# Patient Record
Sex: FEMALE | Race: WHITE | NOT HISPANIC OR LATINO | Employment: OTHER | URBAN - NONMETROPOLITAN AREA
[De-identification: names, ages, dates, MRNs, and addresses within clinical notes are randomized per-mention and may not be internally consistent; named-entity substitution may affect disease eponyms.]

---

## 2017-06-27 ENCOUNTER — OFFICE VISIT (OUTPATIENT)
Dept: ORTHOPEDIC SURGERY | Facility: CLINIC | Age: 67
End: 2017-06-27

## 2017-06-27 DIAGNOSIS — M25.562 LEFT KNEE PAIN, UNSPECIFIED CHRONICITY: ICD-10-CM

## 2017-06-27 DIAGNOSIS — M17.12 PRIMARY OSTEOARTHRITIS OF LEFT KNEE: ICD-10-CM

## 2017-06-27 DIAGNOSIS — Z96.641 STATUS POST TOTAL HIP REPLACEMENT, RIGHT: ICD-10-CM

## 2017-06-27 DIAGNOSIS — M25.551 RIGHT HIP PAIN: Primary | ICD-10-CM

## 2017-06-27 PROCEDURE — 99213 OFFICE O/P EST LOW 20 MIN: CPT | Performed by: ORTHOPAEDIC SURGERY

## 2017-06-27 PROCEDURE — 73560 X-RAY EXAM OF KNEE 1 OR 2: CPT | Performed by: ORTHOPAEDIC SURGERY

## 2017-06-27 PROCEDURE — 73502 X-RAY EXAM HIP UNI 2-3 VIEWS: CPT | Performed by: ORTHOPAEDIC SURGERY

## 2017-11-01 ENCOUNTER — CONVERSION ENCOUNTER (OUTPATIENT)
Dept: OTHER | Facility: HOSPITAL | Age: 67
End: 2017-11-01

## 2017-11-09 ENCOUNTER — CONVERSION ENCOUNTER (OUTPATIENT)
Dept: MAMMOGRAPHY | Facility: HOSPITAL | Age: 67
End: 2017-11-09

## 2018-01-23 ENCOUNTER — OFFICE VISIT CONVERTED (OUTPATIENT)
Dept: FAMILY MEDICINE CLINIC | Age: 68
End: 2018-01-23
Attending: FAMILY MEDICINE

## 2018-03-05 ENCOUNTER — OFFICE VISIT CONVERTED (OUTPATIENT)
Dept: UROLOGY | Facility: CLINIC | Age: 68
End: 2018-03-05
Attending: UROLOGY

## 2018-03-05 ENCOUNTER — CONVERSION ENCOUNTER (OUTPATIENT)
Dept: SURGERY | Facility: CLINIC | Age: 68
End: 2018-03-05

## 2018-06-11 ENCOUNTER — OFFICE VISIT CONVERTED (OUTPATIENT)
Dept: FAMILY MEDICINE CLINIC | Age: 68
End: 2018-06-11
Attending: NURSE PRACTITIONER

## 2018-08-14 ENCOUNTER — OFFICE VISIT CONVERTED (OUTPATIENT)
Dept: FAMILY MEDICINE CLINIC | Age: 68
End: 2018-08-14
Attending: NURSE PRACTITIONER

## 2018-08-21 ENCOUNTER — OFFICE VISIT (OUTPATIENT)
Dept: ORTHOPEDIC SURGERY | Facility: CLINIC | Age: 68
End: 2018-08-21

## 2018-08-21 DIAGNOSIS — M17.12 PRIMARY OSTEOARTHRITIS OF LEFT KNEE: ICD-10-CM

## 2018-08-21 DIAGNOSIS — Z96.641 HISTORY OF TOTAL HIP REPLACEMENT, RIGHT: ICD-10-CM

## 2018-08-21 DIAGNOSIS — G89.29 CHRONIC PAIN OF LEFT KNEE: Primary | ICD-10-CM

## 2018-08-21 DIAGNOSIS — Z96.641 STATUS POST TOTAL HIP REPLACEMENT, RIGHT: ICD-10-CM

## 2018-08-21 DIAGNOSIS — M25.562 CHRONIC PAIN OF LEFT KNEE: Primary | ICD-10-CM

## 2018-08-21 PROCEDURE — 99213 OFFICE O/P EST LOW 20 MIN: CPT | Performed by: ORTHOPAEDIC SURGERY

## 2018-08-21 PROCEDURE — 73560 X-RAY EXAM OF KNEE 1 OR 2: CPT | Performed by: ORTHOPAEDIC SURGERY

## 2018-08-21 PROCEDURE — 73502 X-RAY EXAM HIP UNI 2-3 VIEWS: CPT | Performed by: ORTHOPAEDIC SURGERY

## 2018-08-21 PROCEDURE — 20610 DRAIN/INJ JOINT/BURSA W/O US: CPT | Performed by: ORTHOPAEDIC SURGERY

## 2018-08-21 RX ADMIN — METHYLPREDNISOLONE ACETATE 160 MG: 80 INJECTION, SUSPENSION INTRA-ARTICULAR; INTRALESIONAL; INTRAMUSCULAR; SOFT TISSUE at 13:36

## 2018-08-21 RX ADMIN — LIDOCAINE HYDROCHLORIDE 2 ML: 10 INJECTION, SOLUTION EPIDURAL; INFILTRATION; INTRACAUDAL; PERINEURAL at 13:36

## 2018-08-21 NOTE — PROGRESS NOTES
Chief Complaint   Patient presents with   • Right Hip - Follow-up   • Left Knee - Follow-up   Patient is here today for both a right hip and left knee follow-up. She had a right hip replacement done by Dr. Bose and is having some pain. She is also still having left knee pain.        HPI the patient states that she is doing quite well with her right hip replacement.  The hip arthroplasty itself does not bother her at all.  She has some pain and discomfort laterally over the greater trochanter.  I do think this is because of increased offset from the hip arthroplasty with pain and tenderness over the greater trochanter.  Overall the hip replacement helped her symptoms tremendously.  She is able to walk a whole lot better and can walk for exercise at this point.  She states that she's been having some left knee pain and discomfort.  This pain is a dull grinding sensation which is in the retropatellar location.  Her symptoms are associated with some swelling and bruising.  She has difficulty going up and down the steps.  She has a sense of tightness in the knee when she goes into deep flexion.  There is no history of trauma to the knee.  She describes her pain is moderately severe.  The hip pain is worse at night and makes it difficult for her to turn over in bed at night.  There do not appear to be any symptoms related to the hip implant pain and discomfort.        There were no vitals filed for this visit.        Review of Systems   Constitutional: Negative.    HENT: Negative.    Eyes: Negative.    Respiratory: Negative.    Cardiovascular: Negative.    Gastrointestinal: Negative.    Endocrine: Negative.    Genitourinary: Negative.    Musculoskeletal: Positive for gait problem and joint swelling.   Skin: Negative.    Allergic/Immunologic: Negative.    Hematological: Negative.    Psychiatric/Behavioral: Negative.            Physical Exam   Constitutional: She is oriented to person, place, and time. She appears  well-nourished.   HENT:   Head: Atraumatic.   Eyes: EOM are normal.   Neck: Neck supple.   Cardiovascular: Normal rate, regular rhythm, normal heart sounds and intact distal pulses.    Pulmonary/Chest: Effort normal and breath sounds normal.   Abdominal: Bowel sounds are normal.   Musculoskeletal: She exhibits edema and tenderness.   Neurological: She is oriented to person, place, and time.   Skin: Skin is dry. Capillary refill takes 2 to 3 seconds.   Psychiatric: She has a normal mood and affect. Her behavior is normal. Thought content normal.   Nursing note and vitals reviewed.          Joint/Body Part Specific Exam:  right hip. Patient is post op 5 year(s) from total hip arthoplasty, direct posterior. Incision is clean and healing well. Calf is soft and nontender. Homans sign is negative. Skin and soft tissues are appropriate for postoperative status of the patient. Hip flexion is 0-90 degrees, hip abduction is 0-40 degrees. No limb lenth discrepancy. Neurovascular status is intact. Patients gait is significantly improved. The pain relief is extremely dramatic for the patient. Dorsalis pedis and posterior tibial artery pulses palpable. Common peroneal function is well preserved. There is no evidence of cellulitis or erythema or deep seated joint infection.    left knee. Patient has crepitus throughout range of motion. Positive patellar grind test. Mild effusion. Lachman is negative. Pivot shift is negative. Anterior and posterior drawer signs are negative. Significant joint line tenderness is noted on the medial aspect of the knee. Patient has a varus orientation of the knee. There is fullness and tenderness in the Popliteal fossa. Mild distention of a Popliteal cyst is noted in this location. Range of motion in flexion is from 0- 110° degrees. Neurovascular status is intact.  Dorsalis pedis and posterior tibial artery pulses are palpable. Common peroneal nerve function is well preserved. Patient's gait is  cautious and antalgic. Skin and soft tissues are mildly swollen, consistent with synovitis and effusion. The patient has a significant limp with the first few steps after starting the gait cycle. Getting out of a chair takes a lot of effort due to pain on knee flexion.  X-RAY Report:  right Hip X-Ray  Indication: Evaluation of implant position after total hip arthroplasty  AP and lateral  Findings: Well placed implants with a good press-fit profile without any subsidence of the implants  no bony lesion  Soft tissues within normal limits  within normal limits joint spaces  Hardware appropriately positioned yes      yes prior studies available for comparison.    X-RAY was ordered and reviewed by Alberto Bose MD    left Knee X-Ray  Indication: Evaluation of pain on the medial aspect of the knee  AP, Lateral views  Findings: Slight narrowing of the medial joint space with osteophyte formation  no bony lesion  Soft tissues within normal limits  within normal limits joint spaces  Hardware appropriately positioned not applicable      no prior studies available for comparison.    X-RAY was ordered and reviewed by Alberto Bose MD      Diagnostics:        Criss was seen today for follow-up and follow-up.    Diagnoses and all orders for this visit:    Chronic pain of left knee  -     XR Knee 1 or 2 View Left    History of total hip replacement, right  -     XR Hip With or Without Pelvis 2 - 3 View Right            Large Joint Arthrocentesis  Date/Time: 8/21/2018 1:36 PM  Consent given by: patient  Site marked: site marked  Timeout: Immediately prior to procedure a time out was called to verify the correct patient, procedure, equipment, support staff and site/side marked as required   Supporting Documentation  Indications: pain   Procedure Details  Location: hip - R greater trochanteric bursa  Preparation: Patient was prepped and draped in the usual sterile fashion  Needle size: 25 G  Approach: anteromedial  Medications  administered: 2 mL lidocaine PF 1% 1 %; 160 mg methylPREDNISolone acetate 80 MG/ML  Patient tolerance: patient tolerated the procedure well with no immediate complications              Plan:  Injected patients right greater trochanteric bursa on the base of the hip from an anteromedial approach.    Stretching and strengthening exercises of the quads and the hamstrings.    Dislocation precautions.    Tablet ibuprofen 600 mg orally twice a day for pain swelling and discomfort.    Intra-articular steroid injection to the left knee and Synvisc Visco supplementation discussed with the patient at length.  At this point she states that her knee symptoms are tolerable and she would like to proceed with nonoperative care.    Follow-up in my office in 1 year.    , GI and dental procedure prophylaxis with antibiotics to prevent anesthetic infection to the hip arthroplasty implants.

## 2018-08-29 RX ORDER — METHYLPREDNISOLONE ACETATE 80 MG/ML
160 INJECTION, SUSPENSION INTRA-ARTICULAR; INTRALESIONAL; INTRAMUSCULAR; SOFT TISSUE
Status: COMPLETED | OUTPATIENT
Start: 2018-08-21 | End: 2018-08-21

## 2018-08-29 RX ORDER — LIDOCAINE HYDROCHLORIDE 10 MG/ML
2 INJECTION, SOLUTION EPIDURAL; INFILTRATION; INTRACAUDAL; PERINEURAL
Status: COMPLETED | OUTPATIENT
Start: 2018-08-21 | End: 2018-08-21

## 2018-12-03 ENCOUNTER — OFFICE VISIT CONVERTED (OUTPATIENT)
Dept: FAMILY MEDICINE CLINIC | Age: 68
End: 2018-12-03
Attending: NURSE PRACTITIONER

## 2018-12-26 ENCOUNTER — OFFICE VISIT CONVERTED (OUTPATIENT)
Dept: FAMILY MEDICINE CLINIC | Age: 68
End: 2018-12-26
Attending: NURSE PRACTITIONER

## 2019-01-29 ENCOUNTER — OFFICE VISIT CONVERTED (OUTPATIENT)
Dept: FAMILY MEDICINE CLINIC | Age: 69
End: 2019-01-29
Attending: FAMILY MEDICINE

## 2019-03-06 ENCOUNTER — OFFICE VISIT CONVERTED (OUTPATIENT)
Dept: SURGERY | Facility: CLINIC | Age: 69
End: 2019-03-06
Attending: PHYSICIAN ASSISTANT

## 2019-04-16 ENCOUNTER — HOSPITAL ENCOUNTER (OUTPATIENT)
Dept: OTHER | Facility: HOSPITAL | Age: 69
Discharge: HOME | End: 2019-04-16
Attending: FAMILY MEDICINE

## 2019-04-17 ENCOUNTER — CONVERSION ENCOUNTER (OUTPATIENT)
Dept: OTOLARYNGOLOGY | Facility: CLINIC | Age: 69
End: 2019-04-17

## 2019-04-17 ENCOUNTER — CONVERSION ENCOUNTER (OUTPATIENT)
Dept: OTOLARYNGOLOGY | Facility: CLINIC | Age: 69
End: 2019-04-17
Attending: OTOLARYNGOLOGY

## 2019-05-09 ENCOUNTER — HOSPITAL ENCOUNTER (OUTPATIENT)
Dept: OTHER | Facility: HOSPITAL | Age: 69
Discharge: HOME | End: 2019-05-09
Attending: FAMILY MEDICINE

## 2019-05-09 ENCOUNTER — OFFICE VISIT CONVERTED (OUTPATIENT)
Dept: FAMILY MEDICINE CLINIC | Age: 69
End: 2019-05-09
Attending: FAMILY MEDICINE

## 2019-05-09 LAB
ALBUMIN SERPL-MCNC: 4.2 G/DL (ref 3.5–5)
ALBUMIN/GLOB SERPL: 1.3 {RATIO} (ref 1.4–2.6)
ALP SERPL-CCNC: 101 U/L (ref 43–160)
ALT SERPL-CCNC: 28 U/L (ref 10–40)
ANION GAP SERPL CALC-SCNC: 17 MMOL/L (ref 8–19)
AST SERPL-CCNC: 35 U/L (ref 15–50)
BILIRUB SERPL-MCNC: 0.45 MG/DL (ref 0.2–1.3)
BUN SERPL-MCNC: 13 MG/DL (ref 5–25)
BUN/CREAT SERPL: 16 {RATIO} (ref 6–20)
CALCIUM SERPL-MCNC: 9.7 MG/DL (ref 8.7–10.4)
CHLORIDE SERPL-SCNC: 104 MMOL/L (ref 99–111)
CHOLEST SERPL-MCNC: 171 MG/DL (ref 107–200)
CHOLEST/HDLC SERPL: 4.2 {RATIO} (ref 3–6)
CONV CO2: 24 MMOL/L (ref 22–32)
CONV TOTAL PROTEIN: 7.5 G/DL (ref 6.3–8.2)
CREAT UR-MCNC: 0.8 MG/DL (ref 0.5–0.9)
GFR SERPLBLD BASED ON 1.73 SQ M-ARVRAT: >60 ML/MIN/{1.73_M2}
GLOBULIN UR ELPH-MCNC: 3.3 G/DL (ref 2–3.5)
GLUCOSE SERPL-MCNC: 116 MG/DL (ref 65–99)
HDLC SERPL-MCNC: 41 MG/DL (ref 40–60)
LDLC SERPL CALC-MCNC: 106 MG/DL (ref 70–100)
OSMOLALITY SERPL CALC.SUM OF ELEC: 293 MOSM/KG (ref 273–304)
POTASSIUM SERPL-SCNC: 4.4 MMOL/L (ref 3.5–5.3)
SODIUM SERPL-SCNC: 141 MMOL/L (ref 135–147)
TRIGL SERPL-MCNC: 121 MG/DL (ref 40–150)
VLDLC SERPL-MCNC: 24 MG/DL (ref 5–37)

## 2019-05-29 ENCOUNTER — OFFICE VISIT CONVERTED (OUTPATIENT)
Dept: OTOLARYNGOLOGY | Facility: CLINIC | Age: 69
End: 2019-05-29
Attending: OTOLARYNGOLOGY

## 2019-07-31 ENCOUNTER — OFFICE VISIT CONVERTED (OUTPATIENT)
Dept: FAMILY MEDICINE CLINIC | Age: 69
End: 2019-07-31
Attending: NURSE PRACTITIONER

## 2019-07-31 ENCOUNTER — HOSPITAL ENCOUNTER (OUTPATIENT)
Dept: OTHER | Facility: HOSPITAL | Age: 69
Discharge: HOME | End: 2019-07-31
Attending: NURSE PRACTITIONER

## 2019-08-02 LAB — BACTERIA SPEC AEROBE CULT: NORMAL

## 2019-08-20 ENCOUNTER — OFFICE VISIT CONVERTED (OUTPATIENT)
Dept: FAMILY MEDICINE CLINIC | Age: 69
End: 2019-08-20
Attending: FAMILY MEDICINE

## 2019-12-19 ENCOUNTER — OFFICE VISIT CONVERTED (OUTPATIENT)
Dept: FAMILY MEDICINE CLINIC | Age: 69
End: 2019-12-19
Attending: FAMILY MEDICINE

## 2019-12-19 ENCOUNTER — HOSPITAL ENCOUNTER (OUTPATIENT)
Dept: OTHER | Facility: HOSPITAL | Age: 69
Discharge: HOME | End: 2019-12-19
Attending: FAMILY MEDICINE

## 2019-12-19 LAB
ALBUMIN SERPL-MCNC: 4.5 G/DL (ref 3.5–5)
ALBUMIN/GLOB SERPL: 1.4 {RATIO} (ref 1.4–2.6)
ALP SERPL-CCNC: 121 U/L (ref 43–160)
ALT SERPL-CCNC: 26 U/L (ref 10–40)
ANION GAP SERPL CALC-SCNC: 23 MMOL/L (ref 8–19)
AST SERPL-CCNC: 35 U/L (ref 15–50)
BILIRUB SERPL-MCNC: 0.34 MG/DL (ref 0.2–1.3)
BUN SERPL-MCNC: 12 MG/DL (ref 5–25)
BUN/CREAT SERPL: 14 {RATIO} (ref 6–20)
CALCIUM SERPL-MCNC: 10.4 MG/DL (ref 8.7–10.4)
CHLORIDE SERPL-SCNC: 103 MMOL/L (ref 99–111)
CHOLEST SERPL-MCNC: 178 MG/DL (ref 107–200)
CHOLEST/HDLC SERPL: 4 {RATIO} (ref 3–6)
CONV CO2: 24 MMOL/L (ref 22–32)
CONV TOTAL PROTEIN: 7.8 G/DL (ref 6.3–8.2)
CREAT UR-MCNC: 0.85 MG/DL (ref 0.5–0.9)
GFR SERPLBLD BASED ON 1.73 SQ M-ARVRAT: >60 ML/MIN/{1.73_M2}
GLOBULIN UR ELPH-MCNC: 3.3 G/DL (ref 2–3.5)
GLUCOSE SERPL-MCNC: 96 MG/DL (ref 65–99)
HDLC SERPL-MCNC: 44 MG/DL (ref 40–60)
LDLC SERPL CALC-MCNC: 108 MG/DL (ref 70–100)
OSMOLALITY SERPL CALC.SUM OF ELEC: 300 MOSM/KG (ref 273–304)
POTASSIUM SERPL-SCNC: 4.7 MMOL/L (ref 3.5–5.3)
SODIUM SERPL-SCNC: 145 MMOL/L (ref 135–147)
TRIGL SERPL-MCNC: 131 MG/DL (ref 40–150)
VLDLC SERPL-MCNC: 26 MG/DL (ref 5–37)

## 2020-01-28 ENCOUNTER — HOSPITAL ENCOUNTER (OUTPATIENT)
Dept: OTHER | Facility: HOSPITAL | Age: 70
Discharge: HOME | End: 2020-01-28
Attending: FAMILY MEDICINE

## 2020-02-07 ENCOUNTER — OFFICE VISIT (OUTPATIENT)
Dept: ORTHOPEDIC SURGERY | Facility: CLINIC | Age: 70
End: 2020-02-07

## 2020-02-07 VITALS — BODY MASS INDEX: 31.82 KG/M2 | HEIGHT: 65 IN | TEMPERATURE: 98.3 F | WEIGHT: 191 LBS

## 2020-02-07 DIAGNOSIS — M25.551 RIGHT HIP PAIN: Primary | ICD-10-CM

## 2020-02-07 DIAGNOSIS — Z96.641 STATUS POST TOTAL HIP REPLACEMENT, RIGHT: ICD-10-CM

## 2020-02-07 PROCEDURE — 73502 X-RAY EXAM HIP UNI 2-3 VIEWS: CPT | Performed by: ORTHOPAEDIC SURGERY

## 2020-02-07 PROCEDURE — 99213 OFFICE O/P EST LOW 20 MIN: CPT | Performed by: ORTHOPAEDIC SURGERY

## 2020-02-07 RX ORDER — ONDANSETRON 4 MG/1
4 TABLET, FILM COATED ORAL AS NEEDED
COMMUNITY
Start: 2020-01-03 | End: 2022-09-14

## 2020-02-07 RX ORDER — TAMSULOSIN HYDROCHLORIDE 0.4 MG/1
CAPSULE ORAL
COMMUNITY
Start: 2020-01-02 | End: 2021-09-09

## 2020-02-07 RX ORDER — PREDNISOLONE ACETATE 10 MG/ML
SUSPENSION/ DROPS OPHTHALMIC
COMMUNITY
Start: 2019-12-20 | End: 2021-09-09

## 2020-02-07 RX ORDER — OMEPRAZOLE 40 MG/1
CAPSULE, DELAYED RELEASE ORAL
COMMUNITY
Start: 2020-02-02 | End: 2021-09-09

## 2020-02-07 RX ORDER — TRAMADOL HYDROCHLORIDE 50 MG/1
TABLET ORAL
Qty: 30 TABLET | Refills: 0 | Status: SHIPPED | OUTPATIENT
Start: 2020-02-07 | End: 2021-09-09

## 2020-02-07 RX ORDER — TRAMADOL HYDROCHLORIDE 50 MG/1
TABLET ORAL
Qty: 30 TABLET | Refills: 0 | Status: CANCELLED | OUTPATIENT
Start: 2020-02-07

## 2020-02-07 NOTE — PROGRESS NOTES
FOLLOW UP VISIT    Patient: Criss Edwards  ?  YOB: 1950    MRN: [id number]  ?  Chief Complaint   Patient presents with   • Right Hip - Pain, Follow-up      ?  HPI: The patient is following up on a total hip arthroplasty on the right side.  She had advanced hip osteoarthritis at the time.  She has undergone a hip replacement performed by me in September 2013.  She did have a prosthesis with a modular femoral neck.  I have discussed with the patient about the options of metallosis and a localized adverse reaction with lymphocyte elevation in the vicinity of the pseudocapsule of the joint.  She states that she does not have any history of trauma.  She has difficulty with turning over in bed at night.  Most of the pain is in her buttock.  She states that the pain is intermittent but there are times when it is severe.  She has some swelling over the lateral aspect of the hip as well.  By the end of the day she has a slight limp.  Overall she states that her symptoms started around Thanksgiving and have slowly progressed and are stable at this point but she is just concerned about the fact that she has this pain.    Pain Location: right hip(s)  Radiation: none  Quality: dull, aching  Intensity/Severity: moderate  Duration: 4 months  Onset quality: gradual   Timing: intermittent  Aggravating Factors: going up and down stairs, kneeling, rising after sitting  Alleviating Factors: Tylenol, rest  Previous Episodes: none mentioned  Associated Symptoms: swelling, decreased strength  ADLs Affected: ambulating  Previous Treatment: She has had a total hip arthroplasty in September 2013.    This patient is an established patient.  This problem is not new to this examiner.      Allergies:   Allergies   Allergen Reactions   • Codeine Itching   • Iodine Hives     DENIES       Medications:   Home Medications:  Current Outpatient Medications on File Prior to Visit   Medication Sig   • atenolol (TENORMIN) 25 MG tablet    •  "escitalopram (LEXAPRO) 5 MG tablet    • fluticasone (FLONASE) 50 MCG/ACT nasal spray    • gabapentin (NEURONTIN) 300 MG capsule    • meloxicam (MOBIC) 15 MG tablet    • omeprazole (priLOSEC) 40 MG capsule    • ondansetron (ZOFRAN) 4 MG tablet    • prednisoLONE acetate (PRED FORTE) 1 % ophthalmic suspension INSTILL 1 DROP INTO BOTH EYES EVERY 12 HOURS AS DIRECTED   • tamsulosin (FLOMAX) 0.4 MG capsule 24 hr capsule    • valsartan-hydrochlorothiazide (DIOVAN-HCT) 160-12.5 MG per tablet      No current facility-administered medications on file prior to visit.      Current Medications:  Scheduled Meds:  PRN Meds:.    I have reviewed the patient's medical history in detail and updated the computerized patient record.  Review and summarization of old records include:    History reviewed. No pertinent past medical history.  Past Surgical History:   Procedure Laterality Date   • HAND SURGERY Left 05/07/2016   • HIP ARTHROPLASTY Right    • KIDNEY STONE SURGERY       Social History     Occupational History   • Not on file   Tobacco Use   • Smoking status: Never Smoker   Substance and Sexual Activity   • Alcohol use: No   • Drug use: No   • Sexual activity: Not on file      Social History     Social History Narrative   • Not on file     History reviewed. No pertinent family history.      Review of Systems   Constitutional: Negative.    HENT: Negative.    Eyes: Negative.    Respiratory: Negative.    Cardiovascular: Negative.    Gastrointestinal: Negative.    Endocrine: Negative.    Genitourinary: Negative.    Musculoskeletal: Positive for arthralgias and gait problem.   Skin: Negative.    Allergic/Immunologic: Negative.    Hematological: Negative.    Psychiatric/Behavioral: Negative.           Wt Readings from Last 3 Encounters:   02/07/20 86.6 kg (191 lb)     Ht Readings from Last 3 Encounters:   02/07/20 165.1 cm (65\")     Body mass index is 31.78 kg/m².  Facility age limit for growth percentiles is 20 years.  Vitals:    " 02/07/20 1025   Temp: 98.3 °F (36.8 °C)         Physical Exam  Constitutional: Patient is oriented to person, place, and time. Appears well-developed and well-nourished.   HENT:   Head: Normocephalic and atraumatic.   Eyes: Conjunctivae and EOM are normal. Pupils are equal, round, and reactive to light.   Cardiovascular: Normal rate, regular rhythm, normal heart sounds and intact distal pulses.   Pulmonary/Chest: Effort normal and breath sounds normal.   Musculoskeletal:   See detailed exam below   Neurological: Alert and oriented to person, place, and time. No sensory deficit. Coordination normal.   Skin: Skin is warm and dry. Capillary refill takes less than 2 seconds. No rash noted. No erythema.   Psychiatric: Patient has a normal mood and affect. Her behavior is normal. Judgment and thought content normal.   Nursing note and vitals reviewed.      Ortho Exam:     Right hip. Patient is post op 6.5 year(s) from total hip arthoplasty, direct posterior. Incision is clean and healing well. Calf is soft and nontender. Homans sign is negative. Skin and soft tissues are appropriate for postoperative status of the patient. Hip flexion is 0-80 degrees, hip abduction is 0-30 degrees. No limb lenth discrepancy. Neurovascular status is intact. Patients gait is significantly improved. The pain relief is extremely dramatic for the patient. Dorsalis pedis and posterior tibial artery pulses palpable. Common peroneal function is well preserved. There is no evidence of cellulitis or erythema or deep seated joint infection.      Diagnostics:  xrays obtained today  right Hip X-Ray  Indication: Evaluation of pain after total hip arthroplasty.  AP and lateral  Findings: Well-placed implants without any evidence of osteolysis or subsidence of the stem.  She does have a modular neck on the femoral component.  no bony lesion  Soft tissues within normal limits  within normal limits joint spaces  Hardware appropriately positioned  yes      yes prior studies available for comparison.    X-RAY was ordered and reviewed by Alberto Bose MD        Assessment:  Criss was seen today for pain and follow-up.    Diagnoses and all orders for this visit:    Right hip pain  -     XR Hip With or Without Pelvis 2 - 3 View Right    Status post total hip replacement, right          Procedures  ?    Plan    · Very extensive discussion with the patient about the modularity of the femoral neck and the possibility of metallosis with an adverse local reaction.  If that were to be the case we would go ahead and check some serum ionic levels as far as metal ions are concerned.  The patient understands that this situation could possibly lead to revision surgery.  She states that at this point she is really not interested in any form of revision surgery because she has multiple medical issues and does not wish to consider the potential complications of a complex surgical revision procedure.  · Calcium and vitamin D for bone health.  · If her symptoms continue to bother her we will go ahead and get some blood work to make sure that she does not have any adverse reaction such as an infective pathology.  We would recommend CBC with differential, sedimentation rate and C-reactive protein.  · Also discussed with the patient were getting a triple phase bone scan to make sure that there is no loosening of the components whether or not that is related to metallic small particle disease.  · Tablet Ultram 50 mg tab 1 p.o. nightly for pain swelling and discomfort.  Total 30 pills no refills.  · Controlled substance treatment options discussed in detail. Patient's signed consent to medical options on file. ASHLIE form in chart.  The patient is being provided this narcotic prescription to address the acute medical condition that they are undergoing/experiencing at this time.  It is my medical and surgical assessment that more than 3 days of narcotic medication is necessary to help  control the pain and discomfort that this patient is experiencing at this point.  Risks of narcotic medication usage outlined.  Possibility of physical and psychological dependence and abuse, especially long term, emphasized to the patient.  I have explained to the patient that we will try to wean them off the narcotic medication as soon as possible and introduce non-narcotic modalities of pain control.  At this point in the patient's clinical spectrum, however, alternative available treatments are inadequate to control their pain and symptoms.  I have also discussed the possibility of random drug testing as well as pill counts to prevent misuse and misappropriation of the narcotic medications prescribed to the patient.  · Rest, ice, compression, and elevation (RICE) therapy  · Stretching and strengthening exercises  · Tylenol 500-1000mg by mouth every 6 hours as needed for pain   · Follow up in 6 month(s) and the patient states that she understands to call us if there is any worsening of her symptoms that might require further investigation and possible surgical consideration.    Date of encounter: 02/07/2020   Alberto Bose MD

## 2020-02-10 PROBLEM — M25.551 RIGHT HIP PAIN: Status: ACTIVE | Noted: 2020-02-10

## 2020-03-11 ENCOUNTER — OFFICE VISIT CONVERTED (OUTPATIENT)
Dept: SURGERY | Facility: CLINIC | Age: 70
End: 2020-03-11
Attending: PHYSICIAN ASSISTANT

## 2020-03-11 ENCOUNTER — HOSPITAL ENCOUNTER (OUTPATIENT)
Dept: SURGERY | Facility: CLINIC | Age: 70
Discharge: HOME | End: 2020-03-11
Attending: PHYSICIAN ASSISTANT

## 2020-03-13 LAB — BACTERIA UR CULT: NORMAL

## 2020-04-06 ENCOUNTER — OFFICE VISIT CONVERTED (OUTPATIENT)
Dept: FAMILY MEDICINE CLINIC | Age: 70
End: 2020-04-06
Attending: FAMILY MEDICINE

## 2020-04-14 ENCOUNTER — TELEPHONE CONVERTED (OUTPATIENT)
Dept: UROLOGY | Facility: CLINIC | Age: 70
End: 2020-04-14
Attending: UROLOGY

## 2020-04-17 ENCOUNTER — OFFICE VISIT CONVERTED (OUTPATIENT)
Dept: FAMILY MEDICINE CLINIC | Age: 70
End: 2020-04-17
Attending: FAMILY MEDICINE

## 2020-04-24 ENCOUNTER — OFFICE VISIT CONVERTED (OUTPATIENT)
Dept: FAMILY MEDICINE CLINIC | Age: 70
End: 2020-04-24
Attending: FAMILY MEDICINE

## 2020-05-01 ENCOUNTER — HOSPITAL ENCOUNTER (OUTPATIENT)
Dept: OTHER | Facility: HOSPITAL | Age: 70
Discharge: HOME | End: 2020-05-01
Attending: FAMILY MEDICINE

## 2020-05-01 ENCOUNTER — OFFICE VISIT CONVERTED (OUTPATIENT)
Dept: FAMILY MEDICINE CLINIC | Age: 70
End: 2020-05-01
Attending: FAMILY MEDICINE

## 2020-05-01 LAB
25(OH)D3 SERPL-MCNC: 27 NG/ML (ref 30–100)
ALBUMIN SERPL-MCNC: 4.2 G/DL (ref 3.5–5)
ALBUMIN/GLOB SERPL: 1.4 {RATIO} (ref 1.4–2.6)
ALP SERPL-CCNC: 111 U/L (ref 43–160)
ALT SERPL-CCNC: 25 U/L (ref 10–40)
ANION GAP SERPL CALC-SCNC: 16 MMOL/L (ref 8–19)
AST SERPL-CCNC: 30 U/L (ref 15–50)
BASOPHILS # BLD MANUAL: 0.03 10*3/UL (ref 0–0.2)
BASOPHILS NFR BLD MANUAL: 0.5 % (ref 0–3)
BILIRUB SERPL-MCNC: 0.27 MG/DL (ref 0.2–1.3)
BUN SERPL-MCNC: 7 MG/DL (ref 5–25)
BUN/CREAT SERPL: 9 {RATIO} (ref 6–20)
CALCIUM SERPL-MCNC: 9.9 MG/DL (ref 8.7–10.4)
CHLORIDE SERPL-SCNC: 101 MMOL/L (ref 99–111)
CONV CO2: 27 MMOL/L (ref 22–32)
CONV TOTAL PROTEIN: 7.2 G/DL (ref 6.3–8.2)
CREAT UR-MCNC: 0.75 MG/DL (ref 0.5–0.9)
DEPRECATED RDW RBC AUTO: 44.9 FL
EOSINOPHIL # BLD MANUAL: 0.21 10*3/UL (ref 0–0.7)
EOSINOPHIL NFR BLD MANUAL: 3.6 % (ref 0–7)
ERYTHROCYTE [DISTWIDTH] IN BLOOD BY AUTOMATED COUNT: 13 % (ref 11.5–14.5)
GFR SERPLBLD BASED ON 1.73 SQ M-ARVRAT: >60 ML/MIN/{1.73_M2}
GLOBULIN UR ELPH-MCNC: 3 G/DL (ref 2–3.5)
GLUCOSE SERPL-MCNC: 134 MG/DL (ref 65–99)
GRANS (ABSOLUTE): 3.26 10*3/UL (ref 2–8)
GRANS: 55.6 % (ref 30–85)
HBA1C MFR BLD: 13.4 G/DL (ref 12–16)
HCT VFR BLD AUTO: 40.3 % (ref 37–47)
IMM GRANULOCYTES # BLD: 0.01 10*3/UL (ref 0–0.54)
IMM GRANULOCYTES NFR BLD: 0.2 % (ref 0–0.43)
LYMPHOCYTES # BLD MANUAL: 1.95 10*3/UL (ref 1–5)
LYMPHOCYTES NFR BLD MANUAL: 6.8 % (ref 3–10)
MCH RBC QN AUTO: 31 PG (ref 27–31)
MCHC RBC AUTO-ENTMCNC: 33.3 G/DL (ref 33–37)
MCV RBC AUTO: 93.3 FL (ref 81–99)
MONOCYTES # BLD AUTO: 0.4 10*3/UL (ref 0.2–1.2)
OSMOLALITY SERPL CALC.SUM OF ELEC: 290 MOSM/KG (ref 273–304)
PLATELET # BLD AUTO: 185 10*3/UL (ref 130–400)
PMV BLD AUTO: 9.3 FL (ref 7.4–10.4)
POTASSIUM SERPL-SCNC: 4.3 MMOL/L (ref 3.5–5.3)
RBC # BLD AUTO: 4.32 10*6/UL (ref 4.2–5.4)
SODIUM SERPL-SCNC: 140 MMOL/L (ref 135–147)
TSH SERPL-ACNC: 2.95 M[IU]/L (ref 0.27–4.2)
VARIANT LYMPHS NFR BLD MANUAL: 33.3 % (ref 20–45)
VIT B12 SERPL-MCNC: 414 PG/ML (ref 211–911)
WBC # BLD AUTO: 5.86 10*3/UL (ref 4.8–10.8)

## 2020-05-15 ENCOUNTER — OFFICE VISIT CONVERTED (OUTPATIENT)
Dept: FAMILY MEDICINE CLINIC | Age: 70
End: 2020-05-15
Attending: FAMILY MEDICINE

## 2020-06-26 ENCOUNTER — OFFICE VISIT CONVERTED (OUTPATIENT)
Dept: FAMILY MEDICINE CLINIC | Age: 70
End: 2020-06-26
Attending: FAMILY MEDICINE

## 2020-08-06 ENCOUNTER — HOSPITAL ENCOUNTER (OUTPATIENT)
Dept: OTHER | Facility: HOSPITAL | Age: 70
Discharge: HOME | End: 2020-08-06
Attending: ORTHOPAEDIC SURGERY

## 2020-08-06 ENCOUNTER — OFFICE VISIT (OUTPATIENT)
Dept: ORTHOPEDIC SURGERY | Facility: CLINIC | Age: 70
End: 2020-08-06

## 2020-08-06 VITALS — TEMPERATURE: 98.3 F | WEIGHT: 183 LBS | HEIGHT: 63 IN | BODY MASS INDEX: 32.43 KG/M2

## 2020-08-06 DIAGNOSIS — Z96.641 STATUS POST TOTAL HIP REPLACEMENT, RIGHT: Primary | ICD-10-CM

## 2020-08-06 PROCEDURE — 99213 OFFICE O/P EST LOW 20 MIN: CPT | Performed by: ORTHOPAEDIC SURGERY

## 2020-08-06 NOTE — PROGRESS NOTES
FOLLOW UP VISIT    Patient: Criss Edwards  ?  YOB: 1950    MRN: [id number]  ?  Chief Complaint   Patient presents with   • Right Hip - Follow-up      ?  HPI:   Criss South is following up on a right total hip arthroplasty.  The surgery was performed by me through a posterior approach in 2013.  About 6 months ago she had a lot of pain and discomfort which has since settled down.  She does not have any discomfort at this point.  Apparently her PCP diagnosed her with a vitamin D deficiency.  She was placed on large doses of vitamin D and calcium supplementation.  This has helped to improve her bone pain significantly for the better.  She states that she is ambulating well and is able to walk for exercise.  She does not have any limb length discrepancy.  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      Pain Location: RIGHT hip  Radiation: none  Quality: dull  Intensity/Severity: mild   Duration: 7 years  Onset quality: gradual   Timing: intermittent  Aggravating Factors: walking, squatting  Alleviating Factors: NSAIDs  Previous Episodes: yes  Associated Symptoms: swelling  ADLs Affected: recreational activities/sports  Previous Treatment: right total hip arthroplasty    This patient is an established patient.  This problem is not new to this examiner.      Allergies:   Allergies   Allergen Reactions   • Codeine Itching   • Iodine Hives     DENIES       Medications:   Home Medications:  Current Outpatient Medications on File Prior to Visit   Medication Sig   • atenolol (TENORMIN) 25 MG tablet    • escitalopram (LEXAPRO) 5 MG tablet    • fluticasone (FLONASE) 50 MCG/ACT nasal spray    • gabapentin (NEURONTIN) 300 MG capsule    • meloxicam (MOBIC) 15 MG tablet    • omeprazole (priLOSEC) 40 MG capsule    • ondansetron (ZOFRAN) 4 MG tablet    • prednisoLONE acetate (PRED FORTE) 1 % ophthalmic suspension INSTILL 1 DROP INTO BOTH EYES EVERY 12 HOURS  "AS DIRECTED   • tamsulosin (FLOMAX) 0.4 MG capsule 24 hr capsule    • traMADol (ULTRAM) 50 MG tablet 1 PO QHD   • valsartan-hydrochlorothiazide (DIOVAN-HCT) 160-12.5 MG per tablet      No current facility-administered medications on file prior to visit.      Current Medications:  Scheduled Meds:  PRN Meds:.    I have reviewed the patient's medical history in detail and updated the computerized patient record.  Review and summarization of old records include:    History reviewed. No pertinent past medical history.  Past Surgical History:   Procedure Laterality Date   • HAND SURGERY Left 05/07/2016   • HIP ARTHROPLASTY Right    • KIDNEY STONE SURGERY       Social History     Occupational History   • Not on file   Tobacco Use   • Smoking status: Never Smoker   Substance and Sexual Activity   • Alcohol use: No   • Drug use: No   • Sexual activity: Not on file      History reviewed. No pertinent family history.      Review of Systems   Constitutional: Negative.    Eyes: Negative.    Respiratory: Negative.    Cardiovascular: Negative.    Gastrointestinal: Negative.    Endocrine: Negative.    Genitourinary: Negative.    Musculoskeletal: Positive for arthralgias and gait problem.   Skin: Negative.    Allergic/Immunologic: Negative.    Hematological: Negative.    Psychiatric/Behavioral: Negative.           Wt Readings from Last 3 Encounters:   08/06/20 83 kg (183 lb)   02/07/20 86.6 kg (191 lb)     Ht Readings from Last 3 Encounters:   08/06/20 160 cm (63\")   02/07/20 165.1 cm (65\")     Body mass index is 32.42 kg/m².  Facility age limit for growth percentiles is 20 years.  Vitals:    08/06/20 0818   Temp: 98.3 °F (36.8 °C)         Physical Exam  Constitutional: Patient is oriented to person, place, and time. Appears well-developed and well-nourished.   HENT:   Head: Normocephalic and atraumatic.   Eyes: Conjunctivae and EOM are normal. Pupils are equal, round, and reactive to light.   Cardiovascular: Normal rate, regular " rhythm, normal heart sounds and intact distal pulses.   Pulmonary/Chest: Effort normal and breath sounds normal.   Musculoskeletal:   See detailed exam below   Neurological: Alert and oriented to person, place, and time. No sensory deficit. Coordination normal.   Skin: Skin is warm and dry. Capillary refill takes less than 2 seconds. No rash noted. No erythema.   Psychiatric: Patient has a normal mood and affect. Her behavior is normal. Judgment and thought content normal.   Nursing note and vitals reviewed.      Ortho Exam:       Right hip. Patient is post op 7 year(s) from total hip arthoplasty, direct posterior. Incision is clean and healing well. Calf is soft and nontender. Homans sign is negative. Skin and soft tissues are appropriate for postoperative status of the patient. Hip flexion is 0-90 degrees, hip abduction is 0-30 degrees. No limb lenth discrepancy. Neurovascular status is intact. Patients gait is significantly improved. The pain relief is extremely dramatic for the patient. Dorsalis pedis and posterior tibial artery pulses palpable. Common peroneal function is well preserved. There is no evidence of cellulitis or erythema or deep seated joint infection.          Diagnostics:  Right hip xrays ap/lateral views were ordered by Alberto Bose MD and performed at [facility]. These images were independently viewed and interpreted by myself, my impression as follows:    right Hip X-Ray  Indication: Evaluation of implant position after total hip arthroplasty.  AP and lateral  Findings: The patient has a modular femoral neck and head prosthesis in place.  I do not see any evidence of bone loss either over the greater trochanter or medially over the calcar of the femur.  no bony lesion  Soft tissues within normal limits  within normal limits joint spaces  Hardware appropriately positioned yes      yes prior studies available for comparison.    X-RAY was ordered and reviewed by Alberto  MD Juice        Assessment:  Criss was seen today for follow-up.    Diagnoses and all orders for this visit:    Status post total hip replacement, right  -     XR Hip With or Without Pelvis 2 - 3 View Right; Future          Procedures  ?    Plan    · Dislocation precautions and falls precaution.  · , GI and dental procedure prophylaxis with antibiotics to prevent metastatic infection of the hip arthroplasty implants.  · Calcium and vitamin D for bone health.  · Rest, ice, compression, and elevation (RICE) therapy  · Stretching and strengthening exercises of the hip flexors and abductors.  · Alternate Ibuprofen and Tylenol as needed  · Follow up in 1 year(s)    Date of encounter: 08/06/2020   Alberto Bose MD

## 2020-08-18 ENCOUNTER — HOSPITAL ENCOUNTER (OUTPATIENT)
Dept: OTHER | Facility: HOSPITAL | Age: 70
Discharge: HOME | End: 2020-08-18
Attending: FAMILY MEDICINE

## 2020-08-19 LAB — 25(OH)D3 SERPL-MCNC: 55.2 NG/ML (ref 30–100)

## 2020-08-25 ENCOUNTER — OFFICE VISIT CONVERTED (OUTPATIENT)
Dept: FAMILY MEDICINE CLINIC | Age: 70
End: 2020-08-25
Attending: FAMILY MEDICINE

## 2021-01-26 ENCOUNTER — HOSPITAL ENCOUNTER (OUTPATIENT)
Dept: OTHER | Facility: HOSPITAL | Age: 71
Discharge: HOME | End: 2021-01-26
Attending: FAMILY MEDICINE

## 2021-01-26 ENCOUNTER — OFFICE VISIT CONVERTED (OUTPATIENT)
Dept: FAMILY MEDICINE CLINIC | Age: 71
End: 2021-01-26
Attending: FAMILY MEDICINE

## 2021-01-26 LAB
ALBUMIN SERPL-MCNC: 4.4 G/DL (ref 3.5–5)
ALBUMIN/GLOB SERPL: 1.3 {RATIO} (ref 1.4–2.6)
ALP SERPL-CCNC: 126 U/L (ref 43–160)
ALT SERPL-CCNC: 30 U/L (ref 10–40)
ANION GAP SERPL CALC-SCNC: 16 MMOL/L (ref 8–19)
AST SERPL-CCNC: 37 U/L (ref 15–50)
BILIRUB SERPL-MCNC: 0.38 MG/DL (ref 0.2–1.3)
BUN SERPL-MCNC: 12 MG/DL (ref 5–25)
BUN/CREAT SERPL: 14 {RATIO} (ref 6–20)
CALCIUM SERPL-MCNC: 9.9 MG/DL (ref 8.7–10.4)
CHLORIDE SERPL-SCNC: 104 MMOL/L (ref 99–111)
CHOLEST SERPL-MCNC: 179 MG/DL (ref 107–200)
CHOLEST/HDLC SERPL: 4 {RATIO} (ref 3–6)
CONV CO2: 26 MMOL/L (ref 22–32)
CONV TOTAL PROTEIN: 7.7 G/DL (ref 6.3–8.2)
CREAT UR-MCNC: 0.88 MG/DL (ref 0.5–0.9)
GFR SERPLBLD BASED ON 1.73 SQ M-ARVRAT: >60 ML/MIN/{1.73_M2}
GLOBULIN UR ELPH-MCNC: 3.3 G/DL (ref 2–3.5)
GLUCOSE SERPL-MCNC: 113 MG/DL (ref 65–99)
HDLC SERPL-MCNC: 45 MG/DL (ref 40–60)
LDLC SERPL CALC-MCNC: 109 MG/DL (ref 70–100)
OSMOLALITY SERPL CALC.SUM OF ELEC: 295 MOSM/KG (ref 273–304)
POTASSIUM SERPL-SCNC: 4.4 MMOL/L (ref 3.5–5.3)
SODIUM SERPL-SCNC: 142 MMOL/L (ref 135–147)
TRIGL SERPL-MCNC: 125 MG/DL (ref 40–150)
VLDLC SERPL-MCNC: 25 MG/DL (ref 5–37)

## 2021-02-03 ENCOUNTER — OFFICE VISIT CONVERTED (OUTPATIENT)
Dept: OTOLARYNGOLOGY | Facility: CLINIC | Age: 71
End: 2021-02-03
Attending: OTOLARYNGOLOGY

## 2021-04-22 ENCOUNTER — HOSPITAL ENCOUNTER (OUTPATIENT)
Dept: OTHER | Facility: HOSPITAL | Age: 71
Discharge: HOME | End: 2021-04-22
Attending: FAMILY MEDICINE

## 2021-05-12 NOTE — PROGRESS NOTES
Quick Note      Patient Name: Criss Edwards   Patient ID: [id number]   Sex: Female   YOB: 1950    Primary Care Provider: Clarisse Flores MD   Referring Provider: Nereida Jeffers PA-C    Visit Date: April 14, 2020    Provider: Thong Tiwari MD   Location: [facility]   Location Address: [address] [place] [address]  [id number]   Location Phone: [phone]          History Of Present Illness  TELEHEALTH VISIT  Chief Complaint: Urinary Incontinence   Criss Edwards is a 69 year old /White female who is presenting for evaluation via telehealth visit. Verbal consent obtained before beginning visit.   Provider spent 12 minutes with the patient during telehealth visit.   The following staff were present during this visit: Donya June   Past Medical History/Overview of Patient Symptoms       HPI    69-year-old  female with a horseshoe kidney and chronic pelvic pain, incontinence and history of nephrolithiasis    Patient over the last years been having worse incontinence.  Patient has mixed incontinence    UUI -patient has been having worsening urgency over the last year.  She did try oxybutynin 5 mg XL over the last month with much improvement.  She got down to wearing 3 2-3 pads daily which is an improvement for her.      No history of glaucoma    MEL -patient does have leakage with coughing/sneezing and picking up things.  This does bother her at times she is never had any procedures for this.  She has done Kegels in the past.    Myrbetriqmade her nauseated           Assessment  · Mixed stress and urge urinary incontinence     788.33/N39.46      Plan  · Orders  o Physician Telephone Evaluation, 11-20 minutes ([id number]) - 788.33/N39.46 - 04/14/2020  · Medications  o Medications have been Reconciled  o Transition of Care or Provider Policy  · Instructions  o Plan Of Care:   o Electronically Identified Patient Education Materials Provided Electronically       Patient is liking  the way oxybutynin 5 mg XL works, but it is costly for patient.  At this point is not made her constipation any worse.    After discussion we will place her on oxybutynin 10 mg XL daily.  Risk/benefit and side effect discussed.  She will watch for constipation dry eyes and dry mouth.        I will talk to her with a telephone visit in 1 month see how things are going             Electronically Signed by: Thong Tiwari MD -Author on April 14, 2020 10:48:46 AM

## 2021-05-14 VITALS — TEMPERATURE: 97.1 F | BODY MASS INDEX: 33.4 KG/M2 | HEIGHT: 63 IN | WEIGHT: 188.5 LBS

## 2021-05-14 NOTE — PROGRESS NOTES
Progress Note      Patient Name: Criss Edwards   Patient ID: [id number]   Sex: Female   YOB: 1950    Primary Care Provider: Clarisse Flores MD   Referring Provider: Nereida Jeffers PA-C    Visit Date: February 3, 2021    Provider: Tyrese Terrazas MD   Location: [facility]   Location Address: [address] [place] [address]  [address] [place] [address]  [id number]   Location Phone: [phone]          Chief Complaint     1.  Cerumen impactions    2.  Hearing loss    3.  Intermittent dizzy spells    4.  TMJ       History Of Present Illness  Criss Edwards is a 70 year old /White female who presents to the office today for a follow-up visit.      She presents the clinic today for reevaluation of continued issues with hearing loss, cerumen impaction, ear pain, and intermittent vertigo.  She notes that her hearing has been stable, and she does have difficulty hearing in day-to-day situations.  I previously saw her in 2019 and did recommend hearing aids, but she has not had a chance to get these yet.  She does complain about ear drainage that she states is mostly yellow.  She has not had any significant issues with recurrent ear disease.  She does note bilateral ear pain that radiates down the left side of her neck.  She does have pain with chewing and tenderness around the temporomandibular joint areas bilaterally, today worse on the right.    She also has had issues with intermittent dizzy spells.  She states that the spells are short, lasting seconds.  She describes true vertigo.  She has had some issues with her balance and has sustained several falls.  She notes that she is not very physically active.  She has denied any headaches with this.  She has used meclizine but has not gotten any long-term relief.       Past Medical History  Dysuria; Gross hematuria; Hearing loss; Hematuria, gross; High blood pressure; Kidney stones; Meniere's Disease; Mitral valve prolapse; Mixed  "stress and urge urinary incontinence; Nephrolithiasis; Otalgia; Tinnitus         Past Surgical History  Back surgery; Gallbladder; Hip replacement, right; Hysterectomy; Kidney Stone Surgery, Unspecified; Oopherectomy         Medication List  buspirone 5 mg oral tablet; Cymbalta 60 mg oral capsule,delayed release(DR/EC); meclizine 12.5 mg oral tablet; meloxicam 15 mg oral tablet; omeprazole 40 mg oral capsule,delayed release(DR/EC); oxybutynin chloride 10 mg oral tablet extended release 24hr; valsartan 40 mg oral tablet         Allergy List  Allergic to IV contrast         Family Medical History  Family history of heart disease; Family history of diabetes mellitus         Social History  Tobacco (Never)         Review of Systems  · Constitutional  o Denies  o : fever, night sweats, weight loss  · Eyes  o Denies  o : discharge from eye, impaired vision  · HENT  o Admits  o : *See HPI  · Cardiovascular  o Denies  o : chest pain, irregular heart beats  · Respiratory  o Denies  o : shortness of breath, wheezing, coughing up blood  · Gastrointestinal  o Denies  o : heartburn, reflux, vomiting blood  · Genitourinary  o Denies  o : frequency  · Integument  o Denies  o : rash, skin dryness  · Neurologic  o Denies  o : seizures, loss of balance, loss of consciousness, dizziness  · Endocrine  o Denies  o : cold intolerance, heat intolerance  · Heme-Lymph  o Denies  o : easy bleeding, anemia      Vitals  Date Time BP Position Site L\R Cuff Size HR RR TEMP (F) WT  HT  BMI kg/m2 BSA m2 O2 Sat FR L/min FiO2        02/03/2021 12:57 PM        97.1 188lbs 8oz 5'  3\" 33.39 1.95             Physical Examination  · Constitutional  o Appearance  o : well developed, well-nourished, alert and in no acute distress, voice clear and strong  · Head and Face  o Head  o :   § Inspection  § : no deformities or lesions  o Face  o :   § Inspection  § : No facial lesions; House-Brackmann I/VI bilaterally  § Palpation  § : Bilateral TMJ crepitus " and discomfort  · Eyes  o Vision  o :   § Visual Fields  § : Extraocular movements are intact. No spontaneous or gaze-induced nystagmus.  o Conjunctivae  o : clear  o Sclerae  o : clear  o Pupils and Irises  o : pupils equal, round, and reactive to light.   · Ears, Nose, Mouth and Throat  o Ears  o :   § External Ears  § : appearance within normal limits, no lesions present  § Otoscopic Examination  § : Cerumen impactions bilaterally, cleared with curettes and microscope, tympanic membrane appearance within normal limits bilaterally without perforations, well-aerated middle ears  § Hearing  § : intact to conversational voice both ears  o Nose  o :   § External Nose  § : appearance normal  § Intranasal Exam  § : mucosa within normal limits, vestibules normal, no intranasal lesions present, septum midline, sinuses non tender to percussion  o Oral Cavity  o :   § Oral Mucosa  § : oral mucosa normal without pallor or cyanosis  § Lips  § : lip appearance normal  § Teeth  § : normal dentition for age  § Gums  § : gums pink, non-swollen, no bleeding present  § Tongue  § : tongue appearance normal; normal mobility  § Palate  § : hard palate normal, soft palate appearance normal with symmetric mobility  o Throat  o :   § Oropharynx  § : no inflammation or lesions present, tonsils within normal limits  § Hypopharynx  § : appearance within normal limits, superior epiglottis within normal limits  § Larynx  § : appearance within normal limits, vocal cords within normal limits, no lesions present  · Neck  o Inspection/Palpation  o : normal appearance, no masses or tenderness, trachea midline; thyroid size normal, nontender, no nodules or masses present on palpation  · Respiratory  o Respiratory Effort  o : breathing unlabored  o Inspection of Chest  o : normal appearance, no retractions  · Cardiovascular  o Heart  o : regular rate and rhythm  · Lymphatic  o Neck  o : no lymphadenopathy present  o Supraclavicular Nodes  o : no  lymphadenopathy present  o Preauricular Nodes  o : no lymphadenopathy present  · Skin and Subcutaneous Tissue  o General Inspection  o : Regarding face and neck - there are no rashes present, no lesions present, and no areas of discoloration  · Neurologic  o Cranial Nerves  o : cranial nerves II-XII are grossly intact bilaterally  o Gait and Station  o : normal gait, [place]-Hallpike maneuver negative bilaterally, able to stand without diffculty  · Psychiatric  o Judgement and Insight  o : judgment and insight intact  o Mood and Affect  o : mood normal, affect appropriate          Assessment  · Cerumen impaction     380.4/H61.20  · Hearing loss     389.9/H91.90  · TMJ syndrome     524.69/M26.629  · Vertigo     780.4/R42      Plan  · Medications  o Medications have been Reconciled  o Transition of Care or Provider Policy  · Instructions  o She presents the clinic today for reevaluation of continued issues with hearing loss, cerumen impaction, ear pain, and intermittent vertigo. She notes that her hearing has been stable, and she does have difficulty hearing in day-to-day situations. I previously saw her in 2019 and did recommend hearing aids, but she has not had a chance to get these yet. She does complain about ear drainage that she states is mostly yellow. She has not had any significant issues with recurrent ear disease. She does note bilateral ear pain that radiates down the left side of her neck. She does have pain with chewing and tenderness around the temporomandibular joint areas bilaterally, today worse on the right.She also has had issues with intermittent dizzy spells. She states that the spells are short, lasting seconds. She describes true vertigo. She has had some issues with her balance and has sustained several falls. She notes that she is not very physically active. She has denied any headaches with this. She has used meclizine but has not gotten any long-term relief. On exam, she did have cerumen  impactions which I was able to clear. Her ears look to be free of any fluid or infection. There was TMJ tenderness bilaterally, and I recommended massaging the temporalis and masseter muscles as well as jaw stretching exercises and maintaining a soft diet. Oral exam and neck exam were normal. She does have some musculoskeletal neck pain but no palpable abnormalities. I have asked her to do some vestibular exercises and to stop using meclizine. If this does not resolve her issues, I have asked her to contact me for formal vestibular testing.  o Electronically Identified Patient Education Materials Provided Electronically            Electronically Signed by: Tyrese Terrazas MD -Author on February 3, 2021 03:18:00 PM

## 2021-05-15 VITALS — BODY MASS INDEX: 33.55 KG/M2 | RESPIRATION RATE: 12 BRPM | HEIGHT: 63 IN | WEIGHT: 189.37 LBS

## 2021-05-15 VITALS
SYSTOLIC BLOOD PRESSURE: 127 MMHG | OXYGEN SATURATION: 95 % | HEART RATE: 80 BPM | TEMPERATURE: 97.7 F | RESPIRATION RATE: 18 BRPM | BODY MASS INDEX: 34.09 KG/M2 | DIASTOLIC BLOOD PRESSURE: 77 MMHG | HEIGHT: 63 IN | WEIGHT: 192.37 LBS

## 2021-05-15 VITALS
SYSTOLIC BLOOD PRESSURE: 123 MMHG | BODY MASS INDEX: 33.69 KG/M2 | DIASTOLIC BLOOD PRESSURE: 76 MMHG | OXYGEN SATURATION: 95 % | RESPIRATION RATE: 16 BRPM | HEART RATE: 72 BPM | HEIGHT: 63 IN | WEIGHT: 190.12 LBS

## 2021-05-15 VITALS — HEIGHT: 63 IN | WEIGHT: 190.25 LBS | BODY MASS INDEX: 33.71 KG/M2 | RESPIRATION RATE: 12 BRPM

## 2021-05-16 VITALS — HEIGHT: 63 IN | BODY MASS INDEX: 31.89 KG/M2 | WEIGHT: 180 LBS | RESPIRATION RATE: 15 BRPM

## 2021-05-18 NOTE — PROGRESS NOTES
Criss Edwards 1950     Office/Outpatient Visit    Visit Date: Tue, Jan 23, 2018 08:51 am    Provider: Clarisse Flores MD (Assistant: Rosaura Wagner MA)    Location: [facility]        Electronically signed by Clarisse Flores MD on  01/23/2018 10:53:06 AM                             SUBJECTIVE:        CC: Medicare AWV         HPI:     She is due for colonoscopy.  Pap smears are no longer indicated by age and history; s/p total hysterectomy.  She is UTD on mammogram, last done 11/2017.  She had diagnostic mammo with U/S that was benign and 1 year f/u was recommended.  She is due for DEXA, last done 10/2015 and this showed osteopenia.  She is UTD on Prevnar (11/2016), Zostavax (5/2010), Td (10/2012), and flu (9/2017).  She is due for routine labs including HTN panel and HCV ab screen.     Ms. Edwards is here for a Medicare wellness visit.  In regard to the Five Item Geriatric Depression Scale, she reports dissatisfaction with her life and feeling worthless the way she is now, but not getting bored often, feeling helpless or preferring to stay at home rather than going out and doing new things.  Total score is 2 Returning to health checkup, individual and family medical history was reviewed and updated A list of current providers and suppliers reviewed and updated A current height, weight, BMI, blood pressure, and pulse were recorded in the vitals section of the note and have been reviewed A review of cognitive impairment was performed and was negative.  A review of functional ability and level of safety was performed and was negative In regard to hearing, she reports having trouble hearing the TV/radio when others do not and having to strain to hear or understand conversations, but not wearing hearing aid(s).  Concerning home safety, she reports that at home she DOES have poor lighting, handrails on stairs and functioning smoke alarms, but not throw rugs, a slippery bath or shower or grab bars in  the bath.      Immunization Status: ** Has not received pneumococcal vaccination;     Physical Activity: Exercises at least 3 times per week; Has not had any falls or only one fall without injury in the past year.  Advance Care Directive updated today in PMH Tobacco: She has never smoked.    Preventative Health updated today     Score of 2.  She is currently being treated for depression.     ROS:     CONSTITUTIONAL:  Negative for fatigue and fever.      EYES:  Negative for blurred vision.      E/N/T:  Positive for diminished hearing ( bilaterally ).   Negative for nasal congestion.      CARDIOVASCULAR:  Negative for chest pain and palpitations.      RESPIRATORY:  Negative for recent cough and dyspnea.      GASTROINTESTINAL:  Negative for abdominal pain, constipation, diarrhea, nausea and vomiting.      GENITOURINARY:  Negative for dysuria and urinary incontinence.      MUSCULOSKELETAL:  Positive for arthralgias.   Negative for myalgias.      NEUROLOGICAL:  Negative for paresthesias and weakness.      PSYCHIATRIC:  Positive for anxiety and feelings of stress.   Negative for crying spells, depression, anhedonia, difficulty concentrating or sleep disturbance.          PM/[facility]/SH:     Last Reviewed on 1/23/2018 09:12 AM by Clarisse Flores    Past Medical History:                 PAST MEDICAL HISTORY         Hypertension     Renal Stones         CURRENT MEDICAL PROVIDERS:    Nephrologist: Te    Orthopedist: Juice             ADVANCED DIRECTIVES: None         PREVENTIVE HEALTH MAINTENANCE             BONE DENSITY: was last done 10/28/15 with the following abnormality noted-- osteopenia     EYE EXAM: was last done 2016     MAMMOGRAM: Done within last 2 years and results in are chart was last done 11/2017 with normal results         Surgical History:         Cholecystectomy    Hysterectomy: total;     Joint Replacement: R KENZIE;      back surgery;    kidney stone removal;         Family History:     Father: Congestive  Heart Failure     Mother: Myocardial Infarction;  Cerebrovascular Accident     Brother(s): Coronary Artery Disease ( at 46 yo )     Sister(s): Coronary Artery Disease; Endocrine Type 2 Diabetes         Social History:     Occupation: Retired (Prior occupation: [employer])     Marital Status: Single     Children: 2 children         Tobacco/Alcohol/Supplements:     Last Reviewed on 1/23/2018 09:12 AM by Clarisse Flores    Tobacco: She has never smoked.          Substance Abuse History:     Last Reviewed on 1/23/2018 09:12 AM by Clarisse Flores        Mental Health History:     Last Reviewed on 1/23/2018 09:12 AM by Clarisse Flores        Communicable Diseases (eg STDs):     Last Reviewed on 1/23/2018 09:12 AM by Clarisse Flores            Current Problems:     Last Reviewed on 10/19/2017 01:37 PM by Clarisse Flores    Screening for cancer of colon     Recurrent nephrolithiasis     GERD     Moderate depression     Generalized osteoarthritis     Hypertension     Kidney stone     Allergies         Immunizations:     Td adult 10/1/2012     Prevnar 13 (Pneumococcal PCV 13) 11/1/2016     Fluzone High-Dose pf (>=65 yr) 11/1/2016     Fluzone High-Dose pf (>=65 yr) 9/20/2017     Zostavax (Zoster) 5/1/2010         Allergies:     Last Reviewed on 10/19/2017 01:37 PM by Clarisse Flores    Darvon Compound 32:    Lyrica:    Hydrocodone/Acetaminophen:    Percocet:        Current Medications:     Last Reviewed on 10/19/2017 01:37 PM by Clarisse Flores    Atenolol 25mg Tablet 1 tab daily     Meloxicam 15mg Tablet 1 tab HS with food     Flomax 0.4mg Capsules 1 tab daily     Zinc Gluconate 50mg 1 po qd     Omeprazole 40mg Capsules, Extended Release 1 capsule daily     Gabapentin 300mg Capsules 1 capsule daily     Cetirizine HCl 10mg Tablet 1 tab daily         OBJECTIVE:        Vitals:         Current: 1/23/2018 8:54:16 AM    Ht:  5 ft, 2.1 in;  Wt: 194.3 lbs;  BMI: 35.4    T: 98.1 F (oral);  BP: 132/81 mm Hg (left arm, sitting);   P: 65 bpm (left arm (BP Cuff), sitting);  sCr: 0.77 mg/dL;  GFR: 75.10    VA: 20/70 OD, 20/70 OS (near, without correction)        Exams:     PHYSICAL EXAM:     GENERAL: vital signs recorded - well developed, well nourished;  well groomed;  no apparent distress;     EYES: extraocular movements intact; conjunctiva and cornea are normal; PERRLA;     E/N/T: OROPHARYNX:  normal mucosa, dentition, gingiva, and posterior pharynx;     RESPIRATORY: normal respiratory rate and pattern with no distress; normal breath sounds with no rales, rhonchi, wheezes or rubs;     CARDIOVASCULAR: normal rate; rhythm is regular;  no systolic murmur; no edema;     GASTROINTESTINAL: nontender; normal bowel sounds;     LYMPHATIC: no enlargement of cervical or facial nodes;     MUSCULOSKELETAL: normal gait; normal overall tone     NEUROLOGIC: mental status: alert and oriented x 3; Reflexes: brachioradialis: 2+; knee jerks: 2+;     PSYCHIATRIC: appropriate affect and demeanor; normal psychomotor function; normal speech pattern; normal thought and perception;         Procedures:     Vaccination against pneumococcal pneumonia     1. Pneumovax (pneumococcal PPSV23): 0.5 ml unit dose given IM in the left upper arm; administered by [facility];  lot number [id number]; expires APRIL 20,2019             ASSESSMENT           V70.0   Z00.00  Health checkup              DDx:     V79.0   Z13.89  Screening for depression              DDx:     401.1   I10  Hypertension              DDx:     733.90   M85.89  Osteopenia              DDx:     V03.82   Z23  Vaccination against pneumococcal pneumonia              DDx:         ORDERS:         Meds Prescribed:       Wellbutrin XL (Bupropion HCl) 150mg Tablets, Extended Release 1 tab daily  #30 (Thirty) tablet(s) Refills: 5         Radiology/Test Orders:       [id number]  DXA, bone density study, 1 or more sites; axial skeleton (eg hips, pelvis, spine)  (Send-Out)         3014F  Screening mammography results documented and  reviewed (PV)1  (In-House)           Lab Orders:       [id number]  HTN - [facility] CMP AND LIPID: [id number], [id number]  (Send-Out)           [facility] Hepatitis C AB (Medicare Screen)  (Send-Out)           Procedures Ordered:       [id number]  Pneumococcal polysaccharide vaccine, 23-valent, adult or immunosuppressed patient dosage, for use in  (In-House)         REFER  Referral to Specialist or Other Facility  (Send-Out)           Annual wellness visit, includes a PPPS, subsequent visit  (In-House)           Other Orders:       [id number]  Behav assmt w/score & docd/stand instrument  (In-House)           Depression screen negative  (In-House)         1101F  Pt screen for fall risk; document no falls in past year or only 1 fall w/o injury in past year (YENIFER)  (In-House)           Depression Screen Annual Medicare  (In-House)           Administration of pneumococcal vaccine (x1)                 PLAN:          Health checkup She is due for colonoscopy; ordered.  Pap smears are no longer indicated by age and history; s/p total hysterectomy.  She is UTD on mammogram, last done 11/2017.  She had diagnostic mammo with U/S that was benign and 1 year f/u was recommended.  She is due for DEXA, last done 10/2015 and this showed osteopenia; ordered.  She is UTD on Prevnar (11/2016), Zostavax (5/2010), Td (10/2012), and flu (9/2017).  She is due for Pneumovax; given today in clinic.  She is due for routine labs including HTN panel and HCV ab screen; ordered.  No fall risk, no memory issues, no signs/symptoms of depression.  She lives with alone.  She is able to drive and perform ADLs/manage finances independently.  Hearing is adequate.  She does not have a living will; information given on how to obtain one.  Preventive services handout and safety handout were given to her.  Current doctor list updated.  RTC 5 months.     LABORATORY:  Labs ordered to be performed today include Hepatitis C Ab (Medicare Screen).      REFERRALS:   Referral initiated to a general surgeon ( Dr. Rick Sosa; a colonoscopy ).  MIPS Negative Depression Screen and Current diagnosis of depression and/or bipolar disorder     MAMMOGRAM: Done within last 2 years and results in are chart     FOLLOW-UP: Schedule a follow-up appointment in 6 weeks..            Orders:         [facility] Hepatitis C AB (Medicare Screen)  (Send-Out)         REFER  Referral to Specialist or Other Facility  (Send-Out)         08962  Behav assmt w/score & docd/stand instrument  (In-House)           Depression screen negative  (In-House)         1101F  Pt screen for fall risk; document no falls in past year or only 1 fall w/o injury in past year (YENIFER)  (In-House)         3014F  Screening mammography results documented and reviewed (PV)1  (In-House)           Annual wellness visit, includes a PPPS, subsequent visit  (In-House)           Depression Screen Annual Medicare  (In-House)             Patient Education Handouts:       Physical Exam 60+ year, Female           Screening for depression Score of 2.  She is currently being treated for depression.  Will taper her down off the escitalopram (1/2 tab x 1 week, then 1/2 tab QOD x 1 week, then stop) and start Wellbutrin as below.  RTC 6 weeks.           Prescriptions:       Wellbutrin XL (Bupropion HCl) 150mg Tablets, Extended Release 1 tab daily  #30 (Thirty) tablet(s) Refills: 5          Hypertension     LABORATORY:  Labs ordered to be performed today include HTN/Lipid Panel: CMP, Lipid.            Orders:       [id number]  [facility] CMP AND LIPID: [id number], [id number]  (Send-Out)            Osteopenia         RADIOLOGY:  I have ordered Dexa Scan to be done today.            Orders:       [id number]  DXA, bone density study, 1 or more sites; axial skeleton (eg hips, pelvis, spine)  (Send-Out)            Vaccination against pneumococcal pneumonia         IMMUNIZATIONS given today: Pneumovax.            Orders:       [id number]  Pneumococcal  polysaccharide vaccine, 23-valent, adult or immunosuppressed patient dosage, for use in  (In-House)                     Administration of pneumococcal vaccine (x1)             Patient Recommendations:        For  Health checkup:     Schedule a follow-up visit in 6 weeks.                APPOINTMENT INFORMATION:        Monday Tuesday Wednesday Thursday Friday Saturday Sunday            Time:___________________AM  PM   Date:_____________________             CHARGE CAPTURE           **Please note: ICD descriptions below are intended for billing purposes only and may not represent clinical diagnoses**        Primary Diagnosis:         V70.0 Health checkup            Z00.00    Encntr for general adult medical exam w/o abnormal findings              Orders:          [id number]   Behav assmt w/score & docd/stand instrument  (In-House)                Depression screen negative  (In-House)             1101F   Pt screen for fall risk; document no falls in past year or only 1 fall w/o injury in past year (YENIFER)  (In-House)             3014F   Screening mammography results documented and reviewed (PV)1  (In-House)                Annual wellness visit, includes a PPPS, subsequent visit  (In-House)                Depression Screen Annual Medicare  (In-House)           V79.0 Screening for depression            Z13.89    Encounter for screening for other disorder    401.1 Hypertension            I10    Essential (primary) hypertension    733.90 Osteopenia            M85.89    Other specified disorders of bone density and structure, multiple sites    V03.82 Vaccination against pneumococcal pneumonia            Z23    Encounter for immunization              Orders:          74889   Pneumococcal polysaccharide vaccine, 23-valent, adult or immunosuppressed patient dosage, for use in  (In-House)                                           Administration of pneumococcal vaccine (x1)             ADDENDUMS:       ____________________________________    Date: 01/23/2018 04:21 PM    Author: Yana Chou         Visit Note Faxed to:        Rick Sosa  (General Surgery); Number [phone]

## 2021-05-18 NOTE — PROGRESS NOTES
Criss EdwardsSteph 1950     Office/Outpatient Visit    Visit Date: Mon, Dec 3, 2018 02:47 pm    Provider: Elaine Richardson N.P. (Assistant: Russ Armenta)    Location: [facility]        Electronically signed by Elaine Richardson N.P. on  12/03/2018 05:36:08 PM                             SUBJECTIVE:        CC:     Ms. Edwards is a 68 year old White female.  presents today due to sore throaat, ear pain, burning when she urinates, lost her voice last week (atenolol is twice a day)         HPI:         Ms. Edwards presents with upper respiratory illness.  These have been present for the past 10 days.  The symptoms include chest congestion, cough, ear complaints, headache, nasal congestion, nasal discharge and sinus pain/pressure.  She denies fever.  She denies exposure to ill contacts.  She has already tried to relieve the symptoms with allegy medication.  Medical history is significant for allergies.          In regard to the dysuria, this began within the last 2 days.  Associated symptoms include right flank pain, urgency and urinary frequency.  She denies associated fever, hematuria or hesitancy.  Treatments tried thus far include taking alkaseltzer for her cold and increased water.      ROS:     CONSTITUTIONAL:  Positive for fatigue.   Negative for fever.      EYES:  Negative for blurred vision.      E/N/T:  Positive for nasal congestion, frequent rhinorrhea and sinus pressure.   Negative for ear pain.      CARDIOVASCULAR:  Negative for chest pain, orthopnea, paroxysmal nocturnal dyspnea and pedal edema.      RESPIRATORY:  Positive for recent cough and pleuritic chest pain.   Negative for dyspnea.      GASTROINTESTINAL:  Negative for abdominal pain.      GENITOURINARY:  Positive for dysuria.      NEUROLOGICAL:  Positive for headaches.          PMH/FMH/SH:     Last Reviewed on 6/11/2018 05:14 PM by Reid Bourgeois    Past Medical History:                 PAST MEDICAL HISTORY         Hypertension      Renal Stones         CURRENT MEDICAL PROVIDERS:    Nephrologist: Te    Orthopedist: Juice             ADVANCED DIRECTIVES: None         PREVENTIVE HEALTH MAINTENANCE             BONE DENSITY: was last done 10/28/15 with the following abnormality noted-- osteopenia     COLORECTAL CANCER SCREENING: Up to date (colonoscopy q10y; sigmoidoscopy q5y; Cologuard q3y) was last done 4/11/2018; colonoscopy with normal results; The next colonoscopy is due  10 years     EYE EXAM: was last done 2016     Hepatitis C Medicare Screening: was last done 1/2018     MAMMOGRAM: Done within last 2 years and results in are chart was last done 11/2017 with normal results     PAP SMEAR: No longer indicated due to age and history s/p hysterectomy         Surgical History:         Cholecystectomy    Hysterectomy: total;     Joint Replacement: R KENZIE;      back surgery;    kidney stone removal;         Family History:     Father: Congestive Heart Failure     Mother: Myocardial Infarction;  Cerebrovascular Accident     Brother(s): Coronary Artery Disease ( at 46 yo )     Sister(s): Coronary Artery Disease; Endocrine Type 2 Diabetes         Social History:     Occupation: Retired (Prior occupation: [employer])     Marital Status: Single     Children: 2 children         Tobacco/Alcohol/Supplements:     Last Reviewed on 8/14/2018 04:30 PM by Spurling, Sarah C    Tobacco: She has never smoked.          Substance Abuse History:     Last Reviewed on 6/11/2018 05:14 PM by Reid Bourgeois        Mental Health History:     Last Reviewed on 6/11/2018 05:14 PM by Reid Bourgeois        Communicable Diseases (eg STDs):     Last Reviewed on 6/11/2018 05:14 PM by Reid Bourgeois            Current Problems:     Last Reviewed on 8/14/2018 04:58 PM by Elaine Richardson    Osteopenia     Screening for cancer of colon     Recurrent nephrolithiasis     GERD     Moderate depression     Generalized osteoarthritis     Hypertension     Kidney  stone     Allergies         Immunizations:     Td adult 10/1/2012     Prevnar 13 (Pneumococcal PCV 13) 11/1/2016     Fluzone High-Dose pf (>=65 yr) 11/1/2016     Fluzone High-Dose pf (>=65 yr) 9/20/2017     Fluzone High-Dose pf (>=65 yr) 10/15/2018     PNEUMOVAX 23 (Pneumococcal PPV23) 1/23/2018     Zostavax (Zoster live) 5/1/2010         Allergies:     Last Reviewed on 8/14/2018 04:29 PM by Spurling, Sarah C Darvon Compound 32:    Lyrica:    Hydrocodone/Acetaminophen:    Percocet:    band aides:        Current Medications:     Last Reviewed on 8/14/2018 04:30 PM by Spurling, Sarah C    Atenolol 25mg Tablet 1 tab daily     Meloxicam 15mg Tablet 1 tab HS with food     Lexapro 20mg Tablet 1 tab daily     Zinc Gluconate 50mg 1 po qd     Cetirizine HCl 10mg Tablet 1 tab daily         OBJECTIVE:        Vitals:         Current: 12/3/2018 2:56:55 PM    Ht:  5 ft, 2.1 in;  Wt: 189.4 lbs;  BMI: 34.5    T: 98.6 F (oral);  BP: 131/79 mm Hg (right arm, sitting);  P: 70 bpm (right arm (BP Cuff), sitting);  sCr: 0.79 mg/dL;  GFR: 71.42        Exams:     PHYSICAL EXAM:     GENERAL: vital signs recorded - well developed, well nourished;  no apparent distress, appears moderately ill;     E/N/T: EARS: external auditory canal edematous and erythematous;  bilateral TMs are normal;     NECK: range of motion is normal; thyroid is non-palpable;     RESPIRATORY: normal respiratory rate and pattern with no distress; coarse breath sounds throughout;     CARDIOVASCULAR: normal rate; rhythm is regular;  no systolic murmur; no edema;     GASTROINTESTINAL: nontender; normal bowel sounds; no organomegaly;     LYMPHATIC: bilateral submandibular nodes ( fluctuant, tender );     MUSCULOSKELETAL: normal gait; normal range of motion of all major muscle groups; no limb or joint pain with range of motion;     NEUROLOGICAL:  cranial nerves, motor and sensory function, reflexes, gait and coordination are all intact;     PSYCHIATRIC:  appropriate affect  and demeanor; normal speech pattern; grossly normal memory;         Lab/Test Results:             Glucose, Urine:  Neg (12/03/2018),     Bilirubin, urine:  Negative (12/03/2018),     Ketones, Urine Strip:  Negative (12/03/2018),     Specific Gravity, urine:  1.020 (12/03/2018),     Blood in Urine:  negative (12/03/2018),     pH, urine:  6.5 (12/03/2018),     Protein Urine QL:  negative (12/03/2018),     Urobilinogen, urine:  0.2 E.U./dL (12/03/2018),     Nitrite, Urine:  Negative (12/03/2018),     Leukoctyes, urine:  Small (12/03/2018),     Appearance:  Clear (12/03/2018),     collection source:  Clean-catch (12/03/2018),     Color:  Yellow (12/03/2018),     Performed by::  FRANSISCA (12/03/2018),             ASSESSMENT:           465.8   J01.00  Upper respiratory illness              DDx:     788.1   R30.0  Dysuria              DDx:         ORDERS:         Meds Prescribed:       Amoxicillin 875mg Tablet One PO BID X 10 days.  #20 (Twenty) tablet(s) Refills: 0         Lab Orders:       [id number]  Urinalysis, automated, without microscopy  (In-House)         [id number]  UR - [facility] Urine Culture  (Send-Out)                   PLAN:          Upper respiratory illness           Prescriptions:       Amoxicillin 875mg Tablet One PO BID X 10 days.  #20 (Twenty) tablet(s) Refills: 0          Dysuria     LABORATORY:  Labs ordered to be performed today include Urine culture and UA dip in office no micro.            Orders:       [id number]  Urinalysis, automated, without microscopy  (In-House)         59323  URCU - [facility] Urine Culture  (Send-Out)               CHARGE CAPTURE:           Primary Diagnosis:     465.8 Upper respiratory illness            J01.00    Acute maxillary sinusitis, unspecified              Orders:          [id number]   Office/outpatient visit; established patient, level 3  (In-House)           788.1 Dysuria            R30.0    Dysuria              Orders:          [id number]   Urinalysis, automated, without microscopy  (In-House)

## 2021-05-18 NOTE — PROGRESS NOTES
"Criss Edwards  1950     Office/Outpatient Visit    Visit Date: Fri, Jun 26, 2020 10:09 am    Provider: Clarisse Flores MD (Assistant: Ilda Benson MA)    Location: [facility]        Electronically signed by Clarisse Flores MD on  06/26/2020 11:04:18 AM                             Subjective:        CC: Ms. Edwards is a 69 year old White female.  Patient presents today with complaints of hernia;         HPI: Criss is here today with complaint of umbilical hernia.  She has periumbilical pain around the site of her umbilical hernia for the past few months.  Dr. Tiwari diagnosed her \"while he was doing a kidney procedure.\"  She has been struggling with constipation for the past few months, currently on daily Miralax.        She had a CT A/P in Jan 2020 that showed horseshoe kidney but no hernia specifically.        She needs her atenolol refilled.  She has had several episodes of chest pain this spring.  Follows with Dr. Nelson but has not seen him in the past year.    ROS:     CONSTITUTIONAL:  Positive for fatigue.   Negative for fever.      EYES:  Negative for blurred vision.      CARDIOVASCULAR:  Positive for chest pain.   Negative for palpitations.      RESPIRATORY:  Negative for recent cough and dyspnea.      GASTROINTESTINAL:  Positive for abdominal pain and constipation.   Negative for diarrhea, nausea or vomiting.      MUSCULOSKELETAL:  Positive for arthralgias.   Negative for myalgias.      PSYCHIATRIC:  Positive for anxiety and feelings of stress.   Negative for depression or sleep disturbance.          Past Medical History / Family History / Social History:         Last Reviewed on 6/26/2020 10:42 AM by Clarisse Flores    Past Medical History:                 PAST MEDICAL HISTORY         Hypertension     Renal Stones         CURRENT MEDICAL PROVIDERS:    Nephrologist: Te    Orthopedist: Juice             ADVANCED DIRECTIVES: None         PREVENTIVE HEALTH MAINTENANCE         "     BONE DENSITY: was last done 1/28/2020 with the following abnormality noted-- osteopenia (improved)     COLORECTAL CANCER SCREENING: Up to date (colonoscopy q10y; sigmoidoscopy q5y; Cologuard q3y) was last done 4/11/2018; colonoscopy with normal results; The next colonoscopy is due  10 years     EYE EXAM: was last done 2016     Hepatitis C Medicare Screening: was last done 1/2018     MAMMOGRAM: Done within last 2 years and results in are chart was last done 4/16/2019 with normal results     PAP SMEAR: No longer indicated due to age and history s/p hysterectomy         Surgical History:         Cholecystectomy    Hysterectomy: total;     Joint Replacement: R KENZIE;     back surgery;    kidney stone removal;         Family History:     Father: Congestive Heart Failure     Mother: Myocardial Infarction;  Cerebrovascular Accident     Brother(s): Coronary Artery Disease ( at 48 yo )     Sister(s): Coronary Artery Disease; Endocrine Type 2 Diabetes         Social History:     Occupation: Retired (Prior occupation: [employer])     Marital Status: Single     Children: 2 children         Tobacco/Alcohol/Supplements:     Last Reviewed on 6/26/2020 10:42 AM by Clarisse Flores    Tobacco: She has never smoked.          Substance Abuse History:     Last Reviewed on 6/26/2020 10:42 AM by Clarisse Flores        Mental Health History:     Last Reviewed on 6/26/2020 10:42 AM by Clarisse Flores        Communicable Diseases (eg STDs):     Last Reviewed on 6/26/2020 10:42 AM by Clarisse Flores        Current Problems:     Last Reviewed on 5/15/2020 11:46 AM by Clarisse Flores    Depression - Major depressive disorder, single episode, moderate    HTN - Essential (primary) hypertension    Primary generalized (osteo)arthritis    GERD - Gastro-esophageal reflux disease without esophagitis    Calculus of ureter    Osteopenia - Other specified disorders of bone density and structure, multiple sites    BPPV - Benign paroxysmal  vertigo, bilateral    Allergic rhinitis, unspecified    Lobulated, fused and horseshoe kidney    Other chest pain    Generalized abdominal tenderness    Flushing    Urge incontinence    Constipation, unspecified    Vitamin D deficiency, unspecified    Anxiety disorder, unspecified    Other fatigue        Immunizations:     Td adult 10/1/2012    Prevnar 13 (Pneumococcal PCV 13) 11/1/2016    Fluzone High-Dose pf (>=65 yr) 11/1/2016    Fluzone High-Dose pf (>=65 yr) 9/20/2017    Fluzone High-Dose pf (>=65 yr) 10/15/2018    Fluzone High-Dose pf (>=65 yr) 10/24/2019    PNEUMOVAX 23 (Pneumococcal PPV23) 1/23/2018    Zostavax (Zoster live) 5/1/2010        Allergies:     Last Reviewed on 6/26/2020 10:12 AM by Ilda Benson aides:      Darvon Compound 32:      Lyrica:      Hydrocodone/Acetaminophen:      Percocet:          Current Medications:     Last Reviewed on 6/26/2020 10:12 AM by Ilda Benson    Atenolol 25mg Tablet [bid]    omeprazole 40 mg oral capsule,delayed release (enteric coated) [TAKE ONE CAPSULE BY MOUTH DAILY]    Cymbalta 60 mg oral capsule,delayed release (enteric coated) [Take 1 capsule(s) by mouth daily]    Zofran 4 mg oral tablet [take 1 tab by mouth TID PRN nausea ]    Miralax 17 gram/dose oral Powder [take 17 gram PO BID prn constipation]    busPIRone 5 mg oral tablet [TAKE ONE TABLET BY MOUTH TWICE A DAY AS NEEDED]    cholecalciferol (vitamin D3) 1,250 mcg (50,000 unit) oral capsule [take 1 capsule p.o. WEEKLY for 12 weeks]        Objective:        Vitals:         Current: 6/26/2020 10:13:36 AM    Ht:  5 ft, 2.1 in;  Wt: 184.2 lbs;  BMI: 33.6T: 97.9 F (oral);  BP: 131/72 mm Hg (left arm, sitting);  P: 80 bpm (left arm (BP Cuff), sitting);  sCr: 0.75 mg/dL;  GFR: 73.32        Exams:     PHYSICAL EXAM:     GENERAL: vital signs recorded - well developed, well nourished;  well groomed;  no apparent distress;     EYES: extraocular movements intact; conjunctiva and cornea are normal; pupils and  irises are normal;     E/N/T: EARS:  normal external auditory canals and tympanic membranes;  grossly normal hearing; OROPHARYNX:  normal mucosa, dentition, gingiva, and posterior pharynx; +[place] bilateral ears;     RESPIRATORY: normal respiratory rate and pattern with no distress; normal breath sounds with no rales, rhonchi, wheezes or rubs;     CARDIOVASCULAR: normal rate; rhythm is regular;  no systolic murmur; no edema;     GASTROINTESTINAL: nontender; normal bowel sounds; HERNIA IS REDUCED AT THIS TIME, NON-PALPABLE    MUSCULOSKELETAL: normal gait; normal overall tone         Assessment:         K42.9   Umbilical hernia without obstruction or gangrene       R07.89   Other chest pain       F41.9   Anxiety disorder, unspecified       Z13.31   Encounter for screening for depression           ORDERS:         Radiology/Test Orders:       3017F  Colorectal CA screen results documented and reviewed (PV)  (In-House)              Procedures Ordered:       REFER  Referral to Specialist or Other Facility  (Send-Out)              Other Orders:         Screening mammogram results documented  (Send-Out)              Depression screen positive and follow up plan documented  (In-House)                      Plan:         Umbilical hernia without obstruction or gangreneHer umbilical hernia is not large (not actually even palpable today, although I have seen it before).  Discussed that rupture of the hernia is not really a concern.  Reassurance provided.  She has a lot of anxiety.  Given this fact, she is not interested in surgical repair.  Continue to monitor.        30 min was spent in this face to face encounter.    MIPS PHQ-9 Depression Screening: Completed form scanned and in chart; Total Score 5 Positive Depression Screen: Stable on medications. No suicidal ideation.      FOLLOW-UP: Keep currently scheduled appointment.:.          Other chest painHas now had two spells of chest pain that sound primarily  like they are related to her anxiety.  However, she follows with Dr. Nelson and had a 1 year f/u scheduled that has fallen through the cracks with COVID.  Will get her rescheduled for this just so she can touch base with Dr. Nelson.        REFERRALS:  Referral initiated to a cardiologist ( Gera Nelson, Shahana, and Bran ([facility]); f/u 1 year (was last seen in Jun 2019); to evaluate chest pain ).            Orders:       REFER  Referral to Specialist or Other Facility  (Send-Out)              Anxiety disorder, unspecifiedShlatonya continues to have a lot of anxiety.  She is on Cymbalta and buspirone.  Continue these meds.        Encounter for screening for depression    MIPS PHQ-9 Depression Screening: Completed form scanned and in chart; Total Score 5 Positive Depression Screen: Stable on medications. No suicidal ideation.            Orders:         Screening mammogram results documented  (Send-Out)            3017F  Colorectal CA screen results documented and reviewed (PV)  (In-House)              Depression screen positive and follow up plan documented  (In-House)                  Patient Recommendations:        For  Umbilical hernia without obstruction or gangrene:                    APPOINTMENT INFORMATION:        Monday Tuesday Wednesday Thursday Friday Saturday Sunday            Time:___________________AM  PM   Date:_____________________         For  Other chest pain:    I also recommend f/u 1 year (was last seen in Jun 2019).              Charge Capture:         Primary Diagnosis:     K42.9  Umbilical hernia without obstruction or gangrene           Orders:      94651  Office/outpatient visit; established patient, level 4  (In-House)              R07.89  Other chest pain     F41.9  Anxiety disorder, unspecified     Z13.31  Encounter for screening for depression           Orders:      3017F  Colorectal CA screen results documented and reviewed (PV)  (In-House)              Depression screen  positive and follow up plan documented  (In-House)

## 2021-05-18 NOTE — PROGRESS NOTES
Criss EdwardsSteph 1950     Office/Outpatient Visit    Visit Date: Tue, Aug 14, 2018 04:26 pm    Provider: Elaine Richardson N.P. (Assistant: Sarah Spurling, MA)    Location: [facility]        Electronically signed by Elaine Richardson N.P. on  08/16/2018 10:34:03 PM                             SUBJECTIVE:        CC:     Ms. Edwards is a 67 year old White female.  This is a follow-up visit.  Tick bite on her back, and she said she is ate up with bugs.;         HPI:         Ms. Edwards presents with insect bites.  History of insect sting/bite appears to be consistent with fleas, chigger.  possible mosquito.  She does have a single tick bite to her right lateral mid back.  She pulled it off today and wants to have it looked at.  She is getting bit when she is out to the barn in the tall grass feeding horses and working outdoors.  - she has tried benadryl, hydrocortisone, chigger x cream and a hot bath - nothing seems to help     ROS:     CONSTITUTIONAL:  Negative for chills, fatigue and fever.      CARDIOVASCULAR:  Negative for chest pain and pedal edema.      RESPIRATORY:  Negative for recent cough and dyspnea.      INTEGUMENTARY/BREAST:  Positive for rash all over, mostly her feet and legs - bite on back.   Negative for pruritis.      ENDOCRINE:  Negative for hair loss, polydipsia and polyphagia.      ALLERGIC/IMMUNOLOGIC:  Positive for seasonal allergies.          PMH/FMH/SH:     Last Reviewed on 6/11/2018 05:14 PM by Reid Bourgeois    Past Medical History:                 PAST MEDICAL HISTORY         Hypertension     Renal Stones         CURRENT MEDICAL PROVIDERS:    Nephrologist: Te    Orthopedist: Juice             ADVANCED DIRECTIVES: None         PREVENTIVE HEALTH MAINTENANCE             BONE DENSITY: was last done 10/28/15 with the following abnormality noted-- osteopenia     COLORECTAL CANCER SCREENING: Up to date (colonoscopy q10y; sigmoidoscopy q5y; Cologuard q3y) was last done  4/11/2018; colonoscopy with normal results; The next colonoscopy is due  10 years     EYE EXAM: was last done 2016     Hepatitis C Medicare Screening: was last done 1/2018     MAMMOGRAM: Done within last 2 years and results in are chart was last done 11/2017 with normal results     PAP SMEAR: No longer indicated due to age and history s/p hysterectomy         Surgical History:         Cholecystectomy    Hysterectomy: total;     Joint Replacement: R KENZIE;      back surgery;    kidney stone removal;         Family History:     Father: Congestive Heart Failure     Mother: Myocardial Infarction;  Cerebrovascular Accident     Brother(s): Coronary Artery Disease ( at 48 yo )     Sister(s): Coronary Artery Disease; Endocrine Type 2 Diabetes         Social History:     Occupation: Retired (Prior occupation: [employer])     Marital Status: Single     Children: 2 children         Tobacco/Alcohol/Supplements:     Last Reviewed on 8/14/2018 04:30 PM by Spurling, Sarah C    Tobacco: She has never smoked.          Substance Abuse History:     Last Reviewed on 6/11/2018 05:14 PM by Reid Bourgeois        Mental Health History:     Last Reviewed on 6/11/2018 05:14 PM by Reid Bourgeois        Communicable Diseases (eg STDs):     Last Reviewed on 6/11/2018 05:14 PM by Reid Bourgeois            Current Problems:     Last Reviewed on 8/14/2018 04:58 PM by Elaine Richardson    Osteopenia     Screening for cancer of colon     Recurrent nephrolithiasis     GERD     Moderate depression     Generalized osteoarthritis     Hypertension     Kidney stone     Insect bites     Allergies         Immunizations:     Td adult 10/1/2012     Prevnar 13 (Pneumococcal PCV 13) 11/1/2016     Fluzone High-Dose pf (>=65 yr) 11/1/2016     Fluzone High-Dose pf (>=65 yr) 9/20/2017     PNEUMOVAX 23 (Pneumococcal PPV23) 1/23/2018     Zostavax (Zoster) 5/1/2010         Allergies:     Last Reviewed on 8/14/2018 04:29 PM by Spurling, Sarah C     Darvon Compound 32:    Lyrica:    Hydrocodone/Acetaminophen:    Percocet:    band aides:        Current Medications:     Last Reviewed on 8/14/2018 04:30 PM by Spurling, Sarah C    Atenolol 25mg Tablet 1 tab daily     Meloxicam 15mg Tablet 1 tab HS with food     Lexapro 20mg Tablet 1 tab daily     Zinc Gluconate 50mg 1 po qd     Cetirizine HCl 10mg Tablet 1 tab daily         OBJECTIVE:        Vitals:         Current: 8/14/2018 4:33:22 PM    Ht:  5 ft, 2.1 in;  Wt: 188.4 lbs;  BMI: 34.3    T: 98 F (oral);  BP: 124/54 mm Hg (right arm, sitting);  P: 78 bpm (right arm (BP Cuff), sitting);  sCr: 0.79 mg/dL;  GFR: 72.24        Exams:     PHYSICAL EXAM:     GENERAL: vital signs recorded - well developed, well nourished;  no apparent distress;     NECK: range of motion is normal;     RESPIRATORY: normal appearance and symmetric expansion of chest wall; normal respiratory rate and pattern with no distress; normal breath sounds with no rales, rhonchi, wheezes or rubs;     CARDIOVASCULAR: normal rate; rhythm is regular;  no edema;     BREAST/INTEGUMENT: insect bite(s) located on back, lower back,; bilateral lower extremities up to knees including feet     MUSCULOSKELETAL: normal gait; normal range of motion of all major muscle groups; no limb or joint pain with range of motion;     NEUROLOGIC: mental status: alert and oriented x 3;     PSYCHIATRIC: appropriate affect and demeanor; normal speech pattern; normal thought and perception;         ASSESSMENT           919.5   W57.XXXA  Insect bites              DDx:         ORDERS:         Meds Prescribed:       Triamcinolone Acetonide 0.1% Cream Apply thin film to affected area bid , prn  #60 (Sixty) gm Refills: 1       Doxycycline Monohydrate 100mg Tablet Take 2 tablets for single dose of 200 mg  #2 (Two) tablet(s) Refills: 0                 PLAN:          Insect bites insect bit on back from tick - will treat for that;  other recommend OTC chigger x continues- use OFF spray when  outdoors and long sleeve coverings with openings closed tight to avoid insects entering into clothing           Prescriptions:       Triamcinolone Acetonide 0.1% Cream Apply thin film to affected area bid , prn  #60 (Sixty) gm Refills: 1       Doxycycline Monohydrate 100mg Tablet Take 2 tablets for single dose of 200 mg  #2 (Two) tablet(s) Refills: 0             CHARGE CAPTURE           **Please note: ICD descriptions below are intended for billing purposes only and may not represent clinical diagnoses**        Primary Diagnosis:         919.5 Insect bites            W57.XXXA    Bitten or stung by nonvenomous insect and other nonvenomous arthropods, initial encounter              Orders:          59850   Office/outpatient visit; established patient, level 3  (In-House)               ADDENDUMS:      ____________________________________    Addendum: 08/21/2018 08:54 PHILIP - Elaine Richardson        Add:        S80.861A Insect bite (nonvenomous) right lower leg, initial encounter    S80.862A Insect bite (nonvenomous)  left lower leg, initial encounter    S30.860A Insect bite (nonvenomous) of lower back, initial encounter

## 2021-05-18 NOTE — PROGRESS NOTES
Criss Edwards  1950     Office/Outpatient Visit    Visit Date: Fri, Apr 24, 2020 08:51 am    Provider: Clarisse Flores MD (Assistant: Margarita Morris MA)    Location: [facility]        Electronically signed by Clarisse Flores MD on  04/24/2020 09:18:06 AM                             Subjective:        CC: Ms. Edwards is a 69 year old White female.  This is a follow-up visit.  pt states she is only taking atenolol, cymbalta and oxybutynin PHONE CALL;         HPI: Criss's telehealth visit today is to follow up on constipation.  She is following up after telehealth evaluation last week for a bad episode of abdominal pain felt by me to be related to severe constipation (which has worsened ever since starting oxybutynin for urge incontinence).  She was started on Miralax at that time.  She finally had a bowel movement on Tuesday and again last night.  She still gets some occasional chills and mild tremor.  +Dry mouth and itchy eyes.  She is taking her Miralax twice daily.    ROS:     CONSTITUTIONAL:  Negative for fatigue and fever.      EYES:  Negative for blurred vision.      CARDIOVASCULAR:  Negative for chest pain and palpitations.      RESPIRATORY:  Negative for recent cough and dyspnea.      GASTROINTESTINAL:  Positive for abdominal pain, constipation and nausea ( now resolved ).   Negative for diarrhea or vomiting.      MUSCULOSKELETAL:  Positive for arthralgias.   Negative for myalgias.      PSYCHIATRIC:  Negative for anxiety, depression and sleep disturbance.          Past Medical History / Family History / Social History:         Last Reviewed on 4/24/2020 09:00 AM by Clarisse Flores    Past Medical History:                 PAST MEDICAL HISTORY         Hypertension     Renal Stones         CURRENT MEDICAL PROVIDERS:    Nephrologist: Te    Orthopedist: Juice             ADVANCED DIRECTIVES: None         PREVENTIVE HEALTH MAINTENANCE             BONE DENSITY: was last done 1/28/2020  with the following abnormality noted-- osteopenia (improved)     COLORECTAL CANCER SCREENING: Up to date (colonoscopy q10y; sigmoidoscopy q5y; Cologuard q3y) was last done 4/11/2018; colonoscopy with normal results; The next colonoscopy is due  10 years     EYE EXAM: was last done 2016     Hepatitis C Medicare Screening: was last done 1/2018     MAMMOGRAM: Done within last 2 years and results in are chart was last done 4/16/2019 with normal results     PAP SMEAR: No longer indicated due to age and history s/p hysterectomy         Surgical History:         Cholecystectomy    Hysterectomy: total;     Joint Replacement: R KENZIE;     back surgery;    kidney stone removal;         Family History:     Father: Congestive Heart Failure     Mother: Myocardial Infarction;  Cerebrovascular Accident     Brother(s): Coronary Artery Disease ( at 46 yo )     Sister(s): Coronary Artery Disease; Endocrine Type 2 Diabetes         Social History:     Occupation: Retired (Prior occupation: [employer])     Marital Status: Single     Children: 2 children         Tobacco/Alcohol/Supplements:     Last Reviewed on 4/24/2020 09:00 AM by Clarisse Flores    Tobacco: She has never smoked.          Substance Abuse History:     Last Reviewed on 4/24/2020 09:00 AM by Clarisse Flores        Mental Health History:     Last Reviewed on 4/24/2020 09:00 AM by Clarisse Flores        Communicable Diseases (eg STDs):     Last Reviewed on 4/24/2020 09:00 AM by Clarisse Flores        Current Problems:     Last Reviewed on 4/17/2020 09:41 AM by Clarisse Flores    Depression - Major depressive disorder, single episode, moderate    HTN - Essential (primary) hypertension    Primary generalized (osteo)arthritis    GERD - Gastro-esophageal reflux disease without esophagitis    Calculus of ureter    Osteopenia - Other specified disorders of bone density and structure, multiple sites    BPPV - Benign paroxysmal vertigo, bilateral    Allergic rhinitis,  unspecified    Lobulated, fused and horseshoe kidney    Other chest pain    Generalized abdominal tenderness    Flushing    Urge incontinence    Constipation, unspecified        Immunizations:     Td adult 10/1/2012    Prevnar 13 (Pneumococcal PCV 13) 11/1/2016    Fluzone High-Dose pf (>=65 yr) 11/1/2016    Fluzone High-Dose pf (>=65 yr) 9/20/2017    Fluzone High-Dose pf (>=65 yr) 10/15/2018    Fluzone High-Dose pf (>=65 yr) 10/24/2019    PNEUMOVAX 23 (Pneumococcal PPV23) 1/23/2018    Zostavax (Zoster live) 5/1/2010        Allergies:     Last Reviewed on 4/17/2020 09:41 AM by Clarisse Flores aides:      Darvon Compound 32:      Lyrica:      Hydrocodone/Acetaminophen:      Percocet:          Current Medications:     Last Reviewed on 4/17/2020 09:41 AM by Clarisse Flores    Atenolol 25mg Tablet [bid]    meloxicam 15 mg oral tablet [TAKE ONE TABLET BY MOUTH AT BEDTIME WITH FOOD]    BACLOFEN     TAB 10MG     Cymbalta 60 mg oral capsule,delayed release (enteric coated) [Take 1 capsule(s) by mouth daily]    OMEPRAZOLE   CAP 40MG     fluticasone propionate 50 mcg/actuation Intranasal Spray, Suspension [inhale 1 spray (50 mcg) in each nostril by intranasal route once daily]    Zofran 4 mg oral tablet [take 1 tab by mouth TID PRN nausea ]    OXYBUTYNIN   TAB 5MG ER     Miralax 17 gram/dose oral Powder [take 17 gram PO BID prn constipation]        Assessment:         K59.00   Constipation, unspecified           ORDERS:         Lab Orders:       APPTO  Appointment need  (In-House)                      Plan:         Constipation, unspecifiedImproving but still having some constipation despite BID Miralax.  Also noting dry mouth and eyes and tremors.  I am going to have her stop the oxybutynin at this point.  Continue Miralax BID for now and can cut back to once daily once she achieves one soft BM daily.  I will do another telehealth visit with her next week.    Telehealth: Verbal consent obtained for visit to occur via  phone call; Staff, other than provider, present during telephone visit include Margarita Morris MA; Total time spent was 13 minutes; [id number]--[id number] E/M 11-20 minutes     FOLLOW-UP: Schedule a follow-up appointment in 1 week.:.  telehealth f/u constipation with [place]          Orders:       APPTO  Appointment need  (In-House)                  Patient Recommendations:        For  Constipation, unspecified:    Schedule a follow-up visit in 1 week.                APPOINTMENT INFORMATION:        Monday Tuesday Wednesday Thursday Friday Saturday Sunday            Time:___________________AM  PM   Date:_____________________             Charge Capture:         Primary Diagnosis:     K59.00  Constipation, unspecified           Orders:      APPTO  Appointment need  (In-House)            [id number]  Phys/QHP telephone evaluation 11-20 minutes  (In-House)

## 2021-05-18 NOTE — PROGRESS NOTES
"Criss Edwards  1950     Office/Outpatient Visit    Visit Date: Tue, Jan 26, 2021 09:52 am    Provider: Clarisse Flores MD (Assistant: Ilda Benson MA)    Location: [facility]        Electronically signed by Clarisse Flores MD on  01/26/2021 10:38:50 AM                             Subjective:        CC: Ms. Edwards is a 70 year old White female.  Patient presents today for five month follow up;         HPI: Criss is here today for routine f/u of chronic issues.        She is still having problems with the ears, R>L.  At night, the ears pop and she hears tinnitus \"like sirens.\"  She has felt some dizziness causing balance issues with 3 falls in the past few months.  She saw ENT Dr. Terrazas who reportedly told her she needed hearing aids.  She does not think that this is her main issue.          She is on metoprolol succinate for HTN.  BP has been well controlled.  No CP, palpitations, SOB.        She is on Cymbalta and buspirone for anxiety.          She is on omeprazole for GERD.  No heartburn or indigestion.        She is on meloxicam for diffuse aches and pains for OA.    ROS:     CONSTITUTIONAL:  Positive for fatigue.   Negative for fever.      EYES:  Negative for blurred vision.      E/N/T:  Positive for ear pain ( bilateral; R>L ) and tinnitus.   Negative for diminished hearing or nasal congestion.      CARDIOVASCULAR:  Negative for chest pain and palpitations.      RESPIRATORY:  Negative for recent cough and dyspnea.      GASTROINTESTINAL:  Positive for acid reflux symptoms and nausea.   Negative for abdominal pain, constipation, diarrhea or vomiting.      MUSCULOSKELETAL:  Positive for arthralgias.   Negative for myalgias.      NEUROLOGICAL:  Positive for dizziness.      PSYCHIATRIC:  Positive for anxiety and feelings of stress.   Negative for depression or sleep disturbance.          Past Medical History / Family History / Social History:         Last Reviewed on 1/26/2021 10:19 " AM by Clarisse Flores    Past Medical History:                 PAST MEDICAL HISTORY         Hypertension     Renal Stones         CURRENT MEDICAL PROVIDERS:    Nephrologist: Te    Orthopedist: Juice             ADVANCED DIRECTIVES: None         PREVENTIVE HEALTH MAINTENANCE             BONE DENSITY: was last done 01/26/2018 with the following abnormality noted-- osteopenia (improved)     COLORECTAL CANCER SCREENING: Up to date (colonoscopy q10y; sigmoidoscopy q5y; Cologuard q3y) was last done 4/11/2018; colonoscopy with normal results; The next colonoscopy is due  10 years     EYE EXAM: was last done 2016     Hepatitis C Medicare Screening: was last done 1/2018     MAMMOGRAM: Done within last 2 years and results in are chart was last done 4/16/2019 with normal results     PAP SMEAR: No longer indicated due to age and history s/p hysterectomy         Surgical History:         Cholecystectomy    Hysterectomy: total;     Joint Replacement: R KENZIE;     back surgery;    kidney stone removal;         Family History:     Father: Congestive Heart Failure     Mother: Myocardial Infarction;  Cerebrovascular Accident     Brother(s): Coronary Artery Disease ( at 48 yo )     Sister(s): Coronary Artery Disease; Endocrine Type 2 Diabetes         Social History:     Occupation: Retired (Prior occupation: [employer])     Marital Status: Single     Children: 2 children         Tobacco/Alcohol/Supplements:     Last Reviewed on 1/26/2021 10:19 AM by Clarisse Flores    Tobacco: She has never smoked.          Substance Abuse History:     Last Reviewed on 1/26/2021 10:19 AM by Clarisse Flores        Mental Health History:     Last Reviewed on 1/26/2021 10:19 AM by Clarisse Flores        Communicable Diseases (eg STDs):     Last Reviewed on 1/26/2021 10:19 AM by Clarisse Flores        Current Problems:     Last Reviewed on 8/25/2020 11:28 AM by Clarisse Flores    Depression - Major depressive disorder, single episode,  moderate    HTN - Essential (primary) hypertension    Primary generalized (osteo)arthritis    GERD - Gastro-esophageal reflux disease without esophagitis    Calculus of ureter    Osteopenia - Other specified disorders of bone density and structure, multiple sites    BPPV - Benign paroxysmal vertigo, bilateral    Allergic rhinitis, unspecified    Lobulated, fused and horseshoe kidney    Other chest pain    Generalized abdominal tenderness    Flushing    Urge incontinence    Constipation, unspecified    Other fatigue    Vitamin D deficiency, unspecified    Anxiety disorder, unspecified    Umbilical hernia without obstruction or gangrene    Encounter for screening for depression    Encounter for general adult medical examination without abnormal findings    Encounter for screening mammogram for malignant neoplasm of breast        Immunizations:     influenza, high-dose, quadrivalent (FLUZONE HIGH-DOSE QUAD 2020-21) 10/26/2020    Td adult 10/1/2012    Prevnar 13 (Pneumococcal PCV 13) 11/1/2016    Fluzone High-Dose pf (>=65 yr) 11/1/2016    Fluzone High-Dose pf (>=65 yr) 9/20/2017    Fluzone High-Dose pf (>=65 yr) 10/15/2018    Fluzone High-Dose pf (>=65 yr) 10/24/2019    PNEUMOVAX 23 (Pneumococcal PPV23) 1/23/2018    Zostavax (Zoster live) 5/1/2010        Allergies:     Last Reviewed on 1/26/2021 09:54 AM by Ilda Benson    band aides:      Darvon Compound 32:      Lyrica:      Hydrocodone/Acetaminophen:      Percocet:          Current Medications:     Last Reviewed on 1/26/2021 09:54 AM by Ilda Benson    omeprazole 40 mg oral capsule,delayed release (enteric coated) [TAKE ONE CAPSULE BY MOUTH DAILY]    Cymbalta 60 mg oral capsule,delayed release (enteric coated) [Take 1 capsule(s) by mouth daily]    Zofran 4 mg oral tablet [take 1 tab by mouth TID PRN nausea ]    MELOXICAM    TAB 15MG     Miralax 17 gram/dose oral Powder [take 17 gram PO BID prn constipation]    busPIRone 5 mg oral tablet [TAKE ONE TABLET BY  MOUTH TWICE A DAY AS NEEDED]    cholecalciferol (vitamin D3) 1,250 mcg (50,000 unit) oral capsule [take 1 capsule p.o. WEEKLY for 12 weeks]    METOPROL SUC TAB 25MG ER         Objective:        Vitals:         Current: 1/26/2021 9:57:28 AM    Ht:  5 ft, 2.1 in;  Wt: 187.2 lbs;  BMI: 34.1T: 97.6 F (temporal);  BP: 140/74 mm Hg (left arm, sitting);  P: 87 bpm (left arm (BP Cuff), sitting);  sCr: 0.75 mg/dL;  GFR: 72.79        Exams:     PHYSICAL EXAM:     GENERAL: vital signs recorded - well developed, well nourished;  well groomed;  no apparent distress;     EYES: extraocular movements intact; conjunctiva and cornea are normal; pupils and irises are normal;     RESPIRATORY: normal respiratory rate and pattern with no distress; normal breath sounds with no rales, rhonchi, wheezes or rubs;     CARDIOVASCULAR: normal rate; rhythm is regular;  no systolic murmur; no edema;     GASTROINTESTINAL: nontender; normal bowel sounds;     MUSCULOSKELETAL: normal gait; normal overall tone     PSYCHIATRIC: appropriate affect and demeanor; normal psychomotor function; normal speech pattern;         Assessment:         F41.9   Anxiety disorder, unspecified       F32.1   Depression - Major depressive disorder, single episode, moderate       I10   HTN - Essential (primary) hypertension       M15.0   Primary generalized (osteo)arthritis       K21.9   GERD - Gastro-esophageal reflux disease without esophagitis           ORDERS:         Meds Prescribed:       [Refilled] Cymbalta 60 mg oral capsule,delayed release (enteric coated) [Take 1 capsule(s) by mouth daily], #90 (ninety) capsules, Refills: 1 (one)       [Refilled] busPIRone 5 mg oral tablet [TAKE ONE TABLET BY MOUTH TWICE A DAY AS NEEDED], #180 (one hundred and eighty) tablets, Refills: 1 (one)         Radiology/Test Orders:       3017F  Colorectal CA screen results documented and reviewed (PV)  (In-House)              Lab Orders:       APPTO  Appointment need  (In-House)             [id number] [id number] [id number]  (Send-Out)              Other Orders:         Screening mammogram results documented  (Send-Out)            1124F  Advance Care Planning discussed and doc in MR; no surrogate named or advance care plan provided  (Send-Out)                      Plan:         Anxiety disorder, unspecifiedStable.  Refills needed.  RTC 4 months.    MIPS Vaccines Flu and Pneumonia updated in Shot record Screening mammomgram done within last 2 years and results in are chart Colorectal Cancer Screening is up to date and the results are in the chart Advance Directive/Surrogate Decision Maker discussed and pt declines to complete today     FOLLOW-UP: Schedule a follow-up visit in 4 months.:.  f/u anxiety with [place]          Prescriptions:       [Refilled] Cymbalta 60 mg oral capsule,delayed release (enteric coated) [Take 1 capsule(s) by mouth daily], #90 (ninety) capsules, Refills: 1 (one)       [Refilled] busPIRone 5 mg oral tablet [TAKE ONE TABLET BY MOUTH TWICE A DAY AS NEEDED], #180 (one hundred and eighty) tablets, Refills: 1 (one)           Orders:         Screening mammogram results documented  (Send-Out)            3017F  Colorectal CA screen results documented and reviewed (PV)  (In-House)            1124F  Advance Care Planning discussed and doc in MR; no surrogate named or advance care plan provided  (Send-Out)            APPTO  Appointment need  (In-House)              Depression - Major depressive disorder, single episode, moderateAs above.        HTN - Essential (primary) hypertensionBP at goal.  Checking labs.  No refills needed.    LABORATORY:  Labs ordered to be performed today include HTN/Lipid Panel: CMP, Lipid.            Orders:       [id number]  [facility] CMP AND LIPID: [id number], [id number]  (Send-Out)              Primary generalized (osteo)arthritisStable.  No refills needed.        GERD - Gastro-esophageal reflux disease without esophagitisStable.  No refills  needed.            Patient Recommendations:        For  Anxiety disorder, unspecified:    Schedule a follow-up visit in 4 months.                APPOINTMENT INFORMATION:        Monday Tuesday Wednesday Thursday Friday Saturday Sunday            Time:___________________AM  PM   Date:_____________________             Charge Capture:         Primary Diagnosis:     F41.9  Anxiety disorder, unspecified           Orders:      [id number]  Office/outpatient visit; established patient, level 4  (In-House)            3017F  Colorectal CA screen results documented and reviewed (PV)  (In-House)            APPTO  Appointment need  (In-House)              F32.1  Depression - Major depressive disorder, single episode, moderate     I10  HTN - Essential (primary) hypertension     M15.0  Primary generalized (osteo)arthritis     K21.9  GERD - Gastro-esophageal reflux disease without esophagitis

## 2021-05-18 NOTE — PROGRESS NOTES
Criss Edwards 1950     Office/Outpatient Visit    Visit Date: Tue, Jan 29, 2019 02:19 pm    Provider: Clarisse Flores MD (Assistant: Ilda Benson MA)    Location: [facility]        Electronically signed by Clarisse Flores MD on  01/29/2019 03:01:25 PM                             SUBJECTIVE:        CC:     Ms. Edwards is a 68 year old White female.  This is a follow-up visit.  Patient presents today for follow up, also has complaints of vertigo;         HPI: Criss is here today to follow up on chronic issues.        She is also having dizziness -- it hits her when she bends over or moves her head from side to side.  She notices it when she rolls over in bed as well.  Loud noises can trigger. +Room spinning vertigo.  +N/V associated with it.  Vertigo persists for 15 min or so.          She is on atenolol for HTN.  BP has been well controlled.  No CP, palpitations, SOB.        She is on meloxicam for generalized arthritis.        She is on Lexapro for depression.      ROS:     CONSTITUTIONAL:  Negative for fatigue and fever.      EYES:  Negative for blurred vision.      E/N/T:  Positive for diminished hearing ( bilaterally ).   Negative for nasal congestion.      CARDIOVASCULAR:  Positive for chest pain.   Negative for palpitations.      RESPIRATORY:  Negative for recent cough and dyspnea.      GASTROINTESTINAL:  Negative for abdominal pain, constipation, diarrhea, nausea and vomiting.      GENITOURINARY:  Negative for dysuria and urinary incontinence.      MUSCULOSKELETAL:  Positive for arthralgias.   Negative for myalgias.      NEUROLOGICAL:  Positive for vertigo.   Negative for paresthesias or weakness.          PMH/FMH/SH:     Last Reviewed on 1/29/2019 02:32 PM by Clarisse Flores    Past Medical History:                 PAST MEDICAL HISTORY         Hypertension     Renal Stones         CURRENT MEDICAL PROVIDERS:    Nephrologist: Te    Orthopedist: Juice CORNEJO  DIRECTIVES: None         PREVENTIVE HEALTH MAINTENANCE             BONE DENSITY: was last done 10/28/15 with the following abnormality noted-- osteopenia     COLORECTAL CANCER SCREENING: Up to date (colonoscopy q10y; sigmoidoscopy q5y; Cologuard q3y) was last done 4/11/2018; colonoscopy with normal results; The next colonoscopy is due  10 years     EYE EXAM: was last done 2016     Hepatitis C Medicare Screening: was last done 1/2018     MAMMOGRAM: Done within last 2 years and results in are chart was last done 11/2017 with normal results     PAP SMEAR: No longer indicated due to age and history s/p hysterectomy         Surgical History:         Cholecystectomy    Hysterectomy: total;     Joint Replacement: R KENZIE;      back surgery;    kidney stone removal;         Family History:     Father: Congestive Heart Failure     Mother: Myocardial Infarction;  Cerebrovascular Accident     Brother(s): Coronary Artery Disease ( at 46 yo )     Sister(s): Coronary Artery Disease; Endocrine Type 2 Diabetes         Social History:     Occupation: Retired (Prior occupation: [employer])     Marital Status: Single     Children: 2 children         Tobacco/Alcohol/Supplements:     Last Reviewed on 1/29/2019 02:32 PM by Clarisse Flores    Tobacco: She has never smoked.          Substance Abuse History:     Last Reviewed on 1/29/2019 02:32 PM by Clarisse Flores        Mental Health History:     Last Reviewed on 1/29/2019 02:32 PM by Clarisse Flores        Communicable Diseases (eg STDs):     Last Reviewed on 1/29/2019 02:32 PM by Clarisse Flores            Current Problems:     Last Reviewed on 8/14/2018 04:58 PM by Elaine Richardson    Vertigo     Osteopenia     Screening for cancer of colon     Recurrent nephrolithiasis     GERD     Moderate depression     Generalized osteoarthritis     Hypertension     Kidney stone     Cerumen impaction     Dysuria     Upper respiratory illness     Allergies         Immunizations:     Td adult  10/1/2012     Prevnar 13 (Pneumococcal PCV 13) 11/1/2016     Fluzone High-Dose pf (>=65 yr) 11/1/2016     Fluzone High-Dose pf (>=65 yr) 9/20/2017     Fluzone High-Dose pf (>=65 yr) 10/15/2018     PNEUMOVAX 23 (Pneumococcal PPV23) 1/23/2018     Zostavax (Zoster live) 5/1/2010         Allergies:     Last Reviewed on 12/26/2018 11:04 AM by Mariajose Manley Compound 32:    Lyrica:    Hydrocodone/Acetaminophen:    Percocet:        Current Medications:     Last Reviewed on 12/26/2018 11:05 AM by Mariajose Manley    Atenolol 25mg Tablet 1 tab daily     Meloxicam 15mg Tablet 1 tab HS with food     Lexapro 20mg Tablet 1 tab daily     Baclofen 10mg Tablet one po  tid prn     Cetirizine HCl 10mg Tablet 1 tab daily         OBJECTIVE:        Vitals:         Current: 1/29/2019 2:25:46 PM    Ht:  5 ft, 2.1 in;  Wt: 193.8 lbs;  BMI: 35.3    T: 97.7 F (oral);  BP: 135/65 mm Hg (left arm, sitting);  P: 72 bpm (left arm (BP Cuff), sitting);  sCr: 0.79 mg/dL;  GFR: 72.12        Exams:     PHYSICAL EXAM:     GENERAL: vital signs recorded - well developed, well nourished;  well groomed;  no apparent distress;     EYES: extraocular movements intact; conjunctiva and cornea are normal; pupils and irises are normal;     E/N/T: EARS:  normal external auditory canals and tympanic membranes;  grossly normal hearing; OROPHARYNX:  normal mucosa, dentition, gingiva, and posterior pharynx; +[place] Hallpike bilateral ears;     RESPIRATORY: normal respiratory rate and pattern with no distress; normal breath sounds with no rales, rhonchi, wheezes or rubs;     CARDIOVASCULAR: normal rate; rhythm is regular;  no systolic murmur; no edema;     GASTROINTESTINAL: nontender; normal bowel sounds;     MUSCULOSKELETAL: normal gait; normal overall tone         ASSESSMENT           780.4   H81.13  Vertigo              DDx:     401.1   I10  Hypertension              DDx:     296.22   F32.1  Moderate depression              DDx:     715.09   M15.0   Generalized osteoarthritis              DDx:     V76.10   Z12.39  Screening for breast cancer, unspecified              DDx:         ORDERS:         Meds Prescribed:       Refill of: Lexapro (Escitalopram Oxalate) 20mg Tablet 1 tab daily  #30 (Thirty) tablet(s) Refills: 5       Refill of: Meclizine HCl 25mg Tablet Take 1 to 2 tabs twice daily as needed for dizziness.  #30 (Thirty) tablet(s) Refills: 0         Radiology/Test Orders:       [id number]  Screening mammography bi 2-view inc CAD  (Send-Out)         3014F  Screening mammography results documented and reviewed (PV)1  (In-House)         3017F  Colorectal CA screen results documented and reviewed (PV)  (In-House)           Lab Orders:       APPTO  Appointment need  (In-House)           Other Orders:         Calculated BMI above the upper parameter and a follow-up plan was documented in the medical record  (In-House)                   PLAN:          Vertigo +BPPV.  Sending in more meclizine, but also gave handouts for Epley maneuvers and explained these to her.  If she does not get any benefit from completing these, will have her let me know and we will plan to get her in with ENT.  RTC 3 months.     MIPS Vaccines Flu and Pneumonia updated in Shot record     MAMMOGRAM: Done within last 2 years and results in are chart     COLORECTAL CANCER SCREENING: Results are in chart     BMI Elevated - Follow-Up Plan: She was provided education on weight loss strategies     FOLLOW-UP: Schedule a follow-up visit in 3 months..  Medicare wellness 30 min with Sandra           Prescriptions:       Refill of: Meclizine HCl 25mg Tablet Take 1 to 2 tabs twice daily as needed for dizziness.  #30 (Thirty) tablet(s) Refills: 0           Orders:       APPTO  Appointment need  (In-House)         3014F  Screening mammography results documented and reviewed (PV)1  (In-House)         3017F  Colorectal CA screen results documented and reviewed (PV)  (In-House)           Calculated BMI  above the upper parameter and a follow-up plan was documented in the medical record  (In-House)             Patient Education Handouts:       [facility] Medication Compliance           Hypertension BP at goal.  No refills needed.          Moderate depression Stable.  Will keep her on Lexapro for now, at least until we get the vertigo resolved.  She is interested in maybe going down or getting off.           Prescriptions:       Refill of: Lexapro (Escitalopram Oxalate) 20mg Tablet 1 tab daily  #30 (Thirty) tablet(s) Refills: 5          Generalized osteoarthritis Stable.  No refills needed.          Screening for breast cancer, unspecified         RADIOLOGY:  I have ordered screening mammogram to be done today.            Orders:       [id number]  Screening mammography bi 2-view inc CAD  (Send-Out)               Patient Recommendations:        For  Vertigo:     Schedule a follow-up visit in 3 months.                APPOINTMENT INFORMATION:        Monday Tuesday Wednesday Thursday Friday Saturday Sunday            Time:___________________AM  PM   Date:_____________________             CHARGE CAPTURE           **Please note: ICD descriptions below are intended for billing purposes only and may not represent clinical diagnoses**        Primary Diagnosis:         780.4 Vertigo            H81.13    Benign paroxysmal vertigo, bilateral              Orders:          [id number]   Office/outpatient visit; established patient, level 4  (In-House)             APPTO   Appointment need  (In-House)             3014F   Screening mammography results documented and reviewed (PV)1  (In-House)             3017F   Colorectal CA screen results documented and reviewed (PV)  (In-House)                Calculated BMI above the upper parameter and a follow-up plan was documented in the medical record  (In-House)           401.1 Hypertension            I10    Essential (primary) hypertension    296.22 Moderate depression            F32.1    Major  depressive disorder, single episode, moderate    715.09 Generalized osteoarthritis            M15.0    Primary generalized (osteo)arthritis    V76.10 Screening for breast cancer, unspecified            Z12.39    Encounter for other screening for malignant neoplasm of breast

## 2021-05-18 NOTE — PROGRESS NOTES
Criss Edwards  1950     Office/Outpatient Visit    Visit Date: Mon, Apr 6, 2020 02:56 pm    Provider: Rafael Kovacs MD (Assistant: Russ Armenta, )    Location: [facility]        Electronically signed by Rafael Kovacs MD on  04/06/2020 06:12:02 PM                             Subjective:        CC: Ms. Edwards is a 69 year old White female.  presents today due to stomach and chest pain, diarrhea,hot sweats and chills since sunday, pt had an umbilicial hernia and now it is gone (HAS NOT BEEN TAKING CETIRIZINE).    TELEMEDICINE VISIT:    - Patient consented to this telemedicine visit. Consent obtained by Russ Armenta and Rafael Kovacs MD.    - Persons present during the telemedicine consultation include:  Patient, Dr. Kovacs    - This visit is being conducted via telephone.            HPI: Patient is being evaluated today for complaints of an episode of diaphoresis, abdominal pain and chest pain that occurred 2 nights ago.  She said she was awakened at approximately 3 AM with the urge to have a bowel movement.  While sitting on the toilet, the patient reports that she became acutely sweaty and developed abdominal pain and chest pain.  Pain resolved within a few minutes but the patient became chilled shortly thereafter and this alternated with hot flashes for the next 24 hours.  Today, she reports that she is having no symptoms.  She denies fatigue. She denies chest pain or shortness of breath.  No dizziness or syncope.  No palpitations or edema.  No melena or hematochezia.  No fever or chills. No known coronary artery disease But she has had a cardiac work-up in the past with a normal stress test in 2015.    ROS:     CONSTITUTIONAL:  Positive for chills.   Negative for fatigue or fever.      EYES:  Negative for blurred vision.      CARDIOVASCULAR:  Positive for chest pain.   Negative for dizziness, palpitations or edema.      RESPIRATORY:  Negative for dyspnea and cough.      GASTROINTESTINAL:   Positive for abdominal pain and diarrhea.   Negative for constipation, hematochezia, melena, nausea or vomiting.      MUSCULOSKELETAL:  Negative for arthralgias and myalgias.      INTEGUMENTARY/BREAST:  Negative for rash, breast mass and skin changes of breast.      NEUROLOGICAL:  Negative for headaches, paresthesias and weakness.          Past Medical History / Family History / Social History:         Last Reviewed on 4/06/2020 06:11 PM by Rafael Kovacs    Past Medical History:                 PAST MEDICAL HISTORY         Hypertension     Renal Stones         CURRENT MEDICAL PROVIDERS:    Nephrologist: Te    Orthopedist: Juice             ADVANCED DIRECTIVES: None         PREVENTIVE HEALTH MAINTENANCE             BONE DENSITY: was last done 1/28/2020 with the following abnormality noted-- osteopenia (improved)     COLORECTAL CANCER SCREENING: Up to date (colonoscopy q10y; sigmoidoscopy q5y; Cologuard q3y) was last done 4/11/2018; colonoscopy with normal results; The next colonoscopy is due  10 years     EYE EXAM: was last done 2016     Hepatitis C Medicare Screening: was last done 1/2018     MAMMOGRAM: Done within last 2 years and results in are chart was last done 4/16/2019 with normal results     PAP SMEAR: No longer indicated due to age and history s/p hysterectomy         Surgical History:         Cholecystectomy    Hysterectomy: total;     Joint Replacement: R KENZIE;     back surgery;    kidney stone removal;         Family History:     Father: Congestive Heart Failure     Mother: Myocardial Infarction;  Cerebrovascular Accident     Brother(s): Coronary Artery Disease ( at 46 yo )     Sister(s): Coronary Artery Disease; Endocrine Type 2 Diabetes         Social History:     Occupation: Retired (Prior occupation: [employer])     Marital Status: Single     Children: 2 children         Tobacco/Alcohol/Supplements:     Last Reviewed on 4/06/2020 06:11 PM by Rafael Kovacs    Tobacco: She has never  smoked.          Substance Abuse History:     Last Reviewed on 4/06/2020 06:11 PM by Rafael Kovacs        Mental Health History:     Last Reviewed on 4/06/2020 06:11 PM by Rafael Kovacs        Communicable Diseases (eg STDs):     Last Reviewed on 4/06/2020 06:11 PM by Rafael Kovacs        Current Problems:     Last Reviewed on 4/06/2020 06:11 PM by Rafael Kovacs    Depression - Major depressive disorder, single episode, moderate    HTN - Essential (primary) hypertension    Primary generalized (osteo)arthritis    GERD - Gastro-esophageal reflux disease without esophagitis    Calculus of ureter    Osteopenia - Other specified disorders of bone density and structure, multiple sites    BPPV - Benign paroxysmal vertigo, bilateral    Allergic rhinitis, unspecified    Lobulated, fused and horseshoe kidney    Other chest pain    Generalized abdominal tenderness    Flushing        Immunizations:     Td adult 10/1/2012    Prevnar 13 (Pneumococcal PCV 13) 11/1/2016    Fluzone High-Dose pf (>=65 yr) 11/1/2016    Fluzone High-Dose pf (>=65 yr) 9/20/2017    Fluzone High-Dose pf (>=65 yr) 10/15/2018    Fluzone High-Dose pf (>=65 yr) 10/24/2019    PNEUMOVAX 23 (Pneumococcal PPV23) 1/23/2018    Zostavax (Zoster live) 5/1/2010        Allergies:     Last Reviewed on 4/06/2020 06:11 PM by Rafael Kovacs    band aides:      Darvon Compound 32:      Lyrica:      Hydrocodone/Acetaminophen:      Percocet:          Current Medications:     Last Reviewed on 4/06/2020 06:11 PM by Rafael Kovacs    Atenolol 25mg Tablet [bid]    meloxicam 15 mg oral tablet [TAKE ONE TABLET BY MOUTH AT BEDTIME WITH FOOD]    BACLOFEN     TAB 10MG     Cymbalta 60 mg oral capsule,delayed release (enteric coated) [Take 1 capsule(s) by mouth daily]    OMEPRAZOLE   CAP 40MG     cetirizine 10 mg oral tablet,chewable [chew 1 tablet (10 mg) by oral route once daily]    fluticasone propionate 50 mcg/actuation Intranasal Spray, Suspension [inhale 1 spray (50 mcg) in each  nostril by intranasal route once daily]    Zofran 4 mg oral tablet [take 1 tab by mouth TID PRN nausea ]    OXYBUTYNIN   TAB 5MG ER        Objective:        Exams:     PHYSICAL EXAM:     CARDIOVASCULAR: normal rate; rhythm is regular;  No murmurs. clicks, gallops or rubs appreciated; no edema;         Assessment:         R23.2   Flushing       R07.89   Other chest pain       R10.817   Generalized abdominal tenderness           Plan:         Flushing- Unclear etiology of patient's symptoms.  She has been very concerned that she may have a coronavirus.  Her symptom constellation makes this less of a concern in my opinion.  I be most concerned about a cardiac etiology and the patient has been advised that should she experience another similar episode to report to the emergency department immediately.  She is also been advised to alert her cardiologist about this most recent episode.  Otherwise, we will continue to monitor at this time with no specific intervention given resolution of symptoms.  In addition to possible cardiology intervention, we will consider further work-up if symptoms persist.    Telehealth: Verbal consent obtained for visit to occur via phone call; Total time spent was 7 minutes; 31280--Xenjvgiye E/M 5-10 minutes         Other chest pain- See above        Generalized abdominal tenderness- See above            Charge Capture:         Primary Diagnosis:     R23.2  Flushing           Orders:      [id number]  Phys/QHP telephone evaluation 5-10 min  (In-House)              R07.89  Other chest pain     R10.817  Generalized abdominal tenderness

## 2021-05-18 NOTE — PROGRESS NOTES
"Criss Edwards  1950     Office/Outpatient Visit    Visit Date: Fri, May 1, 2020 09:21 am    Provider: Clarisse Flores MD (Assistant: Donna Vences RN)    Location: [facility]        Electronically signed by Clarisse Flores MD on 05/01/2020 10:19:09 am  Subjective:        CC: Ms. Edwards is a 69 year old White female.  1 week follow up; PT IS NOT TAKING OXYBUTYNIN        HPI: Criss's telehealth visit today is to follow up on constipation.  Two weeks ago, she had a severe episode of abdominal pain thought to be related to severe constipation (which had worsened ever since starting oxybutynin for urge incontinence).  She was started on Miralax.  Sx improved, but she continued to have constipation as well as dry mouth and tremors, so last week we stopped her oxybutynin altogether.  Still having tremors.  Constipation is better.  She is still taking Miralax once daily and is having one soft BM daily.  The dizziness has improved.  \"I just believe I'm dying.\"  She has pressure behind her eyes and temples bilaterally.  She feels pain there as well.  +Fatigue.    ROS:     CONSTITUTIONAL:  Positive for fatigue ( moderate ).   Negative for fever.      EYES:  Negative for blurred vision.      E/N/T:  Positive for sinus pressure.      CARDIOVASCULAR:  Negative for chest pain and palpitations.      RESPIRATORY:  Negative for recent cough and dyspnea.      GASTROINTESTINAL:  Positive for abdominal pain and constipation.   Negative for diarrhea, nausea or vomiting.      PSYCHIATRIC:  Negative for anxiety, depression and sleep disturbance.          Past Medical History / Family History / Social History:         Last Reviewed on 5/01/2020 09:58 AM by Clarisse Flores    Past Medical History:                 PAST MEDICAL HISTORY         Hypertension     Renal Stones         CURRENT MEDICAL PROVIDERS:    Nephrologist: Te    Orthopedist: Juice             ADVANCED DIRECTIVES: None         PREVENTIVE HEALTH " MAINTENANCE             BONE DENSITY: was last done 1/28/2020 with the following abnormality noted-- osteopenia (improved)     COLORECTAL CANCER SCREENING: Up to date (colonoscopy q10y; sigmoidoscopy q5y; Cologuard q3y) was last done 4/11/2018; colonoscopy with normal results; The next colonoscopy is due  10 years     EYE EXAM: was last done 2016     Hepatitis C Medicare Screening: was last done 1/2018     MAMMOGRAM: Done within last 2 years and results in are chart was last done 4/16/2019 with normal results     PAP SMEAR: No longer indicated due to age and history s/p hysterectomy         Surgical History:         Cholecystectomy    Hysterectomy: total;     Joint Replacement: R KENZIE;     back surgery;    kidney stone removal;         Family History:     Father: Congestive Heart Failure     Mother: Myocardial Infarction;  Cerebrovascular Accident     Brother(s): Coronary Artery Disease ( at 46 yo )     Sister(s): Coronary Artery Disease; Endocrine Type 2 Diabetes         Social History:     Occupation: Retired (Prior occupation: [employer])     Marital Status: Single     Children: 2 children         Tobacco/Alcohol/Supplements:     Last Reviewed on 5/01/2020 09:58 AM by Clarisse Flores    Tobacco: She has never smoked.          Substance Abuse History:     Last Reviewed on 5/01/2020 09:58 AM by Clarisse Flores        Mental Health History:     Last Reviewed on 5/01/2020 09:58 AM by Clarisse Flores        Communicable Diseases (eg STDs):     Last Reviewed on 5/01/2020 09:58 AM by Clarisse Flores        Current Problems:     Last Reviewed on 4/24/2020 09:00 AM by Clarisse Flores    Depression - Major depressive disorder, single episode, moderate    HTN - Essential (primary) hypertension    Primary generalized (osteo)arthritis    GERD - Gastro-esophageal reflux disease without esophagitis    Calculus of ureter    Osteopenia - Other specified disorders of bone density and structure, multiple sites    BPPV -  Benign paroxysmal vertigo, bilateral    Allergic rhinitis, unspecified    Lobulated, fused and horseshoe kidney    Other chest pain    Generalized abdominal tenderness    Flushing    Urge incontinence    Constipation, unspecified        Immunizations:     Td adult 10/1/2012    Prevnar 13 (Pneumococcal PCV 13) 11/1/2016    Fluzone High-Dose pf (>=65 yr) 11/1/2016    Fluzone High-Dose pf (>=65 yr) 9/20/2017    Fluzone High-Dose pf (>=65 yr) 10/15/2018    Fluzone High-Dose pf (>=65 yr) 10/24/2019    PNEUMOVAX 23 (Pneumococcal PPV23) 1/23/2018    Zostavax (Zoster live) 5/1/2010        Allergies:     Last Reviewed on 5/01/2020 09:21 AM by Donna Vences aides:      Darvon Compound 32:      Lyrica:      Hydrocodone/Acetaminophen:      Percocet:          Current Medications:     Last Reviewed on 5/01/2020 09:22 AM by Donna Vences    Atenolol 25mg Tablet [bid]    Cymbalta 60 mg oral capsule,delayed release (enteric coated) [Take 1 capsule(s) by mouth daily]    Zofran 4 mg oral tablet [take 1 tab by mouth TID PRN nausea ]    OXYBUTYNIN   TAB 5MG ER     Miralax 17 gram/dose oral Powder [take 17 gram PO BID prn constipation]        Assessment:         R53.83   Other fatigue       E55.9   Vitamin D deficiency, unspecified       K59.00   Constipation, unspecified       F41.9   Anxiety disorder, unspecified           ORDERS:         Meds Prescribed:       [New Rx] busPIRone 5 mg oral tablet [take 1 tablet (5 mg) by oral route 2 times per day prn], #60 (sixty) tablets, Refills: 0 (zero)         Lab Orders:       [id number]  FFAT - H CBC, CMP, TSH, B12 levels FATIGUE PANEL  (Send-Out)            87082  VITD - [facility] Vitamin D, 25 Hydroxy  (Send-Out)            APPTO  Appointment need  (In-House)                      Plan:         Other fatigueFatigue.  She has a lot of anxiety about her current sx and in general.  Checking fatigue labs as below.  Will contact her with results when available.  RTC 2 weeks.    LABORATORY:  Labs  ordered to be performed today include Female Fatigue Panel (CBC, CMP, TSH, B12).  Telehealth: Verbal consent obtained for visit to occur via phone call; Staff, other than provider, present during telephone visit include Donna Vences RN; Total time spent was 15 minutes; 38710--Ehxrxfulw E/M 11-20 minutes     FOLLOW-UP: Schedule a follow-up appointment in 2 weeks.:.  f/u fatigue with [facility]          Orders:       [id number]  FFAT - [facility] CBC, CMP, TSH, B12 levels FATIGUE PANEL  (Send-Out)            APPTO  Appointment need  (In-House)              Vitamin D deficiency, unspecifiedAs above.    LABORATORY:  Labs ordered to be performed today include Vitamin D.            Orders:       [id number]  VITD - [facility] Vitamin D, 25 Hydroxy  (Send-Out)              Constipation, unspecifiedImproved since going off oxybutynin.  Continue Miralax once daily.        Anxiety disorder, unspecifiedShe has a lot of anxiety and I wonder if some of these complaints are not somatization of this stress.  Will continue her Cymbalta and add in some prn buspirone for her to use BID.            Prescriptions:       [New Rx] busPIRone 5 mg oral tablet [take 1 tablet (5 mg) by oral route 2 times per day prn], #60 (sixty) tablets, Refills: 0 (zero)             Patient Recommendations:        For  Other fatigue:    Schedule a follow-up visit in 2 weeks.                APPOINTMENT INFORMATION:        Monday Tuesday Wednesday Thursday Friday Saturday Sunday            Time:___________________AM  PM   Date:_____________________             Charge Capture:         Primary Diagnosis:     R53.83  Other fatigue           Orders:      APPTO  Appointment need  (In-House)            [id number]  Phys/QHP telephone evaluation 11-20 minutes  (In-House)              E55.9  Vitamin D deficiency, unspecified     K59.00  Constipation, unspecified     F41.9  Anxiety disorder, unspecified

## 2021-05-18 NOTE — PROGRESS NOTES
"Criss Edwards  1950     Office/Outpatient Visit    Visit Date: Fri, Apr 17, 2020 09:17 am    Provider: Clarisse Flores MD (Assistant: Ilda Benson MA)    Location: [facility]        Electronically signed by Clarisse Flores MD on  04/17/2020 10:10:09 AM                             Subjective:        CC: Ms. Edwards is a 69 year old White female.  Four month follow up (not taking Cetirizine);         HPI: Criss's telehealth visit today is for f/u on chronic issues.  \"I'm not doing well.\"  She has been very stressed and scared lately.  On April 5th early in the morning, she had severe abdominal pain with cold swets and chills.  \"This was my bowels.\"  +Constipation -- \"Like rocks, and then it came out as just water.\"  +Nausea.  She had hot flashes alternating chills for the rest of the day.  +Tremors.  She was using an OTC stool softener but stopped taking that because \"I could taste the chemicals.\"  Then her daughter sent her some Miralax and she been using one capful daily.        She is on atenolol for HTN.  BP has been well controlled.  No CP, palpitations, SOB.        She is on meloxicam for generalized arthritis.  She has been using baclofen as well for some spasm-like pain.  She saw Dr. Bose who told her that he was going to be monitoring her hip.        She is on Cymbalta for depression.  No depressed mood or anhedonia.  No anxiety or panic attacks  Has been on Lexapro in the past but did not get as much effect.        She is on oxybutynin for urge incontinence.  Urology started this after she saw them in March.        She is on omeprazole for GERD.  No indigestion or reflux.    ROS:     CONSTITUTIONAL:  Negative for fatigue and fever.      EYES:  Negative for blurred vision.      E/N/T:  Positive for diminished hearing ( bilaterally ) and tinnitus.   Negative for nasal congestion.      CARDIOVASCULAR:  Negative for chest pain and palpitations.      RESPIRATORY:  Negative for recent " cough and dyspnea.      GASTROINTESTINAL:  Positive for abdominal pain, constipation and nausea ( now resolved ).   Negative for diarrhea or vomiting.      MUSCULOSKELETAL:  Positive for arthralgias.   Negative for myalgias.      PSYCHIATRIC:  Negative for anxiety, depression and sleep disturbance.          Past Medical History / Family History / Social History:         Last Reviewed on 4/17/2020 09:41 AM by Clarisse Flores    Past Medical History:                 PAST MEDICAL HISTORY         Hypertension     Renal Stones         CURRENT MEDICAL PROVIDERS:    Nephrologist: Te    Orthopedist: Juice             ADVANCED DIRECTIVES: None         PREVENTIVE HEALTH MAINTENANCE             BONE DENSITY: was last done 1/28/2020 with the following abnormality noted-- osteopenia (improved)     COLORECTAL CANCER SCREENING: Up to date (colonoscopy q10y; sigmoidoscopy q5y; Cologuard q3y) was last done 4/11/2018; colonoscopy with normal results; The next colonoscopy is due  10 years     EYE EXAM: was last done 2016     Hepatitis C Medicare Screening: was last done 1/2018     MAMMOGRAM: Done within last 2 years and results in are chart was last done 4/16/2019 with normal results     PAP SMEAR: No longer indicated due to age and history s/p hysterectomy         Surgical History:         Cholecystectomy    Hysterectomy: total;     Joint Replacement: R KENZIE;     back surgery;    kidney stone removal;         Family History:     Father: Congestive Heart Failure     Mother: Myocardial Infarction;  Cerebrovascular Accident     Brother(s): Coronary Artery Disease ( at 46 yo )     Sister(s): Coronary Artery Disease; Endocrine Type 2 Diabetes         Social History:     Occupation: Retired (Prior occupation: [employer])     Marital Status: Single     Children: 2 children         Tobacco/Alcohol/Supplements:     Last Reviewed on 4/17/2020 09:41 AM by Clarisse Flores    Tobacco: She has never smoked.          Substance Abuse  History:     Last Reviewed on 4/17/2020 09:41 AM by Clarisse Flores        Mental Health History:     Last Reviewed on 4/17/2020 09:41 AM by Clarisse Flores        Communicable Diseases (eg STDs):     Last Reviewed on 4/17/2020 09:41 AM by Clarisse Flores        Current Problems:     Last Reviewed on 4/06/2020 06:11 PM by Rafael Kovacs    Depression - Major depressive disorder, single episode, moderate    HTN - Essential (primary) hypertension    Primary generalized (osteo)arthritis    GERD - Gastro-esophageal reflux disease without esophagitis    Calculus of ureter    Osteopenia - Other specified disorders of bone density and structure, multiple sites    BPPV - Benign paroxysmal vertigo, bilateral    Allergic rhinitis, unspecified    Lobulated, fused and horseshoe kidney    Other chest pain    Generalized abdominal tenderness    Flushing        Immunizations:     Td adult 10/1/2012    Prevnar 13 (Pneumococcal PCV 13) 11/1/2016    Fluzone High-Dose pf (>=65 yr) 11/1/2016    Fluzone High-Dose pf (>=65 yr) 9/20/2017    Fluzone High-Dose pf (>=65 yr) 10/15/2018    Fluzone High-Dose pf (>=65 yr) 10/24/2019    PNEUMOVAX 23 (Pneumococcal PPV23) 1/23/2018    Zostavax (Zoster live) 5/1/2010        Allergies:     Last Reviewed on 4/17/2020 09:18 AM by Ilda Benson    band aides:      Darvon Compound 32:      Lyrica:      Hydrocodone/Acetaminophen:      Percocet:          Current Medications:     Last Reviewed on 4/17/2020 09:18 AM by Ilda Benson    Atenolol 25mg Tablet [bid]    meloxicam 15 mg oral tablet [TAKE ONE TABLET BY MOUTH AT BEDTIME WITH FOOD]    BACLOFEN     TAB 10MG     Cymbalta 60 mg oral capsule,delayed release (enteric coated) [Take 1 capsule(s) by mouth daily]    OMEPRAZOLE   CAP 40MG     cetirizine 10 mg oral tablet,chewable [chew 1 tablet (10 mg) by oral route once daily]    fluticasone propionate 50 mcg/actuation Intranasal Spray, Suspension [inhale 1 spray (50 mcg) in each nostril by intranasal  route once daily]    Zofran 4 mg oral tablet [take 1 tab by mouth TID PRN nausea ]    OXYBUTYNIN   TAB 5MG ER        Assessment:         F32.1   Depression - Major depressive disorder, single episode, moderate       K59.00   Constipation, unspecified       I10   HTN - Essential (primary) hypertension       M15.0   Primary generalized (osteo)arthritis       K21.9   GERD - Gastro-esophageal reflux disease without esophagitis       N39.41   Urge incontinence           ORDERS:         Meds Prescribed:       [Refilled] Zofran 4 mg oral tablet [take 1 tab by mouth TID PRN nausea ], #30 (thirty) tablets, Refills: 0 (zero)       [New Rx] Miralax 17 gram/dose oral Powder [take 17 gram PO BID prn constipation], #527 (five hundred and twenty seven) grams, Refills: 2 (two)         Radiology/Test Orders:       3017F  Colorectal CA screen results documented and reviewed (PV)  (In-House)              Lab Orders:       APPTO  Appointment need  (In-House)              Other Orders:       1100F  Pt screen for fall risk; document 2+ falls in the past yr or any fall w/injury in past year (YENIFER)  (In-House)              Screening mammogram results documented  (Send-Out)            1124F  Advance Care Planning discussed and doc in MR; no surrogate named or advance care plan provided  (Send-Out)                      Plan:         Depression - Major depressive disorder, single episode, moderateShe is under a lot of stress, living on her own, and worried about COVID-19.  Extensively discussed her worries today and support offered.  Emphasized that I do not hear any evidence that she has contracted coronavirus, which was a relief to her.  No refills needed.  RTC televisit 1 week for constipation and 4 months in person for AWV.    MIPS Has fallen 2+ times in the past year or had one fall with an injury in the past year Vaccines Flu and Pneumonia updated in Shot record Screening mammomgram done within last 2 years and results in are chart  Colorectal Cancer Screening is up to date and the results are in the chart Advance Directive/Surrogate Decision Maker discussed and pt declines to complete today Telehealth: Verbal consent obtained for visit to occur via phone call; Staff, other than provider, present during telephone visit include Ilda Benson MA; Total time spent was 23 minutes; [id number]--[id number] E/M 21-30 minutes     FOLLOW-UP: Schedule a follow-up visit in 4 months.:.  Medicare wellness 30 min with TamaraAND 1 WEEK FOR TELEHEALTH VISIT WITH TAMARA, F/U CONSTIPATION          Orders:       APPTO  Appointment need  (In-House)            1100F  Pt screen for fall risk; document 2+ falls in the past yr or any fall w/injury in past year (YENIFER)  (In-House)              Screening mammogram results documented  (Send-Out)            3017F  Colorectal CA screen results documented and reviewed (PV)  (In-House)            1124F  Advance Care Planning discussed and doc in MR; no surrogate named or advance care plan provided  (Send-Out)              Constipation, unspecifiedShe had a very scary episode last week that sounds like it was related to bad constipation.  Constipation seems to have worsened since starting oxybutynin, unfortunately.  Will try to get it under better control with good bowel regimen.  Taking Miralax 17 gm once daily for the past 2 days, increase to BID prn constipation and taper back down as indicated if diarrhea develops.  I will do a repeat televisit in 1 week.          Prescriptions:       [Refilled] Zofran 4 mg oral tablet [take 1 tab by mouth TID PRN nausea ], #30 (thirty) tablets, Refills: 0 (zero)       [New Rx] Miralax 17 gram/dose oral Powder [take 17 gram PO BID prn constipation], #527 (five hundred and twenty seven) grams, Refills: 2 (two)         HTN - Essential (primary) hypertensionStable.  No refills needed.        Primary generalized (osteo)arthritisStable.  No refills needed.        GERD - Gastro-esophageal reflux  disease without esophagitisStable.  No refills needed.        Urge incontinenceStable.  No refills needed.            Patient Recommendations:        For  Depression - Major depressive disorder, single episode, moderate:    Schedule a follow-up visit in 4 months.                APPOINTMENT INFORMATION:        Monday Tuesday Wednesday Thursday Friday Saturday Sunday            Time:___________________AM  PM   Date:_____________________             Charge Capture:         Primary Diagnosis:     F32.1  Depression - Major depressive disorder, single episode, moderate           Orders:      APPTO  Appointment need  (In-House)            1100F  Pt screen for fall risk; document 2+ falls in the past yr or any fall w/injury in past year (YENIFER)  (In-House)            3017F  Colorectal CA screen results documented and reviewed (PV)  (In-House)            15258  Phys/QHP telephone evaluation 21-30 minutes  (In-House)              K59.00  Constipation, unspecified     I10  HTN - Essential (primary) hypertension     M15.0  Primary generalized (osteo)arthritis     K21.9  GERD - Gastro-esophageal reflux disease without esophagitis     N39.41  Urge incontinence
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
## 2021-05-18 NOTE — PROGRESS NOTES
Criss Edwards 1950     Office/Outpatient Visit    Visit Date: Mon, Jun 11, 2018 04:50 pm    Provider: Reid Bourgeois N.P. (Assistant: Ayesha Maharaj MA)    Location: [facility]        Electronically signed by Reid Bourgeois N.P. on  06/11/2018 09:26:48 PM                             SUBJECTIVE:        CC:     Ms. Edwards is a 67 year old White female.  Neck is stiff little movement (Atenolol is 2 a day);         HPI:         Neck pain noted.  for neck pain.  The pain is located left.  The pain radiates to the down spine.  The pain is characterized as moderate in intensity and pain is worse at night, states she lays on back with ice.  Initial onset was 6 days ago.  The precipitating event seems to have been fell out of lawn chair on wed, landed on her left side..  Pain is improved by: NSAIDs.  Medical history is pertinent for osteoarthritis and kicked in the left side of head by a horse 10 years ago, did report a concusion.  Negative for dizziness, faint/lightheaded and decreaed LOC.  Other details: pain seems to be worse at night time. Pain has limited her to not being able to do house work.      ROS:     CONSTITUTIONAL:  Negative for chills, fatigue, fever, and weight change.      EYES:  Negative for blurred vision.      CARDIOVASCULAR:  Negative for chest pain, orthopnea, paroxysmal nocturnal dyspnea and pedal edema.      RESPIRATORY:  Negative for dyspnea.      MUSCULOSKELETAL:  Positive for joint stiffness and limb pain ( left shoulder down spine ).          PMH/FMH/SH:     Last Reviewed on 6/11/2018 05:14 PM by Reid Bourgeois    Past Medical History:                 PAST MEDICAL HISTORY         Hypertension     Renal Stones         CURRENT MEDICAL PROVIDERS:    Nephrologist: Te    Orthopedist: Juice             ADVANCED DIRECTIVES: None         PREVENTIVE HEALTH MAINTENANCE             BONE DENSITY: was last done 10/28/15 with the following abnormality noted-- osteopenia      COLORECTAL CANCER SCREENING: Up to date (colonoscopy q10y; sigmoidoscopy q5y; Cologuard q3y) was last done 4/11/2018; colonoscopy with normal results; The next colonoscopy is due  10 years     EYE EXAM: was last done 2016     Hepatitis C Medicare Screening: was last done 1/2018     MAMMOGRAM: Done within last 2 years and results in are chart was last done 11/2017 with normal results     PAP SMEAR: No longer indicated due to age and history s/p hysterectomy         Surgical History:         Cholecystectomy    Hysterectomy: total;     Joint Replacement: R KENZIE;      back surgery;    kidney stone removal;         Family History:     Father: Congestive Heart Failure     Mother: Myocardial Infarction;  Cerebrovascular Accident     Brother(s): Coronary Artery Disease ( at 46 yo )     Sister(s): Coronary Artery Disease; Endocrine Type 2 Diabetes         Social History:     Occupation: Retired (Prior occupation: [employer])     Marital Status: Single     Children: 2 children         Tobacco/Alcohol/Supplements:     Last Reviewed on 6/11/2018 05:14 PM by Reid Bourgeois    Tobacco: She has never smoked.          Substance Abuse History:     Last Reviewed on 6/11/2018 05:14 PM by Reid Bourgeois        Mental Health History:     Last Reviewed on 6/11/2018 05:14 PM by Reid Bourgeois        Communicable Diseases (eg STDs):     Last Reviewed on 6/11/2018 05:14 PM by Reid Bourgeois            Current Problems:     Last Reviewed on 6/11/2018 05:14 PM by Reid Bourgeois    Osteopenia     Screening for cancer of colon     Recurrent nephrolithiasis     GERD     Moderate depression     Generalized osteoarthritis     Hypertension     Kidney stone     Neck pain     Allergies         Immunizations:     Td adult 10/1/2012     Prevnar 13 (Pneumococcal PCV 13) 11/1/2016     Fluzone High-Dose pf (>=65 yr) 11/1/2016     Fluzone High-Dose pf (>=65 yr) 9/20/2017     PNEUMOVAX 23 (Pneumococcal PPV23) 1/23/2018      Zostavax (Zoster) 5/1/2010         Allergies:     Last Reviewed on 6/11/2018 05:14 PM by Reid Bourgeois Compound 32:    Lyrica:    Hydrocodone/Acetaminophen:    Percocet:    band aides:        Current Medications:     Last Reviewed on 6/11/2018 05:14 PM by Reid Bourgeois    Atenolol 25mg Tablet 1 tab daily     Meloxicam 15mg Tablet 1 tab HS with food     Omeprazole 40mg Capsules, Extended Release 1 capsule daily     Lexapro 20mg Tablet 1 tab daily     Zinc Gluconate 50mg 1 po qd     Cetirizine HCl 10mg Tablet 1 tab daily         OBJECTIVE:        Vitals:         Current: 6/11/2018 4:53:14 PM    Ht:  5 ft, 2.1 in;  Wt: 190.8 lbs;  BMI: 34.8    T: 99.2 F (oral);  BP: 117/73 mm Hg (left arm, sitting);  P: 68 bpm (left arm (BP Cuff), sitting);  sCr: 0.79 mg/dL;  GFR: 72.63        Exams:     PHYSICAL EXAM:     GENERAL: vital signs recorded - well developed, well nourished;  no apparent distress;     NECK: range of motion is decreased with rotation toward the right;  thyroid is non-palpable;     RESPIRATORY: normal respiratory rate and pattern with no distress; normal breath sounds with no rales, rhonchi, wheezes or rubs;     CARDIOVASCULAR: normal rate; rhythm is regular;  no systolic murmur; no edema;     MUSCULOSKELETAL: decreased range of motion noted in: neck rightward rotation;  pain with range of motion in: neck rightward rotation;  Left lateral shoulder tenderness with FROM         ASSESSMENT:           847.0   S16.1XXA  Neck strain              DDx:     840.9   S46.919A  Sprains and strains of shoulder and upper arm, unspecified              DDx:         ORDERS:         Meds Prescribed:       Ibuprofen 800mg Tablet Take 1 tablet(s) by mouth q8h prn  #30 (Thirty) tablet(s) Refills: 0       Baclofen 10mg Tablet 1 tab po TID prn for pain/muscle pain  #60 (Sixty) tablet(s) Refills: 1         Radiology/Test Orders:       47360  Radiologic examination, spine, cervical; minimum of four views   (Send-Out)           Procedures Ordered:       REFER  Referral to Specialist or Other Facility  (Send-Out)                   PLAN:          Neck strain         REFERRALS:  Referral initiated to a chiropractor ( Dr. Dr. Donato Joseph for neck pain ).      FOLLOW-UP: Advised to call if there is no improvement 2 weeks.   Schedule follow-up appointments on a p.r.n. basis..      FOLLOW-UP TESTING #1:    RADIOLOGY:  I have ordered a C-Spine x-ray series to be done today.            Prescriptions:       Ibuprofen 800mg Tablet Take 1 tablet(s) by mouth q8h prn  #30 (Thirty) tablet(s) Refills: 0           Orders:       [id number]  Radiologic examination, spine, cervical; minimum of four views  (Send-Out)         REFER  Referral to Specialist or Other Facility  (Send-Out)            Sprains and strains of shoulder and upper arm, unspecified           Prescriptions:       Baclofen 10mg Tablet 1 tab po TID prn for pain/muscle pain  #60 (Sixty) tablet(s) Refills: 1             Patient Recommendations:        For  Neck strain:     Follow-up by phone if no improvement in 2 weeks. Schedule follow-up appointments as needed.                APPOINTMENT INFORMATION:        Monday Tuesday Wednesday Thursday Friday Saturday Sunday            Time:___________________AM  PM   Date:_____________________             CHARGE CAPTURE:           Primary Diagnosis:     847.0 Neck strain            S16.1XXA    Strain of muscle, fascia and tendon at neck level, initial encounter              Orders:          [id number]   Office/outpatient visit; established patient, level 4  (In-House)           840.9 Sprains and strains of shoulder and upper arm, unspecified            S46.919A    Strain of unspecified muscle, fascia and tendon at shoulder and upper arm level, unspecified arm, initial encounter

## 2021-05-18 NOTE — PROGRESS NOTES
"Criss Edwards  1950     Office/Outpatient Visit    Visit Date: Thu, Dec 19, 2019 10:24 am    Provider: Clarisse Flores MD (Assistant: Margarita Morris MA)    Location: [facility]        Electronically signed by Clarisse Flores MD on  12/19/2019 12:37:51 PM                             Subjective:        CC: Ms. Edwards is a 69 year old White female.  This is a follow-up visit.  check up; PT STATES SHE ISNT TAKING CETIRIZINE         HPI: Criss is here today to follow up on chronic issues.        She has had a bad headache for the past month.  +L ear pain.  +Dry, burning eyes.  +Sinus pressure.  +Congestion and rhinorrhea.  She went to the eye doctor this morning who diagnosed her with \"eye mites.\"          She is on atenolol for HTN.  BP has been well controlled.  No CP, palpitations, SOB.        She is on meloxicam for generalized arthritis.  She has been using baclofen.        She is on Cymbalta for depression.  Has been on Lexapro in the past but did not get as much effect.    ROS:     CONSTITUTIONAL:  Negative for fatigue and fever.      EYES:  Negative for blurred vision.      E/N/T:  Positive for diminished hearing ( bilaterally ) and tinnitus.   Negative for nasal congestion.      CARDIOVASCULAR:  Negative for chest pain and palpitations.      RESPIRATORY:  Negative for recent cough and dyspnea.      GASTROINTESTINAL:  Negative for abdominal pain, constipation, diarrhea, nausea and vomiting.      GENITOURINARY:  Negative for dysuria and urinary incontinence.      MUSCULOSKELETAL:  Positive for arthralgias.   Negative for myalgias.      NEUROLOGICAL:  Positive for vertigo (following with ENT for evaluation).   Negative for paresthesias or weakness.      PSYCHIATRIC:  Negative for anxiety, depression and sleep disturbance.          Past Medical History / Family History / Social History:         Last Reviewed on 12/19/2019 10:42 AM by Clarisse Flores    Past Medical History:                 " PAST MEDICAL HISTORY         Hypertension     Renal Stones         CURRENT MEDICAL PROVIDERS:    Nephrologist: Te    Orthopedist: Juice             ADVANCED DIRECTIVES: None         PREVENTIVE HEALTH MAINTENANCE             BONE DENSITY: was last done 01/26/2018 with the following abnormality noted-- osteopenia     COLORECTAL CANCER SCREENING: Up to date (colonoscopy q10y; sigmoidoscopy q5y; Cologuard q3y) was last done 4/11/2018; colonoscopy with normal results; The next colonoscopy is due  10 years     EYE EXAM: was last done 2016     Hepatitis C Medicare Screening: was last done 1/2018     MAMMOGRAM: Done within last 2 years and results in are chart was last done 4/16/2019 with normal results     PAP SMEAR: No longer indicated due to age and history s/p hysterectomy         Surgical History:         Cholecystectomy    Hysterectomy: total;     Joint Replacement: R KENZIE;     back surgery;    kidney stone removal;         Family History:     Father: Congestive Heart Failure     Mother: Myocardial Infarction;  Cerebrovascular Accident     Brother(s): Coronary Artery Disease ( at 48 yo )     Sister(s): Coronary Artery Disease; Endocrine Type 2 Diabetes         Social History:     Occupation: Retired (Prior occupation: [employer])     Marital Status: Single     Children: 2 children         Tobacco/Alcohol/Supplements:     Last Reviewed on 12/19/2019 10:42 AM by Clarisse Flores    Tobacco: She has never smoked.          Substance Abuse History:     Last Reviewed on 12/19/2019 10:42 AM by Clarisse Flores        Mental Health History:     Last Reviewed on 12/19/2019 10:42 AM by Clarisse Flores        Communicable Diseases (eg STDs):     Last Reviewed on 12/19/2019 10:42 AM by Clarisse Flores        Current Problems:     Last Reviewed on 8/20/2019 01:56 PM by Clarisse Flores    Type 2 diabetes mellitus with other circulatory complications    Major depressive disorder, single episode, moderate    Essential  (primary) hypertension    Primary generalized (osteo)arthritis    Allergies    Generalized osteoarthritis    Hypertension    Moderate depression    GERD    Gastro-esophageal reflux disease without esophagitis    Calculus of ureter    Recurrent nephrolithiasis    Encounter for screening for malignant neoplasm of colon    Screening for cancer of colon    Other specified disorders of bone density and structure, multiple sites    Osteopenia    Vertigo    Benign paroxysmal vertigo, bilateral    Screening for depression    Encounter for screening for other disorder        Immunizations:     Td adult 10/1/2012    Prevnar 13 (Pneumococcal PCV 13) 11/1/2016    Fluzone High-Dose pf (>=65 yr) 11/1/2016    Fluzone High-Dose pf (>=65 yr) 9/20/2017    Fluzone High-Dose pf (>=65 yr) 10/15/2018    Fluzone High-Dose pf (>=65 yr) 10/24/2019    PNEUMOVAX 23 (Pneumococcal PPV23) 1/23/2018    Zostavax (Zoster live) 5/1/2010        Allergies:     Last Reviewed on 8/20/2019 01:56 PM by Clarisse Flores    band aides:      Darvon Compound 32:      Lyrica:      Hydrocodone/Acetaminophen:      Percocet:          Current Medications:     Last Reviewed on 8/20/2019 01:56 PM by Clarisse Flores    Atenolol 25mg Tablet [bid]    Meloxicam 15mg Tablet [1 tab HS with food ]    Cetirizine HCl 10mg Tablet [1 tab daily]        Objective:        Vitals:         Current: 12/19/2019 10:30:20 AM    Ht:  5 ft, 2.1 in;  Wt: 191.8 lbs;  BMI: 35.0T: 97.8 F (oral);  BP: 144/71 mm Hg (left arm, sitting);  P: 66 bpm (left arm (BP Cuff), sitting);  sCr: 0.8 mg/dL;  GFR: 69.93        Repeat:     11:2:18 AM  BP:   133/73mm Hg (left arm, sitting, HR: 70)     Exams:     PHYSICAL EXAM:     GENERAL: vital signs recorded - well developed, well nourished;  well groomed;  no apparent distress;     EYES: extraocular movements intact; conjunctiva and cornea are normal; pupils and irises are normal;     E/N/T: EARS:  normal external auditory canals and tympanic membranes;   grossly normal hearing; OROPHARYNX:  normal mucosa, dentition, gingiva, and posterior pharynx; +Sedan Hallpike bilateral ears;     RESPIRATORY: normal respiratory rate and pattern with no distress; normal breath sounds with no rales, rhonchi, wheezes or rubs;     CARDIOVASCULAR: normal rate; rhythm is regular;  no systolic murmur; no edema;     MUSCULOSKELETAL: normal gait; normal overall tone     PSYCHIATRIC: appropriate affect and demeanor; normal psychomotor function; normal speech pattern;         Assessment:         I10   Essential (primary) hypertension       J30.9   Allergic rhinitis, unspecified       M15.0   Primary generalized (osteo)arthritis       K21.9   Gastro-esophageal reflux disease without esophagitis       H81.13   Benign paroxysmal vertigo, bilateral       F32.1   Major depressive disorder, single episode, moderate           ORDERS:         Meds Prescribed:       [New Rx] cetirizine 10 mg oral tablet,chewable [chew 1 tablet (10 mg) by oral route once daily], #90 (ninety) tablets, Refills: 3 (three)       [New Rx] fluticasone propionate 50 mcg/actuation Intranasal Spray, Suspension [inhale 1 spray (50 mcg) in each nostril by intranasal route once daily], #16 (sixteen) grams, Refills: 5 (five)         Radiology/Test Orders:       3014F  Screening mammography results documented and reviewed (PV)1  (In-House)            3017F  Colorectal CA screen results documented and reviewed (PV)  (In-House)              Lab Orders:       APPTO  Appointment need  (In-House)            99993  HTN - [facility] CMP AND LIPID: [id number], [id number]  (Send-Out)                      Plan:         Essential (primary) hypertension    LABORATORY:  Labs ordered to be performed today include HTN/Lipid Panel: CMP, Lipid.  MIPS Vaccines Flu and Pneumonia updated in Shot record Screening mammomgram done within last 2 years and results in are chart Colorectal Cancer Screening is up to date and the results are in the chart     FOLLOW-UP:  Schedule a follow-up visit in 4 months.:.  f/u HTN with [facility]          Orders:       3014F  Screening mammography results documented and reviewed (PV)1  (In-House)            3017F  Colorectal CA screen results documented and reviewed (PV)  (In-House)            APPTO  Appointment need  (In-House)            [id number]  hospitals - [facility] CMP AND LIPID: [id number], [id number]  (Send-Out)              Allergic rhinitis, unspecified          Prescriptions:       [New Rx] cetirizine 10 mg oral tablet,chewable [chew 1 tablet (10 mg) by oral route once daily], #90 (ninety) tablets, Refills: 3 (three)       [New Rx] fluticasone propionate 50 mcg/actuation Intranasal Spray, Suspension [inhale 1 spray (50 mcg) in each nostril by intranasal route once daily], #16 (sixteen) grams, Refills: 5 (five)             Patient Recommendations:        For  Essential (primary) hypertension:    Schedule a follow-up visit in 4 months.                APPOINTMENT INFORMATION:        Monday Tuesday Wednesday Thursday Friday Saturday Sunday            Time:___________________AM  PM   Date:_____________________             Charge Capture:         Primary Diagnosis:     I10  Essential (primary) hypertension           Orders:      [id number]  Office/outpatient visit; established patient, level 4  (In-House)            3014F  Screening mammography results documented and reviewed (PV)1  (In-House)            3017F  Colorectal CA screen results documented and reviewed (PV)  (In-House)            APPTO  Appointment need  (In-House)              J30.9  Allergic rhinitis, unspecified     M15.0  Primary generalized (osteo)arthritis     K21.9  Gastro-esophageal reflux disease without esophagitis     H81.13  Benign paroxysmal vertigo, bilateral     F32.1  Major depressive disorder, single episode, moderate

## 2021-05-18 NOTE — PROGRESS NOTES
"Criss Edwards  1950     Office/Outpatient Visit    Visit Date: Fri, May 15, 2020 11:14 am    Provider: Clarisse Flores MD (Assistant: Donna Vences RN)    Location: [facility]        Electronically signed by Clarisse Flores MD on  05/15/2020 12:01:59 PM                             Subjective:        CC: Ms. Edwards is a 69 year old White female.  2 Week follow up--Audio [id number];         HPI: Criss's telehealth visit today is to follow up on a myriad of issues, including anxiety.  At her last visit, we followed up on constipation which had improved with the discontinuation of oxybutynin and addition of Miralax.  However, she still had multiple complaints of fatigue, headaches, and \"I just believe I'm dying.\"  We did fatigue labs and did find a mildly low vitamin D level, so we have started repletion of that.  She has had increased energy and her hip pain also resolved.  She felt mentally better.  We also started buspirone BID prn in addition to her Cymbalta to see if this would help with her anxiety.  \"It helps, oh Lord.\"  She has been taking the buspirone regularly once daily and that has helped.  She has been watching her friend die, which has been traumatic.          She has been having some intermittent constipation, but going  back on the Miralax helps.    ROS:     CONSTITUTIONAL:  Positive for fatigue ( moderate ).   Negative for fever.      EYES:  Negative for blurred vision.      E/N/T:  Positive for sinus pressure.      CARDIOVASCULAR:  Negative for chest pain and palpitations.      RESPIRATORY:  Negative for recent cough and dyspnea.      GASTROINTESTINAL:  Positive for constipation ( improved ).   Negative for abdominal pain, diarrhea, nausea or vomiting.      GENITOURINARY:  Positive for urinary urgency.      PSYCHIATRIC:  Positive for anxiety and feelings of stress.   Negative for depression or sleep disturbance.          Past Medical History / Family History / Social " History:         Last Reviewed on 5/15/2020 11:46 AM by Clarisse Flores    Past Medical History:                 PAST MEDICAL HISTORY         Hypertension     Renal Stones         CURRENT MEDICAL PROVIDERS:    Nephrologist: Te    Orthopedist: Juice             ADVANCED DIRECTIVES: None         PREVENTIVE HEALTH MAINTENANCE             BONE DENSITY: was last done 1/28/2020 with the following abnormality noted-- osteopenia (improved)     COLORECTAL CANCER SCREENING: Up to date (colonoscopy q10y; sigmoidoscopy q5y; Cologuard q3y) was last done 4/11/2018; colonoscopy with normal results; The next colonoscopy is due  10 years     EYE EXAM: was last done 2016     Hepatitis C Medicare Screening: was last done 1/2018     MAMMOGRAM: Done within last 2 years and results in are chart was last done 4/16/2019 with normal results     PAP SMEAR: No longer indicated due to age and history s/p hysterectomy         Surgical History:         Cholecystectomy    Hysterectomy: total;     Joint Replacement: R KENZIE;     back surgery;    kidney stone removal;         Family History:     Father: Congestive Heart Failure     Mother: Myocardial Infarction;  Cerebrovascular Accident     Brother(s): Coronary Artery Disease ( at 48 yo )     Sister(s): Coronary Artery Disease; Endocrine Type 2 Diabetes         Social History:     Occupation: Retired (Prior occupation: [employer])     Marital Status: Single     Children: 2 children         Tobacco/Alcohol/Supplements:     Last Reviewed on 5/15/2020 11:46 AM by Clarisse Flores    Tobacco: She has never smoked.          Substance Abuse History:     Last Reviewed on 5/15/2020 11:46 AM by Clarisse Flores        Mental Health History:     Last Reviewed on 5/15/2020 11:46 AM by Clarisse Flores        Communicable Diseases (eg STDs):     Last Reviewed on 5/15/2020 11:46 AM by Clarisse Flores        Current Problems:     Last Reviewed on 5/01/2020 09:58 AM by Clarisse Flores    Depression -  Major depressive disorder, single episode, moderate    HTN - Essential (primary) hypertension    Primary generalized (osteo)arthritis    GERD - Gastro-esophageal reflux disease without esophagitis    Calculus of ureter    Osteopenia - Other specified disorders of bone density and structure, multiple sites    BPPV - Benign paroxysmal vertigo, bilateral    Allergic rhinitis, unspecified    Lobulated, fused and horseshoe kidney    Other chest pain    Generalized abdominal tenderness    Flushing    Urge incontinence    Constipation, unspecified    Vitamin D deficiency, unspecified    Anxiety disorder, unspecified    Other fatigue        Immunizations:     Td adult 10/1/2012    Prevnar 13 (Pneumococcal PCV 13) 11/1/2016    Fluzone High-Dose pf (>=65 yr) 11/1/2016    Fluzone High-Dose pf (>=65 yr) 9/20/2017    Fluzone High-Dose pf (>=65 yr) 10/15/2018    Fluzone High-Dose pf (>=65 yr) 10/24/2019    PNEUMOVAX 23 (Pneumococcal PPV23) 1/23/2018    Zostavax (Zoster live) 5/1/2010        Allergies:     Last Reviewed on 5/01/2020 09:58 AM by Clarisse Flores aides:      Darvon Compound 32:      Lyrica:      Hydrocodone/Acetaminophen:      Percocet:          Current Medications:     Last Reviewed on 5/15/2020 11:15 AM by Donna Vences    Atenolol 25mg Tablet [bid]    omeprazole 40 mg oral capsule,delayed release (enteric coated) [TAKE ONE CAPSULE BY MOUTH DAILY]    Cymbalta 60 mg oral capsule,delayed release (enteric coated) [Take 1 capsule(s) by mouth daily]    Zofran 4 mg oral tablet [take 1 tab by mouth TID PRN nausea ]    Miralax 17 gram/dose oral Powder [take 17 gram PO BID prn constipation]    busPIRone 5 mg oral tablet [take 1 tablet (5 mg) by oral route 2 times per day prn]    cholecalciferol (vitamin D3) 1,250 mcg (50,000 unit) oral capsule [take 1 capsule p.o. WEEKLY for 12 weeks]        Assessment:         F41.9   Anxiety disorder, unspecified       K59.00   Constipation, unspecified           ORDERS:          Lab Orders:       APPTO  Appointment need  (In-House)                      Plan:         Anxiety disorder, unspecifiedShe is doing better with being on the buspirone once daily.  She can take that up to twice daily if needed.  Continue Cymbalta.  She has a lot of stressors but feels that she is coping better now.  RTC in Jun/Jul for evaluation of umbilical hernia.    Telehealth: Verbal consent obtained for visit to occur via phone call; Staff, other than provider, present during telephone visit include Donna Vences RN; Total time spent was 13 minutes; [id number]--[id number] E/M 11-20 minutes     FOLLOW-UP: Schedule a follow-up visit in 2 months.:.  1-2 month f/u IN PERSON to evaluate umbilical hernia (she should also keep her [facility] visit scheduled for March)          Orders:       APPTO  Appointment need  (In-House)              Constipation, unspecifiedBetter, although somewhat persistent.  She has been trying to go off the Miralax, but the constipation tends to come back then.  Add in fiber and can continue Miralax.            Patient Recommendations:        For  Anxiety disorder, unspecified:    Schedule a follow-up visit in 2 months.                APPOINTMENT INFORMATION:        Monday Tuesday Wednesday Thursday Friday Saturday Sunday            Time:___________________AM  PM   Date:_____________________             Charge Capture:         Primary Diagnosis:     F41.9  Anxiety disorder, unspecified           Orders:      [id number]  Phys/QHP telephone evaluation 11-20 minutes  (In-House)            APPTO  Appointment need  (In-House)              K59.00  Constipation, unspecified

## 2021-05-18 NOTE — PROGRESS NOTES
Criss Edwards 1950     Office/Outpatient Visit    Visit Date: Wed, Dec 26, 2018 11:00 am    Provider: Elaine Richardson N.P. (Assistant: Mariajose Manley MA)    Location: [facility]        Electronically signed by Elaine Richardson N.P. on  12/26/2018 08:02:57 PM                             SUBJECTIVE:        CC: (PT TAKES ATENOLOL BID )     Ms. Edwards is a 68 year old White female.  She presents with Bilateral ear pain, dizziness, pt had a recent sinus infection.          HPI:         Patient to be evaluated for upper respiratory illness.  These have been present for the past 2 weeks.  The symptoms include dizziness, ear complaints and unsteady when bending over - lymphnodes tender.  She denies headache.  She denies exposure to ill contacts.  She has already tried to relieve the symptoms with antihistamines.  Medical history is significant for allergies.      ROS:     CONSTITUTIONAL:  Negative for chills, fatigue, fever, and weight change.      E/N/T:  Positive for ear pain ( bilateral ).   Negative for nasal congestion.      CARDIOVASCULAR:  Negative for chest pain, orthopnea, paroxysmal nocturnal dyspnea and pedal edema.      RESPIRATORY:  Negative for dyspnea.      NEUROLOGICAL:  Positive for dizziness.      ALLERGIC/IMMUNOLOGIC:  Positive for seasonal allergies.          PMH/FMH/SH:     Last Reviewed on 6/11/2018 05:14 PM by Reid Bourgeois    Past Medical History:                 PAST MEDICAL HISTORY         Hypertension     Renal Stones         CURRENT MEDICAL PROVIDERS:    Nephrologist: Te    Orthopedist: Juice             ADVANCED DIRECTIVES: None         PREVENTIVE HEALTH MAINTENANCE             BONE DENSITY: was last done 10/28/15 with the following abnormality noted-- osteopenia     COLORECTAL CANCER SCREENING: Up to date (colonoscopy q10y; sigmoidoscopy q5y; Cologuard q3y) was last done 4/11/2018; colonoscopy with normal results; The next colonoscopy is due  10 years      EYE EXAM: was last done 2016     Hepatitis C Medicare Screening: was last done 1/2018     MAMMOGRAM: Done within last 2 years and results in are chart was last done 11/2017 with normal results     PAP SMEAR: No longer indicated due to age and history s/p hysterectomy         Surgical History:         Cholecystectomy    Hysterectomy: total;     Joint Replacement: R KENZIE;      back surgery;    kidney stone removal;         Family History:     Father: Congestive Heart Failure     Mother: Myocardial Infarction;  Cerebrovascular Accident     Brother(s): Coronary Artery Disease ( at 46 yo )     Sister(s): Coronary Artery Disease; Endocrine Type 2 Diabetes         Social History:     Occupation: Retired (Prior occupation: [employer])     Marital Status: Single     Children: 2 children         Tobacco/Alcohol/Supplements:     Last Reviewed on 12/26/2018 11:04 AM by Mariajose Manley    Tobacco: She has never smoked.          Substance Abuse History:     Last Reviewed on 6/11/2018 05:14 PM by Reid Bourgeois        Mental Health History:     Last Reviewed on 6/11/2018 05:14 PM by Reid Bourgeois        Communicable Diseases (eg STDs):     Last Reviewed on 6/11/2018 05:14 PM by Reid Bourgeois            Current Problems:     Last Reviewed on 8/14/2018 04:58 PM by Elaine Richardson    Osteopenia     Screening for cancer of colon     Recurrent nephrolithiasis     GERD     Moderate depression     Generalized osteoarthritis     Hypertension     Kidney stone     Cerumen impaction     Upper respiratory illness     Dysuria     Allergies         Immunizations:     Td adult 10/1/2012     Prevnar 13 (Pneumococcal PCV 13) 11/1/2016     Fluzone High-Dose pf (>=65 yr) 11/1/2016     Fluzone High-Dose pf (>=65 yr) 9/20/2017     Fluzone High-Dose pf (>=65 yr) 10/15/2018     PNEUMOVAX 23 (Pneumococcal PPV23) 1/23/2018     Zostavax (Zoster live) 5/1/2010         Allergies:     Last Reviewed on 12/26/2018 11:04 AM by Sindhu  Mariajose Chungn Compound 32:    Lyrica:    Hydrocodone/Acetaminophen:    Percocet:    band aides:        Current Medications:     Last Reviewed on 12/26/2018 11:05 AM by Mariajose Manley    Atenolol 25mg Tablet 1 tab daily     Meloxicam 15mg Tablet 1 tab HS with food     Baclofen 10mg Tablet one po  tid prn     Cetirizine HCl 10mg Tablet 1 tab daily         OBJECTIVE:        Vitals:         Current: 12/26/2018 11:33:49 AM    Ht:  5 ft, 2.1 in;  Wt: 188 lbs;  BMI: 34.3    T: 98.1 F (oral);  BP: 110/69 mm Hg (left arm, sitting);  P: 59 bpm (left arm (BP Cuff), sitting);  sCr: 0.79 mg/dL;  GFR: 71.20        Exams:     PHYSICAL EXAM:     GENERAL: vital signs recorded - well developed, well nourished;  no apparent distress;     E/N/T: EARS: external auditory canal occluded by cerumen bilaterally;     RESPIRATORY: normal respiratory rate and pattern with no distress; normal breath sounds with no rales, rhonchi, wheezes or rubs;     CARDIOVASCULAR: normal rate; rhythm is regular;  no systolic murmur; no edema;     LYMPHATIC: bilateral preauricular nodes ( tender );     MUSCULOSKELETAL: gait: uses a cane;  normal range of motion of all major muscle groups; no limb or joint pain with range of motion;     NEUROLOGICAL:  cranial nerves, motor and sensory function, reflexes, gait and coordination are all intact;     PSYCHIATRIC:  appropriate affect and demeanor; normal speech pattern; grossly normal memory;         Procedures:     Cerumen impaction     Cerumen impaction is noted in both ears The degree of wax accumulation is moderate in the left ear and right ear.  With moderate dificulty, using a syringe irrigation, the wax is removed.  Removed from ear was stringy pieces of ear wax..  The patient tolerated the procedure well.      There were no complications.  Performed by: pr             ASSESSMENT:           780.4   R42  Vertigo              DDx:     380.4   H61.23  Cerumen impaction              DDx:         ORDERS:          Meds Prescribed:       Meclizine HCl 25mg Tablet Take 1 to 2 tabs twice daily as needed for dizziness.  #40 (Forty) tablet(s) Refills: 0         Procedures Ordered:       [id number]  Removal of impacted cerumen right ear (NURSE)  (In-House)           Other Orders:       [id number]  Removal of impacted cerumen left ear (NURSE)  (In-House)                   PLAN:          Vertigo feel this may have been from the pressure on TM - will have her do saline nasal spray over then next week and hopefully with the irrigation of the ears, this may help - If still no improvement in the pain, we will refer to ENT in 2 weeks          Cerumen impaction           Prescriptions:       Meclizine HCl 25mg Tablet Take 1 to 2 tabs twice daily as needed for dizziness.  #40 (Forty) tablet(s) Refills: 0           Orders:       [id number]  Removal of impacted cerumen left ear (NURSE)  (In-House)         [id number]  Removal of impacted cerumen right ear (NURSE)  (In-House)               CHARGE CAPTURE:           Primary Diagnosis:     780.4 Vertigo            R42    Dizziness and giddiness              Orders:          [id number]   Office/outpatient visit; established patient, level 3  (In-House)           380.4 Cerumen impaction            H61.23    Impacted cerumen, bilateral              Orders:          [id number]   Removal of impacted cerumen left ear (NURSE)  (In-House)             [id number]   Removal of impacted cerumen right ear (NURSE)  (In-House)

## 2021-05-18 NOTE — PROGRESS NOTES
Criss Edwards 1950     Office/Outpatient Visit    Visit Date: Wed, Jul 31, 2019 10:52 am    Provider: Elaine Richardson N.P. (Assistant: Mariajose Manley MA)    Location: [facility]        Electronically signed by Elaine Richardson N.P. on  07/31/2019 12:57:02 PM                             SUBJECTIVE:        CC:     Ms. Edwards is a 68 year old White female.  She presents with sore throat, headache.          HPI:         Patient complains of uRI.  These have been present for the past 3 days.  She reports recent exposure to illness from went on trip with a goup of people from [place].  She has not tried any medications for symptomatic relief.      ROS:     CONSTITUTIONAL:  Positive for chills and fatigue ( moderate; lasting about 2-3 weeks ).   Negative for fever.      CARDIOVASCULAR:  Negative for chest pain, orthopnea, paroxysmal nocturnal dyspnea and pedal edema.      RESPIRATORY:  Negative for dyspnea.      GASTROINTESTINAL:  Negative for abdominal pain, constipation, diarrhea, nausea and vomiting.      MUSCULOSKELETAL:  Positive for neck pain.      NEUROLOGICAL:  Positive for headaches.      PSYCHIATRIC:  Negative for anxiety, depression, and sleep disturbances.          PM/FM/SH:     Last Reviewed on 5/09/2019 09:26 AM by Clarisse Flores    Past Medical History:                 PAST MEDICAL HISTORY         Hypertension     Renal Stones         CURRENT MEDICAL PROVIDERS:    Nephrologist: Te    Orthopedist: Juice             ADVANCED DIRECTIVES: None         PREVENTIVE HEALTH MAINTENANCE             BONE DENSITY: was last done 01/26/2018 with the following abnormality noted-- osteopenia     COLORECTAL CANCER SCREENING: Up to date (colonoscopy q10y; sigmoidoscopy q5y; Cologuard q3y) was last done 4/11/2018; colonoscopy with normal results; The next colonoscopy is due  10 years     EYE EXAM: was last done 2016     Hepatitis C Medicare Screening: was last done 1/2018     MAMMOGRAM: Done  within last 2 years and results in are chart was last done 4/16/2019 with normal results     PAP SMEAR: No longer indicated due to age and history s/p hysterectomy         Surgical History:         Cholecystectomy    Hysterectomy: total;     Joint Replacement: R KENZIE;      back surgery;    kidney stone removal;         Family History:     Father: Congestive Heart Failure     Mother: Myocardial Infarction;  Cerebrovascular Accident     Brother(s): Coronary Artery Disease ( at 46 yo )     Sister(s): Coronary Artery Disease; Endocrine Type 2 Diabetes         Social History:     Occupation: Retired (Prior occupation: [employer])     Marital Status: Single     Children: 2 children         Tobacco/Alcohol/Supplements:     Last Reviewed on 7/31/2019 10:57 AM by Mariajsoe Manley    Tobacco: She has never smoked.          Substance Abuse History:     Last Reviewed on 5/09/2019 09:26 AM by Clarisse Flores        Mental Health History:     Last Reviewed on 5/09/2019 09:26 AM by Clarisse Flores        Communicable Diseases (eg STDs):     Last Reviewed on 5/09/2019 09:26 AM by Clarisse Flores            Current Problems:     Last Reviewed on 5/09/2019 09:26 AM by Clarisse Flores    Vertigo     Osteopenia     Screening for cancer of colon     Recurrent nephrolithiasis     GERD     Moderate depression     Generalized osteoarthritis     Hypertension     Acute viral syndrome     Headache     Screening for depression     Allergies         Immunizations:     Td adult 10/1/2012     Prevnar 13 (Pneumococcal PCV 13) 11/1/2016     Fluzone High-Dose pf (>=65 yr) 11/1/2016     Fluzone High-Dose pf (>=65 yr) 9/20/2017     Fluzone High-Dose pf (>=65 yr) 10/15/2018     PNEUMOVAX 23 (Pneumococcal PPV23) 1/23/2018     Zostavax (Zoster live) 5/1/2010         Allergies:     Last Reviewed on 7/31/2019 10:57 AM by Mariajose Manley Compound 32:    Lyrica:    Hydrocodone/Acetaminophen:    Percocet:    band aides:        Current  Medications:     Last Reviewed on 7/31/2019 10:58 AM by Mariajose Manley    Meloxicam 15mg Tablet 1 tab HS with food     Atenolol 25mg Tablet bid     Cymbalta 30mg Capsules, Delayed Release 1 capsule daily     Baclofen 10mg Tablet one po  tid prn     Cetirizine HCl 10mg Tablet 1 tab daily         OBJECTIVE:        Vitals:         Current: 7/31/2019 11:00:40 AM    Ht:  5 ft, 2.1 in;  Wt: 185.8 lbs;  BMI: 33.9    T: 98.1 F (oral);  BP: 105/52 mm Hg (left arm, sitting);  P: 81 bpm (left arm (BP Cuff), sitting);  sCr: 0.8 mg/dL;  GFR: 69.96        Exams:     PHYSICAL EXAM:     GENERAL: vital signs recorded - well developed, well nourished;  no apparent distress;     E/N/T: EARS:  normal external auditory canals and tympanic membranes;  grossly normal hearing; OROPHARYNX: posterior pharynx shows erythema;     NECK: range of motion is normal; thyroid is non-palpable;     RESPIRATORY: normal respiratory rate and pattern with no distress; normal breath sounds with no rales, rhonchi, wheezes or rubs;     CARDIOVASCULAR: normal rate; rhythm is regular;  no systolic murmur; no edema;     GASTROINTESTINAL: nontender; normal bowel sounds; no organomegaly;     LYMPHATIC: no enlargement of cervical or facial nodes;     NEUROLOGICAL:  cranial nerves, motor and sensory function, reflexes, gait and coordination are all intact;     PSYCHIATRIC:  appropriate affect and demeanor; normal speech pattern; grossly normal memory;         Lab/Test Results:             Rapid Strep Screen:  Negative (07/31/2019),     Performed by::  tls (07/31/2019),             Procedures:     Headache     1. Toradol 30 mg given IM in the left hip; administered by sk;  lot number [id number]; expires 11/19             ASSESSMENT:           465.9   B34.9  Acute viral syndrome              DDx:     784.0   R51  Headache              DDx:         ORDERS:         Lab Orders:       [id number]  Group A Streptococcus detection by immunoassay with direct optical observation   (In-House)         35022  [facility] Throat culture, strep  (Send-Out)           Other Orders:       [id number]  Therapeutic injection  (In-House)           Toradol, per 15 mg (x2)                 PLAN:          Acute viral syndrome appears to be a virus - I would recommend that she increase fluids, tylenol for HA/ alternate with Ibuprofen (if tolerated ) and follow up with PCP regarding long standing fatigue.  If symptoms persists , worsen or change, need to F/U in office           Orders:       [id number]  Group A Streptococcus detection by immunoassay with direct optical observation  (In-House)         60967  [facility] Throat culture, strep  (Send-Out)            Headache     Narcotic or pain medication Toradol 30 mg           Orders:       [id number]  Therapeutic injection  (In-House)                     Toradol, per 15 mg (x2)             CHARGE CAPTURE:           Primary Diagnosis:     465.9 Acute viral syndrome            B34.9    Viral infection, unspecified              Orders:          [id number]   Office/outpatient visit; established patient, level 3  (In-House)             66532   Group A Streptococcus detection by immunoassay with direct optical observation  (In-House)           784.0 Headache            R51    Headache              Orders:          [id number]   Therapeutic injection  (In-House)                                           Toradol, per 15 mg (x2)

## 2021-05-18 NOTE — PROGRESS NOTES
Criss Edwards  1950     Office/Outpatient Visit    Visit Date: Tue, Aug 25, 2020 10:54 am    Provider: Clarisse Flores MD (Assistant: Haven Clark LPN)    Location: [facility]        Electronically signed by Clarisse Flores MD on  08/25/2020 12:15:25 PM                             Subjective:        CC: Medicare wellness no longer taking atenolol. no longer on Vitamin d3    HPI:       She is UTD on colonoscopy, last done 4/2018 and this was normal.  Ten year repeat recommended.  Pap smears are no longer indicated by age and history; s/p hysterectomy.  She is due for mammogram, last done 4/2019 and this was normal.  She is UTD on DEXA, last done 1/2020 and this showed osteopenia.  She is UTD on Pneumovax (1/2018), Prevnar (11/2016), Zostavax (5/2010), Td (10/2012), and flu (10/2019).  She is due for Shingrix.  She is due for routine labs.    Ms. Edwards is here for a Medicare wellness visit.          Self-Assessment of Health: She rates her health as fair. She rates her confidence of being able to control/manage most of her health problems as somewhat confident. Her physical/emotional health has limited her social activites slightly.  A review of possible cognitive impairment was performed and the following was noted:;  not experiencing changes in memory; A review of functional ability and level of safety was performed and the following was noted: Bathing ( performs independently ); Dressing: ( performs independently ); Eating: ( performs independently ); Toilet use: ( performs independently ); Transferring: ( performs independently ); Urine and Bowel continence: ( Abnormal ) Falls Risk: Has fallen 2 or more times or had one fall with injury in the past year.  In regard to hearing, she reports having to strain to hear or understand conversations, but not having trouble hearing the TV/radio when others do not or wearing hearing aid(s).  Concerning home safety, she reports that at home she DOES  have adequate lighting, a skid resistant shower/tub, handrails on stairs, functioning smoke alarms and absence of throw rugs, but not grab bars in the bath.  Physical Activity: She exercises for at least 20 minutes 3 or more days/week.; Type of diet patient normally eats is described as well-balanced with fruits and vegetables Tobacco: She has never smoked.  Preventative Health updated today           PHQ-9 Depression Screening: Completed form scanned and in chart; Total Score 5     ROS:     CONSTITUTIONAL:  Positive for fatigue.   Negative for fever.      EYES:  Negative for blurred vision.      E/N/T:  Negative for diminished hearing and nasal congestion.      CARDIOVASCULAR:  Negative for chest pain and palpitations.      RESPIRATORY:  Negative for recent cough and dyspnea.      GASTROINTESTINAL:  Positive for acid reflux symptoms and nausea.   Negative for abdominal pain, constipation, diarrhea or vomiting.      GENITOURINARY:  Negative for dysuria and urinary incontinence.      MUSCULOSKELETAL:  Positive for arthralgias.   Negative for myalgias.      NEUROLOGICAL:  Negative for paresthesias and weakness.      PSYCHIATRIC:  Positive for anxiety and feelings of stress.   Negative for depression or sleep disturbance.          Past Medical History / Family History / Social History:         Last Reviewed on 8/25/2020 11:28 AM by Clarisse Flores    Past Medical History:                 PAST MEDICAL HISTORY         Hypertension     Renal Stones         CURRENT MEDICAL PROVIDERS:    Nephrologist: Te    Orthopedist: Juice             ADVANCED DIRECTIVES: None         PREVENTIVE HEALTH MAINTENANCE             BONE DENSITY: was last done 01/26/2018 with the following abnormality noted-- osteopenia (improved)     COLORECTAL CANCER SCREENING: Up to date (colonoscopy q10y; sigmoidoscopy q5y; Cologuard q3y) was last done 4/11/2018; colonoscopy with normal results; The next colonoscopy is due  10 years     EYE EXAM: was  last done 2016     Hepatitis C Medicare Screening: was last done 1/2018     MAMMOGRAM: Done within last 2 years and results in are chart was last done 4/16/2019 with normal results     PAP SMEAR: No longer indicated due to age and history s/p hysterectomy         Surgical History:         Cholecystectomy    Hysterectomy: total;     Joint Replacement: R KENZIE;     back surgery;    kidney stone removal;         Family History:     Father: Congestive Heart Failure     Mother: Myocardial Infarction;  Cerebrovascular Accident     Brother(s): Coronary Artery Disease ( at 48 yo )     Sister(s): Coronary Artery Disease; Endocrine Type 2 Diabetes         Social History:     Occupation: Retired (Prior occupation: [employer])     Marital Status: Single     Children: 2 children         Tobacco/Alcohol/Supplements:     Last Reviewed on 8/25/2020 11:28 AM by Clarisse Flores    Tobacco: She has never smoked.          Substance Abuse History:     Last Reviewed on 8/25/2020 11:28 AM by Clarisse Flores        Mental Health History:     Last Reviewed on 8/25/2020 11:28 AM by Clarisse Flores        Communicable Diseases (eg STDs):     Last Reviewed on 8/25/2020 11:28 AM by Clarisse Flores        Current Problems:     Last Reviewed on 6/26/2020 10:42 AM by Clarisse Flores    Depression - Major depressive disorder, single episode, moderate    HTN - Essential (primary) hypertension    Primary generalized (osteo)arthritis    GERD - Gastro-esophageal reflux disease without esophagitis    Calculus of ureter    Osteopenia - Other specified disorders of bone density and structure, multiple sites    BPPV - Benign paroxysmal vertigo, bilateral    Allergic rhinitis, unspecified    Lobulated, fused and horseshoe kidney    Other chest pain    Generalized abdominal tenderness    Flushing    Urge incontinence    Constipation, unspecified    Other fatigue    Vitamin D deficiency, unspecified    Anxiety disorder, unspecified    Umbilical hernia  without obstruction or gangrene    Encounter for screening for depression        Immunizations:     Td adult 10/1/2012    Prevnar 13 (Pneumococcal PCV 13) 11/1/2016    Fluzone High-Dose pf (>=65 yr) 11/1/2016    Fluzone High-Dose pf (>=65 yr) 9/20/2017    Fluzone High-Dose pf (>=65 yr) 10/15/2018    Fluzone High-Dose pf (>=65 yr) 10/24/2019    PNEUMOVAX 23 (Pneumococcal PPV23) 1/23/2018    Zostavax (Zoster live) 5/1/2010        Allergies:     Last Reviewed on 6/26/2020 10:42 AM by Clarisse Flores    band aides:      Darvon Compound 32:      Lyrica:      Hydrocodone/Acetaminophen:      Percocet:          Current Medications:     Last Reviewed on 6/26/2020 10:42 AM by Clarisse Flores    Atenolol 25mg Tablet [bid]    omeprazole 40 mg oral capsule,delayed release (enteric coated) [TAKE ONE CAPSULE BY MOUTH DAILY]    Cymbalta 60 mg oral capsule,delayed release (enteric coated) [Take 1 capsule(s) by mouth daily]    Zofran 4 mg oral tablet [take 1 tab by mouth TID PRN nausea ]    Miralax 17 gram/dose oral Powder [take 17 gram PO BID prn constipation]    busPIRone 5 mg oral tablet [TAKE ONE TABLET BY MOUTH TWICE A DAY AS NEEDED]    cholecalciferol (vitamin D3) 1,250 mcg (50,000 unit) oral capsule [take 1 capsule p.o. WEEKLY for 12 weeks]        Objective:        Vitals:         Current: 8/25/2020 10:59:18 AM    Ht:  5 ft, 2.1 in;  Wt: 185.4 lbs;  BMI: 33.8T: 97.2 F (temporal);  BP: 152/80 mm Hg (left arm, sitting);  P: 87 bpm (left arm (BP Cuff), sitting);  sCr: 0.75 mg/dL;  GFR: 72.49VA: 20/70 OD, 20/70 OS (near, without correction)        Repeat:     11:48:12 AM  BP:   138/80mm Hg (left arm, sitting) 11:48:28 AM  P:   83bpm (left arm (BP Cuff), sitting)     Exams:     PHYSICAL EXAM:     GENERAL: vital signs recorded - well developed, well nourished;  well groomed;  no apparent distress;     EYES: extraocular movements intact; conjunctiva and cornea are normal; pupils and irises are normal;     E/N/T: EARS:  normal  external auditory canals and tympanic membranes;  grossly normal hearing; OROPHARYNX:  normal mucosa, dentition, gingiva, and posterior pharynx; +Barnet Hallpike bilateral ears;     RESPIRATORY: normal respiratory rate and pattern with no distress; normal breath sounds with no rales, rhonchi, wheezes or rubs;     CARDIOVASCULAR: normal rate; rhythm is regular;  no systolic murmur; no edema;     GASTROINTESTINAL: nontender; normal bowel sounds;     MUSCULOSKELETAL: normal gait; normal overall tone     NEUROLOGIC: mental status: alert and oriented x 3; Reflexes: brachioradialis: 2+; knee jerks: 2+;     PSYCHIATRIC: appropriate affect and demeanor; normal psychomotor function; normal speech pattern;         Assessment:         Z00.00   Encounter for general adult medical examination without abnormal findings       Z13.31   Encounter for screening for depression       Z12.31   Encounter for screening mammogram for malignant neoplasm of breast           ORDERS:         Meds Prescribed:       [Refilled] Cymbalta 60 mg oral capsule,delayed release (enteric coated) [Take 1 capsule(s) by mouth daily], #90 (ninety) capsules, Refills: 1 (one)       [Refilled] Zofran 4 mg oral tablet [take 1 tab by mouth TID PRN nausea ], #30 (thirty) tablets, Refills: 0 (zero)         Radiology/Test Orders:       3017F  Colorectal CA screen results documented and reviewed (PV)  (In-House)            60354  Screening mammography bi 2-view inc CAD  (Send-Out)              Lab Orders:       APPTO  Appointment need  (In-House)              Procedures Ordered:         Annual wellness visit, includes a PPPS, subsequent visit  (In-House)              Other Orders:         Depression screen positive and follow up plan documented  (In-House)            1100F  Pt screen for fall risk; document 2+ falls in the past yr or any fall w/injury in past year (YENIFER)  (In-House)              Screening mammogram results documented  (Send-Out)             1124F  Advance Care Planning discussed and doc in MR; no surrogate named or advance care plan provided  (Send-Out)                      Plan:         Encounter for general adult medical examination without abnormal findingsShe is UTD on colonoscopy, last done 4/2018 and this was normal.  Ten year repeat recommended.  Pap smears are no longer indicated by age and history; s/p hysterectomy.  She is due for mammogram, last done 4/2019 and this was normal; ordered.  She is UTD on DEXA, last done 1/2020 and this showed osteopenia.  She is UTD on Pneumovax (1/2018), Prevnar (11/2016), Zostavax (5/2010), Td (10/2012), and flu (10/2019).  She is due for Shingrix; can be done at the pharmacy.  She is due for routine labs.  She has had a couple of falls earlier in the year.  No memory issues.  She is currently being treated for depression.  She lives alone.  She is able to drive and perform ADLs/manage finances independently.  Hearing is adequate.  She does not have a living will.  Preventive services handout and safety handout were given to her.  Current doctor list updated.  RTC 5 months.    MIPS PHQ-9 Depression Screening: Completed form scanned and in chart; Total Score 5 Positive Depression Screen: Stable on medications. No suicidal ideation.  ADVANCED DIRECTIVES: None         FOLLOW-UP: Schedule a follow-up visit in 5 months.:.  f/u anxiety with [place]          Orders:         Annual wellness visit, includes a PPPS, subsequent visit  (In-House)              Depression screen positive and follow up plan documented  (In-House)            1100F  Pt screen for fall risk; document 2+ falls in the past yr or any fall w/injury in past year (YENIFER)  (In-House)              Screening mammogram results documented  (Send-Out)            3017F  Colorectal CA screen results documented and reviewed (PV)  (In-House)            1124F  Advance Care Planning discussed and doc in MR; no surrogate named or advance care plan  provided  (Send-Out)            APPTO  Appointment need  (In-House)              Encounter for screening for depression          Prescriptions:       [Refilled] Cymbalta 60 mg oral capsule,delayed release (enteric coated) [Take 1 capsule(s) by mouth daily], #90 (ninety) capsules, Refills: 1 (one)         Encounter for screening mammogram for malignant neoplasm of breast        RADIOLOGY:  I have ordered Mammogram Screening to be done today.            Orders:       [id number]  Screening mammography bi 2-view inc CAD  (Send-Out)                  Other Prescriptions:       [Refilled] Zofran 4 mg oral tablet [take 1 tab by mouth TID PRN nausea ], #30 (thirty) tablets, Refills: 0 (zero)         Patient Recommendations:        For  Encounter for general adult medical examination without abnormal findings:    Schedule a follow-up visit in 5 months.                APPOINTMENT INFORMATION:        Monday Tuesday Wednesday Thursday Friday Saturday Sunday            Time:___________________AM  PM   Date:_____________________             Charge Capture:         Primary Diagnosis:     Z00.00  Encounter for general adult medical examination without abnormal findings           Orders:        Annual wellness visit, includes a PPPS, subsequent visit  (In-House)              Depression screen positive and follow up plan documented  (In-House)            1100F  Pt screen for fall risk; document 2+ falls in the past yr or any fall w/injury in past year (YENIFER)  (In-House)            3017F  Colorectal CA screen results documented and reviewed (PV)  (In-House)            APPTO  Appointment need  (In-House)              Z13.31  Encounter for screening for depression     Z12.31  Encounter for screening mammogram for malignant neoplasm of breast

## 2021-05-18 NOTE — PROGRESS NOTES
Criss EdwardsSteph 1950     Office/Outpatient Visit    Visit Date: Thu, May 9, 2019 09:12 am    Provider: Clarisse Flores MD (Assistant: Ilda Benson MA)    Location: [facility]        Electronically signed by Clarisse Flores MD on  05/09/2019 09:47:53 AM                             SUBJECTIVE:        CC:     Ms. Edwards is a 68 year old White female.  Patient presents today for [facility] visit;         HPI:     She is UTD on colonoscopy, last done 4/2018 and this was normal.  Pap smears are no longer indicated by age and history; s/p hysterectomy.  She is UTD on mammogram, last done 4/2019 and this was normal.  She is due for DEXA, last done 10/2015 and this showed osteopenia.  She is UTD on Pneumovax (1/2018), Prevnar (11/2016), Zostavax (5/2010), Td (10/2012), and flu (10/2018).  She is due for Shingrix and Havrix.  She is due for routine labs including HTN panel.     Ms. Edwards is here for a Medicare wellness visit.  ADVANCED DIRECTIVES: None     Returning to health checkup, the required HRA questions are integrated within this visit note. Family medical history and individual medical/surgical history were reviewed and updated.  A current height, weight, BMI, blood pressure, and pulse were recorded in the vitals section of the note and have been reviewed. Patient's medications, including supplements, were recorded in the chart and reviewed.  Current providers and suppliers were reviewed and updated.          Self-Assessment of Health: She rates her health as good. She rates her confidence of being able to control/manage most of her health problems as very confident. Her physical/emotional health has limited her social activites quite a bit.  A review of possible cognitive impairment was performed and the following was noted: she is not having trouble driving;  memory changes are noted;  she is having trouble with her finances A review of functional ability, including bathing, dressing, walking, and  urine/bowel continence as well as level of safety was performed and was found to be negative.  Falls Risk: Has fallen 2 or more times or had one fall with injury in the past year.  In regard to hearing, she reports having trouble hearing the TV/radio when others do not, but not having to strain to hear or understand conversations or wearing hearing aid(s).  Concerning home safety, she reports that at home she DOES have adequate lighting, handrails on stairs and functioning smoke alarms, but not a skid resistant shower/tub, grab bars in the bath or absence of throw rugs.          Immunization Status: Up to date; Physical Activity: She exercises for at least 20 minutes 3 or more days/week.; Type of diet patient normally eats is described as well-balanced with fruits and vegetables Tobacco: She has never smoked.  Preventative Health updated today         Screening for depression details;         PHQ-9 Depression Screening: Completed form scanned and in chart; Total Score 10 Alcohol Consumption Screening: Completed form scanned and in chart; Total Score 0     ROS:     CONSTITUTIONAL:  Negative for fatigue and fever.      EYES:  Negative for blurred vision.      E/N/T:  Positive for diminished hearing ( bilaterally ) and tinnitus.   Negative for nasal congestion.      CARDIOVASCULAR:  Negative for chest pain and palpitations.      RESPIRATORY:  Negative for recent cough and dyspnea.      GASTROINTESTINAL:  Negative for abdominal pain, constipation, diarrhea, nausea and vomiting.      GENITOURINARY:  Negative for dysuria and urinary incontinence.      MUSCULOSKELETAL:  Positive for arthralgias.   Negative for myalgias.      NEUROLOGICAL:  Positive for vertigo (following with ENT for evaluation).   Negative for paresthesias or weakness.      PSYCHIATRIC:  Negative for anxiety, depression and sleep disturbance.          PMH/FMH/SH:     Last Reviewed on 5/09/2019 09:26 AM by Clarisse Flores    Past Medical History:                  PAST MEDICAL HISTORY         Hypertension     Renal Stones         CURRENT MEDICAL PROVIDERS:    Nephrologist: Te    Orthopedist: Juice             ADVANCED DIRECTIVES: None         PREVENTIVE HEALTH MAINTENANCE             BONE DENSITY: was last done 01/26/2018 with the following abnormality noted-- osteopenia     COLORECTAL CANCER SCREENING: Up to date (colonoscopy q10y; sigmoidoscopy q5y; Cologuard q3y) was last done 4/11/2018; colonoscopy with normal results; The next colonoscopy is due  10 years     EYE EXAM: was last done 2016     Hepatitis C Medicare Screening: was last done 1/2018     MAMMOGRAM: Done within last 2 years and results in are chart was last done 4/16/2019 with normal results     PAP SMEAR: No longer indicated due to age and history s/p hysterectomy         Surgical History:         Cholecystectomy    Hysterectomy: total;     Joint Replacement: R KENZIE;      back surgery;    kidney stone removal;         Family History:     Father: Congestive Heart Failure     Mother: Myocardial Infarction;  Cerebrovascular Accident     Brother(s): Coronary Artery Disease ( at 46 yo )     Sister(s): Coronary Artery Disease; Endocrine Type 2 Diabetes         Social History:     Occupation: Retired (Prior occupation: [employer])     Marital Status: Single     Children: 2 children         Tobacco/Alcohol/Supplements:     Last Reviewed on 5/09/2019 09:26 AM by Clarisse Flores    Tobacco: She has never smoked.          Substance Abuse History:     Last Reviewed on 5/09/2019 09:26 AM by Clarisse Flores        Mental Health History:     Last Reviewed on 5/09/2019 09:26 AM by Clarisse Flores        Communicable Diseases (eg STDs):     Last Reviewed on 5/09/2019 09:26 AM by Clarisse Flores            Current Problems:     Last Reviewed on 1/29/2019 02:32 PM by Clarisse Flores    Vertigo     Osteopenia     Screening for cancer of colon     Recurrent nephrolithiasis     GERD     Moderate depression      Generalized osteoarthritis     Hypertension     Allergies         Immunizations:     Td adult 10/1/2012     Prevnar 13 (Pneumococcal PCV 13) 11/1/2016     Fluzone High-Dose pf (>=65 yr) 11/1/2016     Fluzone High-Dose pf (>=65 yr) 9/20/2017     Fluzone High-Dose pf (>=65 yr) 10/15/2018     PNEUMOVAX 23 (Pneumococcal PPV23) 1/23/2018     Zostavax (Zoster live) 5/1/2010         Allergies:     Last Reviewed on 1/29/2019 02:32 PM by Clarisse Flores Compound 32:    Lyrica:    Hydrocodone/Acetaminophen:    Percocet:        Current Medications:     Last Reviewed on 1/29/2019 02:32 PM by Clarisse Flores    Meloxicam 15mg Tablet 1 tab HS with food     Atenolol 25mg Tablet 1 tab daily     Lexapro 20mg Tablet 1 tab daily     Baclofen 10mg Tablet one po  tid prn     Cetirizine HCl 10mg Tablet 1 tab daily         OBJECTIVE:        Vitals:         Current: 5/9/2019 9:18:59 AM    Ht:  5 ft, 2.1 in;  Wt: 192 lbs;  BMI: 35.0    T: 97.9 F (oral);  BP: 125/71 mm Hg (left arm, sitting);  P: 71 bpm (left arm (BP Cuff), sitting);  sCr: 0.79 mg/dL;  GFR: 71.84    VA: 20/20 OD, 20/20 OS (near, with correction)        Exams:     PHYSICAL EXAM:     GENERAL: vital signs recorded - well developed, well nourished;  well groomed;  no apparent distress;     EYES: extraocular movements intact; conjunctiva and cornea are normal; pupils and irises are normal;     E/N/T: EARS:  normal external auditory canals and tympanic membranes;  grossly normal hearing; OROPHARYNX:  normal mucosa, dentition, gingiva, and posterior pharynx; +Quentin Hallpike bilateral ears;     RESPIRATORY: normal respiratory rate and pattern with no distress; normal breath sounds with no rales, rhonchi, wheezes or rubs;     CARDIOVASCULAR: normal rate; rhythm is regular;  no systolic murmur; no edema;     GASTROINTESTINAL: nontender; normal bowel sounds;     MUSCULOSKELETAL: normal gait; normal overall tone     NEUROLOGIC: mental status: alert and oriented x 3; Reflexes:  brachioradialis: 2+; knee jerks: 2+;     PSYCHIATRIC: appropriate affect and demeanor; normal psychomotor function; normal speech pattern;         ASSESSMENT           V70.0   Z00.00  Health checkup              DDx:     V79.0   Z13.89  Screening for depression              DDx:     401.1   I10  Hypertension              DDx:     V79.0   Z13.89  Screening for depression              DDx:     715.09   M15.0  Generalized osteoarthritis              DDx:         ORDERS:         Meds Prescribed:       Refill of: Lexapro (Escitalopram Oxalate) 5mg Tablet 2 tabs daily x 1 week, then 1 tab daily x 1 week  #21 (Twenty One) tablet(s) Refills: 1       Cymbalta (Duloxetine HCl) 30mg Capsules, Delayed Release 1 capsule daily  #30 (Thirty) capsule(s) Refills: 2       Refill of: Baclofen 10mg Tablet one po  tid prn  #60 (Sixty) tablet(s) Refills: 0         Radiology/Test Orders:       3014F  Screening mammography results documented and reviewed (PV)1  (In-House)         3017F  Colorectal CA screen results documented and reviewed (PV)  (In-House)           Lab Orders:       [id number]  HTN - [facility] CMP AND LIPID: [id number]  (Send-Out)         APPTO  Appointment need  (In-House)           Procedures Ordered:         Annual wellness visit, includes a PPPS, subsequent visit  (In-House)           Other Orders:         Depression screen positive and follow up plan documented  (In-House)         1100F  Pt screen for fall risk; document 2+ falls in the past yr or any fall w/injury in past year (YENIFER)  (In-House)           Negative EtOH screen  (In-House)                   PLAN:          Health checkup She is UTD on colonoscopy, last done 4/2018 and this was normal.  Pap smears are no longer indicated by age and history; s/p hysterectomy.  She is UTD on mammogram, last done 4/2019 and this was normal.  She is due for DEXA, last done 10/2015 and this showed osteopenia.  She is UTD on Pneumovax (1/2018), Prevnar (11/2016),  Zostavax (5/2010), Td (10/2012), and flu (10/2018).  She is due for Shingrix and Havrix; can be done at pharmacy.  She is due for routine labs including HTN panel; ordered.  No fall risk, no memory issues, no signs/symptoms of depression.  She lives alone.  She is able to drive and perform ADLs/manage finances independently.  Hearing is adequate.  She does not have a living will; information given today on how to obtain one.  Preventive services handout and safety handout were given to her.  Current doctor list updated.  RTC 3 months.     MIPS PHQ-9 Depression Screening Completed form scanned and in chart; Total Score 10 Positive Depression Screen: Pharmacologic intervention initiated/modified Negative alcohol screen     MAMMOGRAM: Done within last 2 years and results in are chart     COLORECTAL CANCER SCREENING: Results are in chart     FOLLOW-UP: Schedule a follow-up visit in 3 months..  f/u depression with [place]           Orders:         Annual wellness visit, includes a PPPS, subsequent visit  (In-House)         APPTO  Appointment need  (In-House)           Depression screen positive and follow up plan documented  (In-House)         1100F  Pt screen for fall risk; document 2+ falls in the past yr or any fall w/injury in past year (YENIFER)  (In-House)           Negative EtOH screen  (In-House)         3014F  Screening mammography results documented and reviewed (PV)1  (In-House)         3017F  Colorectal CA screen results documented and reviewed (PV)  (In-House)            Screening for depression           Prescriptions:       Refill of: Lexapro (Escitalopram Oxalate) 5mg Tablet 2 tabs daily x 1 week, then 1 tab daily x 1 week  #21 (Twenty One) tablet(s) Refills: 1       Cymbalta (Duloxetine HCl) 30mg Capsules, Delayed Release 1 capsule daily  #30 (Thirty) capsule(s) Refills: 2          Hypertension     LABORATORY:  Labs ordered to be performed today include HTN/Lipid Panel: CMP, Lipid.             Orders:       [id number]  HTN - [facility] CMP AND LIPID: [id number], [id number]  (Send-Out)            Generalized osteoarthritis           Prescriptions:       Refill of: Baclofen 10mg Tablet one po  tid prn  #60 (Sixty) tablet(s) Refills: 0             Patient Recommendations:        For  Health checkup:     Schedule a follow-up visit in 3 months.                APPOINTMENT INFORMATION:        Monday Tuesday Wednesday Thursday Friday Saturday Sunday            Time:___________________AM  PM   Date:_____________________             CHARGE CAPTURE           **Please note: ICD descriptions below are intended for billing purposes only and may not represent clinical diagnoses**        Primary Diagnosis:         V70.0 Health checkup            Z00.00    Encounter for general adult medical examination without abnormal findings              Orders:             Annual wellness visit, includes a PPPS, subsequent visit  (In-House)             APPTO   Appointment need  (In-House)                Depression screen positive and follow up plan documented  (In-House)             1100F   Pt screen for fall risk; document 2+ falls in the past yr or any fall w/injury in past year (YENIFER)  (In-House)                Negative EtOH screen  (In-House)             3014F   Screening mammography results documented and reviewed (PV)1  (In-House)             3017F   Colorectal CA screen results documented and reviewed (PV)  (In-House)           V79.0 Screening for depression            Z13.89    Encounter for screening for other disorder    401.1 Hypertension            I10    Essential (primary) hypertension    V79.0 Screening for depression            Z13.89    Encounter for screening for other disorder    715.09 Generalized osteoarthritis            M15.0    Primary generalized (osteo)arthritis

## 2021-05-26 ENCOUNTER — TRANSCRIBE ORDERS (OUTPATIENT)
Dept: FAMILY MEDICINE CLINIC | Age: 71
End: 2021-05-26

## 2021-05-26 ENCOUNTER — OFFICE VISIT CONVERTED (OUTPATIENT)
Dept: FAMILY MEDICINE CLINIC | Age: 71
End: 2021-05-26
Attending: FAMILY MEDICINE

## 2021-05-26 DIAGNOSIS — R42 DIZZINESS: Primary | ICD-10-CM

## 2021-05-26 DIAGNOSIS — R42 GIDDINESS: ICD-10-CM

## 2021-06-01 ENCOUNTER — HOSPITAL ENCOUNTER (OUTPATIENT)
Dept: OTHER | Facility: HOSPITAL | Age: 71
Discharge: HOME | End: 2021-06-01
Attending: FAMILY MEDICINE

## 2021-06-01 ENCOUNTER — APPOINTMENT (OUTPATIENT)
Dept: CT IMAGING | Facility: HOSPITAL | Age: 71
End: 2021-06-01

## 2021-06-03 ENCOUNTER — TRANSCRIBE ORDERS (OUTPATIENT)
Dept: ADMINISTRATIVE | Facility: HOSPITAL | Age: 71
End: 2021-06-03

## 2021-06-03 DIAGNOSIS — R29.6 REPEATED FALLS: Primary | ICD-10-CM

## 2021-06-03 DIAGNOSIS — R42 DIZZINESS AND GIDDINESS: ICD-10-CM

## 2021-06-05 NOTE — PROGRESS NOTES
"Criss Edwards  1950     Office/Outpatient Visit    Visit Date: Wed, May 26, 2021 08:50 am    Provider: Clarisse Flores MD (Assistant: Donna Vences RN)    Location: [facility]        Electronically signed by Clarisse Flores MD on  05/26/2021 09:41:30 AM                             Subjective:        CC: Ms. Edwards is a 70 year old White female.  follow up;         HPI: Criss is here today to follow up on chronic issues.        She is still having problems with the ears, R>L.  At night, the ears pop and she hears tinnitus \"like sirens.\"  She has felt some dizziness causing balance issues with 3 falls in the past few months.  She saw ENT Dr. Terrazas who told her she needed hearing aids.  She does not think that this is her main issue.  When she went back to see him, he cleaned out her ears.  She is on meclizine.  She has had multiple falls.  When the spells come on, she just falls over now.        She is on valsartan for HTN.  She is doing really well with that switch.  BP has been well controlled.  No CP, palpitations, or SOB.        She is on Cymbalta and buspirone for anxiety.          She is on omeprazole for GERD.  No reflux, epigastric pain or indigestion.  She is just using that as needed.  Occasionally uses Zofran for nausea.        She is on meloxicam for diffuse aches and pains for OA.    ROS:     CONSTITUTIONAL:  Positive for fatigue.   Negative for fever.      EYES:  Negative for blurred vision.      E/N/T:  Positive for ear pain ( bilateral; R>L ) and tinnitus.   Negative for diminished hearing or nasal congestion.      CARDIOVASCULAR:  Negative for chest pain and palpitations.      RESPIRATORY:  Negative for recent cough and dyspnea.      GASTROINTESTINAL:  Positive for acid reflux symptoms and nausea.   Negative for abdominal pain, constipation, diarrhea or vomiting.      MUSCULOSKELETAL:  Positive for arthralgias and back pain ( acute ).   Negative for myalgias.      " NEUROLOGICAL:  Positive for dizziness.          Past Medical History / Family History / Social History:         Last Reviewed on 5/26/2021 09:08 AM by Clarisse Flores    Past Medical History:                 PAST MEDICAL HISTORY         Hypertension     Renal Stones         CURRENT MEDICAL PROVIDERS:    Nephrologist: Te    Orthopedist: Juice             ADVANCED DIRECTIVES: None         PREVENTIVE HEALTH MAINTENANCE             BONE DENSITY: was last done 01/26/2018 with the following abnormality noted-- osteopenia (improved)     COLORECTAL CANCER SCREENING: Up to date (colonoscopy q10y; sigmoidoscopy q5y; Cologuard q3y) was last done 4/11/2018; colonoscopy with normal results; The next colonoscopy is due  10 years     EYE EXAM: was last done 2016     Hepatitis C Medicare Screening: was last done 1/2018     MAMMOGRAM: Done within last 2 years and results in are chart was last done 4/22/2021 with normal results     PAP SMEAR: No longer indicated due to age and history s/p hysterectomy         Surgical History:         Cholecystectomy    Hysterectomy: total;     Joint Replacement: R KENZIE;     Cataract Removal: bilateral; 12/2020, 1/2021;     back surgery;    kidney stone removal;         Family History:     Father: Congestive Heart Failure     Mother: Myocardial Infarction;  Cerebrovascular Accident     Brother(s): Coronary Artery Disease ( at 48 yo )     Sister(s): Coronary Artery Disease; Endocrine Type 2 Diabetes         Social History:     Occupation: Retired (Prior occupation: [employer])     Marital Status: Single     Children: 2 children         Tobacco/Alcohol/Supplements:     Last Reviewed on 5/26/2021 09:08 AM by Clarisse Flores    Tobacco: She has never smoked.          Substance Abuse History:     Last Reviewed on 5/26/2021 09:08 AM by Clarisse Flores        Mental Health History:     Last Reviewed on 5/26/2021 09:08 AM by Clarisse Flores        Communicable Diseases (eg STDs):     Last  Reviewed on 5/26/2021 09:08 AM by Clarisse Flores        Current Problems:     Last Reviewed on 1/26/2021 10:19 AM by Clarisse Flores    Depression - Major depressive disorder, single episode, moderate    HTN - Essential (primary) hypertension    Primary generalized (osteo)arthritis    GERD - Gastro-esophageal reflux disease without esophagitis    Calculus of ureter    Osteopenia - Other specified disorders of bone density and structure, multiple sites    BPPV - Benign paroxysmal vertigo, bilateral    Allergic rhinitis, unspecified    Lobulated, fused and horseshoe kidney    Other chest pain    Generalized abdominal tenderness    Flushing    Urge incontinence    Constipation, unspecified    Other fatigue    Vitamin D deficiency, unspecified    Anxiety disorder, unspecified    Umbilical hernia without obstruction or gangrene    Encounter for screening for depression    Encounter for general adult medical examination without abnormal findings    Encounter for other screening for malignant neoplasm of breast        Immunizations:     influenza, high-dose, quadrivalent (FLUZONE HIGH-DOSE QUAD 2020-21) 10/26/2020    Td adult 10/1/2012    Prevnar 13 (Pneumococcal PCV 13) 11/1/2016    Fluzone High-Dose pf (>=65 yr) 11/1/2016    Fluzone High-Dose pf (>=65 yr) 9/20/2017    Fluzone High-Dose pf (>=65 yr) 10/15/2018    Fluzone High-Dose pf (>=65 yr) 10/24/2019    PNEUMOVAX 23 (Pneumococcal PPV23) 1/23/2018    Zostavax (Zoster live) 5/1/2010        Allergies:     Last Reviewed on 1/26/2021 10:19 AM by Clarisse Flores    band aides:      Darvon Compound 32:      Lyrica:      Hydrocodone/Acetaminophen:      Percocet:          Current Medications:     Last Reviewed on 1/26/2021 10:19 AM by Clarisse Flores    omeprazole 40 mg oral capsule,delayed release (enteric coated) [TAKE ONE CAPSULE BY MOUTH DAILY]    Cymbalta 60 mg oral capsule,delayed release (enteric coated) [Take 1 capsule(s) by mouth daily]    Zofran 4 mg oral tablet  [take 1 tab by mouth TID PRN nausea ]    MELOXICAM    TAB 15MG     Miralax 17 gram/dose oral Powder [take 17 gram PO BID prn constipation]    busPIRone 5 mg oral tablet [TAKE ONE TABLET BY MOUTH TWICE A DAY AS NEEDED]    cholecalciferol (vitamin D3) 1,250 mcg (50,000 unit) oral capsule [take 1 capsule p.o. WEEKLY for 12 weeks]    meclizine 12.5 mg oral tablet [1 tab BID prn vertigo]    valsartan 40 mg oral tablet [1 tab daily]        Objective:        Vitals:         Current: 5/26/2021 8:53:48 AM    Ht:  5 ft, 2.1 in;  Wt: 185 lbs;  BMI: 33.7T: 97.5 F (temporal);  BP: 120/78 mm Hg (left arm, sitting);  P: 93 bpm (left arm (BP Cuff), sitting);  sCr: 0.88 mg/dL;  GFR: 61.73        Exams:     PHYSICAL EXAM:     GENERAL: vital signs recorded - well developed, well nourished;  well groomed;  no apparent distress;     EYES: extraocular movements intact; conjunctiva and cornea are normal; pupils and irises are normal;     RESPIRATORY: normal respiratory rate and pattern with no distress; normal breath sounds with no rales, rhonchi, wheezes or rubs;     CARDIOVASCULAR: normal rate; rhythm is regular;  no systolic murmur; no edema;     GASTROINTESTINAL: nontender; normal bowel sounds;     MUSCULOSKELETAL: normal gait; normal overall tone     PSYCHIATRIC: appropriate affect and demeanor; normal psychomotor function; normal speech pattern;         Assessment:         F32.1   Depression - Major depressive disorder, single episode, moderate       R42   Dizziness and giddiness       R29.6   Repeated falls       I10   HTN - Essential (primary) hypertension       K21.9   GERD - Gastro-esophageal reflux disease without esophagitis           ORDERS:         Meds Prescribed:       [Refilled] meloxicam 15 mg oral tablet [1 tab PO daily prn pain], #30 (thirty) tablets, Refills: 2 (two)       [Refilled] Zofran 4 mg oral tablet [take 1 tab by mouth TID PRN nausea ], #30 (thirty) tablets, Refills: 2 (two)       [Refilled] valsartan 40 mg  oral tablet [1 tab daily], #90 (ninety) tablets, Refills: 1 (one)         Radiology/Test Orders:       3017F  Colorectal CA screen results documented and reviewed (PV)  (In-House)            68458  Computed tomography, head or brain; without contrast material  (Send-Out)              Lab Orders:       APPTO  Appointment need  (In-House)              Other Orders:         Screening mammogram results documented  (Send-Out)            1124F  Advance Care Planning discussed and doc in MR; no surrogate named or advance care plan provided  (Send-Out)            1100F  Pt screen for fall risk; document 2+ falls in the past yr or any fall w/injury in past year (YENIFER)  (In-House)                      Plan:         Depression - Major depressive disorder, single episode, moderateDoing great on Cymbalta 60 mg daily.  No refills needed today.  RTC 3 months for AWV.    MIPS Has fallen 2+ times in the past year or had one fall with an injury in the past year Vaccines Flu and Pneumonia updated in Shot record Screening mammomgram done within last 2 years and results in are chart Colorectal Cancer Screening is up to date and the results are in the chart Advance Directive/Surrogate Decision Maker discussed and pt declines to complete today     FOLLOW-UP: Schedule a follow-up visit in 3 months.:.  Medicare wellness 30 min with [place]          Orders:         Screening mammogram results documented  (Send-Out)            3017F  Colorectal CA screen results documented and reviewed (PV)  (In-House)            1124F  Advance Care Planning discussed and doc in MR; no surrogate named or advance care plan provided  (Send-Out)            APPTO  Appointment need  (In-House)            1100F  Pt screen for fall risk; document 2+ falls in the past yr or any fall w/injury in past year (YENIFER)  (In-House)              Dizziness and giddinessShe is now having recurrent falls with the dizzy spells.  We need to proceed with head imaging at this  point.  The question now is whether her R hip prosthesis is MRI-compatible or not; will inquire of Dr. Bose who did the KENZIE.  If it is MRI-safe, plan to do CT head without contrast followed by MRI brain with and without contrast.  If not MRI-safe, plan to get CT head with and without contrast up front.  Will get that order placed as soon as we know which direction we need to take.        Addendum:  Dr. Bose's office states that her prosthesis is MRI-safe.  Proceeding with CT head without contrast.        RADIOLOGY:  I have ordered a head CT w/out contrast to be done today.            Orders:       [id number]  Computed tomography, head or brain; without contrast material  (Send-Out)              Repeated fallsAs above.        HTN - Essential (primary) hypertensionBP is doing great on the valsartan.  Continue 40 mg daily.  Refills needed.  Labs reviewed and UTD.          Prescriptions:       [Refilled] valsartan 40 mg oral tablet [1 tab daily], #90 (ninety) tablets, Refills: 1 (one)         GERD - Gastro-esophageal reflux disease without esophagitisUsing the omeprazole just prn.          Prescriptions:       [Refilled] Zofran 4 mg oral tablet [take 1 tab by mouth TID PRN nausea ], #30 (thirty) tablets, Refills: 2 (two)             Other Prescriptions:       [Refilled] meloxicam 15 mg oral tablet [1 tab PO daily prn pain], #30 (thirty) tablets, Refills: 2 (two)         Patient Recommendations:        For  Depression - Major depressive disorder, single episode, moderate:    Schedule a follow-up visit in 3 months.                APPOINTMENT INFORMATION:        Monday Tuesday Wednesday Thursday Friday Saturday Sunday            Time:___________________AM  PM   Date:_____________________             Charge Capture:         Primary Diagnosis:     F32.1  Depression - Major depressive disorder, single episode, moderate           Orders:      [id number]  Office/outpatient visit; established patient, level 4  (In-House)             3017F  Colorectal CA screen results documented and reviewed (PV)  (In-House)            APPTO  Appointment need  (In-House)            1100F  Pt screen for fall risk; document 2+ falls in the past yr or any fall w/injury in past year (YENIFER)  (In-House)              R42  Dizziness and giddiness     R29.6  Repeated falls     I10  HTN - Essential (primary) hypertension     K21.9  GERD - Gastro-esophageal reflux disease without esophagitis

## 2021-06-11 ENCOUNTER — HOSPITAL ENCOUNTER (OUTPATIENT)
Dept: MRI IMAGING | Facility: HOSPITAL | Age: 71
Discharge: HOME | End: 2021-06-11
Admitting: FAMILY MEDICINE

## 2021-06-11 DIAGNOSIS — R42 DIZZINESS AND GIDDINESS: ICD-10-CM

## 2021-06-11 DIAGNOSIS — R29.6 REPEATED FALLS: ICD-10-CM

## 2021-06-11 LAB — CREAT BLDA-MCNC: 0.6 MG/DL

## 2021-06-11 PROCEDURE — 70553 MRI BRAIN STEM W/O & W/DYE: CPT

## 2021-06-11 PROCEDURE — 0 GADOBENATE DIMEGLUMINE 529 MG/ML SOLUTION: Performed by: FAMILY MEDICINE

## 2021-06-11 PROCEDURE — 82565 ASSAY OF CREATININE: CPT

## 2021-06-11 PROCEDURE — A9577 INJ MULTIHANCE: HCPCS | Performed by: FAMILY MEDICINE

## 2021-06-11 RX ADMIN — GADOBENATE DIMEGLUMINE 16 ML: 529 INJECTION, SOLUTION INTRAVENOUS at 12:25

## 2021-06-30 ENCOUNTER — OFFICE VISIT (OUTPATIENT)
Dept: OTOLARYNGOLOGY | Facility: CLINIC | Age: 71
End: 2021-06-30

## 2021-06-30 VITALS — WEIGHT: 184.8 LBS | HEIGHT: 62 IN | BODY MASS INDEX: 34.01 KG/M2 | TEMPERATURE: 97.4 F

## 2021-06-30 DIAGNOSIS — H61.23 BILATERAL IMPACTED CERUMEN: ICD-10-CM

## 2021-06-30 DIAGNOSIS — H90.3 SENSORINEURAL HEARING LOSS (SNHL) OF BOTH EARS: ICD-10-CM

## 2021-06-30 DIAGNOSIS — R26.89 IMBALANCE: Primary | ICD-10-CM

## 2021-06-30 DIAGNOSIS — R42 LIGHT HEADEDNESS: ICD-10-CM

## 2021-06-30 PROCEDURE — 99214 OFFICE O/P EST MOD 30 MIN: CPT | Performed by: OTOLARYNGOLOGY

## 2021-06-30 PROCEDURE — 69210 REMOVE IMPACTED EAR WAX UNI: CPT | Performed by: OTOLARYNGOLOGY

## 2021-06-30 RX ORDER — VALSARTAN 40 MG/1
TABLET ORAL
COMMUNITY
Start: 2021-06-27 | End: 2021-06-30 | Stop reason: SDUPTHER

## 2021-06-30 RX ORDER — DULOXETIN HYDROCHLORIDE 60 MG/1
CAPSULE, DELAYED RELEASE ORAL
COMMUNITY
Start: 2021-05-23 | End: 2021-08-26

## 2021-06-30 RX ORDER — HYDROCODONE BITARTRATE AND ACETAMINOPHEN 5; 325 MG/1; MG/1
TABLET ORAL
COMMUNITY
End: 2021-09-09

## 2021-06-30 RX ORDER — ASPIRIN 81 MG/1
TABLET, CHEWABLE ORAL
COMMUNITY
End: 2021-09-09

## 2021-06-30 RX ORDER — MECLIZINE HCL 12.5 MG/1
TABLET ORAL
COMMUNITY
End: 2021-09-09

## 2021-06-30 RX ORDER — BUSPIRONE HYDROCHLORIDE 5 MG/1
TABLET ORAL
COMMUNITY
End: 2021-09-09

## 2021-06-30 RX ORDER — LORAZEPAM 0.5 MG/1
TABLET ORAL
COMMUNITY
Start: 2021-06-04 | End: 2021-09-09

## 2021-06-30 RX ORDER — DULOXETIN HYDROCHLORIDE 60 MG/1
CAPSULE, DELAYED RELEASE ORAL
COMMUNITY
End: 2021-06-30 | Stop reason: SDUPTHER

## 2021-06-30 NOTE — PROGRESS NOTES
"Patient Name: Criss Edwards   Visit Date: 06/30/2021   Patient ID: [id number]  Provider: Tyrese Terrazas MD    Sex: female  Location: [facility]   YOB: 1950  Location Address: [address] [place] [address], [address] [place] [address],?[id number]    Primary Care Provider Clarisse Flores MD  Location Phone: [phone]    Referring Provider: No ref. provider found        Chief Complaint  Dizziness    Subjective    History of Present Illness  Criss Edwards is a 70 y.o. female who presents to [facility] today as a consult from No ref. provider found.    She presents the clinic today for follow-up regarding falls.  She denies any significant issues with vertigo, but notes that she is \"clumsy and trips frequently,\" and has sustained several falls when she trips and cannot catch herself.  She does have lightheadedness on arising from a laying back or sitting position.  She denies any headaches.  I previously saw her for this in February and did discuss vestibular testing.  She has been doing some exercises but notes that she has some limitations that she has had a hip replacement on the right side.    She does have a hearing loss and had an audiogram performed in May of last year, but has decided against hearing aids for now.  She notes that she is doing okay in day-to-day situations with her hearing.    She does have difficulty with cerumen impactions, and I removed cerumen at last clinic visit in February.  She notes that her ears feel plugged up again.    Past Medical History:   Diagnosis Date   • Dizziness    • HL (hearing loss)        Past Surgical History:   Procedure Laterality Date   • HAND SURGERY Left 05/07/2016   • HIP ARTHROPLASTY Right    • KIDNEY STONE SURGERY           Current Outpatient Medications:   •  aspirin 81 MG chewable tablet, , Disp: , Rfl:   •  busPIRone (BUSPAR) 5 MG tablet, buspirone 5 mg oral tablet take 1 tablet (5 mg) by " "oral route 2 times per day   Active, Disp: , Rfl:   •  Cholecalciferol 25 MCG (1000 UT) capsule, Vitamin D3 1,000 unit oral capsule take 1 capsule by oral route daily   Suspended, Disp: , Rfl:   •  DULoxetine (CYMBALTA) 60 MG capsule, , Disp: , Rfl:   •  fluticasone (FLONASE) 50 MCG/ACT nasal spray, , Disp: , Rfl:   •  meloxicam (MOBIC) 15 MG tablet, , Disp: , Rfl:   •  omeprazole (priLOSEC) 40 MG capsule, , Disp: , Rfl:   •  ondansetron (ZOFRAN) 4 MG tablet, , Disp: , Rfl:   •  atenolol (TENORMIN) 25 MG tablet, , Disp: , Rfl:   •  escitalopram (LEXAPRO) 5 MG tablet, , Disp: , Rfl:   •  gabapentin (NEURONTIN) 300 MG capsule, , Disp: , Rfl:   •  HYDROcodone-acetaminophen (NORCO) 5-325 MG per tablet, hydrocodone-acetaminophen 5-325 mg oral tablet take 1 tablet by oral route every 4 hours as needed for pain   Suspended, Disp: , Rfl:   •  LORazepam (ATIVAN) 0.5 MG tablet, , Disp: , Rfl:   •  meclizine (ANTIVERT) 12.5 MG tablet, meclizine 12.5 mg oral tablet take 2 tablets (25 mg) by oral route 1 hour before exposure to motion   Active, Disp: , Rfl:   •  prednisoLONE acetate (PRED FORTE) 1 % ophthalmic suspension, INSTILL 1 DROP INTO BOTH EYES EVERY 12 HOURS AS DIRECTED, Disp: , Rfl:   •  tamsulosin (FLOMAX) 0.4 MG capsule 24 hr capsule, , Disp: , Rfl:   •  traMADol (ULTRAM) 50 MG tablet, 1 PO QHD, Disp: 30 tablet, Rfl: 0  •  valsartan-hydrochlorothiazide (DIOVAN-HCT) 160-12.5 MG per tablet, , Disp: , Rfl:      Allergies   Allergen Reactions   • Codeine Itching   • Iodine Hives     DENIES       History reviewed. No pertinent family history.     Social History     Social History Narrative   • Not on file       Objective     Vital Signs:   Temp 97.4 °F (36.3 °C) (Temporal)   Ht 157.7 cm (62.1\")   Wt 83.8 kg (184 lb 12.8 oz)   BMI 33.69 kg/m²       Physical Exam    Ear Cerumen Removal    Date/Time: 6/30/2021 1:40 PM  Performed by: Tyrese Terrazas MD  Authorized by: Tyrese Terrazas MD     Anesthesia:  Local " Anesthetic: none  Location details: right ear and left ear  Procedure type: instrumentation   Sedation:  Patient sedated: no            Constitutional   Appearance  · : well developed, well-nourished, alert and in no acute distress, voice clear and strong    Head  Inspection  · : no deformities or lesions  Face  Inspection  · : No facial lesions; House-Brackmann I/VI bilaterally  Palpation  · : No TMJ crepitus nor  muscle tenderness bilaterally    Eyes  Vision  Visual Fields  · : Extraocular movements are intact. No spontaneous or gaze-induced nystagmus.  Conjunctivae  · : clear  Sclerae  · : clear  Pupils and Irises  · : pupils equal, round, and reactive to light.     Ears, Nose, Mouth and Throat    Ears    External Ears  · : appearance within normal limits, no lesions present  Otoscopic Examination  · : Tympanic membrane appearance within normal limits bilaterally without perforations, well-aerated middle ears  Hearing  · : intact to conversational voice both ears  Tunning fork testing:     :    Nose    External Nose  · : appearance normal  Intranasal Exam  · : mucosa within normal limits, vestibules normal, no intranasal lesions present, septum midline, sinuses non tender to percussion  Oral Cavity    Oral Mucosa  · : oral mucosa normal without pallor or cyanosis  Lips  · : lip appearance normal  Teeth  · : normal dentition for age  Gums  · : gums pink, non-swollen, no bleeding present  Tongue  · : tongue appearance normal; normal mobility  Palate  · : hard palate normal, soft palate appearance normal with symmetric mobility    Throat    Oropharynx  · : no inflammation or lesions present, tonsils within normal limits  Hypopharynx  · : appearance within normal limits, superior epiglottis within normal limits  Larynx  · : appearance within normal limits, vocal cords within normal limits, no lesions present    Neck  Inspection/Palpation  · : normal appearance, no masses or tenderness, trachea midline;  "thyroid size normal, nontender, no nodules or masses present on palpation    Respiratory  Respiratory Effort  · : breathing unlabored  Inspection of Chest  · : normal appearance, no retractions    Cardiovascular  Heart  · : regular rate and rhythm    Lymphatic  Neck  · : no lymphadenopathy present  Supraclavicular Nodes  · : no lymphadenopathy present  Preauricular Nodes  · : no lymphadenopathy present    Skin and Subcutaneous Tissue  General Inspection  · : Regarding face and neck - there are no rashes present, no lesions present, and no areas of discoloration    Neurologic  Cranial Nerves  · : cranial nerves II-XII are grossly intact bilaterally  Gait and Station  · : normal gait, normal tandem gait, negative Romberg, able to stand without diffculty    Psychiatric  Judgement and Insight  · : judgment and insight intact  Mood and Affect  · : mood normal, affect appropriate          Assessment and Plan    Diagnoses and all orders for this visit:    1. Imbalance (Primary)    2. Light headedness    3. Bilateral impacted cerumen    4. Sensorineural hearing loss (SNHL) of both ears    Examination today revealed normal gait and balance testing revealed no significant asymmetry.  She notes that most of her \"dizzy\" spells are actually lightheadedness and not vertigo.  We had a lengthy discussion regarding the importance of taking care to take time when she is getting up from a sitting or laying back position.  It may also benefit her to take some blood pressure measures on a regular basis and keep a diary as she informs me that she \"usually has low blood pressure.\"  I have instructed her to make sure she is adequately hydrated.  I have also discussed using an assist device to help with her balance.  She did have cerumen impactions which I was able to clear, and her ears look healthy otherwise.  I will plan to see her back on an as-needed basis should there be any further issues.    Follow Up   Return if symptoms worsen or " fail to improve.  Patient was given instructions and counseling regarding her condition or for health maintenance advice. Please see specific information pulled into the AVS if appropriate.

## 2021-07-01 VITALS
BODY MASS INDEX: 35.11 KG/M2 | TEMPERATURE: 99.2 F | HEART RATE: 68 BPM | HEIGHT: 62 IN | SYSTOLIC BLOOD PRESSURE: 117 MMHG | WEIGHT: 190.8 LBS | DIASTOLIC BLOOD PRESSURE: 73 MMHG

## 2021-07-01 VITALS
DIASTOLIC BLOOD PRESSURE: 81 MMHG | TEMPERATURE: 98.1 F | BODY MASS INDEX: 35.75 KG/M2 | WEIGHT: 194.3 LBS | SYSTOLIC BLOOD PRESSURE: 132 MMHG | HEART RATE: 65 BPM | HEIGHT: 62 IN

## 2021-07-01 VITALS
DIASTOLIC BLOOD PRESSURE: 58 MMHG | WEIGHT: 188 LBS | BODY MASS INDEX: 34.6 KG/M2 | HEIGHT: 62 IN | HEART RATE: 79 BPM | TEMPERATURE: 98.4 F | SYSTOLIC BLOOD PRESSURE: 127 MMHG

## 2021-07-01 VITALS
HEART RATE: 71 BPM | TEMPERATURE: 97.9 F | BODY MASS INDEX: 35.33 KG/M2 | DIASTOLIC BLOOD PRESSURE: 71 MMHG | WEIGHT: 192 LBS | HEIGHT: 62 IN | SYSTOLIC BLOOD PRESSURE: 125 MMHG

## 2021-07-01 VITALS
WEIGHT: 188 LBS | TEMPERATURE: 98.1 F | HEIGHT: 62 IN | BODY MASS INDEX: 34.6 KG/M2 | DIASTOLIC BLOOD PRESSURE: 69 MMHG | SYSTOLIC BLOOD PRESSURE: 110 MMHG | HEART RATE: 59 BPM

## 2021-07-01 VITALS
HEIGHT: 62 IN | SYSTOLIC BLOOD PRESSURE: 124 MMHG | WEIGHT: 188.4 LBS | BODY MASS INDEX: 34.67 KG/M2 | TEMPERATURE: 98 F | HEART RATE: 78 BPM | DIASTOLIC BLOOD PRESSURE: 54 MMHG

## 2021-07-01 VITALS
SYSTOLIC BLOOD PRESSURE: 131 MMHG | HEIGHT: 62 IN | BODY MASS INDEX: 34.85 KG/M2 | DIASTOLIC BLOOD PRESSURE: 79 MMHG | TEMPERATURE: 98.6 F | HEART RATE: 70 BPM | WEIGHT: 189.4 LBS

## 2021-07-01 VITALS
SYSTOLIC BLOOD PRESSURE: 105 MMHG | WEIGHT: 185.8 LBS | DIASTOLIC BLOOD PRESSURE: 52 MMHG | HEIGHT: 62 IN | TEMPERATURE: 98.1 F | HEART RATE: 81 BPM | BODY MASS INDEX: 34.19 KG/M2

## 2021-07-01 VITALS
DIASTOLIC BLOOD PRESSURE: 65 MMHG | WEIGHT: 193.8 LBS | HEIGHT: 62 IN | SYSTOLIC BLOOD PRESSURE: 135 MMHG | TEMPERATURE: 97.7 F | BODY MASS INDEX: 35.66 KG/M2 | HEART RATE: 72 BPM

## 2021-07-02 VITALS
BODY MASS INDEX: 34.04 KG/M2 | WEIGHT: 185 LBS | HEART RATE: 93 BPM | TEMPERATURE: 97.5 F | HEIGHT: 62 IN | SYSTOLIC BLOOD PRESSURE: 120 MMHG | DIASTOLIC BLOOD PRESSURE: 78 MMHG

## 2021-07-02 VITALS
TEMPERATURE: 97.6 F | HEART RATE: 87 BPM | WEIGHT: 187.2 LBS | DIASTOLIC BLOOD PRESSURE: 74 MMHG | HEIGHT: 62 IN | SYSTOLIC BLOOD PRESSURE: 140 MMHG | BODY MASS INDEX: 34.45 KG/M2

## 2021-07-02 VITALS
HEART RATE: 83 BPM | DIASTOLIC BLOOD PRESSURE: 80 MMHG | WEIGHT: 185.4 LBS | HEIGHT: 62 IN | TEMPERATURE: 97.2 F | SYSTOLIC BLOOD PRESSURE: 138 MMHG | BODY MASS INDEX: 34.12 KG/M2

## 2021-07-02 VITALS
TEMPERATURE: 97.8 F | BODY MASS INDEX: 35.3 KG/M2 | DIASTOLIC BLOOD PRESSURE: 73 MMHG | WEIGHT: 191.8 LBS | HEART RATE: 66 BPM | SYSTOLIC BLOOD PRESSURE: 133 MMHG | HEIGHT: 62 IN

## 2021-07-02 VITALS
TEMPERATURE: 97.9 F | SYSTOLIC BLOOD PRESSURE: 131 MMHG | BODY MASS INDEX: 33.9 KG/M2 | HEART RATE: 80 BPM | WEIGHT: 184.2 LBS | DIASTOLIC BLOOD PRESSURE: 72 MMHG | HEIGHT: 62 IN

## 2021-07-30 RX ORDER — VALSARTAN 40 MG/1
40 TABLET ORAL DAILY
Qty: 90 TABLET | Refills: 1 | Status: SHIPPED | OUTPATIENT
Start: 2021-07-30 | End: 2022-01-31

## 2021-08-04 DIAGNOSIS — Z96.641 STATUS POST TOTAL HIP REPLACEMENT, RIGHT: Primary | ICD-10-CM

## 2021-08-04 PROBLEM — N20.0 KIDNEY STONES: Status: ACTIVE | Noted: 2021-08-04

## 2021-08-04 PROBLEM — H92.09 OTALGIA: Status: ACTIVE | Noted: 2021-08-04

## 2021-08-04 PROBLEM — R00.2 PALPITATIONS: Status: ACTIVE | Noted: 2019-02-12

## 2021-08-04 PROBLEM — R30.0 DIFFICULT OR PAINFUL URINATION: Status: ACTIVE | Noted: 2019-03-06

## 2021-08-04 PROBLEM — N39.46 MIXED STRESS AND URGE URINARY INCONTINENCE: Status: ACTIVE | Noted: 2019-03-06

## 2021-08-04 PROBLEM — I10 HIGH BLOOD PRESSURE: Status: ACTIVE | Noted: 2021-08-04

## 2021-08-04 PROBLEM — H93.19 TINNITUS: Status: ACTIVE | Noted: 2021-08-04

## 2021-08-04 PROBLEM — I42.2 HYPERTROPHIC CARDIOMYOPATHY: Status: ACTIVE | Noted: 2019-02-12

## 2021-08-04 PROBLEM — N20.0 NEPHROLITHIASIS: Status: ACTIVE | Noted: 2019-03-06

## 2021-08-04 PROBLEM — H81.09 MENIERE'S DISEASE: Status: ACTIVE | Noted: 2021-08-04

## 2021-08-04 PROBLEM — H91.90 HEARING LOSS: Status: ACTIVE | Noted: 2021-08-04

## 2021-08-04 PROBLEM — R94.31 ELECTROCARDIOGRAM ABNORMAL: Status: ACTIVE | Noted: 2019-02-12

## 2021-08-04 PROBLEM — I34.1 MITRAL VALVE PROLAPSE: Status: ACTIVE | Noted: 2021-08-04

## 2021-08-05 ENCOUNTER — HOSPITAL ENCOUNTER (OUTPATIENT)
Dept: GENERAL RADIOLOGY | Facility: HOSPITAL | Age: 71
Discharge: HOME | End: 2021-08-05
Admitting: ORTHOPAEDIC SURGERY

## 2021-08-05 ENCOUNTER — OFFICE VISIT (OUTPATIENT)
Dept: ORTHOPEDIC SURGERY | Facility: CLINIC | Age: 71
End: 2021-08-05

## 2021-08-05 VITALS — TEMPERATURE: 96.8 F | HEIGHT: 63 IN | WEIGHT: 181 LBS | BODY MASS INDEX: 32.07 KG/M2

## 2021-08-05 DIAGNOSIS — Z96.641 STATUS POST TOTAL HIP REPLACEMENT, RIGHT: Primary | ICD-10-CM

## 2021-08-05 DIAGNOSIS — Z96.641 STATUS POST TOTAL HIP REPLACEMENT, RIGHT: ICD-10-CM

## 2021-08-05 PROCEDURE — 73502 X-RAY EXAM HIP UNI 2-3 VIEWS: CPT

## 2021-08-05 PROCEDURE — 99213 OFFICE O/P EST LOW 20 MIN: CPT | Performed by: ORTHOPAEDIC SURGERY

## 2021-08-05 NOTE — PROGRESS NOTES
"Chief Complaint  Pain and Follow-up of the Right Hip    Subjective    History of Present Illness      Criss Edwards is a 71 y.o. female who presents to [facility] for follow-up on right total hip arthroplasty.  History of Present Illness this patient is following up on her hip replacement which had performed through a posterior approach about 8 years ago.  She states that the hip is done very well for her.  She does not have limb length discrepancy.  She has no neurovascular deficit.  The patient states that she has issues with poor balance.  The patient states that she does have symptoms consistent with radiculopathy of the lower extremity.  I have explained to her that the hip replacement is not the source of her discomfort or loss of balance.  She most likely is going to need a MRI of the lumbar spine for evaluation of possible spinal stenosis causing claudication.  Pain Location: RIGHT hip  Radiation: none  Quality: dull, aching  Intensity/Severity: mild   Duration: several years  Progression of symptoms: no worsening, symptoms stable/unchanged  Onset quality: gradual   Timing: intermittent  Aggravating Factors: going up and down stairs, walking, standing  Alleviating Factors: rest, ice, prior surgery  Previous Episodes: yes  Associated Symptoms: pain  ADLs Affected: ambulating  Previous Treatment: prior surgery       Objective   Vital Signs:   Temp 96.8 °F (36 °C)   Ht 160 cm (63\")   Wt 82.1 kg (181 lb)   BMI 32.06 kg/m²     Physical Exam  Physical Exam  Vitals signs and nursing note reviewed.   Constitutional:       Appearance: Normal appearance.   Pulmonary:      Effort: Pulmonary effort is normal.   Skin:     General: Skin is warm and dry.      Capillary Refill: Capillary refill takes less than 2 seconds.   Neurological:      General: No focal deficit present.      Mental Status: He is alert and oriented to person, place, and time. Mental status is at baseline. "   Psychiatric:         Mood and Affect: Mood normal.         Behavior: Behavior normal.         Thought Content: Thought content normal.         Judgment: Judgment normal.     Ortho Exam   Right hip. Patient is post op 8 year(s) from total hip arthoplasty, direct posterior. Incision is clean and healing well. Calf is soft and nontender. Homans sign is negative. Skin and soft tissues are appropriate for postoperative status of the patient. Hip flexion is 0-90 degrees, hip abduction is 0-30 degrees. No limb lenth discrepancy. Neurovascular status is intact. Patients gait is significantly improved. The pain relief is extremely dramatic for the patient. Dorsalis pedis and posterior tibial artery pulses palpable. Common peroneal function is well preserved. There is no evidence of cellulitis or erythema or deep seated joint infection.  Lumbar Spine: The overlying skin and soft tissues are mildly swollen. Deep tendon reflexes are bilaterally, symmetric and equal.  No long tract signs are noted. There is No evidence of myelopathy. No bowel or bladder deficit noted. Mild spasm of erector spinae muscle is noted. bilaterally sided rotation is associated with pain and discomfort. Straight leg raise test is positive to 60 degrees. Contralateral straight leg raise is negative. Pain radiates to buttock and thigh. Get up and go is slow due to the lumbar pain.No objective motor or sensory function loss is noted.   Result Review :   The following data was reviewed by: Alberto Bose MD on 08/05/2021:  Radiologic studies - see below for interpretation  Right hip xrays ap/lateral views were ordered by Alberto Bose MD. Performed at [facility] on 08/05/2021. Images were independently viewed and interpreted by myself, my impression as follows:    right Hip X-Ray  Indication: Evaluation of implant position after total hip arthroplasty  AP and lateral  Findings: Well-placed implants without any evidence of osteolysis.   There is no evidence of small particle-induced disease in the proximal femur or the acetabulum.  no bony lesion  Soft tissues within normal limits  within normal limits joint spaces  Hardware appropriately positioned yes      no prior studies available for comparison.    X-RAY was ordered and reviewed by Alberto Bose MD            Procedures           Assessment   Assessment and Plan    Diagnoses and all orders for this visit:    1. Status post total hip replacement, right (Primary)          Follow Up   ·   · Rest, ice, compression, and elevation (RICE) therapy  · Stretching and strengthening exercises of the hip flexors and abductors.  · [employer] exercise program with stretching and strengthening exercises.  · Calcium and vitamin D for bone health.  · , GI and dental procedure prophylaxis with antibiotics to prevent metastatic infection to the hip arthroplasty implants.  · Tablet meloxicam 15 mg tab 1 p.o. nightly as needed breakthrough pain and discomfort.  · Falls precautions and dislocation precautions discussed with the patient.  · Recommend a MRI of her lumbar spine.   · OTC Alternate Ibuprofen and Tylenol as needed  · Follow up in 1 year(s) with x-ray of the right hip  • Patient was given instructions and counseling regarding her condition or for health maintenance advice. Please see specific information pulled into the AVS if appropriate.     Alberto Bose MD   Date of Encounter: 8/5/2021       EMR Dragon/Transcription disclaimer:  Much of this encounter note is an electronic transcription/translation of spoken language to printed text. The electronic translation of spoken language may permit erroneous, or at times, nonsensical words or phrases to be inadvertently transcribed; Although I have reviewed the note for such errors, some may still exist.

## 2021-08-26 RX ORDER — DULOXETIN HYDROCHLORIDE 60 MG/1
CAPSULE, DELAYED RELEASE ORAL
Qty: 90 CAPSULE | Refills: 1 | Status: SHIPPED | OUTPATIENT
Start: 2021-08-26 | End: 2022-03-03

## 2021-09-09 ENCOUNTER — OFFICE VISIT (OUTPATIENT)
Dept: FAMILY MEDICINE CLINIC | Age: 71
End: 2021-09-09

## 2021-09-09 ENCOUNTER — LAB (OUTPATIENT)
Dept: LAB | Facility: HOSPITAL | Age: 71
End: 2021-09-09

## 2021-09-09 VITALS
HEIGHT: 63 IN | WEIGHT: 183 LBS | HEART RATE: 86 BPM | TEMPERATURE: 97.8 F | DIASTOLIC BLOOD PRESSURE: 80 MMHG | SYSTOLIC BLOOD PRESSURE: 141 MMHG | BODY MASS INDEX: 32.43 KG/M2

## 2021-09-09 DIAGNOSIS — I10 ESSENTIAL HYPERTENSION: ICD-10-CM

## 2021-09-09 DIAGNOSIS — Z00.00 ANNUAL PHYSICAL EXAM: Primary | ICD-10-CM

## 2021-09-09 DIAGNOSIS — I42.2 HYPERTROPHIC CARDIOMYOPATHY (HCC): ICD-10-CM

## 2021-09-09 DIAGNOSIS — F33.1 MAJOR DEPRESSIVE DISORDER, RECURRENT EPISODE, MODERATE DEGREE (HCC): ICD-10-CM

## 2021-09-09 DIAGNOSIS — M54.16 LUMBAR RADICULOPATHY: ICD-10-CM

## 2021-09-09 PROBLEM — M85.89 OSTEOPENIA OF MULTIPLE SITES: Status: ACTIVE | Noted: 2021-09-09

## 2021-09-09 PROBLEM — M15.9 GENERALIZED OSTEOARTHRITIS: Status: ACTIVE | Noted: 2021-09-09

## 2021-09-09 PROBLEM — E55.9 VITAMIN D DEFICIENCY: Status: ACTIVE | Noted: 2021-09-09

## 2021-09-09 PROBLEM — R94.31 ELECTROCARDIOGRAM ABNORMAL: Status: RESOLVED | Noted: 2019-02-12 | Resolved: 2021-09-09

## 2021-09-09 PROBLEM — J30.1 SEASONAL ALLERGIC RHINITIS DUE TO POLLEN: Status: ACTIVE | Noted: 2021-09-09

## 2021-09-09 PROBLEM — R30.0 DIFFICULT OR PAINFUL URINATION: Status: RESOLVED | Noted: 2019-03-06 | Resolved: 2021-09-09

## 2021-09-09 PROBLEM — N20.0 KIDNEY STONES: Status: RESOLVED | Noted: 2021-08-04 | Resolved: 2021-09-09

## 2021-09-09 PROBLEM — K21.9 GASTROESOPHAGEAL REFLUX DISEASE WITHOUT ESOPHAGITIS: Status: ACTIVE | Noted: 2021-09-09

## 2021-09-09 LAB
ALBUMIN SERPL-MCNC: 4.6 G/DL (ref 3.5–5.2)
ALBUMIN/GLOB SERPL: 1.6 G/DL
ALP SERPL-CCNC: 129 U/L (ref 39–117)
ALT SERPL W P-5'-P-CCNC: 29 U/L (ref 1–33)
ANION GAP SERPL CALCULATED.3IONS-SCNC: 9.9 MMOL/L (ref 5–15)
AST SERPL-CCNC: 33 U/L (ref 1–32)
BILIRUB SERPL-MCNC: 0.5 MG/DL (ref 0–1.2)
BUN SERPL-MCNC: 11 MG/DL (ref 8–23)
BUN/CREAT SERPL: 12.2 (ref 7–25)
CALCIUM SPEC-SCNC: 10.1 MG/DL (ref 8.6–10.5)
CHLORIDE SERPL-SCNC: 103 MMOL/L (ref 98–107)
CO2 SERPL-SCNC: 25.1 MMOL/L (ref 22–29)
CREAT SERPL-MCNC: 0.9 MG/DL (ref 0.57–1)
GFR SERPL CREATININE-BSD FRML MDRD: 61 ML/MIN/1.73
GLOBULIN UR ELPH-MCNC: 2.9 GM/DL
GLUCOSE SERPL-MCNC: 123 MG/DL (ref 65–99)
POTASSIUM SERPL-SCNC: 4.7 MMOL/L (ref 3.5–5.2)
PROT SERPL-MCNC: 7.5 G/DL (ref 6–8.5)
SODIUM SERPL-SCNC: 138 MMOL/L (ref 136–145)

## 2021-09-09 PROCEDURE — 36415 COLL VENOUS BLD VENIPUNCTURE: CPT

## 2021-09-09 PROCEDURE — 80053 COMPREHEN METABOLIC PANEL: CPT

## 2021-09-09 PROCEDURE — 99213 OFFICE O/P EST LOW 20 MIN: CPT | Performed by: FAMILY MEDICINE

## 2021-09-09 PROCEDURE — G0439 PPPS, SUBSEQ VISIT: HCPCS | Performed by: FAMILY MEDICINE

## 2021-09-09 NOTE — ASSESSMENT & PLAN NOTE
Stable on duloxetine 60 mg.  She does have a lot of stress with her son and not letting her have access to her grandkids.  She does not want to change the medications or consider therapy.  No refills needed today.

## 2021-09-09 NOTE — PROGRESS NOTES
The ABCs of the Annual Wellness Visit  Subsequent Medicare Wellness Visit    Chief Complaint   Patient presents with   • Medicare Wellness-subsequent      Subjective    History of Present Illness:  Criss Edwards is a 71 y.o. female who presents for a Subsequent Medicare Wellness Visit.    She is UTD on colonoscopy, last done 4/2018 and this was normal.  Ten year repeat recommended.  Pap smears are no longer indicated by age and history; s/p hysterectomy.  She is due for mammogram, last done 4/2021 and this was normal.  She is UTD on DEXA, last done 1/2020 and this showed osteopenia.  She is UTD on Pneumovax (1/2018), Prevnar (11/2016), Zostavax (5/2010), Td (10/2012), and flu (10/2019).  She is due for Shingrix.  She is due for routine labs including CMP.    She saw Dr. Bose for her R hip last week.  He thinks that her hip pain is actually being referred from the back and recommends MRI of L spine.  She is worried about that because she does not want either injections or surgery at this time.      The following portions of the patient's history were reviewed and   updated as appropriate: allergies, current medications, past family history, past medical history, past social history, past surgical history and problem list.     Compared to one year ago, the patient feels her physical   health is worse.    Compared to one year ago, the patient feels her mental   health is worse.    Recent Hospitalizations:  She was not admitted to the hospital during the last year.       Current Medical Providers:  Patient Care Team:  Clarisse Flores MD as PCP - General (Family Medicine)    Outpatient Medications Prior to Visit   Medication Sig Dispense Refill   • Cholecalciferol 25 MCG (1000 UT) capsule Vitamin D3 1,000 unit oral capsule take 1 capsule by oral route daily   Suspended     • DULoxetine (CYMBALTA) 60 MG capsule TAKE ONE CAPSULE BY MOUTH DAILY 90 capsule 1   • meloxicam (MOBIC) 15 MG tablet Take 15 mg by mouth  Daily.     • ondansetron (ZOFRAN) 4 MG tablet      • valsartan (DIOVAN) 40 MG tablet Take 1 tablet by mouth Daily. 90 tablet 1   • aspirin 81 MG chewable tablet      • atenolol (TENORMIN) 25 MG tablet      • busPIRone (BUSPAR) 5 MG tablet buspirone 5 mg oral tablet take 1 tablet (5 mg) by oral route 2 times per day   Active     • fluticasone (FLONASE) 50 MCG/ACT nasal spray      • gabapentin (NEURONTIN) 300 MG capsule      • HYDROcodone-acetaminophen (NORCO) 5-325 MG per tablet hydrocodone-acetaminophen 5-325 mg oral tablet take 1 tablet by oral route every 4 hours as needed for pain   Suspended     • LORazepam (ATIVAN) 0.5 MG tablet      • meclizine (ANTIVERT) 12.5 MG tablet meclizine 12.5 mg oral tablet take 2 tablets (25 mg) by oral route 1 hour before exposure to motion   Active     • omeprazole (priLOSEC) 40 MG capsule      • prednisoLONE acetate (PRED FORTE) 1 % ophthalmic suspension INSTILL 1 DROP INTO BOTH EYES EVERY 12 HOURS AS DIRECTED     • tamsulosin (FLOMAX) 0.4 MG capsule 24 hr capsule      • traMADol (ULTRAM) 50 MG tablet 1 PO QHD 30 tablet 0     No facility-administered medications prior to visit.       No opioid medication identified on active medication list. I have reviewed chart for other potential  high risk medication/s and harmful drug interactions in the elderly.          Aspirin is not on active medication list.  Aspirin use is not indicated based on review of current medical condition/s. Risk of harm outweighs potential benefits.  .    Patient Active Problem List   Diagnosis   • Primary osteoarthritis of left knee   • Status post total hip replacement, right   • Right hip pain   • Hearing loss   • Essential hypertension   • Hypertrophic cardiomyopathy (CMS/HCC)   • Nephrolithiasis   • Meniere's disease   • Mitral valve prolapse   • Mixed stress and urge urinary incontinence   • Otalgia   • Palpitations   • Tinnitus   • Major depressive disorder, recurrent episode, moderate degree (CMS/HCC)  "  • Generalized osteoarthritis   • Osteopenia of multiple sites   • Seasonal allergic rhinitis due to pollen   • Vitamin D deficiency   • Gastroesophageal reflux disease without esophagitis   • Lumbar radiculopathy   • Annual physical exam     Advance Care Planning   Advance Directive is not on file.  ACP discussion was held with the patient during this visit. Patient does not have an advance directive, information provided.    Review of Systems   Constitutional: Positive for fatigue. Negative for chills and fever.   HENT: Negative for congestion, hearing loss and rhinorrhea.    Eyes: Negative for pain and visual disturbance.   Respiratory: Negative for cough and shortness of breath.    Cardiovascular: Negative for chest pain and palpitations.   Gastrointestinal: Positive for constipation. Negative for abdominal pain, diarrhea, nausea and vomiting.   Genitourinary: Negative for difficulty urinating and dysuria.   Musculoskeletal: Positive for arthralgias and back pain. Negative for myalgias.   Neurological: Negative for weakness and numbness.   Psychiatric/Behavioral: Positive for dysphoric mood and sleep disturbance. Negative for suicidal ideas. The patient is not nervous/anxious.         Objective       Vitals:    09/09/21 0834   BP: 143/79   BP Location: Left arm   Patient Position: Sitting   Temp: 97.8 °F (36.6 °C)   TempSrc: Oral   Weight: 83 kg (183 lb)   Height: 160 cm (63\")     BMI Readings from Last 1 Encounters:   09/09/21 32.42 kg/m²   BMI is above normal parameters. Recommendations include: exercise counseling and nutrition counseling    Does the patient have evidence of cognitive impairment? No    Physical Exam  Vitals reviewed.   Constitutional:       General: She is not in acute distress.     Appearance: Normal appearance. She is well-developed.   HENT:      Head: Normocephalic and atraumatic.      Right Ear: External ear normal.      Left Ear: External ear normal.      Mouth/Throat:      Mouth: " Mucous membranes are moist.   Eyes:      Extraocular Movements: Extraocular movements intact.      Conjunctiva/sclera: Conjunctivae normal.      Pupils: Pupils are equal, round, and reactive to light.   Cardiovascular:      Rate and Rhythm: Normal rate and regular rhythm.      Heart sounds: No murmur heard.     Pulmonary:      Effort: Pulmonary effort is normal.      Breath sounds: Normal breath sounds. No wheezing, rhonchi or rales.   Abdominal:      General: Bowel sounds are normal. There is no distension.      Palpations: Abdomen is soft.      Tenderness: There is no abdominal tenderness.   Musculoskeletal:         General: Normal range of motion.   Skin:     General: Skin is warm and dry.   Neurological:      Mental Status: She is alert and oriented to person, place, and time.      Deep Tendon Reflexes: Reflexes normal.   Psychiatric:         Mood and Affect: Mood and affect normal.         Behavior: Behavior normal.         Thought Content: Thought content normal.         Judgment: Judgment normal.                 HEALTH RISK ASSESSMENT    Smoking Status:  Social History     Tobacco Use   Smoking Status Never Smoker   Smokeless Tobacco Never Used     Alcohol Consumption:  Social History     Substance and Sexual Activity   Alcohol Use No     Fall Risk Screen:    [facility] Fall Risk Assessment was completed, and patient is at HIGH risk for falls. Assessment completed on:9/9/2021    Depression Screening:  PHQ-2/PHQ-9 Depression Screening 9/9/2021   Little interest or pleasure in doing things 1   Feeling down, depressed, or hopeless 3   Trouble falling or staying asleep, or sleeping too much 3   Feeling tired or having little energy 3   Poor appetite or overeating 0   Feeling bad about yourself - or that you are a failure or have let yourself or your family down 3   Trouble concentrating on things, such as reading the newspaper or watching television 0   Moving or speaking so slowly that other people could have  noticed. Or the opposite - being so fidgety or restless that you have been moving around a lot more than usual 0   Thoughts that you would be better off dead, or of hurting yourself in some way 0   Total Score 13   If you checked off any problems, how difficult have these problems made it for you to do your work, take care of things at home, or get along with other people? Very difficult       Health Habits and Functional and Cognitive Screening:  Functional & Cognitive Status 9/9/2021   Do you have difficulty preparing food and eating? No   Do you have difficulty bathing yourself, getting dressed or grooming yourself? No   Do you have difficulty using the toilet? No   Do you have difficulty moving around from place to place? No   Do you have trouble with steps or getting out of a bed or a chair? No   Current Diet Well Balanced Diet   Dental Exam Unknown   Eye Exam Up to date   Exercise (times per week) 5 times per week   Current Exercises Include Yard Work   Do you need help using the phone?  No   Are you deaf or do you have serious difficulty hearing?  No   Do you need help with transportation? No   Do you need help shopping? No   Do you need help preparing meals?  No   Do you need help with housework?  No   Do you need help with laundry? No   Do you need help taking your medications? No   Do you need help managing money? No   Do you ever drive or ride in a car without wearing a seat belt? No   Have you felt unusual stress, anger or loneliness in the last month? Yes   Who do you live with? Alone   If you need help, do you have trouble finding someone available to you? Yes   Have you been bothered in the last four weeks by sexual problems? No   Do you have difficulty concentrating, remembering or making decisions? No       Age-appropriate Screening Schedule:  Refer to the list below for future screening recommendations based on patient's age, sex and/or medical conditions. Orders for these recommended tests are  listed in the plan section. The patient has been provided with a written plan.    Health Maintenance   Topic Date Due   • ZOSTER VACCINE (2 of 3) 06/26/2010   • INFLUENZA VACCINE  10/01/2021   • DXA SCAN  01/28/2022   • TDAP/TD VACCINES (2 - Tdap) 10/01/2022   • MAMMOGRAM  04/22/2023              Assessment/Plan     CMS Preventative Services Quick Reference  Risk Factors Identified During Encounter  Depression/Dysphoria  Immunizations Discussed/Encouraged (specific Immunizations; Shingrix  The above risks/problems have been discussed with the patient.  Follow up actions/plans if indicated are seen below in the Assessment/Plan Section.  Pertinent information has been shared with the patient in the After Visit Summary.    Diagnoses and all orders for this visit:    1. Annual physical exam (Primary)  Assessment & Plan:  She is UTD on colonoscopy, last done 4/2018 and this was normal.  Ten year repeat recommended.  Pap smears are no longer indicated by age and history; s/p hysterectomy.  She is due for mammogram, last done 4/2021 and this was normal.  She is UTD on DEXA, last done 1/2020 and this showed osteopenia.  She is UTD on Pneumovax (1/2018), Prevnar (11/2016), Zostavax (5/2010), Td (10/2012), and flu (10/2019).  She is due for Shingrix; can be done at the pharmacy.  She is due for routine labs including CMP; ordered.      2. Essential hypertension  -     Comprehensive Metabolic Panel; Future    3. Major depressive disorder, recurrent episode, moderate degree (CMS/[facility])  Assessment & Plan:  Stable on duloxetine 60 mg.  She does have a lot of stress with her son and not letting her have access to her grandkids.  She does not want to change the medications or consider therapy.  No refills needed today.      4. Lumbar radiculopathy  Assessment & Plan:  Dr. Bose believes that her pain is more likely to be coming from the back than from the hip itself.  We discussed her options today.  She is hesitant to get an MRI of  the L spine at this time because she is not really interested in doing either surgery or injections -- the pain is not bad enough for that currently.  She is willing to try some physical therapy, however, and completion of this will clear the way to obtain an MRI L spine down the road if she does decide she wishes to proceed with this.  Order placed to [facility] per her preference.    Orders:  -     Ambulatory Referral to Physical Therapy Evaluate and treat    5. Hypertrophic cardiomyopathy (CMS/[facility])  Assessment & Plan:  Follows with Dr. Nelson.  She is on valsartan currently; she was previously on metoprolol succinate but had self-discontinued that because she didn't like the way it made her feel.  She just went for echo last week.  No changes today.        Follow Up:   Return in about 3 months (around 12/9/2021) for Medicare Wellness.     An After Visit Summary and PPPS were given to the patient.

## 2021-09-09 NOTE — ASSESSMENT & PLAN NOTE
She is UTD on colonoscopy, last done 4/2018 and this was normal.  Ten year repeat recommended.  Pap smears are no longer indicated by age and history; s/p hysterectomy.  She is due for mammogram, last done 4/2021 and this was normal.  She is UTD on DEXA, last done 1/2020 and this showed osteopenia.  She is UTD on Pneumovax (1/2018), Prevnar (11/2016), Zostavax (5/2010), Td (10/2012), and flu (10/2019).  She is due for Shingrix; can be done at the pharmacy.  She is due for routine labs including CMP; ordered.

## 2021-09-09 NOTE — ASSESSMENT & PLAN NOTE
Follows with Dr. Nelson.  She is on valsartan currently; she was previously on metoprolol succinate but had self-discontinued that because she didn't like the way it made her feel.  She just went for echo last week.  No changes today.

## 2021-09-09 NOTE — ASSESSMENT & PLAN NOTE
Dr. Bose believes that her pain is more likely to be coming from the back than from the hip itself.  We discussed her options today.  She is hesitant to get an MRI of the L spine at this time because she is not really interested in doing either surgery or injections -- the pain is not bad enough for that currently.  She is willing to try some physical therapy, however, and completion of this will clear the way to obtain an MRI L spine down the road if she does decide she wishes to proceed with this.  Order placed to [facility] per her preference.

## 2021-11-27 ENCOUNTER — DOCUMENTATION (OUTPATIENT)
Dept: FAMILY MEDICINE CLINIC | Age: 71
End: 2021-11-27

## 2021-11-27 RX ORDER — ACYCLOVIR 800 MG/1
800 TABLET ORAL 3 TIMES DAILY
Qty: 21 TABLET | Refills: 0 | Status: SHIPPED
Start: 2021-11-27 | End: 2021-11-27 | Stop reason: SDUPTHER

## 2021-11-27 RX ORDER — METHYLPREDNISOLONE 4 MG/1
TABLET ORAL
Qty: 21 TABLET | Refills: 0 | Status: SHIPPED
Start: 2021-11-27 | End: 2021-12-09

## 2021-11-27 RX ORDER — METHYLPREDNISOLONE 4 MG/1
TABLET ORAL
Qty: 21 TABLET | Refills: 0 | Status: SHIPPED
Start: 2021-11-27 | End: 2021-11-27 | Stop reason: SDUPTHER

## 2021-11-27 RX ORDER — ACYCLOVIR 800 MG/1
800 TABLET ORAL 3 TIMES DAILY
Qty: 21 TABLET | Refills: 0 | Status: SHIPPED
Start: 2021-11-27 | End: 2021-11-30

## 2021-11-27 NOTE — PROGRESS NOTES
I spoke with Criss this morning and she has broken out in a rash across her abdomen and her buttocks and along the scar from her hip surgery several years ago. She says it looks like whelps and she thinks it is shingles. It is itchy and burning. It onset yesterday and seems to be getting worse. She has had shingles in the past and equates it to the same symptoms. She has also been immunized with shingles vaccine. I tried to do a video with her but her reception was very poor and I could not appreciate the rash. I told her I would start her on a shingles medication acyclovir 800 mg 3 times daily x7 days and Medrol Dosepak. I told her I want to see her on Monday no matter what and if she got worse she was to go to the ER for further evaluation. Dr. Barr

## 2021-11-29 ENCOUNTER — OFFICE VISIT (OUTPATIENT)
Dept: FAMILY MEDICINE CLINIC | Age: 71
End: 2021-11-29

## 2021-11-29 VITALS
WEIGHT: 185.2 LBS | HEIGHT: 63 IN | TEMPERATURE: 97.3 F | HEART RATE: 101 BPM | SYSTOLIC BLOOD PRESSURE: 142 MMHG | DIASTOLIC BLOOD PRESSURE: 89 MMHG | BODY MASS INDEX: 32.82 KG/M2

## 2021-11-29 DIAGNOSIS — L23.9 ALLERGIC DERMATITIS: Primary | ICD-10-CM

## 2021-11-29 PROCEDURE — 99213 OFFICE O/P EST LOW 20 MIN: CPT | Performed by: FAMILY MEDICINE

## 2021-11-29 RX ORDER — OMEPRAZOLE 40 MG/1
40 CAPSULE, DELAYED RELEASE ORAL DAILY
COMMUNITY
End: 2021-12-14 | Stop reason: SDUPTHER

## 2021-11-29 NOTE — ASSESSMENT & PLAN NOTE
Start a moisturizing soap and a perfume and dye free detergent.  Use Aveeno 4 times a day on this area and complete her steroid medrol Dosepak

## 2021-11-29 NOTE — PROGRESS NOTES
Criss Edwards presents to [facility].    Chief Complaint: rash    Subjective       History of Present Illness:  LAURA Kahn presents status post calling me over the weekend when I was on call to tell me that she had a rash and thought it was shingles.  It was across her abdomen on both hips down into the scar area of her right incision and over the SI joint area.  The rash is extremely itchy and burning, nonvesicular.  Erythematous and on exam today she has obvious excoriations due to scratching.  She was afraid maybe it was due to stink bugs biting her and she brought in a couple of the bugs for me to evaluate and they are indeed stink bugs and I advised her that they are not bugs that would bite her skin.  Of note she has started a new detergent which is tied because it was cheaper when she was at the store the other day.  Her soap is antibacterial.  She does have Aveeno lotion but has not been using it recently.  On Saturday I did call her in acyclovir for shingles and steroid Dosepak and she has been on both these medications for 2 days now with some improvement    Review of Systems:  Review of Systems   Constitutional: Negative for chills, fatigue and fever.   HENT: Negative for ear pain, sinus pressure and sore throat.    Eyes: Negative for blurred vision and double vision.   Respiratory: Negative for cough, shortness of breath and wheezing.    Cardiovascular: Negative for chest pain and palpitations.   Gastrointestinal: Negative for abdominal pain, blood in stool, constipation, diarrhea, nausea and vomiting.   Skin: Positive for rash.   Neurological: Negative for dizziness and headache.   Psychiatric/Behavioral: Negative for depressed mood.        Objective   Medical History:  Past Medical History:   • Dizziness   • HL (hearing loss)     Past Surgical History:   • HAND SURGERY   • HIP ARTHROPLASTY   • KIDNEY STONE SURGERY      History reviewed. No pertinent family  "history.  Social History     Tobacco Use   • Smoking status: Never Smoker   • Smokeless tobacco: Never Used   Substance Use Topics   • Alcohol use: No       Health Maintenance Due   Topic Date Due   • ZOSTER VACCINE (2 of 3) 06/26/2010   • INFLUENZA VACCINE  08/01/2021   • COVID-19 Vaccine (3 - Booster for Moderna series) 10/08/2021        Immunization History   Administered Date(s) Administered   • COVID-19 (MODERNA) 1st, 2nd, 3rd Dose Only 03/08/2021, 04/08/2021   • Fluzone High Dose =>65 Years ([employer] ONLY) 09/20/2017   • Pneumococcal Polysaccharide (PPSV23) 01/23/2018   • Td 10/01/2012   • Zostavax 05/01/2010       Allergies   Allergen Reactions   • Codeine Itching   • Iodine Hives     DENIES        Medications:  Current Outpatient Medications on File Prior to Visit   Medication Sig   • acyclovir (ZOVIRAX) 800 MG tablet Take 1 tablet by mouth 3 (Three) Times a Day.   • Cholecalciferol 25 MCG (1000 UT) capsule Vitamin D3 1,000 unit oral capsule take 1 capsule by oral route daily   Suspended   • DULoxetine (CYMBALTA) 60 MG capsule TAKE ONE CAPSULE BY MOUTH DAILY   • meloxicam (MOBIC) 15 MG tablet Take 15 mg by mouth Daily.   • methylPREDNISolone (MEDROL) 4 MG dose pack Take as directed on package instructions.   • omeprazole (priLOSEC) 40 MG capsule Take 40 mg by mouth Daily.   • ondansetron (ZOFRAN) 4 MG tablet    • valsartan (DIOVAN) 40 MG tablet Take 1 tablet by mouth Daily.     No current facility-administered medications on file prior to visit.       Vital Signs:   /89 (BP Location: Right arm, Patient Position: Sitting, Cuff Size: Large Adult)   Pulse 101   Temp 97.3 °F (36.3 °C) (Oral)   Ht 160 cm (63\")   Wt 84 kg (185 lb 3.2 oz)   BMI 32.81 kg/m²       Physical Exam:  Physical Exam  Vitals and nursing note reviewed.   Constitutional:       General: She is not in acute distress.     Appearance: Normal appearance. She is not ill-appearing, toxic-appearing or diaphoretic.   HENT:      Head: " Normocephalic and atraumatic.      Nose: No congestion or rhinorrhea.      Mouth/Throat:      Pharynx: Oropharynx is clear. No oropharyngeal exudate or posterior oropharyngeal erythema.   Eyes:      Extraocular Movements: Extraocular movements intact.      Conjunctiva/sclera: Conjunctivae normal.   Pulmonary:      Breath sounds: No wheezing, rhonchi or rales.   Musculoskeletal:      Cervical back: Neck supple. No rigidity.   Lymphadenopathy:      Cervical: No cervical adenopathy.   Skin:     General: Skin is warm and dry.      Comments: Erythematous excoriations across the hips bilaterally the posterior right back and the anterior front abdomen   Neurological:      Mental Status: She is alert and oriented to person, place, and time.   Psychiatric:         Mood and Affect: Mood normal.         Behavior: Behavior normal.         Result Review      The following data was reviewed by Jennifer Barr MD on 11/29/2021.  Lab Results   Component Value Date    WBC 5.86 05/01/2020    HGB 13.40 05/01/2020    HCT 40.3 05/01/2020    MCV 93.3 05/01/2020    .00 05/01/2020     Lab Results   Component Value Date    GLUCOSE 123 (H) 09/09/2021    BUN 11 09/09/2021    CREATININE 0.90 09/09/2021    EGFRIFNONA 61 09/09/2021    BCR 12.2 09/09/2021    K 4.7 09/09/2021    CO2 25.1 09/09/2021    CALCIUM 10.1 09/09/2021    ALBUMIN 4.60 09/09/2021    LABIL2 1.3 (L) 01/26/2021    AST 33 (H) 09/09/2021    ALT 29 09/09/2021     Lab Results   Component Value Date    CHLPL 179 01/26/2021    TRIG 125 01/26/2021    HDL 45 01/26/2021     (H) 01/26/2021     Lab Results   Component Value Date    TSH 2.950 05/01/2020     No results found for: HGBA1C  No results found for: PSA                    Assessment and Plan:          Diagnoses and all orders for this visit:    1. Allergic dermatitis (Primary)  Assessment & Plan:  Start a moisturizing soap and a perfume and dye free detergent.  Use Aveeno 4 times a day on this area and complete  her steroid medrol Dosepak          Follow Up   Return if symptoms worsen or fail to improve.

## 2021-11-30 RX ORDER — ACYCLOVIR 800 MG/1
TABLET ORAL
Qty: 21 TABLET | Refills: 0 | Status: SHIPPED | OUTPATIENT
Start: 2021-11-30 | End: 2022-03-24

## 2021-12-09 ENCOUNTER — OFFICE VISIT (OUTPATIENT)
Dept: FAMILY MEDICINE CLINIC | Age: 71
End: 2021-12-09

## 2021-12-09 VITALS
HEART RATE: 102 BPM | DIASTOLIC BLOOD PRESSURE: 74 MMHG | HEIGHT: 63 IN | WEIGHT: 184.89 LBS | SYSTOLIC BLOOD PRESSURE: 131 MMHG | BODY MASS INDEX: 32.76 KG/M2

## 2021-12-09 DIAGNOSIS — I10 ESSENTIAL HYPERTENSION: Primary | ICD-10-CM

## 2021-12-09 DIAGNOSIS — S29.011A CHEST WALL MUSCLE STRAIN, INITIAL ENCOUNTER: ICD-10-CM

## 2021-12-09 DIAGNOSIS — Z23 NEED FOR INFLUENZA VACCINATION: ICD-10-CM

## 2021-12-09 DIAGNOSIS — F33.1 MAJOR DEPRESSIVE DISORDER, RECURRENT EPISODE, MODERATE DEGREE (HCC): ICD-10-CM

## 2021-12-09 DIAGNOSIS — Z23 ENCOUNTER FOR IMMUNIZATION: ICD-10-CM

## 2021-12-09 DIAGNOSIS — J30.1 SEASONAL ALLERGIC RHINITIS DUE TO POLLEN: ICD-10-CM

## 2021-12-09 DIAGNOSIS — M15.9 GENERALIZED OSTEOARTHRITIS: ICD-10-CM

## 2021-12-09 PROBLEM — F41.9 ANXIETY: Status: ACTIVE | Noted: 2021-12-09

## 2021-12-09 PROCEDURE — 90662 IIV NO PRSV INCREASED AG IM: CPT | Performed by: FAMILY MEDICINE

## 2021-12-09 PROCEDURE — 91300 COVID-19 (PFIZER): CPT | Performed by: FAMILY MEDICINE

## 2021-12-09 PROCEDURE — 99214 OFFICE O/P EST MOD 30 MIN: CPT | Performed by: FAMILY MEDICINE

## 2021-12-09 PROCEDURE — 0003A COVID-19 (PFIZER): CPT | Performed by: FAMILY MEDICINE

## 2021-12-09 PROCEDURE — G0008 ADMIN INFLUENZA VIRUS VAC: HCPCS | Performed by: FAMILY MEDICINE

## 2021-12-09 RX ORDER — FLUTICASONE PROPIONATE 50 MCG
2 SPRAY, SUSPENSION (ML) NASAL DAILY
Qty: 16 ML | Refills: 5 | Status: SHIPPED | OUTPATIENT
Start: 2021-12-09 | End: 2023-02-21

## 2021-12-09 NOTE — ASSESSMENT & PLAN NOTE
States that she still feels depressed, but she actually seems more upbeat than usual today.  She continues on duloxetine 60 mg daily.  She has started to get out and about more and socialize.  One of the hardest things for her since the pandemic started was that she has been very isolated.  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  No refills needed today.  I have encouraged her to keep up her socializing activities.

## 2021-12-09 NOTE — ASSESSMENT & PLAN NOTE
She has noted more congestion and rhinorrhea lately.  She has been on Flonase in the past but ran out of that quite a while back.  Refill sent on that today.

## 2021-12-09 NOTE — PROGRESS NOTES
"Chief Complaint  Hypertension (3 month follow up)    Subjective          Criss Edwards presents to [facility] today for routine follow-up on chronic issues.    She was treated by Dr. Barr for allergic rhinitis of the bilateral lateral upper legs.  She got a Medrol dose-pack and \"I'd like to have 10 more.  I had so much energy.\"      She is on valsartan for HTN.    Her blood pressure has been well controlled.  She denies chest pains, palpitations or shortness of breath.      She is on duloxetine for anxiety and depression.  She continues to struggle with this.  She lost her beloved pug in September.  She has been going to [facility] [employer] and that has been really good for her.  She still has a lot of grief surrounding her family (inability to see her grandkids).      She is on Prilosec as needed for GERD.  She denies indigestion or heartburn.  She keeps a small supply of Zofran on hand that she uses as needed for nausea      She is on meloxicam for generalized osteoarthritis.  She has been going to PT for her R hip and knee and her balance.  She is doing really well with that.  Her balance is better now!  She has noticed a lot of improvement in knee and hip as well.  He gave her a lift to put in her left shoe because her left leg is shorter.  That has also helped tremendously.      Current Outpatient Medications:   •  acyclovir (ZOVIRAX) 800 MG tablet, TAKE ONE TABLET BY MOUTH THREE TIMES A DAY FOR 7 DAYS, Disp: 21 tablet, Rfl: 0  •  Cholecalciferol 25 MCG (1000 UT) capsule, Vitamin D3 1,000 unit oral capsule take 1 capsule by oral route daily   Suspended, Disp: , Rfl:   •  DULoxetine (CYMBALTA) 60 MG capsule, TAKE ONE CAPSULE BY MOUTH DAILY, Disp: 90 capsule, Rfl: 1  •  meloxicam (MOBIC) 15 MG tablet, Take 15 mg by mouth Daily., Disp: , Rfl:   •  omeprazole (priLOSEC) 40 MG capsule, Take 40 mg by mouth Daily., Disp: , Rfl:   •  ondansetron (ZOFRAN) 4 MG tablet, , Disp: , " "Rfl:   •  valsartan (DIOVAN) 40 MG tablet, Take 1 tablet by mouth Daily., Disp: 90 tablet, Rfl: 1  •  fluticasone (FLONASE) 50 MCG/ACT nasal spray, 2 sprays into the nostril(s) as directed by provider Daily., Disp: 16 mL, Rfl: 5    Allergies:  Codeine and Iodine      Objective   Vital Signs:   /74 (BP Location: Left arm, Patient Position: Sitting)   Pulse 102   Ht 160 cm (63\")   Wt 83.9 kg (184 lb 14.2 oz)   BMI 32.75 kg/m²     Physical Exam  Vitals reviewed.   Constitutional:       General: She is not in acute distress.     Appearance: Normal appearance. She is well-developed.   HENT:      Head: Normocephalic and atraumatic.      Right Ear: External ear normal.      Left Ear: External ear normal.      Nose: Nose normal.      Mouth/Throat:      Mouth: Mucous membranes are moist.   Eyes:      Extraocular Movements: Extraocular movements intact.      Conjunctiva/sclera: Conjunctivae normal.      Pupils: Pupils are equal, round, and reactive to light.   Cardiovascular:      Rate and Rhythm: Normal rate and regular rhythm.      Heart sounds: No murmur heard.      Pulmonary:      Effort: Pulmonary effort is normal.      Breath sounds: Normal breath sounds. No wheezing, rhonchi or rales.   Chest:      Comments: Reproducible TTP over the left lateral chest wall, lateral to the left breast.  Abdominal:      General: Bowel sounds are normal. There is no distension.      Palpations: Abdomen is soft.      Tenderness: There is no abdominal tenderness.   Musculoskeletal:         General: Normal range of motion.   Neurological:      Mental Status: She is alert.   Psychiatric:         Mood and Affect: Affect normal.             Assessment and Plan    Diagnoses and all orders for this visit:    1. Essential hypertension (Primary)  Assessment & Plan:  BP at goal.  No refills needed.  Labs are reviewed and up-to-date.      2. Major depressive disorder, recurrent episode, moderate degree (HCC)  Assessment & Plan:  States " that she still feels depressed, but she actually seems more upbeat than usual today.  She continues on duloxetine 60 mg daily.  She has started to get out and about more and socialize.  One of the hardest things for her since the pandemic started was that she has been very isolated.  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  No refills needed today.  I have encouraged her to keep up her socializing activities.      3. Generalized osteoarthritis  Assessment & Plan:  Stable on meloxicam.  She does not need refills today.  Labs are reviewed and up-to-date.      4. Chest wall muscle strain, initial encounter  Assessment & Plan:  Reproducible tenderness to palpation over the left lateral chest wall.  This started after she was moving a piano by herself at home.  I have encouraged her to try conservative management with alternating heat and ice to the area and she can continue her baseline Mobic as well.  Given mechanism of injury, rib fracture seems unlikely, so no x-ray was ordered today but we could was consider this in the future if symptoms do not improve.  Call or return to clinic as needed worsening or failure to improve symptoms.      5. Seasonal allergic rhinitis due to pollen  Assessment & Plan:  She has noted more congestion and rhinorrhea lately.  She has been on Flonase in the past but ran out of that quite a while back.  Refill sent on that today.    Orders:  -     fluticasone (FLONASE) 50 MCG/ACT nasal spray; 2 sprays into the nostril(s) as directed by provider Daily.  Dispense: 16 mL; Refill: 5    6. Encounter for immunization  -     Fluzone High-Dose 65+yrs  -     COVID-19 Vaccine (Pfizer)      Follow Up   Return in about 3 months (around 3/9/2022) for Recheck.  Patient was given instructions and counseling regarding her condition or for health maintenance advice. Please see specific information pulled into the AVS if  appropriate.

## 2021-12-09 NOTE — ASSESSMENT & PLAN NOTE
Reproducible tenderness to palpation over the left lateral chest wall.  This started after she was moving a piano by herself at home.  I have encouraged her to try conservative management with alternating heat and ice to the area and she can continue her baseline Mobic as well.  Given mechanism of injury, rib fracture seems unlikely, so no x-ray was ordered today but we could was consider this in the future if symptoms do not improve.  Call or return to clinic as needed worsening or failure to improve symptoms.

## 2021-12-15 RX ORDER — OMEPRAZOLE 40 MG/1
40 CAPSULE, DELAYED RELEASE ORAL DAILY
Qty: 90 CAPSULE | Refills: 1 | Status: SHIPPED | OUTPATIENT
Start: 2021-12-15 | End: 2022-06-13

## 2022-01-27 ENCOUNTER — TELEPHONE (OUTPATIENT)
Dept: FAMILY MEDICINE CLINIC | Age: 72
End: 2022-01-27

## 2022-01-27 DIAGNOSIS — M85.852 OSTEOPENIA OF LEFT HIP: Primary | ICD-10-CM

## 2022-01-27 DIAGNOSIS — Z78.0 MENOPAUSE: ICD-10-CM

## 2022-01-31 RX ORDER — VALSARTAN 40 MG/1
TABLET ORAL
Qty: 90 TABLET | Refills: 1 | Status: SHIPPED | OUTPATIENT
Start: 2022-01-31 | End: 2022-06-24 | Stop reason: SDUPTHER

## 2022-02-07 ENCOUNTER — HOSPITAL ENCOUNTER (OUTPATIENT)
Dept: BONE DENSITY | Facility: HOSPITAL | Age: 72
Discharge: HOME | End: 2022-02-07
Admitting: FAMILY MEDICINE

## 2022-02-07 DIAGNOSIS — Z78.0 MENOPAUSE: ICD-10-CM

## 2022-02-07 PROCEDURE — 77080 DXA BONE DENSITY AXIAL: CPT

## 2022-02-25 RX ORDER — MELOXICAM 15 MG/1
TABLET ORAL
Qty: 30 TABLET | Refills: 0 | Status: SHIPPED | OUTPATIENT
Start: 2022-02-25 | End: 2022-03-24

## 2022-03-03 RX ORDER — DULOXETIN HYDROCHLORIDE 60 MG/1
CAPSULE, DELAYED RELEASE ORAL
Qty: 90 CAPSULE | Refills: 1 | Status: SHIPPED | OUTPATIENT
Start: 2022-03-03 | End: 2022-08-29

## 2022-03-24 ENCOUNTER — OFFICE VISIT (OUTPATIENT)
Dept: FAMILY MEDICINE CLINIC | Age: 72
End: 2022-03-24

## 2022-03-24 ENCOUNTER — LAB (OUTPATIENT)
Dept: LAB | Facility: HOSPITAL | Age: 72
End: 2022-03-24

## 2022-03-24 VITALS
HEIGHT: 63 IN | DIASTOLIC BLOOD PRESSURE: 65 MMHG | BODY MASS INDEX: 33.31 KG/M2 | HEART RATE: 82 BPM | WEIGHT: 188 LBS | SYSTOLIC BLOOD PRESSURE: 121 MMHG

## 2022-03-24 DIAGNOSIS — F33.1 MAJOR DEPRESSIVE DISORDER, RECURRENT EPISODE, MODERATE DEGREE: ICD-10-CM

## 2022-03-24 DIAGNOSIS — M15.9 GENERALIZED OSTEOARTHRITIS: Primary | ICD-10-CM

## 2022-03-24 DIAGNOSIS — I10 ESSENTIAL HYPERTENSION: ICD-10-CM

## 2022-03-24 DIAGNOSIS — K21.9 GASTROESOPHAGEAL REFLUX DISEASE WITHOUT ESOPHAGITIS: ICD-10-CM

## 2022-03-24 DIAGNOSIS — I42.2 HYPERTROPHIC CARDIOMYOPATHY: ICD-10-CM

## 2022-03-24 DIAGNOSIS — R10.84 GENERALIZED ABDOMINAL PAIN: ICD-10-CM

## 2022-03-24 PROBLEM — Z00.00 ANNUAL PHYSICAL EXAM: Status: RESOLVED | Noted: 2021-09-09 | Resolved: 2022-03-24

## 2022-03-24 PROBLEM — S29.011A CHEST WALL MUSCLE STRAIN, INITIAL ENCOUNTER: Status: RESOLVED | Noted: 2021-12-09 | Resolved: 2022-03-24

## 2022-03-24 LAB
ALBUMIN SERPL-MCNC: 4.4 G/DL (ref 3.5–5.2)
ALBUMIN/GLOB SERPL: 1.4 G/DL
ALP SERPL-CCNC: 118 U/L (ref 39–117)
ALT SERPL W P-5'-P-CCNC: 30 U/L (ref 1–33)
ANION GAP SERPL CALCULATED.3IONS-SCNC: 11.6 MMOL/L (ref 5–15)
AST SERPL-CCNC: 39 U/L (ref 1–32)
BASOPHILS # BLD AUTO: 0.06 10*3/MM3 (ref 0–0.2)
BASOPHILS NFR BLD AUTO: 0.7 % (ref 0–1.5)
BILIRUB SERPL-MCNC: 0.3 MG/DL (ref 0–1.2)
BUN SERPL-MCNC: 12 MG/DL (ref 8–23)
BUN/CREAT SERPL: 13.3 (ref 7–25)
CALCIUM SPEC-SCNC: 11.6 MG/DL (ref 8.6–10.5)
CHLORIDE SERPL-SCNC: 102 MMOL/L (ref 98–107)
CO2 SERPL-SCNC: 28.4 MMOL/L (ref 22–29)
CREAT SERPL-MCNC: 0.9 MG/DL (ref 0.57–1)
DEPRECATED RDW RBC AUTO: 46.1 FL (ref 37–54)
EGFRCR SERPLBLD CKD-EPI 2021: 67.6 ML/MIN/1.73
EOSINOPHIL # BLD AUTO: 0.28 10*3/MM3 (ref 0–0.4)
EOSINOPHIL NFR BLD AUTO: 3.4 % (ref 0.3–6.2)
ERYTHROCYTE [DISTWIDTH] IN BLOOD BY AUTOMATED COUNT: 13 % (ref 12.3–15.4)
GLOBULIN UR ELPH-MCNC: 3.2 GM/DL
GLUCOSE SERPL-MCNC: 148 MG/DL (ref 65–99)
HCT VFR BLD AUTO: 44.5 % (ref 34–46.6)
HGB BLD-MCNC: 14.5 G/DL (ref 12–15.9)
IMM GRANULOCYTES # BLD AUTO: 0.02 10*3/MM3 (ref 0–0.05)
IMM GRANULOCYTES NFR BLD AUTO: 0.2 % (ref 0–0.5)
LIPASE SERPL-CCNC: 31 U/L (ref 13–60)
LYMPHOCYTES # BLD AUTO: 2.23 10*3/MM3 (ref 0.7–3.1)
LYMPHOCYTES NFR BLD AUTO: 27.2 % (ref 19.6–45.3)
MCH RBC QN AUTO: 31 PG (ref 26.6–33)
MCHC RBC AUTO-ENTMCNC: 32.6 G/DL (ref 31.5–35.7)
MCV RBC AUTO: 95.1 FL (ref 79–97)
MONOCYTES # BLD AUTO: 0.59 10*3/MM3 (ref 0.1–0.9)
MONOCYTES NFR BLD AUTO: 7.2 % (ref 5–12)
NEUTROPHILS NFR BLD AUTO: 5.01 10*3/MM3 (ref 1.7–7)
NEUTROPHILS NFR BLD AUTO: 61.3 % (ref 42.7–76)
PLATELET # BLD AUTO: 210 10*3/MM3 (ref 140–450)
PMV BLD AUTO: 9.4 FL (ref 6–12)
POTASSIUM SERPL-SCNC: 4.6 MMOL/L (ref 3.5–5.2)
PROT SERPL-MCNC: 7.6 G/DL (ref 6–8.5)
RBC # BLD AUTO: 4.68 10*6/MM3 (ref 3.77–5.28)
SODIUM SERPL-SCNC: 142 MMOL/L (ref 136–145)
WBC NRBC COR # BLD: 8.19 10*3/MM3 (ref 3.4–10.8)

## 2022-03-24 PROCEDURE — 85025 COMPLETE CBC W/AUTO DIFF WBC: CPT

## 2022-03-24 PROCEDURE — 36415 COLL VENOUS BLD VENIPUNCTURE: CPT

## 2022-03-24 PROCEDURE — 80053 COMPREHEN METABOLIC PANEL: CPT

## 2022-03-24 PROCEDURE — 83690 ASSAY OF LIPASE: CPT

## 2022-03-24 PROCEDURE — 99214 OFFICE O/P EST MOD 30 MIN: CPT | Performed by: FAMILY MEDICINE

## 2022-03-24 RX ORDER — DICLOFENAC SODIUM 75 MG/1
75 TABLET, DELAYED RELEASE ORAL 2 TIMES DAILY PRN
Qty: 60 TABLET | Refills: 5 | Status: SHIPPED | OUTPATIENT
Start: 2022-03-24 | End: 2022-09-14

## 2022-03-24 RX ORDER — OMEPRAZOLE 40 MG/1
40 CAPSULE, DELAYED RELEASE ORAL
COMMUNITY
End: 2022-03-24

## 2022-03-24 RX ORDER — NEBIVOLOL 5 MG/1
1 TABLET ORAL DAILY
COMMUNITY
Start: 2022-03-11 | End: 2023-03-15 | Stop reason: SDUPTHER

## 2022-03-24 NOTE — ASSESSMENT & PLAN NOTE
Diffuse mild abdominal tenderness to palpation which is reportedly better than it was last night.  She has had a lot of bloating and gas.  I am recommending that she go on the omeprazole once daily.  She may try some Gas-X or similar over-the-counter.  I am going to check labs for her including CBC, CMP and lipase.

## 2022-03-24 NOTE — ASSESSMENT & PLAN NOTE
Stable on duloxetine which is really working pretty well for her lately.  Continue 60 mg daily.  No refills needed.  This was already sent in earlier this month.  Continue to monitor.

## 2022-03-24 NOTE — ASSESSMENT & PLAN NOTE
Recommend that she make sure she is taking her omeprazole 40 mg daily instead of as needed for right now.  Abdominal pain work-up as above.

## 2022-03-24 NOTE — ASSESSMENT & PLAN NOTE
Blood pressure at goal on nebivolol 5 mg daily and the losartan 40 mg daily.  No refills needed.  She does have the nebivolol added recently by Dr. Nelson.  It is pretty expensive for her.  I did discuss with her today that we have a  in the clinic and that I could get [employer] involved with her case to see if we could get her patient assistance for the nebivolol.  Criss is not sure she wants to go that route but will keep it in mind, especially if she starts to have trouble affording it.  No refills needed today.  She just had stress test done this morning is going for echo next week.  We will look out for these results.

## 2022-03-24 NOTE — ASSESSMENT & PLAN NOTE
She does not feel that the meloxicam is giving her the effect that it once did.  I am going to stop the meloxicam today and switch her over to diclofenac 75 mg twice daily as needed.  Prescription sent.  Continue to monitor.

## 2022-03-24 NOTE — PROGRESS NOTES
"Chief Complaint  Hypertension (3 month follow up )    Subjective          Criss Edwards presents to [facility] today for follow-up on routine issues.    She is on nebivolol and valsartan for hypertension.  BP has been well controlled.  No problems with chest pain, palpitations or shortness of air.  She has been diagnosed with HOCM for which she follows with Cardiology.  She sees Dr. Nelson.  He did a stress test with her earlier today and she is going for an echo no Monday.  He is planning to see her again in 1 month.      She is on Cymbalta for anxiety and depression.  She has been dealing with a lot of grief involving family dynamics and her inability to see her friend children.       She is on omeprazole as needed for GERD.  No problems with heartburn or reflux.  She keeps a small supply of Zofran on hand that she uses just every once in a while for nausea.  She had a bad spell of bloating last night with crampy abdominal pain.  It has subsided now but she is still somewhat tender.      She is on meloxicam for diffuse arthritis.  Status post PT.  \"Mty poor old bones just ache.\"  The meloxicam does not seem to work like it used to.    Current Outpatient Medications:   •  Cholecalciferol 25 MCG (1000 UT) capsule, Vitamin D3 1,000 unit oral capsule take 1 capsule by oral route daily   Suspended, Disp: , Rfl:   •  DULoxetine (CYMBALTA) 60 MG capsule, TAKE ONE CAPSULE BY MOUTH DAILY, Disp: 90 capsule, Rfl: 1  •  fluticasone (FLONASE) 50 MCG/ACT nasal spray, 2 sprays into the nostril(s) as directed by provider Daily., Disp: 16 mL, Rfl: 5  •  nebivolol (BYSTOLIC) 5 MG tablet, Take 1 tablet by mouth Daily., Disp: , Rfl:   •  omeprazole (priLOSEC) 40 MG capsule, Take 1 capsule by mouth Daily., Disp: 90 capsule, Rfl: 1  •  ondansetron (ZOFRAN) 4 MG tablet, , Disp: , Rfl:   •  valsartan (DIOVAN) 40 MG tablet, TAKE ONE TABLET BY MOUTH DAILY, Disp: 90 tablet, Rfl: 1  •  diclofenac " "(VOLTAREN) 75 MG EC tablet, Take 1 tablet by mouth 2 (Two) Times a Day As Needed (pain)., Disp: 60 tablet, Rfl: 5    Allergies:  Codeine and Iodine      Objective   Vital Signs:   /65 (BP Location: Left arm, Patient Position: Sitting)   Pulse 82   Ht 160 cm (63\")   Wt 85.3 kg (188 lb)   BMI 33.30 kg/m²     Physical Exam  Vitals reviewed.   Constitutional:       General: She is not in acute distress.     Appearance: Normal appearance. She is well-developed.   HENT:      Head: Normocephalic and atraumatic.      Right Ear: External ear normal.      Left Ear: External ear normal.      Nose: Nose normal.      Mouth/Throat:      Mouth: Mucous membranes are moist.   Eyes:      Extraocular Movements: Extraocular movements intact.      Conjunctiva/sclera: Conjunctivae normal.      Pupils: Pupils are equal, round, and reactive to light.   Cardiovascular:      Rate and Rhythm: Normal rate and regular rhythm.      Heart sounds: No murmur heard.  Pulmonary:      Effort: Pulmonary effort is normal.      Breath sounds: Normal breath sounds. No wheezing, rhonchi or rales.   Abdominal:      General: Bowel sounds are normal. There is no distension.      Palpations: Abdomen is soft.      Tenderness: There is abdominal tenderness (diffuse, mild).   Musculoskeletal:         General: Normal range of motion.   Neurological:      Mental Status: She is alert.   Psychiatric:         Mood and Affect: Affect normal.             Assessment and Plan    Diagnoses and all orders for this visit:    1. Generalized osteoarthritis (Primary)  Assessment & Plan:  She does not feel that the meloxicam is giving her the effect that it once did.  I am going to stop the meloxicam today and switch her over to diclofenac 75 mg twice daily as needed.  Prescription sent.  Continue to monitor.    Orders:  -     diclofenac (VOLTAREN) 75 MG EC tablet; Take 1 tablet by mouth 2 (Two) Times a Day As Needed (pain).  Dispense: 60 tablet; Refill: 5    2. " Generalized abdominal pain  Assessment & Plan:  Diffuse mild abdominal tenderness to palpation which is reportedly better than it was last night.  She has had a lot of bloating and gas.  I am recommending that she go on the omeprazole once daily.  She may try some Gas-X or similar over-the-counter.  I am going to check labs for her including CBC, CMP and lipase.    Orders:  -     Comprehensive Metabolic Panel; Future  -     CBC & Differential; Future  -     Lipase; Future    3. Gastroesophageal reflux disease without esophagitis  Assessment & Plan:  Recommend that she make sure she is taking her omeprazole 40 mg daily instead of as needed for right now.  Abdominal pain work-up as above.      4. Essential hypertension  Assessment & Plan:  Blood pressure at goal on nebivolol 5 mg daily and the losartan 40 mg daily.  No refills needed.  She does have the nebivolol added recently by Dr. Nelson.  It is pretty expensive for her.  I did discuss with her today that we have a  in the clinic and that I could get [employer] involved with her case to see if we could get her patient assistance for the nebivolol.  Criss is not sure she wants to go that route but will keep it in mind, especially if she starts to have trouble affording it.  No refills needed today.  She just had stress test done this morning is going for echo next week.  We will look out for these results.      5. Hypertrophic cardiomyopathy (HCC)  Assessment & Plan:  Following with Dr. Nelson Cardiology.      6. Major depressive disorder, recurrent episode, moderate degree (HCC)  Assessment & Plan:  Stable on duloxetine which is really working pretty well for her lately.  Continue 60 mg daily.  No refills needed.  This was already sent in earlier this month.  Continue to monitor.        Follow Up   Return in about 3 months (around 6/24/2022) for Recheck.  Patient was given instructions and counseling regarding her condition or for health maintenance  advice. Please see specific information pulled into the AVS if appropriate.

## 2022-03-28 ENCOUNTER — TELEPHONE (OUTPATIENT)
Dept: FAMILY MEDICINE CLINIC | Age: 72
End: 2022-03-28

## 2022-03-28 DIAGNOSIS — Z12.31 ENCOUNTER FOR SCREENING MAMMOGRAM FOR BREAST CANCER: Primary | ICD-10-CM

## 2022-05-18 ENCOUNTER — HOSPITAL ENCOUNTER (OUTPATIENT)
Dept: MAMMOGRAPHY | Facility: HOSPITAL | Age: 72
Discharge: HOME | End: 2022-05-18
Admitting: FAMILY MEDICINE

## 2022-05-18 DIAGNOSIS — Z12.31 ENCOUNTER FOR SCREENING MAMMOGRAM FOR BREAST CANCER: ICD-10-CM

## 2022-05-18 PROCEDURE — 77067 SCR MAMMO BI INCL CAD: CPT

## 2022-05-18 PROCEDURE — 77063 BREAST TOMOSYNTHESIS BI: CPT

## 2022-06-13 RX ORDER — OMEPRAZOLE 40 MG/1
CAPSULE, DELAYED RELEASE ORAL
Qty: 90 CAPSULE | Refills: 1 | Status: SHIPPED | OUTPATIENT
Start: 2022-06-13 | End: 2022-06-24 | Stop reason: SINTOL

## 2022-06-24 ENCOUNTER — OFFICE VISIT (OUTPATIENT)
Dept: FAMILY MEDICINE CLINIC | Age: 72
End: 2022-06-24

## 2022-06-24 VITALS
HEART RATE: 73 BPM | WEIGHT: 187.4 LBS | HEIGHT: 63 IN | SYSTOLIC BLOOD PRESSURE: 112 MMHG | TEMPERATURE: 98.2 F | BODY MASS INDEX: 33.2 KG/M2 | DIASTOLIC BLOOD PRESSURE: 65 MMHG

## 2022-06-24 DIAGNOSIS — F33.1 MAJOR DEPRESSIVE DISORDER, RECURRENT EPISODE, MODERATE DEGREE: ICD-10-CM

## 2022-06-24 DIAGNOSIS — K21.9 GASTROESOPHAGEAL REFLUX DISEASE WITHOUT ESOPHAGITIS: ICD-10-CM

## 2022-06-24 DIAGNOSIS — I10 ESSENTIAL HYPERTENSION: Primary | ICD-10-CM

## 2022-06-24 DIAGNOSIS — Z23 ENCOUNTER FOR IMMUNIZATION: ICD-10-CM

## 2022-06-24 DIAGNOSIS — J02.9 SORE THROAT: ICD-10-CM

## 2022-06-24 DIAGNOSIS — I42.2 HYPERTROPHIC CARDIOMYOPATHY: ICD-10-CM

## 2022-06-24 PROBLEM — H93.19 TINNITUS: Status: RESOLVED | Noted: 2021-08-04 | Resolved: 2022-06-24

## 2022-06-24 PROBLEM — H92.09 OTALGIA: Status: RESOLVED | Noted: 2021-08-04 | Resolved: 2022-06-24

## 2022-06-24 LAB
EXPIRATION DATE: NORMAL
EXPIRATION DATE: NORMAL
FLUAV AG UPPER RESP QL IA.RAPID: NOT DETECTED
FLUBV AG UPPER RESP QL IA.RAPID: NOT DETECTED
INTERNAL CONTROL: NORMAL
INTERNAL CONTROL: NORMAL
Lab: NORMAL
Lab: NORMAL
S PYO AG THROAT QL: NEGATIVE
SARS-COV-2 AG UPPER RESP QL IA.RAPID: NOT DETECTED

## 2022-06-24 PROCEDURE — 87428 SARSCOV & INF VIR A&B AG IA: CPT | Performed by: FAMILY MEDICINE

## 2022-06-24 PROCEDURE — G0009 ADMIN PNEUMOCOCCAL VACCINE: HCPCS | Performed by: FAMILY MEDICINE

## 2022-06-24 PROCEDURE — 87081 CULTURE SCREEN ONLY: CPT | Performed by: FAMILY MEDICINE

## 2022-06-24 PROCEDURE — 87880 STREP A ASSAY W/OPTIC: CPT | Performed by: FAMILY MEDICINE

## 2022-06-24 PROCEDURE — 99214 OFFICE O/P EST MOD 30 MIN: CPT | Performed by: FAMILY MEDICINE

## 2022-06-24 PROCEDURE — 90677 PCV20 VACCINE IM: CPT | Performed by: FAMILY MEDICINE

## 2022-06-24 RX ORDER — VALSARTAN 40 MG/1
40 TABLET ORAL DAILY
Qty: 90 TABLET | Refills: 1 | Status: SHIPPED | OUTPATIENT
Start: 2022-06-24 | End: 2023-03-15 | Stop reason: SDUPTHER

## 2022-06-24 RX ORDER — PANTOPRAZOLE SODIUM 40 MG/1
40 TABLET, DELAYED RELEASE ORAL DAILY
Qty: 30 TABLET | Refills: 2 | Status: SHIPPED | OUTPATIENT
Start: 2022-06-24 | End: 2022-09-14 | Stop reason: SDUPTHER

## 2022-06-24 NOTE — PROGRESS NOTES
"Chief Complaint  Osteoarthritis (3 month follow up ), Muscle Pain (Muscle cramps on back of legs x 2 months ), and Sore Throat (Sore throat x 2 days )    Subjective          Criss Edwards presents to [facility] today for routine f/u of chronic issues.    She is now on diclofenac for generalized osteoarthritis after she felt that she was no longer getting good results with the meloxicam.  She is doing better with the diclofenac.     She has been getting some headaches, occurring daily    She has noted muscle cramping in the posterior legs for the past 2 months.  She has been staying well hydrated and eating lots of bananas.      She has noted sore throat for the past 2 days.  Mild sx otherwise.  No cough, fevers.     She is on valsartan and nebivolol for HTN.  Her blood pressure has been well controlled since starting the valsartan.    She denies chest pain, palpitations or shortness of air.  Following with Cardiology Dr. Nelson for HOCM.      She is on duloxetine for anxiety and depression.  She has been dealing with a lot of grief involving family dynamics.  \"Things are the same.\"       She is on Prilosec as needed for GERD.  +Heartburn.  The omeprazole seems to make her feel more nauseated so she has not really been taking it regularly.  She keeps a small supply of Zofran on hand that she has been using regularly.            Current Outpatient Medications:   •  Cholecalciferol 25 MCG (1000 UT) capsule, Vitamin D3 1,000 unit oral capsule take 1 capsule by oral route daily   Suspended, Disp: , Rfl:   •  diclofenac (VOLTAREN) 75 MG EC tablet, Take 1 tablet by mouth 2 (Two) Times a Day As Needed (pain)., Disp: 60 tablet, Rfl: 5  •  DULoxetine (CYMBALTA) 60 MG capsule, TAKE ONE CAPSULE BY MOUTH DAILY, Disp: 90 capsule, Rfl: 1  •  fluticasone (FLONASE) 50 MCG/ACT nasal spray, 2 sprays into the nostril(s) as directed by provider Daily., Disp: 16 mL, Rfl: 5  •  nebivolol (BYSTOLIC) 5 " "MG tablet, Take 1 tablet by mouth Daily., Disp: , Rfl:   •  ondansetron (ZOFRAN) 4 MG tablet, , Disp: , Rfl:   •  valsartan (DIOVAN) 40 MG tablet, Take 1 tablet by mouth Daily., Disp: 90 tablet, Rfl: 1  •  pantoprazole (PROTONIX) 40 MG EC tablet, Take 1 tablet by mouth Daily., Disp: 30 tablet, Rfl: 2    Allergies:  Codeine and Iodine      Objective   Vital Signs:   Vitals:    06/24/22 0912   BP: 112/65   BP Location: Left arm   Patient Position: Sitting   Pulse: 73   Temp: 98.2 °F (36.8 °C)   TempSrc: Oral   Weight: 85 kg (187 lb 6.4 oz)   Height: 160 cm (62.99\")       Physical Exam  Vitals reviewed.   Constitutional:       General: She is not in acute distress.     Appearance: Normal appearance. She is well-developed.   HENT:      Head: Normocephalic and atraumatic.      Right Ear: External ear normal.      Left Ear: External ear normal.   Eyes:      Extraocular Movements: Extraocular movements intact.      Conjunctiva/sclera: Conjunctivae normal.      Pupils: Pupils are equal, round, and reactive to light.   Cardiovascular:      Rate and Rhythm: Normal rate and regular rhythm.      Heart sounds: No murmur heard.  Pulmonary:      Effort: Pulmonary effort is normal.      Breath sounds: Normal breath sounds. No wheezing, rhonchi or rales.   Abdominal:      General: Bowel sounds are normal. There is no distension.      Palpations: Abdomen is soft.      Tenderness: There is abdominal tenderness (epigastric).   Musculoskeletal:         General: Normal range of motion.   Neurological:      Mental Status: She is alert.   Psychiatric:         Mood and Affect: Affect normal.             Assessment and Plan    Diagnoses and all orders for this visit:    1. Essential hypertension (Primary)  Assessment & Plan:  Blood pressure at goal, doing much better since the valsartan has been added.  Stable on nebivolol 5 mg daily and valsartan.  Prescription sent for valsartan.  Checking labs.    Orders:  -     Comprehensive Metabolic " Panel; Future  -     Lipid Panel; Future  -     valsartan (DIOVAN) 40 MG tablet; Take 1 tablet by mouth Daily.  Dispense: 90 tablet; Refill: 1    2. Major depressive disorder, recurrent episode, moderate degree (HCC)  Assessment & Plan:  She is doing okay on the duloxetine.  Her complicated family situation tenuous to present difficulties.  The duloxetine is allowing her to continue to function.  No changes to the dose today.  Continue 60 mg daily.  No refills needed.      3. Hypertrophic cardiomyopathy (HCC)  Assessment & Plan:  Following with Cardiology.      4. Gastroesophageal reflux disease without esophagitis  Assessment & Plan:  She is quite tender to palpation in the epigastrium today and complains of reflux symptoms.  She has not been able to take the omeprazole consistently due to its causing nausea to worsen.  I am going to switch her over to Protonix and see if she can tolerate that better.  We discussed today that she really needs to be able to take something every day in order for us to tell if it is working or not.  She will call me if she is unable to tolerate the pantoprazole in the same way that the omeprazole gave her trouble.  If we cannot get good resolution of her symptoms, either due to inability to tolerate the pantoprazole or just to failure on the part of pantoprazole, I will plan to send her for EGD.    Orders:  -     pantoprazole (PROTONIX) 40 MG EC tablet; Take 1 tablet by mouth Daily.  Dispense: 30 tablet; Refill: 2    5. Sore throat  Assessment & Plan:  Viral URI.  Rapid COVID and flu testing were negative today.  Rapid strep negative.  No evidence of bacterial infection.  Symptomatic care recommended with OTC analgesics for pain/fever, OTC decongestants and cough suppressants prn.  Stay well hydrated.  Humidifier in the bedroom at night.  Hot tea with lemon and honey.  RTC prn worsening or failure to improve of sx.      Orders:  -     POCT SARS-CoV-2 Antigen MANOLO + Flu  -     POCT  rapid strep A  -     Beta Strep Culture, Throat - , Throat; Future  -     Beta Strep Culture, Throat - Swab, Throat    6. Encounter for immunization  -     Pneumococcal Conjugate Vaccine 20-Valent All      Follow Up   Return in about 3 months (around 9/24/2022) for Medicare Wellness.  Patient was given instructions and counseling regarding her condition or for health maintenance advice. Please see specific information pulled into the AVS if appropriate.

## 2022-06-24 NOTE — ASSESSMENT & PLAN NOTE
Viral URI.  Rapid COVID and flu testing were negative today.  Rapid strep negative.  No evidence of bacterial infection.  Symptomatic care recommended with OTC analgesics for pain/fever, OTC decongestants and cough suppressants prn.  Stay well hydrated.  Humidifier in the bedroom at night.  Hot tea with lemon and honey.  RTC prn worsening or failure to improve of sx.     yes

## 2022-06-24 NOTE — ASSESSMENT & PLAN NOTE
Blood pressure at goal, doing much better since the valsartan has been added.  Stable on nebivolol 5 mg daily and valsartan.  Prescription sent for valsartan.  Checking labs.

## 2022-06-24 NOTE — ASSESSMENT & PLAN NOTE
She is doing okay on the duloxetine.  Her complicated family situation tenuous to present difficulties.  The duloxetine is allowing her to continue to function.  No changes to the dose today.  Continue 60 mg daily.  No refills needed.

## 2022-06-24 NOTE — ASSESSMENT & PLAN NOTE
She is quite tender to palpation in the epigastrium today and complains of reflux symptoms.  She has not been able to take the omeprazole consistently due to its causing nausea to worsen.  I am going to switch her over to Protonix and see if she can tolerate that better.  We discussed today that she really needs to be able to take something every day in order for us to tell if it is working or not.  She will call me if she is unable to tolerate the pantoprazole in the same way that the omeprazole gave her trouble.  If we cannot get good resolution of her symptoms, either due to inability to tolerate the pantoprazole or just to failure on the part of pantoprazole, I will plan to send her for EGD.

## 2022-06-26 LAB — BACTERIA SPEC AEROBE CULT: NORMAL

## 2022-07-06 ENCOUNTER — TELEPHONE (OUTPATIENT)
Dept: FAMILY MEDICINE CLINIC | Age: 72
End: 2022-07-06

## 2022-07-14 ENCOUNTER — LAB (OUTPATIENT)
Dept: LAB | Facility: HOSPITAL | Age: 72
End: 2022-07-14

## 2022-07-14 DIAGNOSIS — I10 ESSENTIAL HYPERTENSION: ICD-10-CM

## 2022-07-14 LAB
ALBUMIN SERPL-MCNC: 4.4 G/DL (ref 3.5–5.2)
ALBUMIN/GLOB SERPL: 1.6 G/DL
ALP SERPL-CCNC: 112 U/L (ref 39–117)
ALT SERPL W P-5'-P-CCNC: 27 U/L (ref 1–33)
ANION GAP SERPL CALCULATED.3IONS-SCNC: 10.4 MMOL/L (ref 5–15)
AST SERPL-CCNC: 37 U/L (ref 1–32)
BILIRUB SERPL-MCNC: 0.4 MG/DL (ref 0–1.2)
BUN SERPL-MCNC: 11 MG/DL (ref 8–23)
BUN/CREAT SERPL: 13.6 (ref 7–25)
CALCIUM SPEC-SCNC: 10.3 MG/DL (ref 8.6–10.5)
CHLORIDE SERPL-SCNC: 104 MMOL/L (ref 98–107)
CHOLEST SERPL-MCNC: 183 MG/DL (ref 0–200)
CO2 SERPL-SCNC: 27.6 MMOL/L (ref 22–29)
CREAT SERPL-MCNC: 0.81 MG/DL (ref 0.57–1)
EGFRCR SERPLBLD CKD-EPI 2021: 76.8 ML/MIN/1.73
GLOBULIN UR ELPH-MCNC: 2.7 GM/DL
GLUCOSE SERPL-MCNC: 132 MG/DL (ref 65–99)
HDLC SERPL-MCNC: 42 MG/DL (ref 40–60)
LDLC SERPL CALC-MCNC: 120 MG/DL (ref 0–100)
LDLC/HDLC SERPL: 2.81 {RATIO}
POTASSIUM SERPL-SCNC: 4.6 MMOL/L (ref 3.5–5.2)
PROT SERPL-MCNC: 7.1 G/DL (ref 6–8.5)
SODIUM SERPL-SCNC: 142 MMOL/L (ref 136–145)
TRIGL SERPL-MCNC: 115 MG/DL (ref 0–150)
VLDLC SERPL-MCNC: 21 MG/DL (ref 5–40)

## 2022-07-14 PROCEDURE — 36415 COLL VENOUS BLD VENIPUNCTURE: CPT

## 2022-07-14 PROCEDURE — 80053 COMPREHEN METABOLIC PANEL: CPT

## 2022-07-14 PROCEDURE — 80061 LIPID PANEL: CPT

## 2022-08-02 DIAGNOSIS — Z96.641 STATUS POST TOTAL HIP REPLACEMENT, RIGHT: Primary | ICD-10-CM

## 2022-08-04 ENCOUNTER — OFFICE VISIT (OUTPATIENT)
Dept: FAMILY MEDICINE CLINIC | Age: 72
End: 2022-08-04

## 2022-08-04 VITALS
WEIGHT: 187 LBS | SYSTOLIC BLOOD PRESSURE: 109 MMHG | DIASTOLIC BLOOD PRESSURE: 84 MMHG | TEMPERATURE: 97.5 F | BODY MASS INDEX: 33.13 KG/M2 | HEIGHT: 63 IN | HEART RATE: 94 BPM

## 2022-08-04 DIAGNOSIS — R42 DIZZINESS: ICD-10-CM

## 2022-08-04 DIAGNOSIS — S09.90XA TRAUMATIC INJURY OF HEAD, INITIAL ENCOUNTER: Primary | ICD-10-CM

## 2022-08-04 PROCEDURE — 99214 OFFICE O/P EST MOD 30 MIN: CPT | Performed by: FAMILY MEDICINE

## 2022-08-04 RX ORDER — DULOXETIN HYDROCHLORIDE 20 MG/1
CAPSULE, DELAYED RELEASE ORAL
COMMUNITY
Start: 2022-07-24 | End: 2022-08-04 | Stop reason: SDUPTHER

## 2022-08-04 NOTE — PROGRESS NOTES
Chief Complaint  Hospital Follow Up Visit (Seen at [facility] and transported to [facility]. Seen for fall leading to knot on head 07.24.22)   (No ENMANUEL phone call done)    Subjective          Criss Edwards presents to [facility] today for ENMANUEL after being admitted to [facility] 7/24/2022 to 7/26/2022 for headache and disorientation after head trauma.  She was going to the bathroom in the middle of the night when she hit her head against a door.  No loss of consciousness.  She then developed dizziness, slurring of speech and neck pain so she went to [facility].  CT of the head there was normal but she was transferred to [facility] for further neurologic work-up.  MRI of the brain was negative.  She has had problems with recurrent falls in the past.  She has already been to  for balance training.    She has not had any issues for the past week.  No headaches, no nausea, no dizziness.      Current Outpatient Medications:   •  Cholecalciferol 25 MCG (1000 UT) capsule, Vitamin D3 1,000 unit oral capsule take 1 capsule by oral route daily   Suspended, Disp: , Rfl:   •  diclofenac (VOLTAREN) 75 MG EC tablet, Take 1 tablet by mouth 2 (Two) Times a Day As Needed (pain)., Disp: 60 tablet, Rfl: 5  •  DULoxetine (CYMBALTA) 60 MG capsule, TAKE ONE CAPSULE BY MOUTH DAILY, Disp: 90 capsule, Rfl: 1  •  fluticasone (FLONASE) 50 MCG/ACT nasal spray, 2 sprays into the nostril(s) as directed by provider Daily., Disp: 16 mL, Rfl: 5  •  nebivolol (BYSTOLIC) 5 MG tablet, Take 1 tablet by mouth Daily., Disp: , Rfl:   •  ondansetron (ZOFRAN) 4 MG tablet, Take 4 mg by mouth As Needed., Disp: , Rfl:   •  pantoprazole (PROTONIX) 40 MG EC tablet, Take 1 tablet by mouth Daily., Disp: 30 tablet, Rfl: 2  •  valsartan (DIOVAN) 40 MG tablet, Take 1 tablet by mouth Daily., Disp: 90 tablet, Rfl: 1    Allergies:  Codeine, Iodine, and Contrast dye      Objective   Vital Signs:   Vitals:    08/04/22 1403   BP: 109/84   BP Location: Right  "arm   Patient Position: Sitting   Pulse: 94   Temp: 97.5 °F (36.4 °C)   TempSrc: Oral   Weight: 84.8 kg (187 lb)   Height: 160 cm (62.99\")       Physical Exam  Vitals reviewed.   Constitutional:       General: She is not in acute distress.     Appearance: Normal appearance. She is well-developed.   HENT:      Head: Normocephalic and atraumatic.      Right Ear: External ear normal.      Left Ear: External ear normal.   Eyes:      Extraocular Movements: Extraocular movements intact.      Conjunctiva/sclera: Conjunctivae normal.      Pupils: Pupils are equal, round, and reactive to light.   Cardiovascular:      Rate and Rhythm: Normal rate and regular rhythm.      Heart sounds: No murmur heard.  Pulmonary:      Effort: Pulmonary effort is normal.      Breath sounds: Normal breath sounds. No wheezing, rhonchi or rales.   Abdominal:      General: Bowel sounds are normal. There is no distension.      Palpations: Abdomen is soft.      Tenderness: There is no abdominal tenderness.   Musculoskeletal:         General: Normal range of motion.   Neurological:      Mental Status: She is alert.   Psychiatric:         Mood and Affect: Affect normal.          Lab Results   Component Value Date    GLUCOSE 132 (H) 07/14/2022    BUN 11 07/14/2022    CREATININE 0.81 07/14/2022    EGFRIFNONA 61 09/09/2021    BCR 13.6 07/14/2022    K 4.6 07/14/2022    CO2 27.6 07/14/2022    CALCIUM 10.3 07/14/2022    ALBUMIN 4.40 07/14/2022    LABIL2 1.3 (L) 01/26/2021    AST 37 (H) 07/14/2022    ALT 27 07/14/2022       Lab Results   Component Value Date    CHOL 183 07/14/2022    CHLPL 179 01/26/2021    TRIG 115 07/14/2022    HDL 42 07/14/2022     (H) 07/14/2022            Assessment and Plan    Diagnoses and all orders for this visit:    1. Traumatic injury of head, initial encounter (Primary)  Assessment & Plan:  Criss is here for follow-up after she was admitted to the [facility] for headache and disorientation following head trauma.  She " had stroke work-up started at [facility] and completed at [facility] which was negative.  She was eventually discharged with a diagnosis of confusion and dizziness and it was recommended that she complete outpatient follow-up with Neurology.  I do not know that she needs to see them long-term.  It sounds like she probably had perhaps some mild effects of a concussion after striking her head.  However, I would like to make sure that we are being thorough, so I have placed a referral to Neurology with [facility] for continuity sake and she can get there to be seen at least for 1 follow-up visit.  Otherwise, she is doing quite well since she has been home and has no additional complaints at this time.  I have thoroughly reviewed all of her records from both [facility] and [facility] and there have been no medication changes.    Orders:  -     Ambulatory Referral to Neurology    2. Dizziness  -     Ambulatory Referral to Neurology      Follow Up   Return if symptoms worsen or fail to improve, for Or as scheduled 9/14/2022.  Patient was given instructions and counseling regarding her condition or for health maintenance advice. Please see specific information pulled into the AVS if appropriate.

## 2022-08-04 NOTE — ASSESSMENT & PLAN NOTE
Criss is here for follow-up after she was admitted to the [facility] for headache and disorientation following head trauma.  She had stroke work-up started at [facility] and completed at [facility] which was negative.  She was eventually discharged with a diagnosis of confusion and dizziness and it was recommended that she complete outpatient follow-up with Neurology.  I do not know that she needs to see them long-term.  It sounds like she probably had perhaps some mild effects of a concussion after striking her head.  However, I would like to make sure that we are being thorough, so I have placed a referral to Neurology with [facility] for continuity sake and she can get there to be seen at least for 1 follow-up visit.  Otherwise, she is doing quite well since she has been home and has no additional complaints at this time.  I have thoroughly reviewed all of her records from both [facility] and [facility] and there have been no medication changes.

## 2022-08-29 RX ORDER — DULOXETIN HYDROCHLORIDE 60 MG/1
CAPSULE, DELAYED RELEASE ORAL
Qty: 90 CAPSULE | Refills: 1 | Status: SHIPPED | OUTPATIENT
Start: 2022-08-29 | End: 2023-03-15 | Stop reason: SDUPTHER

## 2022-09-14 ENCOUNTER — OFFICE VISIT (OUTPATIENT)
Dept: FAMILY MEDICINE CLINIC | Age: 72
End: 2022-09-14

## 2022-09-14 VITALS
DIASTOLIC BLOOD PRESSURE: 77 MMHG | SYSTOLIC BLOOD PRESSURE: 135 MMHG | WEIGHT: 180 LBS | HEIGHT: 63 IN | HEART RATE: 73 BPM | BODY MASS INDEX: 31.89 KG/M2

## 2022-09-14 DIAGNOSIS — Z23 ENCOUNTER FOR IMMUNIZATION: ICD-10-CM

## 2022-09-14 DIAGNOSIS — K21.9 GASTROESOPHAGEAL REFLUX DISEASE WITHOUT ESOPHAGITIS: ICD-10-CM

## 2022-09-14 DIAGNOSIS — F33.1 MAJOR DEPRESSIVE DISORDER, RECURRENT EPISODE, MODERATE DEGREE: ICD-10-CM

## 2022-09-14 DIAGNOSIS — R30.0 DYSURIA: ICD-10-CM

## 2022-09-14 DIAGNOSIS — Z00.00 ANNUAL PHYSICAL EXAM: Primary | ICD-10-CM

## 2022-09-14 DIAGNOSIS — I10 ESSENTIAL HYPERTENSION: ICD-10-CM

## 2022-09-14 DIAGNOSIS — I42.2 HYPERTROPHIC CARDIOMYOPATHY: ICD-10-CM

## 2022-09-14 PROBLEM — J02.9 SORE THROAT: Status: RESOLVED | Noted: 2022-06-24 | Resolved: 2022-09-14

## 2022-09-14 PROBLEM — S09.90XA HEAD TRAUMA: Status: RESOLVED | Noted: 2022-08-04 | Resolved: 2022-09-14

## 2022-09-14 LAB
BACTERIA UR QL AUTO: ABNORMAL /HPF
BILIRUB UR QL STRIP: NEGATIVE
CLARITY UR: CLEAR
COLOR UR: YELLOW
GLUCOSE UR STRIP-MCNC: NEGATIVE MG/DL
HGB UR QL STRIP.AUTO: ABNORMAL
KETONES UR QL STRIP: NEGATIVE
LEUKOCYTE ESTERASE UR QL STRIP.AUTO: ABNORMAL
NITRITE UR QL STRIP: NEGATIVE
PH UR STRIP.AUTO: 7 [PH] (ref 5–8)
PROT UR QL STRIP: NEGATIVE
RBC # UR STRIP: ABNORMAL /HPF
REF LAB TEST METHOD: ABNORMAL
SP GR UR STRIP: 1.02 (ref 1–1.03)
SQUAMOUS #/AREA URNS HPF: ABNORMAL /HPF
UROBILINOGEN UR QL STRIP: ABNORMAL
WBC # UR STRIP: ABNORMAL /HPF

## 2022-09-14 PROCEDURE — 90662 IIV NO PRSV INCREASED AG IM: CPT | Performed by: FAMILY MEDICINE

## 2022-09-14 PROCEDURE — 1159F MED LIST DOCD IN RCRD: CPT | Performed by: FAMILY MEDICINE

## 2022-09-14 PROCEDURE — 1170F FXNL STATUS ASSESSED: CPT | Performed by: FAMILY MEDICINE

## 2022-09-14 PROCEDURE — 87086 URINE CULTURE/COLONY COUNT: CPT | Performed by: FAMILY MEDICINE

## 2022-09-14 PROCEDURE — 99214 OFFICE O/P EST MOD 30 MIN: CPT | Performed by: FAMILY MEDICINE

## 2022-09-14 PROCEDURE — G0439 PPPS, SUBSEQ VISIT: HCPCS | Performed by: FAMILY MEDICINE

## 2022-09-14 PROCEDURE — G0008 ADMIN INFLUENZA VIRUS VAC: HCPCS | Performed by: FAMILY MEDICINE

## 2022-09-14 PROCEDURE — 81001 URINALYSIS AUTO W/SCOPE: CPT | Performed by: FAMILY MEDICINE

## 2022-09-14 RX ORDER — ONDANSETRON 4 MG/1
4 TABLET, FILM COATED ORAL 3 TIMES DAILY PRN
Qty: 20 TABLET | Refills: 0 | Status: SHIPPED | OUTPATIENT
Start: 2022-09-14 | End: 2022-11-18 | Stop reason: SDUPTHER

## 2022-09-14 RX ORDER — PANTOPRAZOLE SODIUM 40 MG/1
40 TABLET, DELAYED RELEASE ORAL DAILY
Qty: 90 TABLET | Refills: 1 | Status: SHIPPED | OUTPATIENT
Start: 2022-09-14 | End: 2022-10-13

## 2022-09-14 RX ORDER — ONDANSETRON 4 MG/1
4 TABLET, FILM COATED ORAL AS NEEDED
Qty: 20 TABLET | Refills: 0 | Status: SHIPPED | OUTPATIENT
Start: 2022-09-14 | End: 2022-09-14

## 2022-09-14 RX ORDER — SULFAMETHOXAZOLE AND TRIMETHOPRIM 800; 160 MG/1; MG/1
1 TABLET ORAL 2 TIMES DAILY
Qty: 10 TABLET | Refills: 0 | Status: SHIPPED | OUTPATIENT
Start: 2022-09-14 | End: 2022-09-19

## 2022-09-14 NOTE — ASSESSMENT & PLAN NOTE
She is UTD on colonoscopy, last done 4/2018 and this was normal.  Ten year repeat recommended.  Pap smears are no longer indicated by age and history; s/p hysterectomy.  She is UTD on mammogram, last done 5/2022 and this was normal.  She is UTD on DEXA, last done 2/2022 and this showed osteopenia.  She is UTD on COVID (x3, due for booster - deferred for bivalent booster availability), [id number] (6/2022), Pneumovax (1/2018), Prevnar (11/2016), Zostavax (5/2010), Td (10/2012), and flu (12/2021).  High dose flu given today.  She is due for Shingrix.  Shingrix can be done at the pharmacy.  She is UTD on routine labs.

## 2022-09-14 NOTE — PROGRESS NOTES
The ABCs of the Annual Wellness Visit  Subsequent Medicare Wellness Visit    Chief Complaint   Patient presents with   • Medicare Wellness-subsequent   • Cystitis     Constant pressure and burns when urinating x 2 days       Subjective    History of Present Illness:  Criss Edwards is a 72 y.o. female who presents for a Subsequent Medicare Wellness Visit.    She is UTD on colonoscopy, last done 4/2018 and this was normal.  Ten year repeat recommended.  Pap smears are no longer indicated by age and history; s/p hysterectomy.  She is UTD on mammogram, last done 5/2022 and this was normal.  She is UTD on DEXA, last done 2/2022 and this showed osteopenia.  She is UTD on COVID (x3, due for booster), Ppuyezy60 (6/2022), Pneumovax (1/2018), Prevnar (11/2016), Zostavax (5/2010), Td (10/2012), and flu (12/2021).  She is due for Shingrix.  She is UTD on routine labs.     She reports acute complaint of dysuria for the past 2 days.  She reports positive dysuria, negative hematuria, positive frequency, positive urgency.  She reports positive fevers, positive chills, positive nausea, positive vomiting, positive back pain, negative flank pain.  She has had UTIs in the past.    She has had ongoing issues with abdominal pain for the past couple of weeks.  Pain is suprapubic, pulsing, and intermittent.  She has had diarrhea and nausea/vomiting associated.  The pain is diffuse.  She had colonoscopy done 4/2018 and that was normal.  She is on pantoprazole for GERD.  The actual reflux symptoms seem to be better lately, despite the fact that she is having pain as previously described.    She is on valsartan and nebivolol for hypertension.  Her blood pressure has been well controlled.  No chest pain, palpitations or shortness of breath.  Follows with Cardiology Dr. Nelson for HOCM.      She is on duloxetine for anxiety and depression.  She has been dealing with a lot of grief involving family dynamics.        The following portions of  the patient's history were reviewed and   updated as appropriate: allergies, current medications, past family history, past medical history, past social history, past surgical history and problem list.    Compared to one year ago, the patient feels her physical   health is worse.    Compared to one year ago, the patient feels her mental   health is the same.    Recent Hospitalizations:  She was admitted within the past 365 days at [facility].       Current Medical Providers:  Patient Care Team:  Clarisse Flores MD as PCP - General (Family Medicine)  Alberto Bose MD as Surgeon (Orthopedic Surgery)  Sammy Nelson MD as Consulting Physician (Cardiology)    Outpatient Medications Prior to Visit   Medication Sig Dispense Refill   • Cholecalciferol 25 MCG (1000 UT) capsule Vitamin D3 1,000 unit oral capsule take 1 capsule by oral route daily   Suspended     • DULoxetine (CYMBALTA) 60 MG capsule TAKE ONE CAPSULE BY MOUTH DAILY 90 capsule 1   • fluticasone (FLONASE) 50 MCG/ACT nasal spray 2 sprays into the nostril(s) as directed by provider Daily. 16 mL 5   • nebivolol (BYSTOLIC) 5 MG tablet Take 1 tablet by mouth Daily.     • valsartan (DIOVAN) 40 MG tablet Take 1 tablet by mouth Daily. 90 tablet 1   • pantoprazole (PROTONIX) 40 MG EC tablet Take 1 tablet by mouth Daily. 30 tablet 2   • diclofenac (VOLTAREN) 75 MG EC tablet Take 1 tablet by mouth 2 (Two) Times a Day As Needed (pain). 60 tablet 5   • ondansetron (ZOFRAN) 4 MG tablet Take 4 mg by mouth As Needed.       No facility-administered medications prior to visit.       No opioid medication identified on active medication list. I have reviewed chart for other potential  high risk medication/s and harmful drug interactions in the elderly.          Aspirin is not on active medication list.  Aspirin use is not indicated based on review of current medical condition/s. Risk of harm outweighs potential benefits.  .    Patient Active Problem List  "  Diagnosis   • Primary osteoarthritis of left knee   • Status post total hip replacement, right   • Right hip pain   • Dysuria   • Hearing loss   • Essential hypertension   • Hypertrophic cardiomyopathy (HCC)   • Nephrolithiasis   • Meniere's disease   • Mitral valve prolapse   • Mixed stress and urge urinary incontinence   • Palpitations   • Major depressive disorder, recurrent episode, moderate degree (HCC)   • Generalized osteoarthritis   • Osteopenia of multiple sites   • Seasonal allergic rhinitis due to pollen   • Vitamin D deficiency   • Gastroesophageal reflux disease without esophagitis   • Lumbar radiculopathy   • Annual physical exam   • Allergic dermatitis   • Anxiety   • Generalized abdominal pain     Advance Care Planning  Advance Directive is not on file.  ACP discussion was held with the patient during this visit. Patient does not have an advance directive, information provided.    Review of Systems   Constitutional: Positive for fatigue. Negative for chills and fever.   HENT: Positive for congestion and rhinorrhea. Negative for hearing loss.    Eyes: Negative for pain and visual disturbance.   Respiratory: Negative for cough and shortness of breath.    Cardiovascular: Negative for chest pain and palpitations.   Gastrointestinal: Positive for abdominal pain, diarrhea, nausea and vomiting. Negative for constipation.   Genitourinary: Positive for dysuria, frequency and urgency. Negative for difficulty urinating.   Musculoskeletal: Positive for arthralgias. Negative for myalgias.   Neurological: Negative for weakness and numbness.   Psychiatric/Behavioral: Negative for dysphoric mood and sleep disturbance. The patient is nervous/anxious.         Objective    Vitals:    09/14/22 1254   BP: 135/77   BP Location: Right arm   Patient Position: Sitting   Pulse: 73   Weight: 81.6 kg (180 lb)   Height: 160 cm (62.99\")     Estimated body mass index is 31.89 kg/m² as calculated from the following:    Height as " "of this encounter: 160 cm (62.99\").    Weight as of this encounter: 81.6 kg (180 lb).    BMI is >= 30 and <35. (Class 1 Obesity). The following options were offered after discussion;: exercise counseling/recommendations and nutrition counseling/recommendations      Does the patient have evidence of cognitive impairment? No    Physical Exam  Vitals reviewed.   Constitutional:       General: She is not in acute distress.     Appearance: Normal appearance. She is well-developed.   HENT:      Head: Normocephalic and atraumatic.      Right Ear: External ear normal.      Left Ear: External ear normal.      Mouth/Throat:      Mouth: Mucous membranes are moist.   Eyes:      Extraocular Movements: Extraocular movements intact.      Conjunctiva/sclera: Conjunctivae normal.      Pupils: Pupils are equal, round, and reactive to light.   Cardiovascular:      Rate and Rhythm: Normal rate and regular rhythm.      Heart sounds: No murmur heard.  Pulmonary:      Effort: Pulmonary effort is normal.      Breath sounds: Normal breath sounds. No wheezing, rhonchi or rales.   Abdominal:      General: Bowel sounds are normal. There is no distension.      Palpations: Abdomen is soft.      Tenderness: There is abdominal tenderness (diffuse, worse suprapubic). There is no right CVA tenderness, left CVA tenderness, guarding or rebound.   Musculoskeletal:         General: Normal range of motion.   Skin:     General: Skin is warm and dry.   Neurological:      Mental Status: She is alert and oriented to person, place, and time.      Deep Tendon Reflexes: Reflexes normal.   Psychiatric:         Mood and Affect: Affect normal. Mood is anxious.         Behavior: Behavior normal.         Thought Content: Thought content normal.         Judgment: Judgment normal.       Lab Results   Component Value Date    TRIG 115 07/14/2022    HDL 42 07/14/2022     (H) 07/14/2022    VLDL 21 07/14/2022            HEALTH RISK ASSESSMENT    Smoking " Status:  Social History     Tobacco Use   Smoking Status Never Smoker   Smokeless Tobacco Never Used     Alcohol Consumption:  Social History     Substance and Sexual Activity   Alcohol Use No     Fall Risk Screen:    LALOADI Fall Risk Assessment was completed, and patient is at HIGH risk for falls. Assessment completed on:9/14/2022    Depression Screening:  PHQ-2/PHQ-9 Depression Screening 3/24/2022   Retired PHQ-9 Total Score -   Retired Total Score -   Little Interest or Pleasure in Doing Things 0-->not at all   Feeling Down, Depressed or Hopeless 3-->nearly every day   Trouble Falling or Staying Asleep, or Sleeping Too Much 2-->more than half the days   Feeling Tired or Having Little Energy 2-->more than half the days   Poor Appetite or Overeating 0-->not at all   Feeling Bad about Yourself - or that You are a Failure or Have Let Yourself or Your Family Down 2-->more than half the days   Trouble Concentrating on Things, Such as Reading the Newspaper or Watching Television 2-->more than half the days   Moving or Speaking So Slowly that Other People Could Have Noticed? Or the Opposite - Being So Fidgety 0-->not at all   Thoughts that You Would be Better Off Dead or of Hurting Yourself in Some Way 0-->not at all   PHQ-9: Brief Depression Severity Measure Score 11   If You Checked Off Any Problems, How Difficult Have These Problems Made It For You to Do Your Work, Take Care of Things at Home, or Get Along with Other People? not difficult at all       Health Habits and Functional and Cognitive Screening:  Functional & Cognitive Status 9/14/2022   Do you have difficulty preparing food and eating? No   Do you have difficulty bathing yourself, getting dressed or grooming yourself? No   Do you have difficulty using the toilet? No   Do you have difficulty moving around from place to place? Yes   Do you have trouble with steps or getting out of a bed or a chair? No   Current Diet Unhealthy Diet   Dental Exam Not up to date    Eye Exam Up to date   Exercise (times per week) 3 times per week   Current Exercises Include Other   Do you need help using the phone?  No   Are you deaf or do you have serious difficulty hearing?  Yes   Do you need help with transportation? No   Do you need help shopping? No   Do you need help preparing meals?  No   Do you need help with housework?  No   Do you need help with laundry? No   Do you need help taking your medications? No   Do you need help managing money? No   Do you ever drive or ride in a car without wearing a seat belt? No   Have you felt unusual stress, anger or loneliness in the last month? Yes   Who do you live with? Alone   If you need help, do you have trouble finding someone available to you? Yes   Have you been bothered in the last four weeks by sexual problems? No   Do you have difficulty concentrating, remembering or making decisions? Yes       Age-appropriate Screening Schedule:  Refer to the list below for future screening recommendations based on patient's age, sex and/or medical conditions. Orders for these recommended tests are listed in the plan section. The patient has been provided with a written plan.    Health Maintenance   Topic Date Due   • ZOSTER VACCINE (2 of 3) 06/26/2010   • INFLUENZA VACCINE  10/01/2022   • TDAP/TD VACCINES (2 - Tdap) 10/01/2022   • DXA SCAN  02/07/2024   • MAMMOGRAM  05/18/2024              Assessment & Plan   CMS Preventative Services Quick Reference  Risk Factors Identified During Encounter  Immunizations Discussed/Encouraged (specific Immunizations; Shingrix and COVID19  The above risks/problems have been discussed with the patient.  Follow up actions/plans if indicated are seen below in the Assessment/Plan Section.  Pertinent information has been shared with the patient in the After Visit Summary.    Diagnoses and all orders for this visit:    1. Annual physical exam (Primary)  Assessment & Plan:  She is UTD on colonoscopy, last done 4/2018 and this  was normal.  Ten year repeat recommended.  Pap smears are no longer indicated by age and history; s/p hysterectomy.  She is UTD on mammogram, last done 5/2022 and this was normal.  She is UTD on DEXA, last done 2/2022 and this showed osteopenia.  She is UTD on COVID (x3, due for booster - deferred for bivalent booster availability), Rtdejog65 (6/2022), Pneumovax (1/2018), Prevnar (11/2016), Zostavax (5/2010), Td (10/2012), and flu (12/2021).  High dose flu given today.  She is due for Shingrix.  Shingrix can be done at the pharmacy.  She is UTD on routine labs.      2. Dysuria  Assessment & Plan:  UA consistent with possible UTI in the context of her sx.  No evidence of pyelonephritis.  Treating with antibiotic course as below.  Following culture.  Stay well-hydrated.  May use Azo for symptomatic control.  Call or return to clinic as needed worsening or failure to improve of symptoms.    Orders:  -     Cancel: Urinalysis With Microscopic - Urine, Clean Catch; Future  -     Urinalysis With Microscopic - Urine, Clean Catch  -     Urinalysis With Microscopic - Urine, Clean Catch; Future  -     Urinalysis With Microscopic - Urine, Clean Catch  -     Urine Culture - Urine, Urine, Clean Catch  -     sulfamethoxazole-trimethoprim (BACTRIM DS,SEPTRA DS) 800-160 MG per tablet; Take 1 tablet by mouth 2 (Two) Times a Day for 5 days.  Dispense: 10 tablet; Refill: 0  -     Discontinue: ondansetron (ZOFRAN) 4 MG tablet; Take 1 tablet by mouth As Needed for Nausea or Vomiting.  Dispense: 20 tablet; Refill: 0  -     ondansetron (ZOFRAN) 4 MG tablet; Take 1 tablet by mouth 3 (Three) Times a Day As Needed for Nausea or Vomiting.  Dispense: 20 tablet; Refill: 0    3. Essential hypertension  Assessment & Plan:  Stable on nebivolol 5 mg daily and valsartan 40 mg daily.  No refills needed.  Labs reviewed and up-to-date.      4. Hypertrophic cardiomyopathy (HCC)  Assessment & Plan:  Following with Dr. Nelson Cardiology.  She is on  nebivolol and valsartan.      5. Major depressive disorder, recurrent episode, moderate degree (HCC)  Assessment & Plan:  Stable on duloxetine 60 mg daily.  No refills needed.  Continue to monitor.      6. Gastroesophageal reflux disease without esophagitis  Assessment & Plan:  Stable on pantoprazole.  Refill sent.  Wean as able.    Orders:  -     pantoprazole (PROTONIX) 40 MG EC tablet; Take 1 tablet by mouth Daily.  Dispense: 90 tablet; Refill: 1    7. Encounter for immunization  -     Fluzone High-Dose 65+yrs      Follow Up:   Return in about 3 months (around 12/14/2022) for Recheck **30 min please**.     An After Visit Summary and PPPS were made available to the patient.

## 2022-09-14 NOTE — ASSESSMENT & PLAN NOTE
Stable on nebivolol 5 mg daily and valsartan 40 mg daily.  No refills needed.  Labs reviewed and up-to-date.

## 2022-09-14 NOTE — ASSESSMENT & PLAN NOTE
UA consistent with possible UTI in the context of her sx.  No evidence of pyelonephritis.  Treating with antibiotic course as below.  Following culture.  Stay well-hydrated.  May use Azo for symptomatic control.  Call or return to clinic as needed worsening or failure to improve of symptoms.

## 2022-09-15 LAB — BACTERIA SPEC AEROBE CULT: NO GROWTH

## 2022-09-16 RX ORDER — PHENAZOPYRIDINE HYDROCHLORIDE 100 MG/1
100 TABLET, FILM COATED ORAL 3 TIMES DAILY PRN
Qty: 10 TABLET | Refills: 0 | Status: SHIPPED | OUTPATIENT
Start: 2022-09-16 | End: 2022-11-18

## 2022-10-13 DIAGNOSIS — K21.9 GASTROESOPHAGEAL REFLUX DISEASE WITHOUT ESOPHAGITIS: ICD-10-CM

## 2022-10-13 RX ORDER — PANTOPRAZOLE SODIUM 40 MG/1
TABLET, DELAYED RELEASE ORAL
Qty: 30 TABLET | Refills: 0 | Status: SHIPPED | OUTPATIENT
Start: 2022-10-13 | End: 2022-11-18

## 2022-11-18 ENCOUNTER — OFFICE VISIT (OUTPATIENT)
Dept: FAMILY MEDICINE CLINIC | Age: 72
End: 2022-11-18

## 2022-11-18 VITALS
BODY MASS INDEX: 32.18 KG/M2 | SYSTOLIC BLOOD PRESSURE: 134 MMHG | WEIGHT: 181.6 LBS | DIASTOLIC BLOOD PRESSURE: 86 MMHG | HEIGHT: 63 IN | HEART RATE: 77 BPM

## 2022-11-18 DIAGNOSIS — K21.9 GASTROESOPHAGEAL REFLUX DISEASE WITHOUT ESOPHAGITIS: ICD-10-CM

## 2022-11-18 DIAGNOSIS — R25.2 LEG CRAMP: Primary | ICD-10-CM

## 2022-11-18 DIAGNOSIS — R11.0 NAUSEA: ICD-10-CM

## 2022-11-18 DIAGNOSIS — Z23 NEED FOR VACCINATION: ICD-10-CM

## 2022-11-18 DIAGNOSIS — H61.23 BILATERAL IMPACTED CERUMEN: ICD-10-CM

## 2022-11-18 PROCEDURE — 69209 REMOVE IMPACTED EAR WAX UNI: CPT | Performed by: FAMILY MEDICINE

## 2022-11-18 PROCEDURE — 0124A COVID-19 (PFIZER) BIVALENT BOOSTER 12+YRS: CPT | Performed by: FAMILY MEDICINE

## 2022-11-18 PROCEDURE — 99214 OFFICE O/P EST MOD 30 MIN: CPT | Performed by: FAMILY MEDICINE

## 2022-11-18 PROCEDURE — 91312 COVID-19 (PFIZER) BIVALENT BOOSTER 12+YRS: CPT | Performed by: FAMILY MEDICINE

## 2022-11-18 RX ORDER — MAGNESIUM OXIDE 400 MG/1
400 TABLET ORAL DAILY
Qty: 30 TABLET | Refills: 5 | Status: SHIPPED | OUTPATIENT
Start: 2022-11-18

## 2022-11-18 RX ORDER — PANTOPRAZOLE SODIUM 40 MG/1
40 TABLET, DELAYED RELEASE ORAL DAILY
Qty: 30 TABLET | Refills: 5 | Status: SHIPPED | OUTPATIENT
Start: 2022-11-18 | End: 2023-03-15 | Stop reason: SDUPTHER

## 2022-11-18 RX ORDER — CYCLOBENZAPRINE HCL 5 MG
5 TABLET ORAL NIGHTLY PRN
Qty: 30 TABLET | Refills: 0 | Status: SHIPPED | OUTPATIENT
Start: 2022-11-18 | End: 2022-12-27

## 2022-11-18 RX ORDER — ONDANSETRON 4 MG/1
4 TABLET, ORALLY DISINTEGRATING ORAL EVERY 8 HOURS PRN
Qty: 20 TABLET | Refills: 0 | Status: SHIPPED | OUTPATIENT
Start: 2022-11-18

## 2022-11-18 NOTE — ASSESSMENT & PLAN NOTE
Stable on pantoprazole 40 mg daily.  Refill sent as below.  Wean as able.  She also needs some Zofran sent in for periodic nausea.

## 2022-11-18 NOTE — ASSESSMENT & PLAN NOTE
Start bedtime regimen consisting of magnesium supplement (prescription sent), followed by 15 to 20 minutes of a heating pad to the right calf which is her worst area of cramping.  Finally after that, she should stretch out her legs before going to sleep.  I am also going to send in some Flexeril that she can take as needed.  This has been a longstanding issue for her.

## 2022-11-18 NOTE — PROGRESS NOTES
Chief Complaint  Ear Fullness    Subjective          Criss Edwards presents to [facility] today for bilateral ear fullness.      She went to the audiologist to be evaluated for hearing aids.  She went to [facility] and saw Bryn Naidu.  She was frustrated because of the cost.  She does have a lot of wax in the ear, she was told, and needs to get it cleaned out.    She is also having muscle spasms in the R calf.  It has been gradually worsening for the past couple of weeks.  Worst at night.  It grabs her at night and wakes her up.  She has had intermittent problems with this since childhood.      Current Outpatient Medications:   •  Cholecalciferol 25 MCG (1000 UT) capsule, Vitamin D3 1,000 unit oral capsule take 1 capsule by oral route daily   Suspended, Disp: , Rfl:   •  DULoxetine (CYMBALTA) 60 MG capsule, TAKE ONE CAPSULE BY MOUTH DAILY, Disp: 90 capsule, Rfl: 1  •  fluticasone (FLONASE) 50 MCG/ACT nasal spray, 2 sprays into the nostril(s) as directed by provider Daily. (Patient taking differently: 2 sprays into the nostril(s) as directed by provider As Needed.), Disp: 16 mL, Rfl: 5  •  nebivolol (BYSTOLIC) 5 MG tablet, Take 1 tablet by mouth Daily., Disp: , Rfl:   •  pantoprazole (PROTONIX) 40 MG EC tablet, Take 1 tablet by mouth Daily., Disp: 30 tablet, Rfl: 5  •  valsartan (DIOVAN) 40 MG tablet, Take 1 tablet by mouth Daily., Disp: 90 tablet, Rfl: 1  •  cyclobenzaprine (FLEXERIL) 5 MG tablet, Take 1 tablet by mouth At Night As Needed for Muscle Spasms., Disp: 30 tablet, Rfl: 0  •  magnesium oxide (MAG-OX) 400 MG tablet, Take 1 tablet by mouth Daily., Disp: 30 tablet, Rfl: 5  •  ondansetron ODT (Zofran ODT) 4 MG disintegrating tablet, Place 1 tablet on the tongue Every 8 (Eight) Hours As Needed for Nausea or Vomiting., Disp: 20 tablet, Rfl: 0    Allergies:  Codeine, Iodine, and Contrast dye      Objective   Vital Signs:   Vitals:    11/18/22 1407 11/18/22 1440   BP:  "143/82 134/86   BP Location: Left arm Left arm   Patient Position: Sitting Sitting   Pulse: 75 77   Weight: 82.4 kg (181 lb 9.6 oz)    Height: 160 cm (62.99\")        Physical Exam  Vitals reviewed.   Constitutional:       General: She is not in acute distress.     Appearance: Normal appearance. She is well-developed.   HENT:      Head: Normocephalic and atraumatic.      Right Ear: External ear normal. There is impacted cerumen.      Left Ear: External ear normal. There is impacted cerumen.   Eyes:      Extraocular Movements: Extraocular movements intact.      Conjunctiva/sclera: Conjunctivae normal.      Pupils: Pupils are equal, round, and reactive to light.   Cardiovascular:      Rate and Rhythm: Normal rate and regular rhythm.      Heart sounds: No murmur heard.  Pulmonary:      Effort: Pulmonary effort is normal.      Breath sounds: Normal breath sounds. No wheezing, rhonchi or rales.   Abdominal:      General: Bowel sounds are normal. There is no distension.      Palpations: Abdomen is soft.      Tenderness: There is no abdominal tenderness.   Musculoskeletal:         General: Tenderness (R calf, palpable spasm) present. Normal range of motion.      Right lower leg: No edema.      Left lower leg: No edema.   Neurological:      Mental Status: She is alert.   Psychiatric:         Mood and Affect: Affect normal.        Ear Cerumen Removal    Date/Time: 11/18/2022 3:16 PM  Performed by: Farzaneh Julien MA  Authorized by: Clarisse Flores MD     Anesthesia:  Local Anesthetic: none  Location details: left ear and right ear  Patient tolerance: patient tolerated the procedure well with no immediate complications  Comments: Pt tolerated procedure well. / MC  Procedure type: irrigation   Sedation:  Patient sedated: no            Lab Results   Component Value Date    GLUCOSE 132 (H) 07/14/2022    BUN 11 07/14/2022    CREATININE 0.81 07/14/2022    EGFRIFNONA 61 09/09/2021    BCR 13.6 07/14/2022    K 4.6 " 07/14/2022    CO2 27.6 07/14/2022    CALCIUM 10.3 07/14/2022    ALBUMIN 4.40 07/14/2022    LABIL2 1.3 (L) 01/26/2021    AST 37 (H) 07/14/2022    ALT 27 07/14/2022       Lab Results   Component Value Date    CHOL 183 07/14/2022    CHLPL 179 01/26/2021    TRIG 115 07/14/2022    HDL 42 07/14/2022     (H) 07/14/2022            Assessment and Plan    Diagnoses and all orders for this visit:    1. Leg cramp (Primary)  Assessment & Plan:  Start bedtime regimen consisting of magnesium supplement (prescription sent), followed by 15 to 20 minutes of a heating pad to the right calf which is her worst area of cramping.  Finally after that, she should stretch out her legs before going to sleep.  I am also going to send in some Flexeril that she can take as needed.  This has been a longstanding issue for her.    Orders:  -     cyclobenzaprine (FLEXERIL) 5 MG tablet; Take 1 tablet by mouth At Night As Needed for Muscle Spasms.  Dispense: 30 tablet; Refill: 0  -     magnesium oxide (MAG-OX) 400 MG tablet; Take 1 tablet by mouth Daily.  Dispense: 30 tablet; Refill: 5    2. Gastroesophageal reflux disease without esophagitis  Assessment & Plan:  Stable on pantoprazole 40 mg daily.  Refill sent as below.  Wean as able.  She also needs some Zofran sent in for periodic nausea.    Orders:  -     pantoprazole (PROTONIX) 40 MG EC tablet; Take 1 tablet by mouth Daily.  Dispense: 30 tablet; Refill: 5    3. Nausea  -     ondansetron ODT (Zofran ODT) 4 MG disintegrating tablet; Place 1 tablet on the tongue Every 8 (Eight) Hours As Needed for Nausea or Vomiting.  Dispense: 20 tablet; Refill: 0    4. Bilateral impacted cerumen      Follow Up   Return if symptoms worsen or fail to improve, for Or as scheduled 12/14/2022.  Patient was given instructions and counseling regarding her condition or for health maintenance advice. Please see specific information pulled into the AVS if appropriate.           11/18/2022

## 2022-11-21 ENCOUNTER — PRIOR AUTHORIZATION (OUTPATIENT)
Dept: FAMILY MEDICINE CLINIC | Age: 72
End: 2022-11-21

## 2022-12-24 DIAGNOSIS — R25.2 LEG CRAMP: ICD-10-CM

## 2022-12-27 RX ORDER — CYCLOBENZAPRINE HCL 5 MG
TABLET ORAL
Qty: 30 TABLET | Refills: 0 | Status: SHIPPED | OUTPATIENT
Start: 2022-12-27 | End: 2023-02-24

## 2023-01-19 ENCOUNTER — HOSPITAL ENCOUNTER (OUTPATIENT)
Dept: GENERAL RADIOLOGY | Facility: HOSPITAL | Age: 73
Discharge: HOME | End: 2023-01-19
Admitting: ORTHOPAEDIC SURGERY
Payer: MEDICARE

## 2023-01-19 ENCOUNTER — OFFICE VISIT (OUTPATIENT)
Dept: ORTHOPEDIC SURGERY | Facility: CLINIC | Age: 73
End: 2023-01-19
Payer: MEDICARE

## 2023-01-19 VITALS — WEIGHT: 179 LBS | HEIGHT: 63 IN | TEMPERATURE: 98.5 F | BODY MASS INDEX: 31.71 KG/M2

## 2023-01-19 DIAGNOSIS — Z96.641 STATUS POST TOTAL HIP REPLACEMENT, RIGHT: ICD-10-CM

## 2023-01-19 DIAGNOSIS — M17.12 PRIMARY OSTEOARTHRITIS OF LEFT KNEE: ICD-10-CM

## 2023-01-19 DIAGNOSIS — M54.16 LUMBAR RADICULOPATHY: Primary | ICD-10-CM

## 2023-01-19 PROCEDURE — 20610 DRAIN/INJ JOINT/BURSA W/O US: CPT | Performed by: ORTHOPAEDIC SURGERY

## 2023-01-19 PROCEDURE — 99213 OFFICE O/P EST LOW 20 MIN: CPT | Performed by: ORTHOPAEDIC SURGERY

## 2023-01-19 PROCEDURE — 73502 X-RAY EXAM HIP UNI 2-3 VIEWS: CPT

## 2023-01-19 RX ADMIN — METHYLPREDNISOLONE ACETATE 160 MG: 80 INJECTION, SUSPENSION INTRA-ARTICULAR; INTRALESIONAL; INTRAMUSCULAR; SOFT TISSUE at 15:38

## 2023-01-19 RX ADMIN — LIDOCAINE HYDROCHLORIDE 2 ML: 10 INJECTION, SOLUTION EPIDURAL; INFILTRATION; INTRACAUDAL; PERINEURAL at 15:38

## 2023-02-05 RX ORDER — METHYLPREDNISOLONE ACETATE 80 MG/ML
160 INJECTION, SUSPENSION INTRA-ARTICULAR; INTRALESIONAL; INTRAMUSCULAR; SOFT TISSUE
Status: COMPLETED | OUTPATIENT
Start: 2023-01-19 | End: 2023-01-19

## 2023-02-05 RX ORDER — LIDOCAINE HYDROCHLORIDE 10 MG/ML
2 INJECTION, SOLUTION EPIDURAL; INFILTRATION; INTRACAUDAL; PERINEURAL
Status: COMPLETED | OUTPATIENT
Start: 2023-01-19 | End: 2023-01-19

## 2023-02-20 NOTE — PROGRESS NOTES
"Chief Complaint  Shoulder Pain (Left, swelling, goes down left arm)    Subjective          Criss Edwards presents to [facility]  History of Present Illness  She thinks her left shoulder pain may be due to a fall several weeks back when there was ice on the ground.   Arm Pain   The incident occurred more than 1 week ago. The injury mechanism was a fall. Quality: \"painful\" The pain radiates to the left neck and left hand (left lateral side). The pain is at a severity of 10/10. The pain is severe. The pain has been constant since the incident. Associated symptoms include muscle weakness, numbness and tingling. She has tried heat and NSAIDs (Flex's salve) for the symptoms. The treatment provided mild relief.       Objective   Vital Signs:   /82 (BP Location: Right arm, Patient Position: Sitting)   Pulse 76   Ht 160 cm (63\")   Wt 83.4 kg (183 lb 12.8 oz)   BMI 32.56 kg/m²     Physical Exam  Constitutional:       General: She is not in acute distress.     Appearance: Normal appearance. She is normal weight.   HENT:      Head: Normocephalic.   Eyes:      Pupils: Pupils are equal, round, and reactive to light.      Visual Fields: Right eye visual fields normal and left eye visual fields normal.   Neck:      Trachea: Trachea normal.   Cardiovascular:      Rate and Rhythm: Normal rate and regular rhythm.      Heart sounds: Normal heart sounds.   Pulmonary:      Effort: Pulmonary effort is normal.      Breath sounds: Normal breath sounds and air entry.   Musculoskeletal:      Left shoulder: Swelling, deformity, tenderness and bony tenderness present. Decreased range of motion.      Right lower leg: No edema.      Left lower leg: No edema.   Skin:     General: Skin is warm and dry.   Neurological:      Mental Status: She is alert and oriented to person, place, and time.   Psychiatric:         Mood and Affect: Mood and affect normal.         Behavior: Behavior normal.         " Thought Content: Thought content normal.        Result Review :   The following data was reviewed by: RAUL Breaux on 02/21/2023:  Comprehensive Metabolic Panel (07/14/2022 09:25)                  Assessment and Plan    Diagnoses and all orders for this visit:    1. Acute pain of left shoulder (Primary)  -     meloxicam (Mobic) 7.5 MG tablet; Take 1 tablet by mouth Daily for 14 days.  Dispense: 14 tablet; Refill: 0  -     XR Shoulder 2+ View Left; Future    She is extremely tender in her left shoulder upon palpation today.  She has very limited range of motion.  We will obtain a left shoulder x-ray.  She does have an appoint with Dr. Bose in April, but she may need to move that appointment up sooner.  We will give her a trial of meloxicam to see if that will help.  We have discussed avoiding NSAIDs while taking the medication.  She verbalized understanding.      Follow Up   Return if symptoms worsen or fail to improve.  Patient was given instructions and counseling regarding her condition or for health maintenance advice. Please see specific information pulled into the AVS if appropriate.

## 2023-02-21 ENCOUNTER — HOSPITAL ENCOUNTER (OUTPATIENT)
Dept: GENERAL RADIOLOGY | Facility: HOSPITAL | Age: 73
Discharge: HOME | End: 2023-02-21
Admitting: NURSE PRACTITIONER
Payer: MEDICARE

## 2023-02-21 ENCOUNTER — OFFICE VISIT (OUTPATIENT)
Dept: FAMILY MEDICINE CLINIC | Age: 73
End: 2023-02-21
Payer: MEDICARE

## 2023-02-21 VITALS
HEART RATE: 76 BPM | SYSTOLIC BLOOD PRESSURE: 138 MMHG | DIASTOLIC BLOOD PRESSURE: 82 MMHG | BODY MASS INDEX: 32.57 KG/M2 | HEIGHT: 63 IN | WEIGHT: 183.8 LBS

## 2023-02-21 DIAGNOSIS — M25.512 ACUTE PAIN OF LEFT SHOULDER: ICD-10-CM

## 2023-02-21 DIAGNOSIS — M25.512 ACUTE PAIN OF LEFT SHOULDER: Primary | ICD-10-CM

## 2023-02-21 PROCEDURE — 99213 OFFICE O/P EST LOW 20 MIN: CPT | Performed by: NURSE PRACTITIONER

## 2023-02-21 PROCEDURE — 73030 X-RAY EXAM OF SHOULDER: CPT

## 2023-02-21 RX ORDER — MELOXICAM 7.5 MG/1
7.5 TABLET ORAL DAILY
Qty: 14 TABLET | Refills: 0 | Status: SHIPPED | OUTPATIENT
Start: 2023-02-21 | End: 2023-03-07

## 2023-02-23 ENCOUNTER — TELEPHONE (OUTPATIENT)
Dept: FAMILY MEDICINE CLINIC | Age: 73
End: 2023-02-23
Payer: MEDICARE

## 2023-02-23 DIAGNOSIS — R25.2 LEG CRAMP: ICD-10-CM

## 2023-02-23 NOTE — TELEPHONE ENCOUNTER
Caller: Criss Edwards    Relationship: Self    Best call back number: [phone]    What is the best time to reach you: ANYTIME    Who are you requesting to speak with (clinical staff, provider,  specific staff member): CLINICAL  / NOAH       What was the call regarding: PATIENT IS CALLING STATING THAT SHE WANTED TO GO BACK OVER THE X-RAY RESULTS, STILL FEELING PAIN IN ARMS, SHOULDER , AND WRIST     Do you require a callback: YES

## 2023-02-24 RX ORDER — CYCLOBENZAPRINE HCL 5 MG
TABLET ORAL
Qty: 30 TABLET | Refills: 2 | Status: SHIPPED | OUTPATIENT
Start: 2023-02-24

## 2023-03-15 ENCOUNTER — LAB (OUTPATIENT)
Dept: LAB | Facility: HOSPITAL | Age: 73
End: 2023-03-15
Payer: MEDICARE

## 2023-03-15 ENCOUNTER — OFFICE VISIT (OUTPATIENT)
Dept: FAMILY MEDICINE CLINIC | Age: 73
End: 2023-03-15
Payer: MEDICARE

## 2023-03-15 VITALS
WEIGHT: 184 LBS | HEART RATE: 74 BPM | SYSTOLIC BLOOD PRESSURE: 123 MMHG | OXYGEN SATURATION: 96 % | DIASTOLIC BLOOD PRESSURE: 78 MMHG | TEMPERATURE: 97.5 F | BODY MASS INDEX: 32.6 KG/M2 | HEIGHT: 63 IN

## 2023-03-15 DIAGNOSIS — I10 ESSENTIAL HYPERTENSION: ICD-10-CM

## 2023-03-15 DIAGNOSIS — I42.2 HYPERTROPHIC CARDIOMYOPATHY: ICD-10-CM

## 2023-03-15 DIAGNOSIS — F41.1 GENERALIZED ANXIETY DISORDER: ICD-10-CM

## 2023-03-15 DIAGNOSIS — F33.1 MAJOR DEPRESSIVE DISORDER, RECURRENT EPISODE, MODERATE DEGREE: ICD-10-CM

## 2023-03-15 DIAGNOSIS — I10 ESSENTIAL HYPERTENSION: Primary | ICD-10-CM

## 2023-03-15 DIAGNOSIS — K21.9 GASTROESOPHAGEAL REFLUX DISEASE WITHOUT ESOPHAGITIS: ICD-10-CM

## 2023-03-15 DIAGNOSIS — M19.012 PRIMARY OSTEOARTHRITIS, LEFT SHOULDER: ICD-10-CM

## 2023-03-15 PROBLEM — R25.2 LEG CRAMP: Status: RESOLVED | Noted: 2022-11-18 | Resolved: 2023-03-15

## 2023-03-15 PROBLEM — Z00.00 ANNUAL PHYSICAL EXAM: Status: RESOLVED | Noted: 2021-09-09 | Resolved: 2023-03-15

## 2023-03-15 PROBLEM — R30.0 DYSURIA: Status: RESOLVED | Noted: 2019-03-06 | Resolved: 2023-03-15

## 2023-03-15 LAB
ALBUMIN SERPL-MCNC: 4.4 G/DL (ref 3.5–5.2)
ALBUMIN/GLOB SERPL: 1.6 G/DL
ALP SERPL-CCNC: 129 U/L (ref 39–117)
ALT SERPL W P-5'-P-CCNC: 25 U/L (ref 1–33)
ANION GAP SERPL CALCULATED.3IONS-SCNC: 6.8 MMOL/L (ref 5–15)
AST SERPL-CCNC: 23 U/L (ref 1–32)
BASOPHILS # BLD AUTO: 0.03 10*3/MM3 (ref 0–0.2)
BASOPHILS NFR BLD AUTO: 0.4 % (ref 0–1.5)
BILIRUB SERPL-MCNC: 0.4 MG/DL (ref 0–1.2)
BUN SERPL-MCNC: 16 MG/DL (ref 8–23)
BUN/CREAT SERPL: 15.8 (ref 7–25)
CALCIUM SPEC-SCNC: 10 MG/DL (ref 8.6–10.5)
CHLORIDE SERPL-SCNC: 104 MMOL/L (ref 98–107)
CHOLEST SERPL-MCNC: 188 MG/DL (ref 0–200)
CO2 SERPL-SCNC: 28.2 MMOL/L (ref 22–29)
CREAT SERPL-MCNC: 1.01 MG/DL (ref 0.57–1)
DEPRECATED RDW RBC AUTO: 47.8 FL (ref 37–54)
EGFRCR SERPLBLD CKD-EPI 2021: 58.5 ML/MIN/1.73
EOSINOPHIL # BLD AUTO: 0.24 10*3/MM3 (ref 0–0.4)
EOSINOPHIL NFR BLD AUTO: 3.2 % (ref 0.3–6.2)
ERYTHROCYTE [DISTWIDTH] IN BLOOD BY AUTOMATED COUNT: 13.5 % (ref 12.3–15.4)
GLOBULIN UR ELPH-MCNC: 2.7 GM/DL
GLUCOSE SERPL-MCNC: 129 MG/DL (ref 65–99)
HCT VFR BLD AUTO: 43 % (ref 34–46.6)
HDLC SERPL-MCNC: 45 MG/DL (ref 40–60)
HGB BLD-MCNC: 13.9 G/DL (ref 12–15.9)
IMM GRANULOCYTES # BLD AUTO: 0.03 10*3/MM3 (ref 0–0.05)
IMM GRANULOCYTES NFR BLD AUTO: 0.4 % (ref 0–0.5)
LDLC SERPL CALC-MCNC: 121 MG/DL (ref 0–100)
LDLC/HDLC SERPL: 2.63 {RATIO}
LYMPHOCYTES # BLD AUTO: 2.1 10*3/MM3 (ref 0.7–3.1)
LYMPHOCYTES NFR BLD AUTO: 28.2 % (ref 19.6–45.3)
MCH RBC QN AUTO: 30.5 PG (ref 26.6–33)
MCHC RBC AUTO-ENTMCNC: 32.3 G/DL (ref 31.5–35.7)
MCV RBC AUTO: 94.5 FL (ref 79–97)
MONOCYTES # BLD AUTO: 0.47 10*3/MM3 (ref 0.1–0.9)
MONOCYTES NFR BLD AUTO: 6.3 % (ref 5–12)
NEUTROPHILS NFR BLD AUTO: 4.57 10*3/MM3 (ref 1.7–7)
NEUTROPHILS NFR BLD AUTO: 61.5 % (ref 42.7–76)
PLATELET # BLD AUTO: 187 10*3/MM3 (ref 140–450)
PMV BLD AUTO: 9.7 FL (ref 6–12)
POTASSIUM SERPL-SCNC: 4.9 MMOL/L (ref 3.5–5.2)
PROT SERPL-MCNC: 7.1 G/DL (ref 6–8.5)
RBC # BLD AUTO: 4.55 10*6/MM3 (ref 3.77–5.28)
SODIUM SERPL-SCNC: 139 MMOL/L (ref 136–145)
TRIGL SERPL-MCNC: 123 MG/DL (ref 0–150)
VLDLC SERPL-MCNC: 22 MG/DL (ref 5–40)
WBC NRBC COR # BLD: 7.44 10*3/MM3 (ref 3.4–10.8)

## 2023-03-15 PROCEDURE — 36415 COLL VENOUS BLD VENIPUNCTURE: CPT

## 2023-03-15 PROCEDURE — 80053 COMPREHEN METABOLIC PANEL: CPT

## 2023-03-15 PROCEDURE — 3074F SYST BP LT 130 MM HG: CPT | Performed by: FAMILY MEDICINE

## 2023-03-15 PROCEDURE — 3078F DIAST BP <80 MM HG: CPT | Performed by: FAMILY MEDICINE

## 2023-03-15 PROCEDURE — 99214 OFFICE O/P EST MOD 30 MIN: CPT | Performed by: FAMILY MEDICINE

## 2023-03-15 PROCEDURE — 80061 LIPID PANEL: CPT

## 2023-03-15 PROCEDURE — 1160F RVW MEDS BY RX/DR IN RCRD: CPT | Performed by: FAMILY MEDICINE

## 2023-03-15 PROCEDURE — 1159F MED LIST DOCD IN RCRD: CPT | Performed by: FAMILY MEDICINE

## 2023-03-15 PROCEDURE — 85025 COMPLETE CBC W/AUTO DIFF WBC: CPT

## 2023-03-15 RX ORDER — VALSARTAN 40 MG/1
40 TABLET ORAL DAILY
Qty: 90 TABLET | Refills: 1 | Status: SHIPPED | OUTPATIENT
Start: 2023-03-15

## 2023-03-15 RX ORDER — DICLOFENAC SODIUM 75 MG/1
TABLET, DELAYED RELEASE ORAL
COMMUNITY
Start: 2023-03-14

## 2023-03-15 RX ORDER — NEBIVOLOL 5 MG/1
5 TABLET ORAL DAILY
Qty: 90 TABLET | Refills: 1 | Status: SHIPPED | OUTPATIENT
Start: 2023-03-15

## 2023-03-15 RX ORDER — DULOXETIN HYDROCHLORIDE 60 MG/1
60 CAPSULE, DELAYED RELEASE ORAL DAILY
Qty: 90 CAPSULE | Refills: 1 | Status: SHIPPED | OUTPATIENT
Start: 2023-03-15

## 2023-03-15 RX ORDER — PANTOPRAZOLE SODIUM 40 MG/1
40 TABLET, DELAYED RELEASE ORAL DAILY
Qty: 90 TABLET | Refills: 1 | Status: SHIPPED | OUTPATIENT
Start: 2023-03-15

## 2023-03-15 NOTE — ASSESSMENT & PLAN NOTE
X-ray done last month showed arthritis in the glenohumeral and AC joints.  Pain in left shoulder is improved with use of the diclofenac.  She has an appointment with Dr. Bose in April.  In the meantime, we will get her in for some Physical Therapy to see if they can do some rehab on the shoulder.

## 2023-03-15 NOTE — PROGRESS NOTES
Chief Complaint  Hypertension (Follow up) and Arm Pain (Follow up.  Left arm and shoulder pain)    Subjective          Criss Edwards presents to [facility] today for routine follow-up on chronic issues.    She was seen a couple of weeks ago for left arm pain.  She got a shoulder XR at that time and was given a trial of meloxicam.  XR showed OA in the GH and AC joints both.       She is nebivolol and valsartan for hypertension.  BP has been well controlled.  Denies chest pain, palpitations or shortness of breath.  She is following with Cardiology Dr. Nelson for HOCM.      She is on Cymbalta for depression and anxiety.  There are some complicated interpersonal family relationships contributing to her symptoms.    She is on pantoprazole for GERD.  Reflux has been better controlled lately.      Current Outpatient Medications:   •  Cholecalciferol 25 MCG (1000 UT) capsule, Vitamin D3 1,000 unit oral capsule take 1 capsule by oral route daily   Suspended, Disp: , Rfl:   •  cyclobenzaprine (FLEXERIL) 5 MG tablet, TAKE 1 TABLET BY MOUTH AT NIGHT AS NEEDED FOR MUSCLE SPASMS, Disp: 30 tablet, Rfl: 2  •  diclofenac (VOLTAREN) 75 MG EC tablet, , Disp: , Rfl:   •  DULoxetine (CYMBALTA) 60 MG capsule, Take 1 capsule by mouth Daily., Disp: 90 capsule, Rfl: 1  •  magnesium oxide (MAG-OX) 400 MG tablet, Take 1 tablet by mouth Daily., Disp: 30 tablet, Rfl: 5  •  nebivolol (BYSTOLIC) 5 MG tablet, Take 1 tablet by mouth Daily., Disp: 90 tablet, Rfl: 1  •  ondansetron ODT (Zofran ODT) 4 MG disintegrating tablet, Place 1 tablet on the tongue Every 8 (Eight) Hours As Needed for Nausea or Vomiting., Disp: 20 tablet, Rfl: 0  •  pantoprazole (PROTONIX) 40 MG EC tablet, Take 1 tablet by mouth Daily., Disp: 90 tablet, Rfl: 1  •  valsartan (DIOVAN) 40 MG tablet, Take 1 tablet by mouth Daily., Disp: 90 tablet, Rfl: 1  •  fluticasone (FLONASE) 50 MCG/ACT nasal spray, 2 sprays into the nostril(s) as directed by  "provider Daily. (Patient taking differently: 2 sprays into the nostril(s) as directed by provider As Needed.), Disp: 16 mL, Rfl: 5    Allergies:  Codeine, Iodine, and Contrast dye (echo or unknown ct/mr)      Objective   Vital Signs:   Vitals:    03/15/23 0752   BP: 123/78   BP Location: Left arm   Patient Position: Sitting   Cuff Size: Adult   Pulse: 74   Temp: 97.5 °F (36.4 °C)   TempSrc: Oral   SpO2: 96%   Weight: 83.5 kg (184 lb)   Height: 160 cm (62.99\")       Physical Exam  Vitals reviewed.   Constitutional:       General: She is not in acute distress.     Appearance: Normal appearance. She is well-developed.   HENT:      Head: Normocephalic and atraumatic.      Right Ear: External ear normal.      Left Ear: External ear normal.   Eyes:      Extraocular Movements: Extraocular movements intact.      Conjunctiva/sclera: Conjunctivae normal.      Pupils: Pupils are equal, round, and reactive to light.   Cardiovascular:      Rate and Rhythm: Normal rate and regular rhythm.      Heart sounds: No murmur heard.  Pulmonary:      Effort: Pulmonary effort is normal.      Breath sounds: Normal breath sounds. No wheezing, rhonchi or rales.   Abdominal:      General: Bowel sounds are normal. There is no distension.      Palpations: Abdomen is soft.      Tenderness: There is no abdominal tenderness.   Musculoskeletal:         General: Normal range of motion.   Neurological:      Mental Status: She is alert.   Psychiatric:         Mood and Affect: Affect normal.          Lab Results   Component Value Date    GLUCOSE 132 (H) 07/14/2022    BUN 11 07/14/2022    CREATININE 0.81 07/14/2022    EGFRIFNONA 61 09/09/2021    BCR 13.6 07/14/2022    K 4.6 07/14/2022    CO2 27.6 07/14/2022    CALCIUM 10.3 07/14/2022    ALBUMIN 4.40 07/14/2022    LABIL2 1.3 (L) 01/26/2021    AST 37 (H) 07/14/2022    ALT 27 07/14/2022       Lab Results   Component Value Date    CHOL 183 07/14/2022    CHLPL 179 01/26/2021    TRIG 115 07/14/2022    HDL 42 " 07/14/2022     (H) 07/14/2022            Assessment and Plan    Diagnoses and all orders for this visit:    1. Essential hypertension (Primary)  Assessment & Plan:  BP is running at goal.  Refills sent.  Checking labs.  Continue to monitor.    Orders:  -     valsartan (DIOVAN) 40 MG tablet; Take 1 tablet by mouth Daily.  Dispense: 90 tablet; Refill: 1  -     nebivolol (BYSTOLIC) 5 MG tablet; Take 1 tablet by mouth Daily.  Dispense: 90 tablet; Refill: 1  -     Comprehensive Metabolic Panel; Future  -     Lipid Panel; Future  -     CBC Auto Differential; Future    2. Hypertrophic cardiomyopathy (HCC)  Overview:  Follows with Dr. Nelson.  Continue valsartan and nebivolol.  Refills sent as below.  Continue to monitor.      3. Major depressive disorder, recurrent episode, moderate degree (HCC)  Assessment & Plan:  Stable on duloxetine 60 mg daily.  Refills sent.  Continue to monitor.    Orders:  -     DULoxetine (CYMBALTA) 60 MG capsule; Take 1 capsule by mouth Daily.  Dispense: 90 capsule; Refill: 1    4. Generalized anxiety disorder  Assessment & Plan:  As per depression plan.      5. Gastroesophageal reflux disease without esophagitis  Assessment & Plan:  Stable on pantoprazole 40 mg daily.  No refills needed.  Continue to monitor.  Wean PPI as able.    Orders:  -     pantoprazole (PROTONIX) 40 MG EC tablet; Take 1 tablet by mouth Daily.  Dispense: 90 tablet; Refill: 1    6. Primary osteoarthritis, left shoulder  Assessment & Plan:  X-ray done last month showed arthritis in the glenohumeral and AC joints.  Pain in left shoulder is improved with use of the diclofenac.  She has an appointment with Dr. Bose in April.  In the meantime, we will get her in for some Physical Therapy to see if they can do some rehab on the shoulder.    Orders:  -     Ambulatory Referral to Physical Therapy Evaluate and treat      Follow Up   Return in about 3 months (around 6/15/2023) for Recheck.  Patient was given instructions and  counseling regarding her condition or for health maintenance advice. Please see specific information pulled into the AVS if appropriate.           03/15/2023

## 2023-03-17 DIAGNOSIS — F33.1 MAJOR DEPRESSIVE DISORDER, RECURRENT EPISODE, MODERATE DEGREE: ICD-10-CM

## 2023-03-17 RX ORDER — DULOXETIN HYDROCHLORIDE 60 MG/1
CAPSULE, DELAYED RELEASE ORAL
Qty: 90 CAPSULE | Refills: 1 | OUTPATIENT
Start: 2023-03-17

## 2023-03-22 ENCOUNTER — OFFICE VISIT (OUTPATIENT)
Dept: OTOLARYNGOLOGY | Facility: CLINIC | Age: 73
End: 2023-03-22
Payer: MEDICARE

## 2023-03-22 VITALS — RESPIRATION RATE: 20 BRPM | HEIGHT: 63 IN | WEIGHT: 184.8 LBS | BODY MASS INDEX: 32.74 KG/M2 | TEMPERATURE: 97.7 F

## 2023-03-22 DIAGNOSIS — H92.02 OTALGIA OF LEFT EAR: ICD-10-CM

## 2023-03-22 DIAGNOSIS — H61.23 BILATERAL IMPACTED CERUMEN: Primary | ICD-10-CM

## 2023-03-22 DIAGNOSIS — R26.89 IMBALANCE: ICD-10-CM

## 2023-03-22 PROCEDURE — 69210 REMOVE IMPACTED EAR WAX UNI: CPT | Performed by: OTOLARYNGOLOGY

## 2023-03-22 PROCEDURE — 1160F RVW MEDS BY RX/DR IN RCRD: CPT | Performed by: OTOLARYNGOLOGY

## 2023-03-22 PROCEDURE — 1159F MED LIST DOCD IN RCRD: CPT | Performed by: OTOLARYNGOLOGY

## 2023-03-22 PROCEDURE — 99214 OFFICE O/P EST MOD 30 MIN: CPT | Performed by: OTOLARYNGOLOGY

## 2023-03-23 ENCOUNTER — TELEPHONE (OUTPATIENT)
Dept: OTOLARYNGOLOGY | Facility: CLINIC | Age: 73
End: 2023-03-23
Payer: MEDICARE

## 2023-03-23 RX ORDER — BETAMETHASONE DIPROPIONATE 0.5 MG/G
1 CREAM TOPICAL 2 TIMES DAILY
Qty: 15 G | Refills: 0 | Status: SHIPPED | OUTPATIENT
Start: 2023-03-23 | End: 2023-04-06

## 2023-03-23 NOTE — TELEPHONE ENCOUNTER
Caller: Criss Edwards    Relationship to patient: SELF    Best call back number: [phone]    Patient is needing: PT CALLED AND STATED THAT SHE WAS SEEN IN THE OFFICE IN [place] ON 03/22/23 WITH DR HARVEY. PT STATES THAT SHE WAS SUPPOSED TO HAVE A PRESCRIPTION SENT IN TO HELP WITH HER EAR ISSUES. SHE STATES THAT SHE BELIEVES THAT IT WAS TO BE DROPS OF SOME KIND. SHE WOULD LIKE IT SENT OVER TO THE PHARMACY, [facility] [id number] [address] [place] HERNANDEZ [facility] [phone] [facility] [phone] FX. THEY HASNT RECEIVED THE PRESCRIPTION YET. PLEASE CALL THE PT BACK TO LET HER KNOW IF THE MEDICATION CAN BE SENT OR TO LET HER KNOW THAT IT HAS BEEN SENT

## 2023-04-17 ENCOUNTER — TREATMENT (OUTPATIENT)
Dept: PHYSICAL THERAPY | Facility: CLINIC | Age: 73
End: 2023-04-17
Payer: MEDICARE

## 2023-04-17 DIAGNOSIS — M19.012 OSTEOARTHRITIS OF LEFT SHOULDER, UNSPECIFIED OSTEOARTHRITIS TYPE: ICD-10-CM

## 2023-04-17 DIAGNOSIS — R29.898 WEAKNESS OF LEFT ARM: ICD-10-CM

## 2023-04-17 DIAGNOSIS — M25.612 DECREASED RANGE OF MOTION OF LEFT SHOULDER: ICD-10-CM

## 2023-04-17 DIAGNOSIS — M25.512 LEFT SHOULDER PAIN, UNSPECIFIED CHRONICITY: Primary | ICD-10-CM

## 2023-04-17 PROCEDURE — 97161 PT EVAL LOW COMPLEX 20 MIN: CPT | Performed by: PHYSICAL THERAPIST

## 2023-04-17 PROCEDURE — 97110 THERAPEUTIC EXERCISES: CPT | Performed by: PHYSICAL THERAPIST

## 2023-04-17 NOTE — PROGRESS NOTES
Physical Therapy Initial Evaluation and Plan of Care      Patient: Criss Edwards   : 1950  Diagnosis/ICD-10 Code:  Left shoulder pain, unspecified chronicity [M25.512]  Referring practitioner: Clarisse Flores,*  Date of Initial Visit: 2023  Today's Date: 2023  Patient seen for 1 sessions         Visit Diagnoses:    ICD-10-CM ICD-9-CM   1. Left shoulder pain, unspecified chronicity  M25.512 719.41   2. Decreased range of motion of left shoulder  M25.612 719.51   3. Osteoarthritis of left shoulder, unspecified osteoarthritis type  M19.012 715.91   4. Weakness of left arm  R29.898 729.89       Subjective Evaluation    History of Present Illness  Mechanism of injury: Pt was referred to OPPT with complaints of L shoulder pain.  Beginning of March, Criss notes that she was filling up a bucket of water to feed to her animals, turned around, slipped on ice, and hurt her L shoulder.  She saw PCP, X-rays were done,  they showed arthritis in the glenohumeral and AC joints.  She is limited with her L shoulder ROM.  She notes a knot and increased swelling at the L shoulder on top of the shoulder and in the armpit.  She has to keep her arm to her side to get any sleep.  She gets radicular symptoms, needle like sensation, down the LUE.  She has an appointment with Dr. Bose on 2023.    She notes several falls.     She cannot  branches, cannot reach up high, household chores, opening doors.     Pain  Current pain ratin  Quality: needle-like, dull ache, throbbing and radiating  Relieving factors: ice and medications  Aggravating factors: overhead activity, lifting, outstretched reach and sleeping    Social Support  Lives in: trailer  Lives with: alone    Hand dominance: right    Diagnostic Tests  X-ray: abnormal    Patient Goals  Patient goals for therapy: increased strength, decreased edema, decreased pain, increased motion, independence with ADLs/IADLs and return to sport/leisure  activities             Objective           General Comments     Shoulder Comments   Posture: rounded shoulders, forwards head    L shoulder AROM:  Flexion: 88 degrees   External rotation: 33 degrees   Abduction: 80 degrees   Internal rotation: 25 degrees     L shoulder MMT  Flexion: 3-/5  Extension: 3/5  Abduction: 3-/5  External rotation: 3/5  Internal rotation: 3-/5    Radicular symptoms:  Tingling running down the LUE down into the fingertips    Tenderness: R humeral head, biceps, triceps, deltoid, UT, forearm    Swelling: anterior shoulder, humeral head           Assessment & Plan     Assessment  Impairments: abnormal muscle firing, abnormal or restricted ROM, activity intolerance, impaired physical strength, lacks appropriate home exercise program, pain with function and safety issue  Functional Limitations: carrying objects, lifting, sleeping, pulling, pushing, uncomfortable because of pain, reaching behind back, reaching overhead and unable to perform repetitive tasks  Assessment details: Pt presents with limitations, noted below, that impede her ability to open jars, perform ADLs, reach outside RONALD, hobbies, sleeping, and household duties. The skills of a therapist will be required to safely and effectively implement the following treatment plan to restore maximal level of function.        Goals  Plan Goals: SHOULDER  PROBLEMS:     1. The patient has limited ROM of the left shoulder.    LTG 1: 12 weeks:  The patient will demonstrate 160 degrees of left shoulder flexion, 160 degrees of shoulder abduction, 50 degrees of shoulder external rotation, and 50 degrees of shoulder internal rotation to allow the patient to reach into upper kitchen cabinets and manipulate clothing behind the back with greater ease.    STATUS:  New   STG 1a: 4 weeks:  The patient will demonstrate 100 degrees of left shoulder flexion, 100 degrees of shoulder abduction, 40 degrees of shoulder external rotation, and 40 degrees of shoulder  internal rotation.    STATUS:  New   TREATMENT: Manual therapy, therapeutic exercise, home exercise instruction, and modalities as needed to include: electrical stimulation, ultrasound, moist heat, and ice.    2. The patient has limited strength of the left shoulder.   LTG 2: 12 weeks:  The patient will demonstrate 4 /5 strength for left shoulder flexion, abduction, external rotation, and internal rotation in order to demonstrate improved shoulder stability.    STATUS:  New   STG 2a: 4 weeks:  The patient will demonstrate 4- /5 strength for left shoulder flexion, abduction, external rotation, and internal rotation.    STATUS:  New   STG2b:  4 weeks:  The patient will be independent with home exercises.     STATUS:  New   TREATMENT: Manual therapy, therapeutic exercise, home exercise instruction, and modalities as needed to include: electrical stimulation, ultrasound, moist heat, and ice.     3. The patient complains of pain to the left shoulder.   LTG 3: 12 weeks:  The patient will report a pain rating of 2 /10 or better in order to improve sleep quality and tolerance to performance of activities of daily living.    STATUS:  New   STG 3a: 4 weeks:  The patient will report a pain rating of 4 /10 or better.     STATUS:  New   TREATMENT: Manual therapy, therapeutic exercise, home exercise instruction, and modalities as needed to include: electrical stimulation, ultrasound, moist heat, and ice.    4. Carrying, Moving, and Handling Objects Functional Limitation     LTG 4: 12 weeks:  The patient will demonstrate limitation by achieving a score of 30/55 on the QuickDASH.    STATUS:  New   STG 4a: 4 weeks:  The patient will demonstrate limitation by achieving a score of 38/55 on the QuickDASH.      STATUS:  New   TREATMENT:  Manual therapy, therapeutic exercise, home exercise instruction, and modalities as needed to include: moist heat, electrical stimulation, and ultrasound.    5. The patient reports radicular symptoms in  the left upper extremity.   LTG 5: 12 weeks:  The patient will report a decrease in radicular symptoms in the left upper extremity by 75%.    STATUS:  New   STG 5a: 4 weeks:  The patient will report a decrease in radicular symptoms in the left upper extremity by 50%.    STATUS:  New   TREATMENT: Manual therapy, therapeutic exercise, home exercise instruction, cervical traction, and modalities as needed to include: moist heat, electrical stimulation, and ultrasound.        Plan  Therapy options: will be seen for skilled therapy services  Planned modality interventions: cryotherapy, dry needling, electrical stimulation/Russian stimulation, TENS, thermotherapy (hydrocollator packs) and ultrasound  Planned therapy interventions: manual therapy, flexibility, functional ROM exercises, home exercise program, therapeutic activities, stretching, strengthening, soft tissue mobilization, postural training, neuromuscular re-education, body mechanics training, joint mobilization, ADL retraining and fine motor coordination training  Duration in weeks: 12  Treatment plan discussed with: patient  Plan details: Review L shoulder/arm HEP, update as needed.    Progress with L shoulder strength, scapular stabilization, postural awareness, increased stamina, decreased tightness, improved ROM/flexibility, fine motor and coordination, education as needed.            History # of Personal Factors and/or Comorbidities: MODERATE (1-2)  Examination of Body System(s): # of elements: MODERATE (3)  Clinical Presentation: STABLE   Clinical Decision Making: LOW     Timed:  Manual Therapy:         mins  65103;  Therapeutic Exercise:    8     mins  64784;     Neuromuscular Vivienne:        mins  81542;    Therapeutic Activity:          mins  64772;     Gait Training:           mins  02092;     Ultrasound:          mins  82576;    Canalith Repos           ___  mins  61616      Untimed:  Electrical Stimulation:         mins  01195 ( );  Mechanical  Traction:         mins  11048;   Dry Needling:                     mins self pay  Low Eval:                      30     mins  50746;  Medium Eval:                     mins  86352;   High Eval:                          mins  76300       Timed Treatment:   8   mins   Total Treatment:     38   mins    PT SIGNATURE: Farzaneh Flores PT, DPT  [place] License: [id number]  Electronically signed by Farzaneh Flores PT, 04/17/23, 7:41 AM EDT      Initial Certification    Certification Period: 4/17/2023 thru 7/15/2023  I certify that the therapy services are furnished while this patient is under my care.  The services outlined above are required by this patient, and will be reviewed every 90 days.     PHYSICIAN: Clarisse Flores MD  NPI: [id number]            PHYSICIAN PRINT NAME: ______________________________________________      PHYSICIAN SIGNATURE: ______________________________________________         DATE:________________________________        Please sign and return via fax to [phone].  Thank you, [facility].

## 2023-04-20 ENCOUNTER — TREATMENT (OUTPATIENT)
Dept: PHYSICAL THERAPY | Facility: CLINIC | Age: 73
End: 2023-04-20
Payer: MEDICARE

## 2023-04-20 ENCOUNTER — CLINICAL SUPPORT (OUTPATIENT)
Dept: ORTHOPEDIC SURGERY | Facility: CLINIC | Age: 73
End: 2023-04-20
Payer: MEDICARE

## 2023-04-20 VITALS — BODY MASS INDEX: 32.6 KG/M2 | HEIGHT: 63 IN | TEMPERATURE: 98.6 F | WEIGHT: 184 LBS

## 2023-04-20 DIAGNOSIS — R29.898 WEAKNESS OF LEFT ARM: ICD-10-CM

## 2023-04-20 DIAGNOSIS — M19.012 PRIMARY OSTEOARTHRITIS, LEFT SHOULDER: Primary | ICD-10-CM

## 2023-04-20 DIAGNOSIS — M19.012 OSTEOARTHRITIS OF LEFT SHOULDER, UNSPECIFIED OSTEOARTHRITIS TYPE: ICD-10-CM

## 2023-04-20 DIAGNOSIS — M25.512 ACUTE PAIN OF LEFT SHOULDER: ICD-10-CM

## 2023-04-20 DIAGNOSIS — M25.612 DECREASED RANGE OF MOTION OF LEFT SHOULDER: ICD-10-CM

## 2023-04-20 DIAGNOSIS — M25.512 LEFT SHOULDER PAIN, UNSPECIFIED CHRONICITY: Primary | ICD-10-CM

## 2023-04-20 RX ADMIN — METHYLPREDNISOLONE ACETATE 160 MG: 80 INJECTION, SUSPENSION INTRA-ARTICULAR; INTRALESIONAL; INTRAMUSCULAR; SOFT TISSUE at 13:49

## 2023-04-20 RX ADMIN — LIDOCAINE HYDROCHLORIDE 2 ML: 10 INJECTION, SOLUTION EPIDURAL; INFILTRATION; INTRACAUDAL; PERINEURAL at 13:49

## 2023-04-20 NOTE — PROGRESS NOTES
"Chief Complaint  Follow-up and Pain of the Left Knee    Subjective    History of Present Illness      Criss Edwards is a 72 y.o. female who presents to [facility] for  Patient returns today for follow-up on left shoulder pain. Her pain is generalized in the shoulder, has been progressive and remains intermittent . Her pain is worsened by repetitive motion and improved with NSAIDS.  She states that she is having increasing pain and discomfort in her shoulder and her knee.  She had fallen on 3 March 2023 and since that time she has difficulty with abducting the arm.  She has difficulty in reaching the overhead position.  Shoulder bothers her significantly.  She states that she finds it difficult to sleep in bed at night.  She continues to have knee pain and discomfort as well.  The patient states that she started physical therapy but that has really really not helped her symptoms to any great extent.  I am concerned that she has a rotator cuff tear and is possibly going to need an MRI for evaluation of the extent of damage to the abductor mechanism of the shoulder.      Objective   Vital Signs:   Temp 98.6 °F (37 °C)   Ht 160 cm (62.99\")   Wt 83.5 kg (184 lb)   BMI 32.60 kg/m²     Physical Exam  72 y.o. female is awake, alert, oriented, in no acute distress and well developed, well nourished.  Ortho Exam   LEFT shoulder  Left shoulder (cta). Rotator cuff function is extremely impaired. There is a high riding humeral head with articulation of the humerus to the undersurface of the acromiohumeral articulation. AC joint is exquisitely tender and painful for patient. Axillary nerve function is well preserved. There is significant winging of the scapula with hollowing of the fossae over the proximal and distal aspects of the spine of the scapula. Forward flexion is 0-30 degrees, active abduction is 0-60 degrees. Drop arm sign is positive. External rotation against resistance is extremely " painful and limited for the patient. Skin and soft tissues are essentially normal other than some amounts of swelling. The pain level is 6      Result Review :                  Large Joint Arthrocentesis: L glenohumeral  Date/Time: 4/20/2023 1:49 PM  Consent given by: patient  Site marked: site marked  Timeout: Immediately prior to procedure a time out was called to verify the correct patient, procedure, equipment, support staff and site/side marked as required   Supporting Documentation  Indications: pain   Procedure Details  Location: shoulder - L glenohumeral  Preparation: Patient was prepped and draped in the usual sterile fashion  Needle size: 25 G  Approach: anteromedial  Medications administered: 2 mL lidocaine PF 1% 1 %; 160 mg methylPREDNISolone acetate 80 MG/ML  Patient tolerance: patient tolerated the procedure well with no immediate complications               Assessment   Assessment and Plan    Problem List Items Addressed This Visit        Musculoskeletal and Injuries    Primary osteoarthritis, left shoulder - Primary    Relevant Orders    Large Joint Arthrocentesis: L glenohumeral       Follow Up   · Injected patient's left shoulder joint(s)with Depo-Medrol from an anterolateral approach   · Compression/brace to the knee to prevent it from buckling and giving out.  · Schedule an MRI of the left shoulder for evaluation of cuff tear arthropathy and injury to the rotator cuff musculature.  · Calcium and vitamin D for bone health.  · Rest, ice, compression, and elevation (RICE) therapy  · OTC Tylenol 500-1000mg by mouth every 6 hours as needed for pain   · Follow up in 6 week(s)  • Patient was given instructions and counseling regarding her condition or for health maintenance advice. Please see specific information pulled into the AVS if appropriate.     Alberto Bose MD   Date of Encounter: 4/20/2023       EMR Dragon/Transcription disclaimer:  Much of this encounter note is an electronic  transcription/translation of spoken language to printed text. The electronic translation of spoken language may permit erroneous, or at times, nonsensical words or phrases to be inadvertently transcribed; Although I have reviewed the note for such errors, some may still exist.

## 2023-04-20 NOTE — PROGRESS NOTES
"Physical Therapy Daily Treatment Note      Patient: Criss Edwards   : 1950  Referring practitioner: Clarisse Flores,*  Date of Initial Visit: Type: THERAPY  Noted: 2023  Today's Date: 2023  Patient seen for 2 sessions           Visit Diagnoses:    ICD-10-CM ICD-9-CM   1. Left shoulder pain, unspecified chronicity  M25.512 719.41   2. Decreased range of motion of left shoulder  M25.612 719.51   3. Osteoarthritis of left shoulder, unspecified osteoarthritis type  M19.012 715.91   4. Weakness of left arm  R29.898 729.89       Subjective Evaluation    History of Present Illness    Subjective comment: 5/10 pain in the L shoulder.  She saw Dr. Bose this afternoon, she got an injection in the L shoulder, she \"feels different\".  She is getting an MRI for the L shoulder.          Objective   See Exercise, Manual, and Modality Logs for complete treatment.       Assessment & Plan     Assessment  Impairments: abnormal muscle firing, abnormal or restricted ROM, activity intolerance, impaired physical strength, lacks appropriate home exercise program, pain with function and safety issue  Functional Limitations: carrying objects, lifting, sleeping, pulling, pushing, uncomfortable because of pain, reaching behind back, reaching overhead and unable to perform repetitive tasks  Assessment details: First session today, progressed with cervical and shoulder stretching, improvements with L shoulder ROM.  Manual work used on the upper trap, as tolerated.  She was able to do some shoulder rows.  She notes her shoulder was feeling different since the shot earlier this afternoon.     Goals  Plan Goals: SHOULDER  PROBLEMS:     1. The patient has limited ROM of the left shoulder.    LTG 1: 12 weeks:  The patient will demonstrate 160 degrees of left shoulder flexion, 160 degrees of shoulder abduction, 50 degrees of shoulder external rotation, and 50 degrees of shoulder internal rotation to allow the patient to " reach into upper kitchen cabinets and manipulate clothing behind the back with greater ease.    STATUS:  Progressing   STG 1a: 4 weeks:  The patient will demonstrate 100 degrees of left shoulder flexion, 100 degrees of shoulder abduction, 40 degrees of shoulder external rotation, and 40 degrees of shoulder internal rotation.    STATUS:  Progressing   TREATMENT: Manual therapy, therapeutic exercise, home exercise instruction, and modalities as needed to include: electrical stimulation, ultrasound, moist heat, and ice.    2. The patient has limited strength of the left shoulder.   LTG 2: 12 weeks:  The patient will demonstrate 4 /5 strength for left shoulder flexion, abduction, external rotation, and internal rotation in order to demonstrate improved shoulder stability.    STATUS:   Progressing   STG 2a: 4 weeks:  The patient will demonstrate 4- /5 strength for left shoulder flexion, abduction, external rotation, and internal rotation.    STATUS:   Progressing   STG2b:  4 weeks:  The patient will be independent with home exercises.     STATUS:   Progressing   TREATMENT: Manual therapy, therapeutic exercise, home exercise instruction, and modalities as needed to include: electrical stimulation, ultrasound, moist heat, and ice.     3. The patient complains of pain to the left shoulder.   LTG 3: 12 weeks:  The patient will report a pain rating of 2 /10 or better in order to improve sleep quality and tolerance to performance of activities of daily living.    STATUS:   Progressing   STG 3a: 4 weeks:  The patient will report a pain rating of 4 /10 or better.     STATUS:  Progressing   TREATMENT: Manual therapy, therapeutic exercise, home exercise instruction, and modalities as needed to include: electrical stimulation, ultrasound, moist heat, and ice.    4. Carrying, Moving, and Handling Objects Functional Limitation     LTG 4: 12 weeks:  The patient will demonstrate limitation by achieving a score of 30/55 on the  QuickDASH.    STATUS:   Progressing   STG 4a: 4 weeks:  The patient will demonstrate limitation by achieving a score of 38/55 on the QuickDASH.      STATUS:   Progressing   TREATMENT:  Manual therapy, therapeutic exercise, home exercise instruction, and modalities as needed to include: moist heat, electrical stimulation, and ultrasound.    5. The patient reports radicular symptoms in the left upper extremity.   LTG 5: 12 weeks:  The patient will report a decrease in radicular symptoms in the left upper extremity by 75%.    STATUS:   Progressing   STG 5a: 4 weeks:  The patient will report a decrease in radicular symptoms in the left upper extremity by 50%.    STATUS:   Progressing   TREATMENT: Manual therapy, therapeutic exercise, home exercise instruction, cervical traction, and modalities as needed to include: moist heat, electrical stimulation, and ultrasound.        Plan  Therapy options: will be seen for skilled therapy services  Planned modality interventions: cryotherapy, dry needling, electrical stimulation/Russian stimulation, TENS, thermotherapy (hydrocollator packs) and ultrasound  Planned therapy interventions: manual therapy, flexibility, functional ROM exercises, home exercise program, therapeutic activities, stretching, strengthening, soft tissue mobilization, postural training, neuromuscular re-education, body mechanics training, joint mobilization, ADL retraining and fine motor coordination training  Duration in weeks: 12  Treatment plan discussed with: patient  Plan details: Review L shoulder/arm pain since injection.     Progress with L shoulder strength, scapular stabilization, postural awareness, increased stamina, decreased tightness, improved ROM/flexibility, fine motor and coordination, education as needed.            Progress per Plan of Care and Progress strengthening /stabilization /functional activity           Timed:  Manual Therapy:    14     mins  50993;  Therapeutic Exercise:    14      mins  85966;     Neuromuscular Vivienne:        mins  39594;    Therapeutic Activity:          mins  69813;     Gait Training:           mins  43355;     Ultrasound:          mins  38041;    Canalith Repos           ___  mins  48391      Untimed:  Electrical Stimulation:         mins  52169 ( );  Mechanical Traction:         mins  40307;   Dry Needling:                     mins self pay       Timed Treatment:      28mins   Total Treatment:     30   mins      Farzaneh Flores PT, [facility] License #: [id number]

## 2023-04-26 ENCOUNTER — TREATMENT (OUTPATIENT)
Dept: PHYSICAL THERAPY | Facility: CLINIC | Age: 73
End: 2023-04-26
Payer: MEDICARE

## 2023-04-26 DIAGNOSIS — M25.612 DECREASED RANGE OF MOTION OF LEFT SHOULDER: ICD-10-CM

## 2023-04-26 DIAGNOSIS — M19.012 OSTEOARTHRITIS OF LEFT SHOULDER, UNSPECIFIED OSTEOARTHRITIS TYPE: ICD-10-CM

## 2023-04-26 DIAGNOSIS — R29.898 WEAKNESS OF LEFT ARM: ICD-10-CM

## 2023-04-26 DIAGNOSIS — M25.512 LEFT SHOULDER PAIN, UNSPECIFIED CHRONICITY: Primary | ICD-10-CM

## 2023-04-26 PROCEDURE — 97530 THERAPEUTIC ACTIVITIES: CPT | Performed by: PHYSICAL THERAPIST

## 2023-04-26 PROCEDURE — 97140 MANUAL THERAPY 1/> REGIONS: CPT | Performed by: PHYSICAL THERAPIST

## 2023-04-26 PROCEDURE — 97110 THERAPEUTIC EXERCISES: CPT | Performed by: PHYSICAL THERAPIST

## 2023-04-26 NOTE — PROGRESS NOTES
"Physical Therapy Daily Treatment Note      Patient: Criss Edwards   : 1950  Referring practitioner: Clarisse Flores,*  Date of Initial Visit: Type: THERAPY  Noted: 2023  Today's Date: 2023  Patient seen for 3 sessions           Visit Diagnoses:    ICD-10-CM ICD-9-CM   1. Left shoulder pain, unspecified chronicity  M25.512 719.41   2. Decreased range of motion of left shoulder  M25.612 719.51   3. Osteoarthritis of left shoulder, unspecified osteoarthritis type  M19.012 715.91   4. Weakness of left arm  R29.898 729.89       Subjective Evaluation    History of Present Illness  Mechanism of injury: Her shoulder isn't as bad today, the injection and therapy helped last week.      She does stuff at home, she doesn't have help, her arms, muscles, bones are sore, but not pain.  She cannot use the shovel.     She had a tooth pulled on Monday, she has a \"mouth full of stiches\", also having neck pain.     3/10 pain             Objective   See Exercise, Manual, and Modality Logs for complete treatment.       Assessment & Plan     Assessment  Impairments: abnormal muscle firing, abnormal or restricted ROM, activity intolerance, impaired physical strength, lacks appropriate home exercise program, pain with function and safety issue  Functional Limitations: carrying objects, lifting, sleeping, pulling, pushing, uncomfortable because of pain, reaching behind back, reaching overhead and unable to perform repetitive tasks  Assessment details: She had good tolerance to activities today, better ROM noted as well.  Continued with strengthening work, used 1 lb weights with activities, rest breaks given throughout.  Periscapular muscles were tight, manual work used.     Goals  Plan Goals: SHOULDER  PROBLEMS:     1. The patient has limited ROM of the left shoulder.    LTG 1: 12 weeks:  The patient will demonstrate 160 degrees of left shoulder flexion, 160 degrees of shoulder abduction, 50 degrees of shoulder " external rotation, and 50 degrees of shoulder internal rotation to allow the patient to reach into upper kitchen cabinets and manipulate clothing behind the back with greater ease.    STATUS:  Progressing   STG 1a: 4 weeks:  The patient will demonstrate 100 degrees of left shoulder flexion, 100 degrees of shoulder abduction, 40 degrees of shoulder external rotation, and 40 degrees of shoulder internal rotation.    STATUS:  Progressing   TREATMENT: Manual therapy, therapeutic exercise, home exercise instruction, and modalities as needed to include: electrical stimulation, ultrasound, moist heat, and ice.    2. The patient has limited strength of the left shoulder.   LTG 2: 12 weeks:  The patient will demonstrate 4 /5 strength for left shoulder flexion, abduction, external rotation, and internal rotation in order to demonstrate improved shoulder stability.    STATUS:   Progressing   STG 2a: 4 weeks:  The patient will demonstrate 4- /5 strength for left shoulder flexion, abduction, external rotation, and internal rotation.    STATUS:   Progressing   STG2b:  4 weeks:  The patient will be independent with home exercises.     STATUS:   Progressing   TREATMENT: Manual therapy, therapeutic exercise, home exercise instruction, and modalities as needed to include: electrical stimulation, ultrasound, moist heat, and ice.     3. The patient complains of pain to the left shoulder.   LTG 3: 12 weeks:  The patient will report a pain rating of 2 /10 or better in order to improve sleep quality and tolerance to performance of activities of daily living.    STATUS:   Progressing   STG 3a: 4 weeks:  The patient will report a pain rating of 4 /10 or better.     STATUS:  Progressing   TREATMENT: Manual therapy, therapeutic exercise, home exercise instruction, and modalities as needed to include: electrical stimulation, ultrasound, moist heat, and ice.    4. Carrying, Moving, and Handling Objects Functional Limitation     LTG 4: 12 weeks:   The patient will demonstrate limitation by achieving a score of 30/55 on the QuickDASH.    STATUS:   Progressing   STG 4a: 4 weeks:  The patient will demonstrate limitation by achieving a score of 38/55 on the QuickDASH.      STATUS:   Progressing   TREATMENT:  Manual therapy, therapeutic exercise, home exercise instruction, and modalities as needed to include: moist heat, electrical stimulation, and ultrasound.    5. The patient reports radicular symptoms in the left upper extremity.   LTG 5: 12 weeks:  The patient will report a decrease in radicular symptoms in the left upper extremity by 75%.    STATUS:   Progressing   STG 5a: 4 weeks:  The patient will report a decrease in radicular symptoms in the left upper extremity by 50%.    STATUS:   Progressing   TREATMENT: Manual therapy, therapeutic exercise, home exercise instruction, cervical traction, and modalities as needed to include: moist heat, electrical stimulation, and ultrasound.        Plan  Therapy options: will be seen for skilled therapy services  Planned modality interventions: cryotherapy, dry needling, electrical stimulation/Russian stimulation, TENS, thermotherapy (hydrocollator packs) and ultrasound  Planned therapy interventions: manual therapy, flexibility, functional ROM exercises, home exercise program, therapeutic activities, stretching, strengthening, soft tissue mobilization, postural training, neuromuscular re-education, body mechanics training, joint mobilization, ADL retraining and fine motor coordination training  Duration in weeks: 12  Treatment plan discussed with: patient  Plan details: Progress with L shoulder strength, scapular stabilization, postural awareness, increased stamina, decreased tightness          Progress per Plan of Care and Progress strengthening /stabilization /functional activity           Timed:  Manual Therapy:    14     mins  67767;  Therapeutic Exercise:    12     mins  47859;     Neuromuscular Vivienne:        mins   41298;    Therapeutic Activity:     12     mins  28089;     Gait Training:           mins  14247;     Ultrasound:          mins  73087;    Canalith Repos           ___  mins  33660      Untimed:  Electrical Stimulation:         mins  21344 ( );  Mechanical Traction:        mins  28457;   Dry Needling:                     mins self pay       Timed Treatment:   38   mins   Total Treatment:     38   mins      Farzaneh Flores PT, [facility] License #: [id number]

## 2023-05-02 ENCOUNTER — TREATMENT (OUTPATIENT)
Dept: PHYSICAL THERAPY | Facility: CLINIC | Age: 73
End: 2023-05-02
Payer: MEDICARE

## 2023-05-02 DIAGNOSIS — M25.512 LEFT SHOULDER PAIN, UNSPECIFIED CHRONICITY: Primary | ICD-10-CM

## 2023-05-02 DIAGNOSIS — R29.898 WEAKNESS OF LEFT ARM: ICD-10-CM

## 2023-05-02 DIAGNOSIS — M25.612 DECREASED RANGE OF MOTION OF LEFT SHOULDER: ICD-10-CM

## 2023-05-02 DIAGNOSIS — M19.012 OSTEOARTHRITIS OF LEFT SHOULDER, UNSPECIFIED OSTEOARTHRITIS TYPE: ICD-10-CM

## 2023-05-02 PROCEDURE — 97110 THERAPEUTIC EXERCISES: CPT | Performed by: PHYSICAL THERAPIST

## 2023-05-02 PROCEDURE — 97140 MANUAL THERAPY 1/> REGIONS: CPT | Performed by: PHYSICAL THERAPIST

## 2023-05-02 NOTE — PROGRESS NOTES
Physical Therapy Daily Treatment Note      Patient: Criss Edwards   : 1950  Referring practitioner: Clarisse Flores,*  Date of Initial Visit: Type: THERAPY  Noted: 2023  Today's Date: 2023  Patient seen for 4 sessions           Visit Diagnoses:    ICD-10-CM ICD-9-CM   1. Left shoulder pain, unspecified chronicity  M25.512 719.41   2. Decreased range of motion of left shoulder  M25.612 719.51   3. Osteoarthritis of left shoulder, unspecified osteoarthritis type  M19.012 715.91   4. Weakness of left arm  R29.898 729.89       Subjective Evaluation    History of Present Illness    Subjective comment: 4/10 pain today in the arm, ROM is getting better.  However, 30/10 pain over the weekend, really hurting.  She took a hot shower in the middle of the night to help with pain.          Objective   See Exercise, Manual, and Modality Logs for complete treatment.       Assessment & Plan     Assessment  Impairments: abnormal muscle firing, abnormal or restricted ROM, activity intolerance, impaired physical strength, lacks appropriate home exercise program, pain with function and safety issue  Functional Limitations: carrying objects, lifting, sleeping, pulling, pushing, uncomfortable because of pain, reaching behind back, reaching overhead and unable to perform repetitive tasks  Assessment details: She did have some soreness with activities today, pulled back on strengthening today.  Added in manual work to help with the shoulder tightness and pain.  Most tenderness noted at the anterior shoulder/biceps region, some in the L upper trap.     Goals  Plan Goals: SHOULDER  PROBLEMS:     1. The patient has limited ROM of the left shoulder.    LTG 1: 12 weeks:  The patient will demonstrate 160 degrees of left shoulder flexion, 160 degrees of shoulder abduction, 50 degrees of shoulder external rotation, and 50 degrees of shoulder internal rotation to allow the patient to reach into upper kitchen cabinets and  manipulate clothing behind the back with greater ease.    STATUS:  Progressing   STG 1a: 4 weeks:  The patient will demonstrate 100 degrees of left shoulder flexion, 100 degrees of shoulder abduction, 40 degrees of shoulder external rotation, and 40 degrees of shoulder internal rotation.    STATUS:  Progressing   TREATMENT: Manual therapy, therapeutic exercise, home exercise instruction, and modalities as needed to include: electrical stimulation, ultrasound, moist heat, and ice.    2. The patient has limited strength of the left shoulder.   LTG 2: 12 weeks:  The patient will demonstrate 4 /5 strength for left shoulder flexion, abduction, external rotation, and internal rotation in order to demonstrate improved shoulder stability.    STATUS:   Progressing   STG 2a: 4 weeks:  The patient will demonstrate 4- /5 strength for left shoulder flexion, abduction, external rotation, and internal rotation.    STATUS:   Progressing   STG2b:  4 weeks:  The patient will be independent with home exercises.     STATUS:   Progressing   TREATMENT: Manual therapy, therapeutic exercise, home exercise instruction, and modalities as needed to include: electrical stimulation, ultrasound, moist heat, and ice.     3. The patient complains of pain to the left shoulder.   LTG 3: 12 weeks:  The patient will report a pain rating of 2 /10 or better in order to improve sleep quality and tolerance to performance of activities of daily living.    STATUS:   Progressing   STG 3a: 4 weeks:  The patient will report a pain rating of 4 /10 or better.     STATUS:  Progressing   TREATMENT: Manual therapy, therapeutic exercise, home exercise instruction, and modalities as needed to include: electrical stimulation, ultrasound, moist heat, and ice.    4. Carrying, Moving, and Handling Objects Functional Limitation     LTG 4: 12 weeks:  The patient will demonstrate limitation by achieving a score of 30/55 on the QuickDASH.    STATUS:   Progressing   STG 4a: 4  weeks:  The patient will demonstrate limitation by achieving a score of 38/55 on the QuickDASH.      STATUS:   Progressing   TREATMENT:  Manual therapy, therapeutic exercise, home exercise instruction, and modalities as needed to include: moist heat, electrical stimulation, and ultrasound.    5. The patient reports radicular symptoms in the left upper extremity.   LTG 5: 12 weeks:  The patient will report a decrease in radicular symptoms in the left upper extremity by 75%.    STATUS:   Progressing   STG 5a: 4 weeks:  The patient will report a decrease in radicular symptoms in the left upper extremity by 50%.    STATUS:   Progressing   TREATMENT: Manual therapy, therapeutic exercise, home exercise instruction, cervical traction, and modalities as needed to include: moist heat, electrical stimulation, and ultrasound.        Plan  Therapy options: will be seen for skilled therapy services  Planned modality interventions: cryotherapy, dry needling, electrical stimulation/Russian stimulation, TENS, thermotherapy (hydrocollator packs) and ultrasound  Planned therapy interventions: manual therapy, flexibility, functional ROM exercises, home exercise program, therapeutic activities, stretching, strengthening, soft tissue mobilization, postural training, neuromuscular re-education, body mechanics training, joint mobilization, ADL retraining and fine motor coordination training  Duration in weeks: 12  Treatment plan discussed with: patient  Plan details: Manual work as needed.    Progress with L shoulder strength, scapular stabilization, postural awareness, increased stamina, decreased tightness          Progress per Plan of Care and Progress strengthening /stabilization /functional activity           Timed:  Manual Therapy:    10     mins  08195;  Therapeutic Exercise:    14     mins  77541;     Neuromuscular Vivienne:        mins  10919;    Therapeutic Activity:          mins  56916;     Gait Training:           mins  57045;      Ultrasound:          mins  07780;    Canalith Repos           ___  mins  22296      Untimed:  Electrical Stimulation:        mins  71009 ( );  Mechanical Traction:         mins  04603;   Dry Needling:                     mins self pay       Timed Treatment:   24   mins   Total Treatment:     29   mins      Farzaneh Flores PT, DPT  [place] License #: [id number]

## 2023-05-05 ENCOUNTER — TREATMENT (OUTPATIENT)
Dept: PHYSICAL THERAPY | Facility: CLINIC | Age: 73
End: 2023-05-05
Payer: MEDICARE

## 2023-05-05 DIAGNOSIS — M19.012 OSTEOARTHRITIS OF LEFT SHOULDER, UNSPECIFIED OSTEOARTHRITIS TYPE: ICD-10-CM

## 2023-05-05 DIAGNOSIS — M25.512 LEFT SHOULDER PAIN, UNSPECIFIED CHRONICITY: Primary | ICD-10-CM

## 2023-05-05 DIAGNOSIS — R29.898 WEAKNESS OF LEFT ARM: ICD-10-CM

## 2023-05-05 DIAGNOSIS — M25.612 DECREASED RANGE OF MOTION OF LEFT SHOULDER: ICD-10-CM

## 2023-05-05 PROCEDURE — 97530 THERAPEUTIC ACTIVITIES: CPT | Performed by: PHYSICAL THERAPIST

## 2023-05-05 PROCEDURE — 97110 THERAPEUTIC EXERCISES: CPT | Performed by: PHYSICAL THERAPIST

## 2023-05-05 PROCEDURE — 97140 MANUAL THERAPY 1/> REGIONS: CPT | Performed by: PHYSICAL THERAPIST

## 2023-05-05 NOTE — PROGRESS NOTES
Physical Therapy Daily Treatment Note      Patient: Criss Edwards   : 1950  Referring practitioner: Clarisse Flores,*  Date of Initial Visit: Type: THERAPY  Noted: 2023  Today's Date: 2023  Patient seen for 5 sessions           Visit Diagnoses:    ICD-10-CM ICD-9-CM   1. Left shoulder pain, unspecified chronicity  M25.512 719.41   2. Decreased range of motion of left shoulder  M25.612 719.51   3. Osteoarthritis of left shoulder, unspecified osteoarthritis type  M19.012 715.91   4. Weakness of left arm  R29.898 729.89       Subjective Evaluation    History of Present Illness    Subjective comment: She is having difficulty sleeping.  She is getting back on the underarms and around the L shoulder.  She is sleeping with five pillows, going to buy more today, to help with propping her arm up.          Objective   See Exercise, Manual, and Modality Logs for complete treatment.       Assessment & Plan     Assessment  Impairments: abnormal muscle firing, abnormal or restricted ROM, activity intolerance, impaired physical strength, lacks appropriate home exercise program, pain with function and safety issue  Functional Limitations: carrying objects, lifting, sleeping, pulling, pushing, uncomfortable because of pain, reaching behind back, reaching overhead and unable to perform repetitive tasks  Assessment details: Continued with manual work to help with the shoulder tightness and pain, ROM is improving.  Continued to progress with strengthening as well.  Pt is pleased with progress thus far with ROM and she feels she is getting some of her strength back.     Goals  Plan Goals: SHOULDER  PROBLEMS:     1. The patient has limited ROM of the left shoulder.    LTG 1: 12 weeks:  The patient will demonstrate 160 degrees of left shoulder flexion, 160 degrees of shoulder abduction, 50 degrees of shoulder external rotation, and 50 degrees of shoulder internal rotation to allow the patient to reach into upper  kitchen cabinets and manipulate clothing behind the back with greater ease.    STATUS:  Progressing   STG 1a: 4 weeks:  The patient will demonstrate 100 degrees of left shoulder flexion, 100 degrees of shoulder abduction, 40 degrees of shoulder external rotation, and 40 degrees of shoulder internal rotation.    STATUS:  Progressing   TREATMENT: Manual therapy, therapeutic exercise, home exercise instruction, and modalities as needed to include: electrical stimulation, ultrasound, moist heat, and ice.    2. The patient has limited strength of the left shoulder.   LTG 2: 12 weeks:  The patient will demonstrate 4 /5 strength for left shoulder flexion, abduction, external rotation, and internal rotation in order to demonstrate improved shoulder stability.    STATUS:   Progressing   STG 2a: 4 weeks:  The patient will demonstrate 4- /5 strength for left shoulder flexion, abduction, external rotation, and internal rotation.    STATUS:   Progressing   STG2b:  4 weeks:  The patient will be independent with home exercises.     STATUS:   Progressing   TREATMENT: Manual therapy, therapeutic exercise, home exercise instruction, and modalities as needed to include: electrical stimulation, ultrasound, moist heat, and ice.     3. The patient complains of pain to the left shoulder.   LTG 3: 12 weeks:  The patient will report a pain rating of 2 /10 or better in order to improve sleep quality and tolerance to performance of activities of daily living.    STATUS:   Progressing   STG 3a: 4 weeks:  The patient will report a pain rating of 4 /10 or better.     STATUS:  Progressing   TREATMENT: Manual therapy, therapeutic exercise, home exercise instruction, and modalities as needed to include: electrical stimulation, ultrasound, moist heat, and ice.    4. Carrying, Moving, and Handling Objects Functional Limitation     LTG 4: 12 weeks:  The patient will demonstrate limitation by achieving a score of 30/55 on the QuickDASH.    STATUS:    Progressing   STG 4a: 4 weeks:  The patient will demonstrate limitation by achieving a score of 38/55 on the QuickDASH.      STATUS:   Progressing   TREATMENT:  Manual therapy, therapeutic exercise, home exercise instruction, and modalities as needed to include: moist heat, electrical stimulation, and ultrasound.    5. The patient reports radicular symptoms in the left upper extremity.   LTG 5: 12 weeks:  The patient will report a decrease in radicular symptoms in the left upper extremity by 75%.    STATUS:   Progressing   STG 5a: 4 weeks:  The patient will report a decrease in radicular symptoms in the left upper extremity by 50%.    STATUS:   Progressing   TREATMENT: Manual therapy, therapeutic exercise, home exercise instruction, cervical traction, and modalities as needed to include: moist heat, electrical stimulation, and ultrasound.        Plan  Therapy options: will be seen for skilled therapy services  Planned modality interventions: cryotherapy, dry needling, electrical stimulation/Russian stimulation, TENS, thermotherapy (hydrocollator packs) and ultrasound  Planned therapy interventions: manual therapy, flexibility, functional ROM exercises, home exercise program, therapeutic activities, stretching, strengthening, soft tissue mobilization, postural training, neuromuscular re-education, body mechanics training, joint mobilization, ADL retraining and fine motor coordination training  Duration in weeks: 12  Treatment plan discussed with: patient  Plan details: Continue with manual work,  L shoulder ROM, strength, scapular stabilization, postural awareness, increased stamina, decreased tightness          Progress per Plan of Care and Progress strengthening /stabilization /functional activity           Timed:  Manual Therapy:    12     mins  74549;  Therapeutic Exercise:    15     mins  11529;     Neuromuscular Vivienne:        mins  29666;    Therapeutic Activity:     12     mins  61922;     Gait Training:            mins  31022;     Ultrasound:          mins  96138;    Canalith Repos           ___  mins  80303      Untimed:  Electrical Stimulation:         mins  89951 ( );  Mechanical Traction:         mins  25752;   Dry Needling:                     mins self pay       Timed Treatment:   39   mins   Total Treatment:     40   mins      Farzaneh Flores PT, DPT  [place] License #: [id number]

## 2023-05-07 PROBLEM — M25.512 ACUTE PAIN OF LEFT SHOULDER: Status: ACTIVE | Noted: 2023-05-07

## 2023-05-07 RX ORDER — METHYLPREDNISOLONE ACETATE 80 MG/ML
160 INJECTION, SUSPENSION INTRA-ARTICULAR; INTRALESIONAL; INTRAMUSCULAR; SOFT TISSUE
Status: COMPLETED | OUTPATIENT
Start: 2023-04-20 | End: 2023-04-20

## 2023-05-07 RX ORDER — LIDOCAINE HYDROCHLORIDE 10 MG/ML
2 INJECTION, SOLUTION EPIDURAL; INFILTRATION; INTRACAUDAL; PERINEURAL
Status: COMPLETED | OUTPATIENT
Start: 2023-04-20 | End: 2023-04-20

## 2023-05-09 ENCOUNTER — TREATMENT (OUTPATIENT)
Dept: PHYSICAL THERAPY | Facility: CLINIC | Age: 73
End: 2023-05-09
Payer: MEDICARE

## 2023-05-09 DIAGNOSIS — M25.612 DECREASED RANGE OF MOTION OF LEFT SHOULDER: ICD-10-CM

## 2023-05-09 DIAGNOSIS — M19.012 OSTEOARTHRITIS OF LEFT SHOULDER, UNSPECIFIED OSTEOARTHRITIS TYPE: ICD-10-CM

## 2023-05-09 DIAGNOSIS — R29.898 WEAKNESS OF LEFT ARM: ICD-10-CM

## 2023-05-09 DIAGNOSIS — M25.512 LEFT SHOULDER PAIN, UNSPECIFIED CHRONICITY: Primary | ICD-10-CM

## 2023-05-09 PROCEDURE — 97140 MANUAL THERAPY 1/> REGIONS: CPT | Performed by: PHYSICAL THERAPIST

## 2023-05-09 PROCEDURE — 97110 THERAPEUTIC EXERCISES: CPT | Performed by: PHYSICAL THERAPIST

## 2023-05-09 NOTE — PROGRESS NOTES
"Physical Therapy Daily Treatment Note      Patient: Criss Edwards   : 1950  Referring practitioner: Clarisse Flores,*  Date of Initial Visit: Type: THERAPY  Noted: 2023  Today's Date: 2023  Patient seen for 6 sessions           Visit Diagnoses:    ICD-10-CM ICD-9-CM   1. Left shoulder pain, unspecified chronicity  M25.512 719.41   2. Decreased range of motion of left shoulder  M25.612 719.51   3. Weakness of left arm  R29.898 729.89   4. Osteoarthritis of left shoulder, unspecified osteoarthritis type  M19.012 715.91       Subjective Evaluation    History of Present Illness    Subjective comment: 11/10 pain today in the L shoulder.  She worked outside in her garden more this weekend.  She still hasn't heard anything about her MRI.          Objective   See Exercise, Manual, and Modality Logs for complete treatment.       Assessment & Plan     Assessment  Impairments: abnormal muscle firing, abnormal or restricted ROM, activity intolerance, impaired physical strength, lacks appropriate home exercise program, pain with function and safety issue  Functional Limitations: carrying objects, lifting, sleeping, pulling, pushing, uncomfortable because of pain, reaching behind back, reaching overhead and unable to perform repetitive tasks  Assessment details: Manual work used today to help with ROM, but also due to patient's high pain levels on arrival.  Pain did decrease to about a 8/10 after manual work.  ROM is still improving, but increased pain (pt notes \"seesaw\" like feeling in the shoulder joint) with ROM of the L shoulder.      Goals  Plan Goals: SHOULDER  PROBLEMS:     1. The patient has limited ROM of the left shoulder.    LTG 1: 12 weeks:  The patient will demonstrate 160 degrees of left shoulder flexion, 160 degrees of shoulder abduction, 50 degrees of shoulder external rotation, and 50 degrees of shoulder internal rotation to allow the patient to reach into upper kitchen cabinets and " manipulate clothing behind the back with greater ease.    STATUS:  Progressing   STG 1a: 4 weeks:  The patient will demonstrate 100 degrees of left shoulder flexion, 100 degrees of shoulder abduction, 40 degrees of shoulder external rotation, and 40 degrees of shoulder internal rotation.    STATUS:  Progressing   TREATMENT: Manual therapy, therapeutic exercise, home exercise instruction, and modalities as needed to include: electrical stimulation, ultrasound, moist heat, and ice.    2. The patient has limited strength of the left shoulder.   LTG 2: 12 weeks:  The patient will demonstrate 4 /5 strength for left shoulder flexion, abduction, external rotation, and internal rotation in order to demonstrate improved shoulder stability.    STATUS:   Progressing   STG 2a: 4 weeks:  The patient will demonstrate 4- /5 strength for left shoulder flexion, abduction, external rotation, and internal rotation.    STATUS:   Progressing   STG2b:  4 weeks:  The patient will be independent with home exercises.     STATUS:   Progressing   TREATMENT: Manual therapy, therapeutic exercise, home exercise instruction, and modalities as needed to include: electrical stimulation, ultrasound, moist heat, and ice.     3. The patient complains of pain to the left shoulder.   LTG 3: 12 weeks:  The patient will report a pain rating of 2 /10 or better in order to improve sleep quality and tolerance to performance of activities of daily living.    STATUS:   Progressing   STG 3a: 4 weeks:  The patient will report a pain rating of 4 /10 or better.     STATUS:  Progressing   TREATMENT: Manual therapy, therapeutic exercise, home exercise instruction, and modalities as needed to include: electrical stimulation, ultrasound, moist heat, and ice.    4. Carrying, Moving, and Handling Objects Functional Limitation     LTG 4: 12 weeks:  The patient will demonstrate limitation by achieving a score of 30/55 on the QuickDASH.    STATUS:   Progressing   STG 4a: 4  weeks:  The patient will demonstrate limitation by achieving a score of 38/55 on the QuickDASH.      STATUS:   Progressing   TREATMENT:  Manual therapy, therapeutic exercise, home exercise instruction, and modalities as needed to include: moist heat, electrical stimulation, and ultrasound.    5. The patient reports radicular symptoms in the left upper extremity.   LTG 5: 12 weeks:  The patient will report a decrease in radicular symptoms in the left upper extremity by 75%.    STATUS:   Progressing   STG 5a: 4 weeks:  The patient will report a decrease in radicular symptoms in the left upper extremity by 50%.    STATUS:   Progressing   TREATMENT: Manual therapy, therapeutic exercise, home exercise instruction, cervical traction, and modalities as needed to include: moist heat, electrical stimulation, and ultrasound.        Plan  Therapy options: will be seen for skilled therapy services  Planned modality interventions: cryotherapy, dry needling, electrical stimulation/Russian stimulation, TENS, thermotherapy (hydrocollator packs) and ultrasound  Planned therapy interventions: manual therapy, flexibility, functional ROM exercises, home exercise program, therapeutic activities, stretching, strengthening, soft tissue mobilization, postural training, neuromuscular re-education, body mechanics training, joint mobilization, ADL retraining and fine motor coordination training  Duration in weeks: 12  Treatment plan discussed with: patient  Plan details: Continue with manual work,  L shoulder ROM, strength, scapular stabilization, postural awareness, increased stamina, decreased tightness          Progress per Plan of Care and Progress strengthening /stabilization /functional activity           Timed:  Manual Therapy:    17     mins  44699;  Therapeutic Exercise:    8     mins  05480;     Neuromuscular Vivienne:        mins  34041;    Therapeutic Activity:          mins  60102;     Gait Training:           mins  85602;      Ultrasound:          mins  56437;    Canalith Repos           ___  mins  06981      Untimed:  Electrical Stimulation:         mins  12872 ( );  Mechanical Traction:         mins  91145;   Dry Needling:                     mins self pay       Timed Treatment:   25   mins   Total Treatment:     29   mins      Farzaneh Flores PT, DPT  [place] License #: [id number]

## 2023-05-12 ENCOUNTER — TREATMENT (OUTPATIENT)
Dept: PHYSICAL THERAPY | Facility: CLINIC | Age: 73
End: 2023-05-12
Payer: MEDICARE

## 2023-05-12 DIAGNOSIS — M19.012 OSTEOARTHRITIS OF LEFT SHOULDER, UNSPECIFIED OSTEOARTHRITIS TYPE: ICD-10-CM

## 2023-05-12 DIAGNOSIS — R29.898 WEAKNESS OF LEFT ARM: ICD-10-CM

## 2023-05-12 DIAGNOSIS — M25.612 DECREASED RANGE OF MOTION OF LEFT SHOULDER: ICD-10-CM

## 2023-05-12 DIAGNOSIS — M25.512 LEFT SHOULDER PAIN, UNSPECIFIED CHRONICITY: Primary | ICD-10-CM

## 2023-05-12 PROCEDURE — 97140 MANUAL THERAPY 1/> REGIONS: CPT | Performed by: PHYSICAL THERAPIST

## 2023-05-12 PROCEDURE — 97110 THERAPEUTIC EXERCISES: CPT | Performed by: PHYSICAL THERAPIST

## 2023-05-12 NOTE — PROGRESS NOTES
Physical Therapy Daily Treatment Note      Patient: Criss Edwards   : 1950  Referring practitioner: Clarisse Flores,*  Date of Initial Visit: Type: THERAPY  Noted: 2023  Today's Date: 2023  Patient seen for 7 sessions           Visit Diagnoses:    ICD-10-CM ICD-9-CM   1. Left shoulder pain, unspecified chronicity  M25.512 719.41   2. Decreased range of motion of left shoulder  M25.612 719.51   3. Weakness of left arm  R29.898 729.89   4. Osteoarthritis of left shoulder, unspecified osteoarthritis type  M19.012 715.91       Subjective Evaluation    History of Present Illness    Subjective comment: Pt has felt better since last visit, she did some icing at home as well.  She goes for an ECHO on 23 and then an MRI on the shoulder for 23.          Objective   See Exercise, Manual, and Modality Logs for complete treatment.       Assessment & Plan     Assessment  Impairments: abnormal muscle firing, abnormal or restricted ROM, activity intolerance, impaired physical strength, lacks appropriate home exercise program, pain with function and safety issue  Functional Limitations: carrying objects, lifting, sleeping, pulling, pushing, uncomfortable because of pain, reaching behind back, reaching overhead and unable to perform repetitive tasks  Assessment details: Manual work continues to provide Ms. Edwards with some relief of the shoulder.  Added in rows, lows for postural strengthening, added to her HEP today.     Goals  Plan Goals: SHOULDER  PROBLEMS:     1. The patient has limited ROM of the left shoulder.    LTG 1: 12 weeks:  The patient will demonstrate 160 degrees of left shoulder flexion, 160 degrees of shoulder abduction, 50 degrees of shoulder external rotation, and 50 degrees of shoulder internal rotation to allow the patient to reach into upper kitchen cabinets and manipulate clothing behind the back with greater ease.    STATUS:  Progressing   STG 1a: 4 weeks:  The patient will  demonstrate 100 degrees of left shoulder flexion, 100 degrees of shoulder abduction, 40 degrees of shoulder external rotation, and 40 degrees of shoulder internal rotation.    STATUS:  Progressing   TREATMENT: Manual therapy, therapeutic exercise, home exercise instruction, and modalities as needed to include: electrical stimulation, ultrasound, moist heat, and ice.    2. The patient has limited strength of the left shoulder.   LTG 2: 12 weeks:  The patient will demonstrate 4 /5 strength for left shoulder flexion, abduction, external rotation, and internal rotation in order to demonstrate improved shoulder stability.    STATUS:   Progressing   STG 2a: 4 weeks:  The patient will demonstrate 4- /5 strength for left shoulder flexion, abduction, external rotation, and internal rotation.    STATUS:   Progressing   STG2b:  4 weeks:  The patient will be independent with home exercises.     STATUS:   Progressing   TREATMENT: Manual therapy, therapeutic exercise, home exercise instruction, and modalities as needed to include: electrical stimulation, ultrasound, moist heat, and ice.     3. The patient complains of pain to the left shoulder.   LTG 3: 12 weeks:  The patient will report a pain rating of 2 /10 or better in order to improve sleep quality and tolerance to performance of activities of daily living.    STATUS:   Progressing   STG 3a: 4 weeks:  The patient will report a pain rating of 4 /10 or better.     STATUS:  Progressing   TREATMENT: Manual therapy, therapeutic exercise, home exercise instruction, and modalities as needed to include: electrical stimulation, ultrasound, moist heat, and ice.    4. Carrying, Moving, and Handling Objects Functional Limitation     LTG 4: 12 weeks:  The patient will demonstrate limitation by achieving a score of 30/55 on the QuickDASH.    STATUS:   Progressing   STG 4a: 4 weeks:  The patient will demonstrate limitation by achieving a score of 38/55 on the QuickDASH.      STATUS:    Progressing   TREATMENT:  Manual therapy, therapeutic exercise, home exercise instruction, and modalities as needed to include: moist heat, electrical stimulation, and ultrasound.    5. The patient reports radicular symptoms in the left upper extremity.   LTG 5: 12 weeks:  The patient will report a decrease in radicular symptoms in the left upper extremity by 75%.    STATUS:   Progressing   STG 5a: 4 weeks:  The patient will report a decrease in radicular symptoms in the left upper extremity by 50%.    STATUS:   Progressing   TREATMENT: Manual therapy, therapeutic exercise, home exercise instruction, cervical traction, and modalities as needed to include: moist heat, electrical stimulation, and ultrasound.        Plan  Therapy options: will be seen for skilled therapy services  Planned modality interventions: cryotherapy, dry needling, electrical stimulation/Russian stimulation, TENS, thermotherapy (hydrocollator packs) and ultrasound  Planned therapy interventions: manual therapy, flexibility, functional ROM exercises, home exercise program, therapeutic activities, stretching, strengthening, soft tissue mobilization, postural training, neuromuscular re-education, body mechanics training, joint mobilization, ADL retraining and fine motor coordination training  Duration in weeks: 12  Treatment plan discussed with: patient  Plan details: Continue with manual work,  L shoulder ROM, strength, scapular stabilization, postural awareness, increased stamina, decreased tightness          Progress per Plan of Care and Progress strengthening /stabilization /functional activity           Timed:  Manual Therapy:    16     mins  05943;  Therapeutic Exercise:    12     mins  67773;     Neuromuscular Vivienne:        mins  41462;    Therapeutic Activity:          mins  52271;     Gait Training:           mins  17515;     Ultrasound:          mins  61600;    Canalith Repos           __  mins  50740      Untimed:  Electrical  Stimulation:         mins  23024 ( );  Mechanical Traction:         mins  05753;   Dry Needling:                     mins self pay       Timed Treatment:   28   mins   Total Treatment:     30   mins      Farzaneh Flores PT, STEVIET  [place] License #: [id number]

## 2023-05-16 ENCOUNTER — TREATMENT (OUTPATIENT)
Dept: PHYSICAL THERAPY | Facility: CLINIC | Age: 73
End: 2023-05-16
Payer: MEDICARE

## 2023-05-16 DIAGNOSIS — M19.012 OSTEOARTHRITIS OF LEFT SHOULDER, UNSPECIFIED OSTEOARTHRITIS TYPE: ICD-10-CM

## 2023-05-16 DIAGNOSIS — R29.898 WEAKNESS OF LEFT ARM: ICD-10-CM

## 2023-05-16 DIAGNOSIS — M25.612 DECREASED RANGE OF MOTION OF LEFT SHOULDER: ICD-10-CM

## 2023-05-16 DIAGNOSIS — M25.512 LEFT SHOULDER PAIN, UNSPECIFIED CHRONICITY: Primary | ICD-10-CM

## 2023-05-16 PROCEDURE — 97110 THERAPEUTIC EXERCISES: CPT | Performed by: PHYSICAL THERAPIST

## 2023-05-16 PROCEDURE — 97140 MANUAL THERAPY 1/> REGIONS: CPT | Performed by: PHYSICAL THERAPIST

## 2023-05-16 NOTE — PROGRESS NOTES
Re-Assessment / Re-Certification        Patient: Criss Edwards   : 1950  Diagnosis/ICD-10 Code:  Left shoulder pain, unspecified chronicity [M25.512]  Referring practitioner: Clarisse Flores,*  Date of Initial Visit: Type: THERAPY  Noted: 2023  Today's Date: 2023  Patient seen for 8 sessions      Subjective:     Visit Diagnoses:    ICD-10-CM ICD-9-CM   1. Left shoulder pain, unspecified chronicity  M25.512 719.41   2. Decreased range of motion of left shoulder  M25.612 719.51   3. Weakness of left arm  R29.898 729.89   4. Osteoarthritis of left shoulder, unspecified osteoarthritis type  M19.012 715.91       Clinical Progress:  IMPROVED  Home Program Compliance: YES  Treatment has included: therapeutic exercise, manual therapy and therapeutic activity      Subjective Evaluation    History of Present Illness  Mechanism of injury: Patient's pain today is 2/10.    Overall, patient feels that PT has been helpful for them.  They note an improvement of 75% since starting therapy.    Patient has noted improvements with usage of the L arm.    Patient is still having difficulty with reaching overhead or outside [place], vacuuming and sweeping, digging holes in the garden.     MRI is May 25 for the L shoulder.            Objective           General Comments     Shoulder Comments   Posture: rounded shoulders, forwards head    L shoulder AROM:  Flexion: 131 degrees   External rotation: 60 degrees   Abduction: 110 degrees   Internal rotation: 44 degrees     L shoulder MMT  Flexion: 3+/5  Extension: 4-/5  Abduction: 3+/5  External rotation: 4-/5  Internal rotation: 3+/5    Radicular symptoms:  She hasn't had any in a week or so    Tenderness: R humeral head, biceps, triceps, deltoid, UT    Swelling - humeral head       Assessment & Plan     Assessment  Impairments: abnormal muscle firing, abnormal or restricted ROM, activity intolerance, impaired physical strength, lacks appropriate home exercise program,  pain with function and safety issue  Functional Limitations: carrying objects, lifting, sleeping, pulling, pushing, uncomfortable because of pain, reaching behind back, reaching overhead and unable to perform repetitive tasks  Assessment details: Patient has had improvements with her L shoulder ROM, but still having some pain in the upper trap and humeral head.  She is tolerating more activities.  Goals are improving.  She continues to lack L shoulder strength and postural strengthening, reaching overhead.  She goes for her MRI next week.  Patient will continue to benefit from further PT services to address their remaining deficits noted and progress to achieve their goals.       Goals  Plan Goals: SHOULDER  PROBLEMS:     1. The patient has limited ROM of the left shoulder.    LTG 1: 12 weeks:  The patient will demonstrate 160 degrees of left shoulder flexion, 160 degrees of shoulder abduction, 50 degrees of shoulder external rotation, and 50 degrees of shoulder internal rotation to allow the patient to reach into upper kitchen cabinets and manipulate clothing behind the back with greater ease.    STATUS:  Progressing   STG 1a: 4 weeks:  The patient will demonstrate 100 degrees of left shoulder flexion, 100 degrees of shoulder abduction, 40 degrees of shoulder external rotation, and 40 degrees of shoulder internal rotation.    STATUS:  MET   TREATMENT: Manual therapy, therapeutic exercise, home exercise instruction, and modalities as needed to include: electrical stimulation, ultrasound, moist heat, and ice.    2. The patient has limited strength of the left shoulder.   LTG 2: 12 weeks:  The patient will demonstrate 4 /5 strength for left shoulder flexion, abduction, external rotation, and internal rotation in order to demonstrate improved shoulder stability.    STATUS:   Progressing   STG 2a: 4 weeks:  The patient will demonstrate 4- /5 strength for left shoulder flexion, abduction, external rotation, and internal  rotation.    STATUS:   Progressing   STG2b:  4 weeks:  The patient will be independent with home exercises.     STATUS:   Progressing   TREATMENT: Manual therapy, therapeutic exercise, home exercise instruction, and modalities as needed to include: electrical stimulation, ultrasound, moist heat, and ice.     3. The patient complains of pain to the left shoulder.   LTG 3: 12 weeks:  The patient will report a pain rating of 2 /10 or better in order to improve sleep quality and tolerance to performance of activities of daily living.    STATUS:   MET   STG 3a: 4 weeks:  The patient will report a pain rating of 4 /10 or better.     STATUS:  MET   TREATMENT: Manual therapy, therapeutic exercise, home exercise instruction, and modalities as needed to include: electrical stimulation, ultrasound, moist heat, and ice.    4. Carrying, Moving, and Handling Objects Functional Limitation     LTG 4: 12 weeks:  The patient will demonstrate limitation by achieving a score of 30/55 on the QuickDASH.    STATUS:   Progressing   STG 4a: 4 weeks:  The patient will demonstrate limitation by achieving a score of 38/55 on the QuickDASH.      STATUS:   MET   TREATMENT:  Manual therapy, therapeutic exercise, home exercise instruction, and modalities as needed to include: moist heat, electrical stimulation, and ultrasound.    5. The patient reports radicular symptoms in the left upper extremity.   LTG 5: 12 weeks:  The patient will report a decrease in radicular symptoms in the left upper extremity by 75%.    STATUS:   MET   STG 5a: 4 weeks:  The patient will report a decrease in radicular symptoms in the left upper extremity by 50%.    STATUS:   MET   TREATMENT: Manual therapy, therapeutic exercise, home exercise instruction, cervical traction, and modalities as needed to include: moist heat, electrical stimulation, and ultrasound.        Plan  Therapy options: will be seen for skilled therapy services  Planned modality interventions:  cryotherapy, dry needling, electrical stimulation/Russian stimulation, TENS, thermotherapy (hydrocollator packs) and ultrasound  Planned therapy interventions: manual therapy, flexibility, functional ROM exercises, home exercise program, therapeutic activities, stretching, strengthening, soft tissue mobilization, postural training, neuromuscular re-education, body mechanics training, joint mobilization, ADL retraining and fine motor coordination training  Frequency: 2x week  Duration in weeks: 8  Treatment plan discussed with: patient  Plan details: Continue with manual work,  L shoulder ROM, strength, scapular stabilization, postural awareness, increased stamina, decreased tightness.    Review MRI next week        Progress toward previous goals: Partially Met      Recommendations: Continue as planned  Timeframe: 2 months  Prognosis to achieve goals: good    Timed:  Manual Therapy:    10     mins  30592;  Therapeutic Exercise:    16     mins  59916;     Neuromuscular Vivienne:        mins  57692;    Therapeutic Activity:          mins  96856;     Gait Training:           mins  13227;     Ultrasound:          mins  53464;    Canalith Repos           ___  mins  95225      Untimed:  Electrical Stimulation:         mins  33965 ( );  Mechanical Traction:         mins  34872;   Dry Needling:                     mins self pay  Re-Eval:                           mins  89807         Timed Treatment:   26   mins   Total Treatment:     31   mins          PT SIGNATURE: Farzaneh Flores PT, DPT  [place] License: [id number]     Certification Period: 5/16/2023 thru 8/13/2023  I certify that the therapy services are furnished while this patient is under my care.  The services outlined above are required by this patient, and will be reviewed every 90 days.     PHYSICIAN: Clarisse Flores MD  NPI: [id number]      PHYSICIAN PRINT NAME: ______________________________________________      PHYSICIAN SIGNATURE:  ______________________________________________         DATE:________________________________        Please sign and return via fax to [phone].  Thank you, [facility].

## 2023-05-19 ENCOUNTER — TELEPHONE (OUTPATIENT)
Dept: FAMILY MEDICINE CLINIC | Age: 73
End: 2023-05-19
Payer: MEDICARE

## 2023-05-19 DIAGNOSIS — Z12.31 ENCOUNTER FOR SCREENING MAMMOGRAM FOR BREAST CANCER: Primary | ICD-10-CM

## 2023-05-19 NOTE — TELEPHONE ENCOUNTER
----- Message from Clarisse Flores MD sent at 5/19/2022  1:32 PM EDT -----  Regarding: f/u screening mammo  Please call pt to let her know that she is due for her yearly screening mammogram.  Please pend order for screening mammo and send to me.  Thanks, KATHERINE

## 2023-05-23 ENCOUNTER — TELEPHONE (OUTPATIENT)
Dept: PHYSICAL THERAPY | Facility: CLINIC | Age: 73
End: 2023-05-23

## 2023-05-25 ENCOUNTER — HOSPITAL ENCOUNTER (OUTPATIENT)
Dept: MRI IMAGING | Facility: HOSPITAL | Age: 73
Discharge: HOME | End: 2023-05-25
Admitting: ORTHOPAEDIC SURGERY
Payer: MEDICARE

## 2023-05-25 DIAGNOSIS — M25.512 ACUTE PAIN OF LEFT SHOULDER: ICD-10-CM

## 2023-05-25 PROCEDURE — 73221 MRI JOINT UPR EXTREM W/O DYE: CPT

## 2023-06-01 ENCOUNTER — TREATMENT (OUTPATIENT)
Dept: PHYSICAL THERAPY | Facility: CLINIC | Age: 73
End: 2023-06-01

## 2023-06-01 DIAGNOSIS — M25.512 LEFT SHOULDER PAIN, UNSPECIFIED CHRONICITY: Primary | ICD-10-CM

## 2023-06-01 DIAGNOSIS — M25.612 DECREASED RANGE OF MOTION OF LEFT SHOULDER: ICD-10-CM

## 2023-06-01 DIAGNOSIS — R29.898 WEAKNESS OF LEFT ARM: ICD-10-CM

## 2023-06-01 DIAGNOSIS — M19.012 OSTEOARTHRITIS OF LEFT SHOULDER, UNSPECIFIED OSTEOARTHRITIS TYPE: ICD-10-CM

## 2023-06-01 PROCEDURE — 97530 THERAPEUTIC ACTIVITIES: CPT | Performed by: PHYSICAL THERAPIST

## 2023-06-01 PROCEDURE — 97140 MANUAL THERAPY 1/> REGIONS: CPT | Performed by: PHYSICAL THERAPIST

## 2023-06-01 PROCEDURE — 97110 THERAPEUTIC EXERCISES: CPT | Performed by: PHYSICAL THERAPIST

## 2023-06-01 NOTE — PROGRESS NOTES
Physical Therapy Daily Treatment Note      Patient: Criss Edwards   : 1950  Referring practitioner: Clarisse Flores,*  Date of Initial Visit: Type: THERAPY  Noted: 2023  Today's Date: 2023  Patient seen for 9 sessions           Visit Diagnoses:    ICD-10-CM ICD-9-CM   1. Left shoulder pain, unspecified chronicity  M25.512 719.41   2. Decreased range of motion of left shoulder  M25.612 719.51   3. Weakness of left arm  R29.898 729.89   4. Osteoarthritis of left shoulder, unspecified osteoarthritis type  M19.012 715.91       Subjective Evaluation    History of Present Illness    Subjective comment: 4/10 pain in the L shoulder, soreness noted down the L side of trunk as well.  She had an MRI last week.  She still cannot dig a hole or pull.  She notes she is still having swelling.           Objective   See Exercise, Manual, and Modality Logs for complete treatment.       Assessment & Plan     Assessment  Impairments: abnormal muscle firing, abnormal or restricted ROM, activity intolerance, impaired physical strength, lacks appropriate home exercise program, pain with function and safety issue  Functional Limitations: carrying objects, lifting, sleeping, pulling, pushing, uncomfortable because of pain, reaching behind back, reaching overhead and unable to perform repetitive tasks  Assessment details: Ms. Edwards had her MRI - see results below.  She is having more pain in the L shoulder, pushing motion is more painful as well as flexion.  Continued with strengthening    She doesn't have an appt with ortho until 8/10/23.    L SHOULDER MRI IMPRESSION:                 1. Scattered foci of T2 high signal in the humeral head, humeral neck and glenoid.  Findings could represent osseous contusions in the setting of previous trauma.  Infection and neoplasm cannot be   excluded.  Clinical correlation is needed.  2. No fracture or malalignment  3. Subdeltoid/subacromial bursitis  4. Supraspinatus and  infra spinatus tears and tendinopathy, as above.    Goals  Plan Goals: SHOULDER  PROBLEMS:     1. The patient has limited ROM of the left shoulder.    LTG 1: 12 weeks:  The patient will demonstrate 160 degrees of left shoulder flexion, 160 degrees of shoulder abduction, 50 degrees of shoulder external rotation, and 50 degrees of shoulder internal rotation to allow the patient to reach into upper kitchen cabinets and manipulate clothing behind the back with greater ease.    STATUS:  Progressing   STG 1a: 4 weeks:  The patient will demonstrate 100 degrees of left shoulder flexion, 100 degrees of shoulder abduction, 40 degrees of shoulder external rotation, and 40 degrees of shoulder internal rotation.    STATUS:  MET   TREATMENT: Manual therapy, therapeutic exercise, home exercise instruction, and modalities as needed to include: electrical stimulation, ultrasound, moist heat, and ice.    2. The patient has limited strength of the left shoulder.   LTG 2: 12 weeks:  The patient will demonstrate 4 /5 strength for left shoulder flexion, abduction, external rotation, and internal rotation in order to demonstrate improved shoulder stability.    STATUS:   Progressing   STG 2a: 4 weeks:  The patient will demonstrate 4- /5 strength for left shoulder flexion, abduction, external rotation, and internal rotation.    STATUS:   Progressing   STG2b:  4 weeks:  The patient will be independent with home exercises.     STATUS:   Progressing   TREATMENT: Manual therapy, therapeutic exercise, home exercise instruction, and modalities as needed to include: electrical stimulation, ultrasound, moist heat, and ice.     3. The patient complains of pain to the left shoulder.   LTG 3: 12 weeks:  The patient will report a pain rating of 2 /10 or better in order to improve sleep quality and tolerance to performance of activities of daily living.    STATUS:   MET   STG 3a: 4 weeks:  The patient will report a pain rating of 4 /10 or better.      STATUS:  MET   TREATMENT: Manual therapy, therapeutic exercise, home exercise instruction, and modalities as needed to include: electrical stimulation, ultrasound, moist heat, and ice.    4. Carrying, Moving, and Handling Objects Functional Limitation     LTG 4: 12 weeks:  The patient will demonstrate limitation by achieving a score of 30/55 on the QuickDASH.    STATUS:   Progressing   STG 4a: 4 weeks:  The patient will demonstrate limitation by achieving a score of 38/55 on the QuickDASH.      STATUS:   MET   TREATMENT:  Manual therapy, therapeutic exercise, home exercise instruction, and modalities as needed to include: moist heat, electrical stimulation, and ultrasound.    5. The patient reports radicular symptoms in the left upper extremity.   LTG 5: 12 weeks:  The patient will report a decrease in radicular symptoms in the left upper extremity by 75%.    STATUS:   MET   STG 5a: 4 weeks:  The patient will report a decrease in radicular symptoms in the left upper extremity by 50%.    STATUS:   MET   TREATMENT: Manual therapy, therapeutic exercise, home exercise instruction, cervical traction, and modalities as needed to include: moist heat, electrical stimulation, and ultrasound.        Plan  Therapy options: will be seen for skilled therapy services  Planned modality interventions: cryotherapy, dry needling, electrical stimulation/Russian stimulation, TENS, thermotherapy (hydrocollator packs) and ultrasound  Planned therapy interventions: manual therapy, flexibility, functional ROM exercises, home exercise program, therapeutic activities, stretching, strengthening, soft tissue mobilization, postural training, neuromuscular re-education, body mechanics training, joint mobilization, ADL retraining and fine motor coordination training  Frequency: 2x week  Duration in weeks: 8  Treatment plan discussed with: patient  Plan details: Continue with manual work,  L shoulder ROM, strength, scapular  stabilization            Progress per Plan of Care and Progress strengthening /stabilization /functional activity           Timed:  Manual Therapy:    10     mins  32714;  Therapeutic Exercise:    20     mins  01410;     Neuromuscular Vivienne:        mins  23726;    Therapeutic Activity:     10     mins  07968;     Gait Training:           mins  93711;     Ultrasound:          mins  22475;    Canalith Repos           ___  mins  64258      Untimed:  Electrical Stimulation:         mins  71293 ( );  Mechanical Traction:         mins  04492;   Dry Needling:                    mins self pay       Timed Treatment:   40   mins   Total Treatment:     40   mins      Farzaneh Flores PT, DPT  [place] License #: [id number]

## 2023-06-06 ENCOUNTER — TELEPHONE (OUTPATIENT)
Dept: FAMILY MEDICINE CLINIC | Age: 73
End: 2023-06-06
Payer: MEDICARE

## 2023-06-08 ENCOUNTER — TELEPHONE (OUTPATIENT)
Dept: PHYSICAL THERAPY | Facility: CLINIC | Age: 73
End: 2023-06-08

## 2023-06-08 NOTE — TELEPHONE ENCOUNTER
Caller: Criss Edwards    Relationship: Self       What was the call regarding: BEEN UP ALL NIGHT WITH A STOMACH BUG

## 2023-06-15 ENCOUNTER — TREATMENT (OUTPATIENT)
Dept: PHYSICAL THERAPY | Facility: CLINIC | Age: 73
End: 2023-06-15
Payer: MEDICARE

## 2023-06-15 ENCOUNTER — OFFICE VISIT (OUTPATIENT)
Dept: FAMILY MEDICINE CLINIC | Age: 73
End: 2023-06-15
Payer: MEDICARE

## 2023-06-15 VITALS
HEART RATE: 87 BPM | DIASTOLIC BLOOD PRESSURE: 70 MMHG | SYSTOLIC BLOOD PRESSURE: 110 MMHG | BODY MASS INDEX: 32.43 KG/M2 | HEIGHT: 63 IN | WEIGHT: 183 LBS

## 2023-06-15 DIAGNOSIS — M25.512 LEFT SHOULDER PAIN, UNSPECIFIED CHRONICITY: Primary | ICD-10-CM

## 2023-06-15 DIAGNOSIS — M19.012 OSTEOARTHRITIS OF LEFT SHOULDER, UNSPECIFIED OSTEOARTHRITIS TYPE: ICD-10-CM

## 2023-06-15 DIAGNOSIS — R29.898 WEAKNESS OF LEFT ARM: ICD-10-CM

## 2023-06-15 DIAGNOSIS — H61.22 IMPACTED CERUMEN OF LEFT EAR: Primary | ICD-10-CM

## 2023-06-15 DIAGNOSIS — M25.612 DECREASED RANGE OF MOTION OF LEFT SHOULDER: ICD-10-CM

## 2023-06-15 PROCEDURE — 1160F RVW MEDS BY RX/DR IN RCRD: CPT | Performed by: NURSE PRACTITIONER

## 2023-06-15 PROCEDURE — 97530 THERAPEUTIC ACTIVITIES: CPT | Performed by: PHYSICAL THERAPIST

## 2023-06-15 PROCEDURE — 1159F MED LIST DOCD IN RCRD: CPT | Performed by: NURSE PRACTITIONER

## 2023-06-15 PROCEDURE — 97140 MANUAL THERAPY 1/> REGIONS: CPT | Performed by: PHYSICAL THERAPIST

## 2023-06-15 PROCEDURE — 3078F DIAST BP <80 MM HG: CPT | Performed by: NURSE PRACTITIONER

## 2023-06-15 PROCEDURE — 99213 OFFICE O/P EST LOW 20 MIN: CPT | Performed by: NURSE PRACTITIONER

## 2023-06-15 PROCEDURE — 3074F SYST BP LT 130 MM HG: CPT | Performed by: NURSE PRACTITIONER

## 2023-06-15 PROCEDURE — 97110 THERAPEUTIC EXERCISES: CPT | Performed by: PHYSICAL THERAPIST

## 2023-06-15 NOTE — PROGRESS NOTES
Procedure   Ear Cerumen Removal    Date/Time: 6/15/2023 12:52 PM  Performed by: Ivon Au APRN  Authorized by: Ivon Au APRN   Location details: left ear  Patient tolerance: patient tolerated the procedure well with no immediate complications  Procedure type: irrigation, curette

## 2023-06-15 NOTE — PROGRESS NOTES
Re-Assessment / Re-Certification        Patient: Criss Edwards   : 1950  Diagnosis/ICD-10 Code:  Left shoulder pain, unspecified chronicity [M25.512]  Referring practitioner: Clarisse Flores,*  Date of Initial Visit: Type: THERAPY  Noted: 2023  Today's Date: 6/15/2023  Patient seen for 10 sessions      Subjective:     Visit Diagnoses:    ICD-10-CM ICD-9-CM   1. Left shoulder pain, unspecified chronicity  M25.512 719.41   2. Decreased range of motion of left shoulder  M25.612 719.51   3. Weakness of left arm  R29.898 729.89   4. Osteoarthritis of left shoulder, unspecified osteoarthritis type  M19.012 715.91       Clinical Progress: improved  Home Program Compliance: Yes  Treatment has included: therapeutic exercise, neuromuscular re-education, manual therapy, and therapeutic activity      Subjective Evaluation    History of Present Illness    Subjective comment: 4/10 pain today.  She has a lot of pain under the armpit.  She is lifting her arm better.  She cannot get relief with the pain.  She does see ortho until 8/10/23.  She does feel like the PT has been helpful for him. She is having trouble with hearing - she has skin and wax build up.     Objective     Assessment & Plan     Assessment  Impairments: abnormal muscle firing, abnormal or restricted ROM, activity intolerance, impaired physical strength, lacks appropriate home exercise program, pain with function and safety issue  Functional Limitations: carrying objects, lifting, sleeping, pulling, pushing, uncomfortable because of pain, reaching behind back, reaching overhead and unable to perform repetitive tasks  Assessment details: Ms. Edwards is having increased pain in the L shoulder and her underarm.  She cannot get relief with it.  MRI has been done - she is not in with ortho until August.  She does feel like PT has been helpful with ROM, but the pain continues to be a limiting factor for her.  Patient will continue to benefit from  further PT services to address their remaining deficits noted and progress to achieve their goals.       Goals  Plan Goals: SHOULDER  PROBLEMS:     1. The patient has limited ROM of the left shoulder.    LTG 1: 12 weeks:  The patient will demonstrate 160 degrees of left shoulder flexion, 160 degrees of shoulder abduction, 50 degrees of shoulder external rotation, and 50 degrees of shoulder internal rotation to allow the patient to reach into upper kitchen cabinets and manipulate clothing behind the back with greater ease.    STATUS:  Progressing   STG 1a: 4 weeks:  The patient will demonstrate 100 degrees of left shoulder flexion, 100 degrees of shoulder abduction, 40 degrees of shoulder external rotation, and 40 degrees of shoulder internal rotation.    STATUS:  MET   TREATMENT: Manual therapy, therapeutic exercise, home exercise instruction, and modalities as needed to include: electrical stimulation, ultrasound, moist heat, and ice.    2. The patient has limited strength of the left shoulder.   LTG 2: 12 weeks:  The patient will demonstrate 4 /5 strength for left shoulder flexion, abduction, external rotation, and internal rotation in order to demonstrate improved shoulder stability.    STATUS:   Progressing   STG 2a: 4 weeks:  The patient will demonstrate 4- /5 strength for left shoulder flexion, abduction, external rotation, and internal rotation.    STATUS:   Progressing   STG2b:  4 weeks:  The patient will be independent with home exercises.     STATUS:   Progressing   TREATMENT: Manual therapy, therapeutic exercise, home exercise instruction, and modalities as needed to include: electrical stimulation, ultrasound, moist heat, and ice.     3. The patient complains of pain to the left shoulder.   LTG 3: 12 weeks:  The patient will report a pain rating of 2 /10 or better in order to improve sleep quality and tolerance to performance of activities of daily living.    STATUS:   MET   STG 3a: 4 weeks:  The patient  will report a pain rating of 4 /10 or better.     STATUS:  MET   TREATMENT: Manual therapy, therapeutic exercise, home exercise instruction, and modalities as needed to include: electrical stimulation, ultrasound, moist heat, and ice.    4. Carrying, Moving, and Handling Objects Functional Limitation     LTG 4: 12 weeks:  The patient will demonstrate limitation by achieving a score of 30/55 on the QuickDASH.    STATUS:   Progressing   STG 4a: 4 weeks:  The patient will demonstrate limitation by achieving a score of 38/55 on the QuickDASH.      STATUS:   MET   TREATMENT:  Manual therapy, therapeutic exercise, home exercise instruction, and modalities as needed to include: moist heat, electrical stimulation, and ultrasound.    5. The patient reports radicular symptoms in the left upper extremity.   LTG 5: 12 weeks:  The patient will report a decrease in radicular symptoms in the left upper extremity by 75%.    STATUS:   MET   STG 5a: 4 weeks:  The patient will report a decrease in radicular symptoms in the left upper extremity by 50%.    STATUS:   MET   TREATMENT: Manual therapy, therapeutic exercise, home exercise instruction, cervical traction, and modalities as needed to include: moist heat, electrical stimulation, and ultrasound.        Plan  Therapy options: will be seen for skilled therapy services  Planned modality interventions: cryotherapy, dry needling, electrical stimulation/Russian stimulation, TENS, thermotherapy (hydrocollator packs) and ultrasound  Planned therapy interventions: manual therapy, flexibility, functional ROM exercises, home exercise program, therapeutic activities, stretching, strengthening, soft tissue mobilization, postural training, neuromuscular re-education, body mechanics training, joint mobilization, ADL retraining and fine motor coordination training  Frequency: 2x week  Duration in weeks: 4  Treatment plan discussed with: patient  Plan details: Continue with manual work,  L  shoulder ROM, strength, scapular stabilization, pain control, update HEP.         Progress toward previous goals: Partially Met    Recommendations: Continue as planned  Timeframe: 1 month  Prognosis to achieve goals: good    Timed:  Manual Therapy:    20     mins  49027;  Therapeutic Exercise:    10     mins  15624;     Neuromuscular Vivienne:        mins  44694;    Therapeutic Activity:      8    mins  18211;     Gait Training:           mins  02492;     Ultrasound:          mins  69421;    Canalith Repos           ___  mins  90592      Untimed:  Electrical Stimulation:         mins  69556 ( );  Mechanical Traction:         mins  30695;   Dry Needling:                     mins self pay  Re-Eval:                           mins  92225         Timed Treatment:   38   mins   Total Treatment:     40   mins          PT SIGNATURE: Farzaneh Flores PT, DPT  [place] License: [id number]       Certification Period: 6/15/2023 thru 9/12/2023  I certify that the therapy services are furnished while this patient is under my care.  The services outlined above are required by this patient, and will be reviewed every 90 days.     PHYSICIAN:   NPI:       PHYSICIAN PRINT NAME: ______________________________________________      PHYSICIAN SIGNATURE: ______________________________________________         DATE:________________________________        Please sign and return via fax to [phone].  Thank you, [facility].

## 2023-06-15 NOTE — PROGRESS NOTES
"Criss Edwards presents to [facility] with complaint of  Ear Fullness ((L) )    SUBJECTIVE  Ear Fullness   There is pain in the left ear. This is a recurrent problem. The problem has been unchanged. There has been no fever. Associated symptoms include hearing loss. Pertinent negatives include no abdominal pain, coughing, diarrhea, ear discharge, headaches, neck pain, rash, rhinorrhea, sore throat or vomiting. Her past medical history is significant for hearing loss. She is getting fitted for hearing aid tomorrow and was told she needs her left ear flushed     OBJECTIVE  Vital Signs:   /70 (BP Location: Right arm, Patient Position: Sitting)   Pulse 87   Ht 160 cm (62.99\")   Wt 83 kg (183 lb)   BMI 32.43 kg/m²       Physical Exam  Vitals reviewed.   Constitutional:       General: She is not in acute distress.     Appearance: Normal appearance. She is not ill-appearing.   HENT:      Head: Normocephalic and atraumatic.      Right Ear: Tympanic membrane and ear canal normal.      Left Ear: There is impacted cerumen.      Nose: Nose normal.      Mouth/Throat:      Mouth: Mucous membranes are moist.      Pharynx: Oropharynx is clear.   Cardiovascular:      Rate and Rhythm: Normal rate and regular rhythm.      Pulses: Normal pulses.      Heart sounds: Normal heart sounds.   Pulmonary:      Effort: Pulmonary effort is normal.      Breath sounds: Normal breath sounds.   Musculoskeletal:      Cervical back: Neck supple.   Skin:     General: Skin is warm and dry.   Neurological:      General: No focal deficit present.      Mental Status: She is alert and oriented to person, place, and time. Mental status is at baseline.   Psychiatric:         Mood and Affect: Mood normal.         Behavior: Behavior normal.         Judgment: Judgment normal.              ASSESSMENT AND PLAN:  Diagnoses and all orders for this visit:    1. Impacted cerumen of left ear (Primary)      All cerumen was " able to be removed today. She does have a large piece of skin that remains in ear canal. This was attempted to be removed today by me with curette, tweezers and alligator forceps but removal was unsuccessful. Patient was encouraged to still see audiologist as scheduled for hearing aid fitting as this skin may not impede fitting. If they are unable to complete hearing aid molds for fitting, patient was advised to see her established ENT provider Dr. Terrazas.    Follow Up   Return for Next scheduled follow up. Patient to notify office with any acute concerns or issues.  Patient verbalizes understanding, agrees with plan of care and has no further questions upon discharge.     Patient was given instructions and counseling regarding her condition or for health maintenance advice. Please see specific information pulled into the AVS if appropriate.     Discussed the importance of following up with any needed screening tests/labs/specialist appointments and any requested follow-up recommended by me today. Importance of maintaining follow-up discussed and patient accepts that missed appointments can delay diagnosis and potentially lead to worsening of conditions.    Part of this note may be an electronic transcription/translation of spoken language to printed text using the Dragon Dictation System.

## 2023-06-23 PROBLEM — H61.23 BILATERAL IMPACTED CERUMEN: Status: RESOLVED | Noted: 2023-03-22 | Resolved: 2023-06-23

## 2023-06-23 PROBLEM — R10.84 GENERALIZED ABDOMINAL PAIN: Status: RESOLVED | Noted: 2022-03-24 | Resolved: 2023-06-23

## 2023-06-23 PROBLEM — R11.0 NAUSEA: Status: ACTIVE | Noted: 2023-06-23

## 2023-06-23 PROBLEM — H92.02 OTALGIA OF LEFT EAR: Status: RESOLVED | Noted: 2021-08-04 | Resolved: 2023-06-23

## 2023-06-27 NOTE — PROGRESS NOTES
"Patient Name: Criss Edwards   Visit Date: 03/22/2023   Patient ID: [id number]  Provider: Tyrese Terrazas MD    Sex: female  Location: [facility]   YOB: 1950  Location Address: [address] [place] [address], [address] [place] [address],?[place]?[id number]    Primary Care Provider Clarisse Flores MD  Location Phone: [phone]    Referring Provider: No ref. provider found        Chief Complaint  Ear Problem, Sore Throat, Follow-up, and Earache    Subjective    History of Present Illness  Criss Edwards is a 72 y.o. female who presents to [facility] today as a consult from No ref. provider found.    She presents to the clinic today for evaluation of left ear pain, continued issues with imbalance without vertigo, and cerumen impactions.  I previously saw her in June 2021 and at that time she was also having some difficulty with her balance.  At that clinic visit she noted that she is \"just clumsy and falls a lot.\"  She notes no vertigo.  She is not very physically active, does have some strength issues.  Her falls are usually not associated with spinning, but she notes that she loses her balance.  She does have some lightheadedness at times.    She notes that her left ear has been bothering her and is painful for the last several months.  Denies any major changes in her hearing.    Past Medical History:   Diagnosis Date   • Dizziness    • HL (hearing loss)        Past Surgical History:   Procedure Laterality Date   • HAND SURGERY Left 05/07/2016   • HIP ARTHROPLASTY Right    • KIDNEY STONE SURGERY           Current Outpatient Medications:   •  Cholecalciferol 25 MCG (1000 UT) capsule, Vitamin D3 1,000 unit oral capsule take 1 capsule by oral route daily   Suspended, Disp: , Rfl:   •  cyclobenzaprine (FLEXERIL) 5 MG tablet, TAKE 1 TABLET BY MOUTH AT NIGHT AS NEEDED FOR MUSCLE SPASMS, Disp: 30 tablet, Rfl: 2  •  diclofenac (VOLTAREN) 75 MG EC tablet, , Disp: , " 35965. Cognitive testing/speech.   59550/ Mongolian interpretor: #[id number]                      Treatment    Ultrasound (31676)  - Rfl:   •  DULoxetine (CYMBALTA) 60 MG capsule, Take 1 capsule by mouth Daily., Disp: 90 capsule, Rfl: 1  •  magnesium oxide (MAG-OX) 400 MG tablet, Take 1 tablet by mouth Daily., Disp: 30 tablet, Rfl: 5  •  nebivolol (BYSTOLIC) 5 MG tablet, Take 1 tablet by mouth Daily., Disp: 90 tablet, Rfl: 1  •  ondansetron ODT (Zofran ODT) 4 MG disintegrating tablet, Place 1 tablet on the tongue Every 8 (Eight) Hours As Needed for Nausea or Vomiting., Disp: 20 tablet, Rfl: 0  •  pantoprazole (PROTONIX) 40 MG EC tablet, Take 1 tablet by mouth Daily., Disp: 90 tablet, Rfl: 1  •  valsartan (DIOVAN) 40 MG tablet, Take 1 tablet by mouth Daily., Disp: 90 tablet, Rfl: 1  •  fluticasone (FLONASE) 50 MCG/ACT nasal spray, 2 sprays into the nostril(s) as directed by provider Daily. (Patient taking differently: 2 sprays into the nostril(s) as directed by provider As Needed.), Disp: 16 mL, Rfl: 5     Allergies   Allergen Reactions   • Codeine Itching   • Iodine Hives     DENIES   • Contrast Dye (Echo Or Unknown Ct/Mr) GI Intolerance       History reviewed. No pertinent family history.     Social History     Social History Narrative   • Not on file       Objective     Vital Signs:   Temp 97.7 °F (36.5 °C)   Resp 20   Ht 160 cm (62.99\")   Wt 83.8 kg (184 lb 12.8 oz)   BMI 32.74 kg/m²       Physical Exam    Ear Cerumen Removal    Date/Time: 3/22/2023 3:30 PM  Performed by: Tyrese Terrazas MD  Authorized by: Tyrese Terrazas MD     Anesthesia:  Local Anesthetic: none  Location details: left ear and right ear  Procedure type: instrumentation, curette   Sedation:  Patient sedated: no            Constitutional   Appearance  · : well developed, well-nourished, alert and in no acute distress, voice clear and strong    Head  Inspection  · : no deformities or lesions  Face  Inspection  · : No facial lesions; House-Brackmann I/VI bilaterally  Palpation  · : No TMJ crepitus nor  muscle tenderness bilaterally    Eyes  Vision  Visual " Lozano  · : Extraocular movements are intact. No spontaneous or gaze-induced nystagmus.  Conjunctivae  · : clear  Sclerae  · : clear  Pupils and Irises  · : pupils equal, round, and reactive to light.     Ears, Nose, Mouth and Throat    Ears    External Ears  · : appearance within normal limits, no lesions present  Otoscopic Examination  · : Cerumen impactions bilaterally, cleared with curettes and microscope, ear canal inflamed and erythematous on the left, tympanic membrane appearance within normal limits bilaterally without perforations, well-aerated middle ears  Hearing  · : intact to conversational voice both ears  Tunning fork testing:     :    Nose    External Nose  · : appearance normal  Intranasal Exam  · : mucosa within normal limits, vestibules normal, no intranasal lesions present, septum midline, sinuses non tender to percussion  Oral Cavity    Oral Mucosa  · : oral mucosa normal without pallor or cyanosis  Lips  · : lip appearance normal  Teeth  · : normal dentition for age  Gums  · : gums pink, non-swollen, no bleeding present  Tongue  · : tongue appearance normal; normal mobility  Palate  · : hard palate normal, soft palate appearance normal with symmetric mobility    Throat    Oropharynx  · : no inflammation or lesions present, tonsils within normal limits  Hypopharynx  · : appearance within normal limits, superior epiglottis within normal limits  Larynx  · : appearance within normal limits, vocal cords within normal limits, no lesions present    Neck  Inspection/Palpation  · : normal appearance, no masses or tenderness, trachea midline; thyroid size normal, nontender, no nodules or masses present on palpation    Respiratory  Respiratory Effort  · : breathing unlabored  Inspection of Chest  · : normal appearance, no retractions    Cardiovascular  Heart  · : regular rate and rhythm    Lymphatic  Neck  · : no lymphadenopathy present  Supraclavicular Nodes  · : no lymphadenopathy present  Preauricular  Nodes  · : no lymphadenopathy present    Skin and Subcutaneous Tissue  General Inspection  · : Regarding face and neck - there are no rashes present, no lesions present, and no areas of discoloration    Neurologic  Cranial Nerves  · : cranial nerves II-XII are grossly intact bilaterally  Gait and Station  · : normal gait, able to stand without diffculty    Psychiatric  Judgement and Insight  · : judgment and insight intact  Mood and Affect  · : mood normal, affect appropriate        Assessment and Plan    Diagnoses and all orders for this visit:    1. Bilateral impacted cerumen (Primary)    2. Otalgia of left ear    3. Imbalance    Examination today revealed cerumen impactions which I was able to clear.  Ear canal was erythematous on the left side and I instructed her not to use any Q-tips.  I did prescribe some steroid ointment for her to use to settle down inflammation for about 2 weeks.  I have asked that she keep her ear dry.  Her imbalance does not seem to be associated with vestibular dysfunction and some balance testing that was performed today in the clinic was essentially normal.  She does have some musculoskeletal issues and is not very physically active.  I did recommend that she ambulate with assist and avoid situations that result in falls.  I will be glad to see her back on an as-needed basis should there be any further issues.    Follow Up   No follow-ups on file.  Patient was given instructions and counseling regarding her condition or for health maintenance advice. Please see specific information pulled into the AVS if appropriate.

## 2023-07-31 ENCOUNTER — PATIENT OUTREACH (OUTPATIENT)
Dept: CASE MANAGEMENT | Facility: OTHER | Age: 73
End: 2023-07-31
Payer: MEDICARE

## 2023-07-31 NOTE — OUTREACH NOTE
AMBULATORY CASE MANAGEMENT NOTE    Name and Relationship of Patient/Support Person: Criss Edwards Funmi - Self    Criss was identified on [facility] as presenting to the ER.  Reviewed ER note and she will need follow up on CT chest, sent to PCP to flag at next office visit. Left message for patient to return call.       Attempted to reach patient x 3, left message to return call.  PCP responded, she has flagged chart for CT follow up.      Terra CAMPA  Ambulatory Case Management    7/31/2023, 16:03 EDT

## 2023-08-09 DIAGNOSIS — M25.512 LEFT SHOULDER PAIN, UNSPECIFIED CHRONICITY: Primary | ICD-10-CM

## 2023-08-10 ENCOUNTER — OFFICE VISIT (OUTPATIENT)
Dept: ORTHOPEDIC SURGERY | Facility: CLINIC | Age: 73
End: 2023-08-10
Payer: MEDICARE

## 2023-08-10 ENCOUNTER — HOSPITAL ENCOUNTER (OUTPATIENT)
Dept: GENERAL RADIOLOGY | Facility: HOSPITAL | Age: 73
Discharge: HOME | End: 2023-08-10
Admitting: ORTHOPAEDIC SURGERY
Payer: MEDICARE

## 2023-08-10 VITALS — TEMPERATURE: 97.6 F | HEIGHT: 63 IN | WEIGHT: 180 LBS | BODY MASS INDEX: 31.89 KG/M2

## 2023-08-10 DIAGNOSIS — G89.29 CHRONIC LEFT SHOULDER PAIN: Primary | ICD-10-CM

## 2023-08-10 DIAGNOSIS — M25.512 CHRONIC LEFT SHOULDER PAIN: Primary | ICD-10-CM

## 2023-08-10 DIAGNOSIS — M25.461 EFFUSION OF RIGHT KNEE: ICD-10-CM

## 2023-08-10 DIAGNOSIS — M25.512 LEFT SHOULDER PAIN, UNSPECIFIED CHRONICITY: ICD-10-CM

## 2023-08-10 PROCEDURE — 1160F RVW MEDS BY RX/DR IN RCRD: CPT | Performed by: ORTHOPAEDIC SURGERY

## 2023-08-10 PROCEDURE — 1159F MED LIST DOCD IN RCRD: CPT | Performed by: ORTHOPAEDIC SURGERY

## 2023-08-10 PROCEDURE — 99213 OFFICE O/P EST LOW 20 MIN: CPT | Performed by: ORTHOPAEDIC SURGERY

## 2023-08-10 PROCEDURE — 73030 X-RAY EXAM OF SHOULDER: CPT

## 2023-08-10 NOTE — PROGRESS NOTES
"Chief Complaint  Follow-up of the Left Shoulder    Subjective    History of Present Illness      Criss Edwards is a 73 y.o. female who presents to [facility] for follow-up on left shoulder pain and right knee pain.  History of Present Illness the patient has increasing pain and discomfort in the left shoulder.  She has weakness in abduction and difficulty in sleeping in bed at night.  She has pain with cross body activities.  Patient has difficulty with reaching into the overhead position and placing objects into cabinets over her head.  She recently fell about 2 weeks ago and sustained an injury to the right knee.  She is got a significant amount of effusion in the knee and has pain with flexion of the knee.  She was also diagnosed with a rib fracture and is quite uncomfortable because of the rib fracture.  She states that she was pulled down by her dog and she fell in an uncontrolled fashion.  The MRI of the shoulder which we had ordered previously shows a partial-thickness tear of the rotator cuff with cuff tear arthropathy.  Pain Location: LEFT shoulder  Radiation: none  Quality: dull, aching  Intensity/Severity: moderate  Duration: since 03/03/2023  Progression of symptoms: yes, progressive worsening  Onset quality: sudden  Timing: intermittent  Aggravating Factors: overhead reaching, reaching backward  Alleviating Factors: NSAIDs, steroid injection (intra-articular), rest, heat, ice  Previous Episodes: yes  Associated Symptoms: pain, swelling, bruising  ADLs Affected: grooming/hygiene/toileting/personal care, dressing, recreational activities/sports  Previous Treatment: NSAIDs and intra-articular injection       Objective   Vital Signs:   Temp 97.6 øF (36.4 øC)   Ht 160 cm (63\")   Wt 81.6 kg (180 lb)   BMI 31.89 kg/mý     Physical Exam  Physical Exam  Vitals signs and nursing note reviewed.   Constitutional:       Appearance: Normal appearance.   Pulmonary:      Effort: " Pulmonary effort is normal.   Skin:     General: Skin is warm and dry.      Capillary Refill: Capillary refill takes less than 2 seconds.   Neurological:      General: No focal deficit present.      Mental Status: He is alert and oriented to person, place, and time. Mental status is at baseline.   Psychiatric:         Mood and Affect: Mood normal.         Behavior: Behavior normal.         Thought Content: Thought content normal.         Judgment: Judgment normal.     Ortho Exam   Right knee (effusion). The knee is swollen. The patella is ballotable. There is thickening of the synovial membrane. There appears to be a fluid flow in the supra-patellar space. The patient's knee feels tight in flexion. Range of motion is restricted because of the limited flexion. There is some quadriceps inhibition on account of the distension of the joint. There is diffuse tenderness around the knee. The popliteal fossa is full and tender as well. No evidence of a compartmental syndrome is noted. Anterior and posterior drawer signs are negative. No medial or lateral instability is noted. Pivot shift sign is negative. Dorsalis pedis and posterior tibial artery pulses are palpable. Common peroneal nerve function is well preserved.   Left shoulder (cta). Rotator cuff function is extremely impaired. There is a high riding humeral head with articulation of the humerus to the undersurface of the acromiohumeral articulation. AC joint is exquisitely tender and painful for patient. Axillary nerve function is well preserved. There is significant winging of the scapula with hollowing of the fossae over the proximal and distal aspects of the spine of the scapula. Forward flexion is 0-30 degrees, active abduction is 0-60 degrees. Drop arm sign is positive. External rotation against resistance is extremely painful and limited for the patient. Skin and soft tissues are essentially normal other than some amounts of swelling. The pain level is 6  Left  shoulder (rct). The shoulder is extremely tender anteriorly. There is tenderness over the insertion of the rotator cuff over the greater tuberosity of the humerus. Slight proximal migration of the humeral head is noted. AC joint is tender. Crossover adduction test is positive. Drop arm sign is positive. Forward flexion is 0-110 degrees, abduction is 0-110 degrees, external rotation is 0-30 degrees. Patient has mild atrophy both of the supra and infraspinatus fossa. Sulcus sign is negative. There is no evidence of multidirectional instability. Neer test is positive on compression. Skin and soft tissue tenderness is noted. Patient's strength in abduction and external rotation are extremely lacking. The pain level is 6.    Result Review :  The following data was reviewed by: Alberto Bose MD on 08/10/2023:  Radiologic studies - see below for interpretation  LEFT shoulder xrays  weightbearing/standing ap/lateral views were ordered by Alberto Bose MD. Performed at [facility] on 08/10/23. Images were independently viewed and interpreted by myself, my impression as follows:  left Shoulder X-Ray  Indication: Evaluation of pain and discomfort to the left shoulder.  AP and Lateral  Findings: Slight narrowing of the AC joint space.  no bony lesion  Soft tissues within normal limits  decreased joint spaces  Hardware appropriately positioned not applicable      yes prior studies available for comparison.    X-RAY was ordered and reviewed by Alberto Bose MD     Reviewed MRI report of left shoulder, performed at  [facility] on 05/25/23, summary of impression below:    MRI findings of the left shoulder are available in the office today.  These images are discussed with the patient.  The patient shows osseous contusions in the humeral head and the humeral neck.  No fractures are noted.  The patient does have a partial-thickness tear of the supraspinatus.  Some tendinopathy is noted as  well.  The AC joint is somewhat narrowed.  No full-thickness tear of the rotator cuff is noted.        Procedures           Assessment   Assessment and Plan    Diagnoses and all orders for this visit:    1. Chronic left shoulder pain (Primary)    2. Effusion of right knee          Follow Up   Compression/brace to the knee to prevent it from buckling and giving out.  Calcium and vitamin D for bone health.  Intra-articular steroid injection to the shoulder discussed with the patient.  At this point I would want her to recover from the effects of the fall and therefore no surgical intervention is proposed for the patient.  Eventually she might require surgical intervention on the shoulder for progressive cuff tear arthropathy.  The patient will let me know when she is ready for that form of intervention.  Rest, ice, compression, and elevation (RICE) therapy  Stretching and strengthening exercises of the flexors and abductors of the shoulder to prevent arthrofibrosis.  OTC Alternate Ibuprofen and Tylenol as needed  Follow up in 6 month(s)  Patient was given instructions and counseling regarding her condition or for health maintenance advice. Please see specific information pulled into the AVS if appropriate.     Alberto Bose MD   Date of Encounter: 8/10/2023   Electronically signed by Alberto Bose MD, 08/10/23, 10:19 AM EDT.     EMR Dragon/Transcription disclaimer:  Much of this encounter note is an electronic transcription/translation of spoken language to printed text. The electronic translation of spoken language may permit erroneous, or at times, nonsensical words or phrases to be inadvertently transcribed; Although I have reviewed the note for such errors, some may still exist.

## 2023-08-16 DIAGNOSIS — R25.2 LEG CRAMP: ICD-10-CM

## 2023-08-16 RX ORDER — LANOLIN ALCOHOL/MO/W.PET/CERES
1 CREAM (GRAM) TOPICAL DAILY
Qty: 30 TABLET | Refills: 5 | Status: SHIPPED | OUTPATIENT
Start: 2023-08-16 | End: 2023-08-17

## 2023-08-17 ENCOUNTER — OFFICE VISIT (OUTPATIENT)
Dept: FAMILY MEDICINE CLINIC | Age: 73
End: 2023-08-17
Payer: MEDICARE

## 2023-08-17 VITALS
SYSTOLIC BLOOD PRESSURE: 155 MMHG | DIASTOLIC BLOOD PRESSURE: 84 MMHG | WEIGHT: 184 LBS | OXYGEN SATURATION: 95 % | HEART RATE: 71 BPM | HEIGHT: 63 IN | BODY MASS INDEX: 32.6 KG/M2

## 2023-08-17 DIAGNOSIS — S22.41XD CLOSED FRACTURE OF MULTIPLE RIBS OF RIGHT SIDE WITH ROUTINE HEALING: Primary | ICD-10-CM

## 2023-08-17 RX ORDER — HYDROCODONE BITARTRATE AND ACETAMINOPHEN 5; 325 MG/1; MG/1
1 TABLET ORAL EVERY 8 HOURS PRN
Qty: 20 TABLET | Refills: 0 | Status: SHIPPED | OUTPATIENT
Start: 2023-08-17

## 2023-08-17 RX ORDER — HYDROCODONE BITARTRATE AND ACETAMINOPHEN 5; 325 MG/1; MG/1
TABLET ORAL
COMMUNITY
Start: 2023-07-30 | End: 2023-08-17 | Stop reason: SDUPTHER

## 2023-08-17 NOTE — ASSESSMENT & PLAN NOTE
Criss fractured 2 ribs on 7/29/2023 after she was hold down a short flight of steps by her dogs.  She has been using her incentive spirometer multiple times daily.  Still having a lot of pain in the right anterior rib cage.  She is out of hydrocodone.  She was given #12 by the ED back on the 29th.  I am going to send in some additional hydrocodone today as she has responded poorly to tramadol in the past.  Would did discuss that this is not a long-term medication but I do want to ensure that she does not end up with a pneumonia in the short-term as she recovers from these rib fractures.  Kevin was run today.  No tox screen was performed as this is not intended to be a long-term medication.  Call or return to clinic prn worsening or failure to improve of symptoms.  Otherwise I will see her back in September as regularly scheduled.

## 2023-08-17 NOTE — PROGRESS NOTES
Chief Complaint  Hospital Follow Up Visit ([facility] ER, 07/29/23-- fall, broken ribs)    Subjective          Criss Edwards presents to [facility] today for follow-up after presenting to the [facility] ED on 7/29/2023 after a fall.  She was walking her dogs and was knocked down a flight of about 5 steps. She was found to have rib fractures x2 of the right fourth and fifth anterior ribs.  She was discharged home with hydrocodone and an incentive spirometer.  She has been using her IS several times a day.  She is having a lot of chest pain.      She is having left shoulder pain since her fall.  She did see him last week.  Conservative measures were recommended.          Current Outpatient Medications:     acetaminophen (TYLENOL) 650 MG 8 hr tablet, Take 1 tablet by mouth Every 8 (Eight) Hours As Needed for Mild Pain., Disp: , Rfl:     cyclobenzaprine (FLEXERIL) 5 MG tablet, TAKE 1 TABLET BY MOUTH AT NIGHT AS NEEDED FOR MUSCLE SPASMS, Disp: 30 tablet, Rfl: 2    DULoxetine (CYMBALTA) 60 MG capsule, Take 1 capsule by mouth 2 (Two) Times a Day. (Patient taking differently: Take 1 capsule by mouth Daily.), Disp: 180 capsule, Rfl: 1    HYDROcodone-acetaminophen (NORCO) 5-325 MG per tablet, Take 1 tablet by mouth Every 8 (Eight) Hours As Needed for Severe Pain., Disp: 20 tablet, Rfl: 0    nebivolol (BYSTOLIC) 5 MG tablet, Take 1 tablet by mouth Daily., Disp: 90 tablet, Rfl: 1    ondansetron ODT (ZOFRAN-ODT) 4 MG disintegrating tablet, DISSOLVE ONE TABLET BY MOUTH EVERY 8 HOURS AS NEEDED FOR NAUSEA AND VOMITING, Disp: 20 tablet, Rfl: 0    pantoprazole (PROTONIX) 40 MG EC tablet, Take 1 tablet by mouth Daily., Disp: 90 tablet, Rfl: 1    valsartan (DIOVAN) 40 MG tablet, Take 1 tablet by mouth Daily., Disp: 90 tablet, Rfl: 1    Allergies:  Codeine, Iodine, and Contrast dye (echo or unknown ct/mr)      Objective   Vital Signs:   Vitals:    08/17/23 1320 08/17/23 1401   BP: 147/88 155/84   BP  "Location: Right arm Right arm   Patient Position: Sitting Sitting   Cuff Size: Adult Adult   Pulse: 78 71   SpO2: 95%    Weight: 83.5 kg (184 lb)    Height: 160 cm (62.99\")        Physical Exam  Vitals reviewed.   Constitutional:       General: She is not in acute distress.     Appearance: Normal appearance. She is well-developed.   HENT:      Head: Normocephalic and atraumatic.      Right Ear: External ear normal.      Left Ear: External ear normal.   Eyes:      Extraocular Movements: Extraocular movements intact.      Conjunctiva/sclera: Conjunctivae normal.      Pupils: Pupils are equal, round, and reactive to light.   Cardiovascular:      Rate and Rhythm: Normal rate and regular rhythm.      Heart sounds: No murmur heard.  Pulmonary:      Effort: Pulmonary effort is normal. No respiratory distress.      Breath sounds: Normal breath sounds. No wheezing, rhonchi or rales.   Chest:      Comments: Right anterior chest wall tenderness to palpation  Abdominal:      General: Bowel sounds are normal. There is no distension.      Palpations: Abdomen is soft.      Tenderness: There is no abdominal tenderness.   Musculoskeletal:         General: Normal range of motion.   Skin:     General: Skin is warm and dry.   Neurological:      General: No focal deficit present.      Mental Status: She is alert and oriented to person, place, and time.   Psychiatric:         Mood and Affect: Mood and affect normal.         Behavior: Behavior normal.         Thought Content: Thought content normal.         Judgment: Judgment normal.         Lab Results   Component Value Date    GLUCOSE 129 (H) 03/15/2023    BUN 16 03/15/2023    CREATININE 1.01 (H) 03/15/2023    EGFRIFNONA 61 09/09/2021    BCR 15.8 03/15/2023    K 4.9 03/15/2023    CO2 28.2 03/15/2023    CALCIUM 10.0 03/15/2023    ALBUMIN 4.4 03/15/2023    LABIL2 1.3 (L) 01/26/2021    AST 23 03/15/2023    ALT 25 03/15/2023       Lab Results   Component Value Date    CHOL 188 03/15/2023    " CHLPL 179 01/26/2021    TRIG 123 03/15/2023    HDL 45 03/15/2023     (H) 03/15/2023            Assessment and Plan    Diagnoses and all orders for this visit:    1. Closed fracture of multiple ribs of right side with routine healing (Primary)  Assessment & Plan:  Criss fractured 2 ribs on 7/29/2023 after she was hold down a short flight of steps by her dogs.  She has been using her incentive spirometer multiple times daily.  Still having a lot of pain in the right anterior rib cage.  She is out of hydrocodone.  She was given #12 by the ED back on the 29th.  I am going to send in some additional hydrocodone today as she has responded poorly to tramadol in the past.  Would did discuss that this is not a long-term medication but I do want to ensure that she does not end up with a pneumonia in the short-term as she recovers from these rib fractures.  Kevin was run today.  No tox screen was performed as this is not intended to be a long-term medication.  Call or return to clinic prn worsening or failure to improve of symptoms.  Otherwise I will see her back in September as regularly scheduled.    Orders:  -     HYDROcodone-acetaminophen (NORCO) 5-325 MG per tablet; Take 1 tablet by mouth Every 8 (Eight) Hours As Needed for Severe Pain.  Dispense: 20 tablet; Refill: 0        Follow Up   Return if symptoms worsen or fail to improve, for or as scheduled 9/29/2023.  Patient was given instructions and counseling regarding her condition or for health maintenance advice. Please see specific information pulled into the AVS if appropriate.           08/17/2023

## 2023-08-28 PROBLEM — G89.29 CHRONIC LEFT SHOULDER PAIN: Status: ACTIVE | Noted: 2023-05-07

## 2023-08-28 PROBLEM — M25.461 EFFUSION OF RIGHT KNEE: Status: ACTIVE | Noted: 2023-08-28

## 2023-09-10 DIAGNOSIS — K21.9 GASTROESOPHAGEAL REFLUX DISEASE WITHOUT ESOPHAGITIS: ICD-10-CM

## 2023-09-10 DIAGNOSIS — F33.1 MAJOR DEPRESSIVE DISORDER, RECURRENT EPISODE, MODERATE DEGREE: ICD-10-CM

## 2023-09-11 RX ORDER — DULOXETIN HYDROCHLORIDE 60 MG/1
60 CAPSULE, DELAYED RELEASE ORAL DAILY
Qty: 90 CAPSULE | Refills: 0 | Status: SHIPPED | OUTPATIENT
Start: 2023-09-11

## 2023-09-11 RX ORDER — PANTOPRAZOLE SODIUM 40 MG/1
40 TABLET, DELAYED RELEASE ORAL DAILY
Qty: 90 TABLET | Refills: 1 | Status: SHIPPED | OUTPATIENT
Start: 2023-09-11

## 2023-10-17 ENCOUNTER — OFFICE VISIT (OUTPATIENT)
Dept: FAMILY MEDICINE CLINIC | Age: 73
End: 2023-10-17
Payer: MEDICARE

## 2023-10-17 ENCOUNTER — LAB (OUTPATIENT)
Dept: LAB | Facility: HOSPITAL | Age: 73
End: 2023-10-17
Payer: MEDICARE

## 2023-10-17 VITALS
WEIGHT: 175.6 LBS | HEIGHT: 63 IN | HEART RATE: 79 BPM | SYSTOLIC BLOOD PRESSURE: 113 MMHG | DIASTOLIC BLOOD PRESSURE: 73 MMHG | BODY MASS INDEX: 31.11 KG/M2 | OXYGEN SATURATION: 96 %

## 2023-10-17 DIAGNOSIS — M15.9 GENERALIZED OSTEOARTHRITIS: ICD-10-CM

## 2023-10-17 DIAGNOSIS — Z23 ENCOUNTER FOR IMMUNIZATION: ICD-10-CM

## 2023-10-17 DIAGNOSIS — K21.9 GASTROESOPHAGEAL REFLUX DISEASE WITHOUT ESOPHAGITIS: ICD-10-CM

## 2023-10-17 DIAGNOSIS — R53.83 OTHER FATIGUE: ICD-10-CM

## 2023-10-17 DIAGNOSIS — I10 ESSENTIAL HYPERTENSION: ICD-10-CM

## 2023-10-17 DIAGNOSIS — F33.1 MAJOR DEPRESSIVE DISORDER, RECURRENT EPISODE, MODERATE DEGREE: ICD-10-CM

## 2023-10-17 DIAGNOSIS — I42.2 HYPERTROPHIC CARDIOMYOPATHY: ICD-10-CM

## 2023-10-17 DIAGNOSIS — F41.1 GENERALIZED ANXIETY DISORDER: ICD-10-CM

## 2023-10-17 DIAGNOSIS — Z00.00 ANNUAL PHYSICAL EXAM: Primary | ICD-10-CM

## 2023-10-17 PROBLEM — M25.551 RIGHT HIP PAIN: Status: RESOLVED | Noted: 2020-02-10 | Resolved: 2023-10-17

## 2023-10-17 PROBLEM — R00.2 PALPITATIONS: Status: RESOLVED | Noted: 2019-02-12 | Resolved: 2023-10-17

## 2023-10-17 PROBLEM — R11.0 NAUSEA: Status: RESOLVED | Noted: 2023-06-23 | Resolved: 2023-10-17

## 2023-10-17 LAB
ALBUMIN SERPL-MCNC: 4.6 G/DL (ref 3.5–5.2)
ALBUMIN/GLOB SERPL: 1.7 G/DL
ALP SERPL-CCNC: 121 U/L (ref 39–117)
ALT SERPL W P-5'-P-CCNC: 25 U/L (ref 1–33)
ANION GAP SERPL CALCULATED.3IONS-SCNC: 8.6 MMOL/L (ref 5–15)
AST SERPL-CCNC: 35 U/L (ref 1–32)
BASOPHILS # BLD AUTO: 0.03 10*3/MM3 (ref 0–0.2)
BASOPHILS NFR BLD AUTO: 0.4 % (ref 0–1.5)
BILIRUB SERPL-MCNC: 0.3 MG/DL (ref 0–1.2)
BUN SERPL-MCNC: 11 MG/DL (ref 8–23)
BUN/CREAT SERPL: 12.6 (ref 7–25)
CALCIUM SPEC-SCNC: 10.8 MG/DL (ref 8.6–10.5)
CHLORIDE SERPL-SCNC: 104 MMOL/L (ref 98–107)
CHOLEST SERPL-MCNC: 188 MG/DL (ref 0–200)
CO2 SERPL-SCNC: 28.4 MMOL/L (ref 22–29)
CREAT SERPL-MCNC: 0.87 MG/DL (ref 0.57–1)
DEPRECATED RDW RBC AUTO: 45.7 FL (ref 37–54)
EGFRCR SERPLBLD CKD-EPI 2021: 69.6 ML/MIN/1.73
EOSINOPHIL # BLD AUTO: 0.27 10*3/MM3 (ref 0–0.4)
EOSINOPHIL NFR BLD AUTO: 3.8 % (ref 0.3–6.2)
ERYTHROCYTE [DISTWIDTH] IN BLOOD BY AUTOMATED COUNT: 12.9 % (ref 12.3–15.4)
FOLATE SERPL-MCNC: 6.25 NG/ML (ref 4.78–24.2)
GLOBULIN UR ELPH-MCNC: 2.7 GM/DL
GLUCOSE SERPL-MCNC: 131 MG/DL (ref 65–99)
HCT VFR BLD AUTO: 43.3 % (ref 34–46.6)
HDLC SERPL-MCNC: 40 MG/DL (ref 40–60)
HGB BLD-MCNC: 13.9 G/DL (ref 12–15.9)
IMM GRANULOCYTES # BLD AUTO: 0 10*3/MM3 (ref 0–0.05)
IMM GRANULOCYTES NFR BLD AUTO: 0 % (ref 0–0.5)
LDLC SERPL CALC-MCNC: 114 MG/DL (ref 0–100)
LDLC/HDLC SERPL: 2.73 {RATIO}
LYMPHOCYTES # BLD AUTO: 2.29 10*3/MM3 (ref 0.7–3.1)
LYMPHOCYTES NFR BLD AUTO: 32.1 % (ref 19.6–45.3)
MCH RBC QN AUTO: 30.4 PG (ref 26.6–33)
MCHC RBC AUTO-ENTMCNC: 32.1 G/DL (ref 31.5–35.7)
MCV RBC AUTO: 94.7 FL (ref 79–97)
MONOCYTES # BLD AUTO: 0.46 10*3/MM3 (ref 0.1–0.9)
MONOCYTES NFR BLD AUTO: 6.4 % (ref 5–12)
NEUTROPHILS NFR BLD AUTO: 4.09 10*3/MM3 (ref 1.7–7)
NEUTROPHILS NFR BLD AUTO: 57.3 % (ref 42.7–76)
PLATELET # BLD AUTO: 211 10*3/MM3 (ref 140–450)
PMV BLD AUTO: 9.2 FL (ref 6–12)
POTASSIUM SERPL-SCNC: 5.1 MMOL/L (ref 3.5–5.2)
PROT SERPL-MCNC: 7.3 G/DL (ref 6–8.5)
RBC # BLD AUTO: 4.57 10*6/MM3 (ref 3.77–5.28)
SODIUM SERPL-SCNC: 141 MMOL/L (ref 136–145)
TRIGL SERPL-MCNC: 194 MG/DL (ref 0–150)
TSH SERPL DL<=0.05 MIU/L-ACNC: 3.8 UIU/ML (ref 0.27–4.2)
VIT B12 BLD-MCNC: 285 PG/ML (ref 211–946)
VLDLC SERPL-MCNC: 34 MG/DL (ref 5–40)
WBC NRBC COR # BLD: 7.14 10*3/MM3 (ref 3.4–10.8)

## 2023-10-17 PROCEDURE — 80061 LIPID PANEL: CPT

## 2023-10-17 PROCEDURE — 36415 COLL VENOUS BLD VENIPUNCTURE: CPT

## 2023-10-17 PROCEDURE — 82746 ASSAY OF FOLIC ACID SERUM: CPT

## 2023-10-17 PROCEDURE — 85025 COMPLETE CBC W/AUTO DIFF WBC: CPT

## 2023-10-17 PROCEDURE — 80053 COMPREHEN METABOLIC PANEL: CPT

## 2023-10-17 PROCEDURE — 84443 ASSAY THYROID STIM HORMONE: CPT

## 2023-10-17 PROCEDURE — 82607 VITAMIN B-12: CPT

## 2023-10-17 RX ORDER — VALSARTAN 40 MG/1
40 TABLET ORAL DAILY
Qty: 90 TABLET | Refills: 1 | Status: SHIPPED | OUTPATIENT
Start: 2023-10-17

## 2023-10-17 RX ORDER — DULOXETIN HYDROCHLORIDE 60 MG/1
60 CAPSULE, DELAYED RELEASE ORAL DAILY
Qty: 90 CAPSULE | Refills: 1 | Status: SHIPPED | OUTPATIENT
Start: 2023-10-17

## 2023-10-17 RX ORDER — NEBIVOLOL 5 MG/1
5 TABLET ORAL DAILY
Qty: 90 TABLET | Refills: 1 | Status: SHIPPED | OUTPATIENT
Start: 2023-10-17

## 2023-10-17 NOTE — PROGRESS NOTES
The ABCs of the Annual Wellness Visit  Subsequent Medicare Wellness Visit    Subjective    Criss Edwards is a 73 y.o. female who presents for a Subsequent Medicare Wellness Visit.    She is UTD on colonoscopy, last done 4/2018 and this was normal.  Ten year repeat recommended.  Pap smears are no longer indicated by age and history; s/p hysterectomy.  She is UTD on mammogram, last done 7/2023 and this was normal.  She is UTD on DEXA, last done 2/2022 and this showed osteopenia.  She is UTD on Idtnete90 (6/2022), Pneumovax (1/2018), Zwvdsvf13 (11/2016), Zostavax (5/2010).  She is due for Shingrix, Td (10/2012), COVID, flu.  She is due for routine labs including CMP, lipids and CBC.     The following portions of the patient's history were reviewed and   updated as appropriate: allergies, current medications, past family history, past medical history, past social history, past surgical history, and problem list.    Compared to one year ago, the patient feels her physical   health is the same.    Compared to one year ago, the patient feels her mental   health is the same.    Recent Hospitalizations:  She was not admitted to the hospital during the last year.       Current Medical Providers:  Patient Care Team:  Clarisse Flores MD as PCP - General (Family Medicine)  Alberto Bose MD as Surgeon (Orthopedic Surgery)  Sammy Nelson MD as Consulting Physician (Cardiology)    Outpatient Medications Prior to Visit   Medication Sig Dispense Refill    acetaminophen (TYLENOL) 650 MG 8 hr tablet Take 1 tablet by mouth Every 8 (Eight) Hours As Needed for Mild Pain.      cyclobenzaprine (FLEXERIL) 5 MG tablet TAKE 1 TABLET BY MOUTH AT NIGHT AS NEEDED FOR MUSCLE SPASMS 30 tablet 2    HYDROcodone-acetaminophen (NORCO) 5-325 MG per tablet Take 1 tablet by mouth Every 8 (Eight) Hours As Needed for Severe Pain. 20 tablet 0    ondansetron ODT (ZOFRAN-ODT) 4 MG disintegrating tablet DISSOLVE ONE TABLET BY MOUTH EVERY 8 HOURS  AS NEEDED FOR NAUSEA AND VOMITING 20 tablet 0    pantoprazole (PROTONIX) 40 MG EC tablet Take 1 tablet by mouth Daily. 90 tablet 1    DULoxetine (CYMBALTA) 60 MG capsule Take 1 capsule by mouth Daily. 90 capsule 0    nebivolol (BYSTOLIC) 5 MG tablet Take 1 tablet by mouth Daily. 90 tablet 1    valsartan (DIOVAN) 40 MG tablet Take 1 tablet by mouth Daily. 90 tablet 1     No facility-administered medications prior to visit.       Opioid medication/s are on active medication list.  and I have evaluated her active treatment plan and pain score trends (see table).  There were no vitals filed for this visit.  I have reviewed the chart for potential of high risk medication and harmful drug interactions in the elderly.          Aspirin is not on active medication list.  Aspirin use is not indicated based on review of current medical condition/s. Risk of harm outweighs potential benefits.  .    Patient Active Problem List   Diagnosis    Primary osteoarthritis of left knee    Status post total hip replacement, right    Hearing loss    Essential hypertension    Hypertrophic cardiomyopathy    Nephrolithiasis    Meniere's disease    Mitral valve prolapse    Mixed stress and urge urinary incontinence    Major depressive disorder, recurrent episode, moderate degree    Generalized osteoarthritis    Osteopenia of multiple sites    Seasonal allergic rhinitis due to pollen    Vitamin D deficiency    Gastroesophageal reflux disease without esophagitis    Lumbar radiculopathy    Annual physical exam    Allergic dermatitis    Generalized anxiety disorder    Primary osteoarthritis, left shoulder    Imbalance    Chronic left shoulder pain    Closed fracture of multiple ribs of right side with routine healing    Effusion of right knee    Other fatigue     Advance Care Planning   Advance Care Planning     Advance Directive is not on file.  ACP discussion was held with the patient during this visit. Patient does not have an advance directive,  "information provided.     Objective    Vitals:    10/17/23 1433   BP: 113/73   BP Location: Left arm   Patient Position: Sitting   Cuff Size: Adult   Pulse: 79   SpO2: 96%   Weight: 79.7 kg (175 lb 9.6 oz)   Height: 160 cm (62.99\")     Estimated body mass index is 31.11 kg/m² as calculated from the following:    Height as of this encounter: 160 cm (62.99\").    Weight as of this encounter: 79.7 kg (175 lb 9.6 oz).           Does the patient have evidence of cognitive impairment? No          HEALTH RISK ASSESSMENT    Smoking Status:  Social History     Tobacco Use   Smoking Status Never    Passive exposure: Never   Smokeless Tobacco Never     Alcohol Consumption:  Social History     Substance and Sexual Activity   Alcohol Use No     Fall Risk Screen:    JEIMY Fall Risk Assessment was completed, and patient is at HIGH risk for falls. Assessment completed on:10/17/2023    Depression Screening:      10/17/2023     2:37 PM   PHQ-2/PHQ-9 Depression Screening   Little Interest or Pleasure in Doing Things 0-->not at all   Feeling Down, Depressed or Hopeless 0-->not at all   PHQ-9: Brief Depression Severity Measure Score 0       Health Habits and Functional and Cognitive Screening:      10/17/2023     2:38 PM   Functional & Cognitive Status   Do you have difficulty preparing food and eating? No   Do you have difficulty bathing yourself, getting dressed or grooming yourself? No   Do you have difficulty using the toilet? No   Do you have difficulty moving around from place to place? No   Do you have trouble with steps or getting out of a bed or a chair? No   Current Diet Unhealthy Diet   Dental Exam Up to date   Eye Exam Up to date   Exercise (times per week) 3 times per week   Current Exercises Include Walking   Do you need help using the phone?  No   Are you deaf or do you have serious difficulty hearing?  Yes   Do you need help to go to places out of walking distance? No   Do you need help shopping? No   Do you need help " preparing meals?  No   Do you need help with housework?  Yes   Do you need help with laundry? No   Do you need help taking your medications? No   Do you need help managing money? No   Do you ever drive or ride in a car without wearing a seat belt? No   Have you felt unusual stress, anger or loneliness in the last month? Yes   Who do you live with? Alone   If you need help, do you have trouble finding someone available to you? Yes   Have you been bothered in the last four weeks by sexual problems? No   Do you have difficulty concentrating, remembering or making decisions? No       Age-appropriate Screening Schedule:  Refer to the list below for future screening recommendations based on patient's age, sex and/or medical conditions. Orders for these recommended tests are listed in the plan section. The patient has been provided with a written plan.    Health Maintenance   Topic Date Due    ZOSTER VACCINE (2 of 3) 06/26/2010    TDAP/TD VACCINES (2 - Tdap) 10/01/2022    COVID-19 Vaccine (5 - 2023-24 season) 09/01/2023    DXA SCAN  02/07/2024    BMI FOLLOWUP  03/15/2024    ANNUAL WELLNESS VISIT  10/17/2024    MAMMOGRAM  07/07/2025    COLORECTAL CANCER SCREENING  04/11/2028    HEPATITIS C SCREENING  Completed    INFLUENZA VACCINE  Completed    Pneumococcal Vaccine 65+  Completed                  CMS Preventative Services Quick Reference  Risk Factors Identified During Encounter  Immunizations Discussed/Encouraged: Td, Influenza, Shingrix, and COVID19  The above risks/problems have been discussed with the patient.  Pertinent information has been shared with the patient in the After Visit Summary.  An After Visit Summary and PPPS were made available to the patient.    Follow Up:   Next Medicare Wellness visit to be scheduled in 1 year.       Additional E&M Note during same encounter follows:  Patient has multiple medical problems which are significant and separately identifiable that require additional work above and beyond  "the Medicare Wellness Visit.      Chief Complaint  Medicare Wellness-subsequent and Fatigue    Subjective        HPI  Criss Edwards is also being seen today for routine f/u of chronic issues.    She developed rib fractures x2 after a fall down a short flight of stairs at the end of July.  At that time, she did have CT chest done during the course of her ED evaluation which showed \"evidence of old calcified granulomatous disease\" with \"nodularity in the right middle lobe, 2 adjacent nodules measuring 4 and 5 mm.\"  Additionally, there was a \"tiny spiculated nodule in the right lower lobe\".  Six-month CT chest was recommended to document stability.    She has been feeling a lot of fatigue the whole past summer.  She feels dyspnea on exertion when she goes out to the yard to try to work.  It has been somewhat worse since she fractured the ribs.  However, it may be slowly improving.  She has been following with Dr. Juice Alejandro for her L shoulder that she hurt when she fell.      She is valsartan and nebivolol for HTN.  Her BP has been well controlled.  No chest pain, palpitations or shortness of breath.  Follows with Cardiology Dr. Nelson for HOCM.      She is on duloxetine for depression and anxiety.  There are some complicated interpersonal family relationships contributing to her symptoms.  She carries a lot of anxiety typically.  Depressed mood has been better controlled.     She is on pantoprazole for GERD.  No indigestion or reflux.    Review of Systems   Constitutional:  Positive for fatigue. Negative for chills and fever.   HENT:  Positive for congestion and rhinorrhea. Negative for hearing loss.    Eyes:  Negative for pain and visual disturbance.   Respiratory:  Negative for cough and shortness of breath.    Cardiovascular:  Negative for chest pain and palpitations.   Gastrointestinal:  Negative for abdominal pain, constipation, diarrhea, nausea and vomiting.   Genitourinary:  Negative for difficulty " "urinating, dysuria, frequency and urgency.   Musculoskeletal:  Positive for arthralgias. Negative for myalgias.   Neurological:  Negative for weakness and numbness.   Psychiatric/Behavioral:  Negative for dysphoric mood and sleep disturbance. The patient is nervous/anxious.        Objective   Vital Signs:  /73 (BP Location: Left arm, Patient Position: Sitting, Cuff Size: Adult)   Pulse 79   Ht 160 cm (62.99\")   Wt 79.7 kg (175 lb 9.6 oz)   SpO2 96%   BMI 31.11 kg/m²     Physical Exam  Vitals reviewed.   Constitutional:       General: She is not in acute distress.     Appearance: Normal appearance. She is well-developed.   HENT:      Head: Normocephalic and atraumatic.      Right Ear: External ear normal.      Left Ear: External ear normal.      Mouth/Throat:      Mouth: Mucous membranes are moist.   Eyes:      Extraocular Movements: Extraocular movements intact.      Conjunctiva/sclera: Conjunctivae normal.      Pupils: Pupils are equal, round, and reactive to light.   Cardiovascular:      Rate and Rhythm: Normal rate and regular rhythm.      Heart sounds: No murmur heard.  Pulmonary:      Effort: Pulmonary effort is normal.      Breath sounds: Normal breath sounds. No wheezing, rhonchi or rales.   Abdominal:      General: Bowel sounds are normal. There is no distension.      Palpations: Abdomen is soft.      Tenderness: There is no abdominal tenderness.   Musculoskeletal:         General: Normal range of motion.   Skin:     General: Skin is warm and dry.   Neurological:      Mental Status: She is alert and oriented to person, place, and time.      Deep Tendon Reflexes: Reflexes normal.   Psychiatric:         Mood and Affect: Affect normal. Mood is anxious.         Behavior: Behavior normal.         Thought Content: Thought content normal.         Judgment: Judgment normal.                    Lab Results   Component Value Date    GLUCOSE 129 (H) 03/15/2023    BUN 16 03/15/2023    CREATININE 1.01 (H) " 03/15/2023    EGFR 58.5 (L) 03/15/2023    BCR 15.8 03/15/2023    K 4.9 03/15/2023    CO2 28.2 03/15/2023    CALCIUM 10.0 03/15/2023    ALBUMIN 4.4 03/15/2023    BILITOT 0.4 03/15/2023    AST 23 03/15/2023    ALT 25 03/15/2023       Lab Results   Component Value Date    CHOL 188 03/15/2023    CHLPL 179 01/26/2021    TRIG 123 03/15/2023    HDL 45 03/15/2023     (H) 03/15/2023       Lab Results   Component Value Date    WBC 7.44 03/15/2023    HGB 13.9 03/15/2023    HCT 43.0 03/15/2023    MCV 94.5 03/15/2023     03/15/2023          Assessment and Plan   Diagnoses and all orders for this visit:    1. Annual physical exam (Primary)  Assessment & Plan:  She is UTD on colonoscopy, last done 4/2018 and this was normal.  Ten year repeat recommended.  Pap smears are no longer indicated by age and history; s/p hysterectomy.  She is UTD on mammogram, last done 7/2023 and this was normal.  She is UTD on DEXA, last done 2/2022 and this showed osteopenia.  She is UTD on Rulzcbd51 (6/2022), Pneumovax (1/2018), Hzufddd15 (11/2016), Zostavax (5/2010).  She is due for Shingrix, Td (10/2012), COVID, flu.  High dose flu given today.  She can come back for COVID later in the week when available.  Shingrix and Tdap can be done at the pharmacy.  She is due for routine labs including CMP, lipids and CBC; ordered.      2. Essential hypertension  Assessment & Plan:  Blood pressure has been running at goal.  Continue valsartan 40 mg daily and nebivolol 5 mg daily.  Refills were needed today.  Labs were needed today.  Continue to monitor.      Orders:  -     Comprehensive Metabolic Panel; Future  -     Lipid Panel; Future  -     CBC Auto Differential; Future  -     valsartan (DIOVAN) 40 MG tablet; Take 1 tablet by mouth Daily.  Dispense: 90 tablet; Refill: 1  -     nebivolol (BYSTOLIC) 5 MG tablet; Take 1 tablet by mouth Daily.  Dispense: 90 tablet; Refill: 1    3. Hypertrophic cardiomyopathy    4. Major depressive disorder,  recurrent episode, moderate degree  Assessment & Plan:  Stable on duloxetine 60 mg daily.  Refills were needed today.  Continue to monitor.      Orders:  -     DULoxetine (CYMBALTA) 60 MG capsule; Take 1 capsule by mouth Daily.  Dispense: 90 capsule; Refill: 1    5. Generalized anxiety disorder  Assessment & Plan:  Stable on duloxetine 60 mg daily.  Refills were needed today.  Continue to monitor.        6. Gastroesophageal reflux disease without esophagitis  Assessment & Plan:  Stable on pantoprazole 40 mg daily.  Refills were not needed today.  Continue to monitor.  Wean PPI as able.        7. Generalized osteoarthritis    8. Other fatigue  Assessment & Plan:  Checking labs.    Orders:  -     TSH; Future  -     Vitamin B12 & Folate; Future    9. Encounter for immunization  -     Fluzone High-Dose 65+yrs             Follow Up   Return in about 3 months (around 1/17/2024) for Recheck  ** 30 min please**.  Patient was given instructions and counseling regarding her condition or for health maintenance advice. Please see specific information pulled into the AVS if appropriate.

## 2023-10-17 NOTE — ASSESSMENT & PLAN NOTE
Stable on pantoprazole 40 mg daily.  Refills were not needed today.  Continue to monitor.  Wean PPI as able.

## 2023-10-17 NOTE — ASSESSMENT & PLAN NOTE
Blood pressure has been running at goal.  Continue valsartan 40 mg daily and nebivolol 5 mg daily.  Refills were needed today.  Labs were needed today.  Continue to monitor.

## 2023-10-17 NOTE — ASSESSMENT & PLAN NOTE
She is UTD on colonoscopy, last done 4/2018 and this was normal.  Ten year repeat recommended.  Pap smears are no longer indicated by age and history; s/p hysterectomy.  She is UTD on mammogram, last done 7/2023 and this was normal.  She is UTD on DEXA, last done 2/2022 and this showed osteopenia.  She is UTD on Fitkyng15 (6/2022), Pneumovax (1/2018), Uoobrlk35 (11/2016), Zostavax (5/2010).  She is due for Shingrix, Td (10/2012), COVID, flu.  High dose flu given today.  She can come back for COVID later in the week when available.  Shingrix and Tdap can be done at the pharmacy.  She is due for routine labs including CMP, lipids and CBC; ordered.

## 2023-10-19 ENCOUNTER — OFFICE VISIT (OUTPATIENT)
Dept: ORTHOPEDIC SURGERY | Facility: CLINIC | Age: 73
End: 2023-10-19
Payer: MEDICARE

## 2023-10-19 VITALS — TEMPERATURE: 98.6 F | BODY MASS INDEX: 31.01 KG/M2 | WEIGHT: 175 LBS | HEIGHT: 63 IN

## 2023-10-19 DIAGNOSIS — M19.012 PRIMARY OSTEOARTHRITIS, LEFT SHOULDER: Primary | ICD-10-CM

## 2023-10-19 DIAGNOSIS — Z96.641 STATUS POST TOTAL HIP REPLACEMENT, RIGHT: ICD-10-CM

## 2023-10-19 PROCEDURE — 1160F RVW MEDS BY RX/DR IN RCRD: CPT | Performed by: ORTHOPAEDIC SURGERY

## 2023-10-19 PROCEDURE — 20610 DRAIN/INJ JOINT/BURSA W/O US: CPT | Performed by: ORTHOPAEDIC SURGERY

## 2023-10-19 PROCEDURE — 1159F MED LIST DOCD IN RCRD: CPT | Performed by: ORTHOPAEDIC SURGERY

## 2023-10-19 RX ADMIN — LIDOCAINE HYDROCHLORIDE 2 ML: 20 INJECTION, SOLUTION EPIDURAL; INFILTRATION; INTRACAUDAL; PERINEURAL at 08:48

## 2023-10-19 RX ADMIN — METHYLPREDNISOLONE ACETATE 160 MG: 80 INJECTION, SUSPENSION INTRA-ARTICULAR; INTRALESIONAL; INTRAMUSCULAR; SOFT TISSUE at 08:48

## 2023-10-19 NOTE — PROGRESS NOTES
INJECTION    Patient: Criss Edwards    YOB: 1950    MRN: [id number]    Chief Complaint   Patient presents with    Left Shoulder - Follow-up, Pain       History of Present Illness:  Patient returns today for follow-up on left shoulder pain. Her pain is generalized in the shoulder, has been progressive and remains intermittent . Her pain is worsened by overhead reaching, reaching backward and improved with injections.    This problem is not new to this examiner.     Allergies:   Allergies   Allergen Reactions    Codeine Itching    Iodine Hives     DENIES    Contrast Dye (Echo Or Unknown Ct/Mr) GI Intolerance       Medications:   Home Medications:  Current Outpatient Medications on File Prior to Visit   Medication Sig    acetaminophen (TYLENOL) 650 MG 8 hr tablet Take 1 tablet by mouth Every 8 (Eight) Hours As Needed for Mild Pain.    cyclobenzaprine (FLEXERIL) 5 MG tablet TAKE 1 TABLET BY MOUTH AT NIGHT AS NEEDED FOR MUSCLE SPASMS    DULoxetine (CYMBALTA) 60 MG capsule Take 1 capsule by mouth Daily.    nebivolol (BYSTOLIC) 5 MG tablet Take 1 tablet by mouth Daily.    ondansetron ODT (ZOFRAN-ODT) 4 MG disintegrating tablet DISSOLVE ONE TABLET BY MOUTH EVERY 8 HOURS AS NEEDED FOR NAUSEA AND VOMITING    pantoprazole (PROTONIX) 40 MG EC tablet Take 1 tablet by mouth Daily.    valsartan (DIOVAN) 40 MG tablet Take 1 tablet by mouth Daily.     No current facility-administered medications on file prior to visit.     Current Medications:  Scheduled Meds:  PRN Meds:.    I have reviewed the patient's medical history in detail and updated the computerized patient record.  Review and summarization of old records include:    Past Medical History:   Diagnosis Date    Dizziness     History of back surgery 2013    unkown what surgeries    History of total right hip replacement     HL (hearing loss)     Hypertension      Past Surgical History:   Procedure Laterality Date    HAND SURGERY Left 05/07/2016    HIP  "ARTHROPLASTY Right     KIDNEY STONE SURGERY       Social History     Occupational History    Not on file   Tobacco Use    Smoking status: Never     Passive exposure: Never    Smokeless tobacco: Never   Vaping Use    Vaping Use: Never used   Substance and Sexual Activity    Alcohol use: No    Drug use: No    Sexual activity: Defer      Social History     Social History Narrative    Not on file     History reviewed. No pertinent family history.    Review of Systems        Wt Readings from Last 3 Encounters:   10/25/23 78.9 kg (174 lb)   10/19/23 79.4 kg (175 lb)   10/17/23 79.7 kg (175 lb 9.6 oz)     Ht Readings from Last 3 Encounters:   10/25/23 160 cm (62.99\")   10/19/23 160 cm (62.99\")   10/17/23 160 cm (62.99\")     Body mass index is 31.01 kg/m².  Facility age limit for growth %caden is 20 years.  Vitals:    10/19/23 0851   Temp: 98.6 °F (37 °C)       Physical Exam    Musculoskeletal:    Left shoulder (cta). Rotator cuff function is extremely impaired. There is a high riding humeral head with articulation of the humerus to the undersurface of the acromiohumeral articulation. AC joint is exquisitely tender and painful for patient. Axillary nerve function is well preserved. There is significant winging of the scapula with hollowing of the fossae over the proximal and distal aspects of the spine of the scapula. Forward flexion is 0-30 degrees, active abduction is 0-60 degrees. Drop arm sign is positive. External rotation against resistance is extremely painful and limited for the patient. Skin and soft tissues are essentially normal other than some amounts of swelling. The pain level is 5      Diagnostics:      Procedure:  Large Joint Arthrocentesis: L glenohumeral  Date/Time: 10/19/2023 8:48 AM  Consent given by: patient  Site marked: site marked  Timeout: Immediately prior to procedure a time out was called to verify the correct patient, procedure, equipment, support staff and site/side marked as required "   Supporting Documentation  Indications: pain   Procedure Details  Location: shoulder - L glenohumeral  Preparation: Patient was prepped and draped in the usual sterile fashion  Needle size: 25 G  Approach: anteromedial  Medications administered: 160 mg methylPREDNISolone acetate 80 MG/ML; 2 mL lidocaine PF 2% 2 %  Patient tolerance: patient tolerated the procedure well with no immediate complications            Assessment:   Diagnoses and all orders for this visit:    1. Primary osteoarthritis, left shoulder (Primary)    2. Status post total hip replacement, right    Other orders  -     Large Joint Arthrocentesis: L glenohumeral          Plan:   Injected patient's left shoulder joint(s)with Depo-Medrol from an anterolateral approach   Compression/brace   Rest, ice, compression, and elevation (RICE) therapy  OTC Alternate Ibuprofen and Tylenol as needed  Follow up in 3 month(s)    Date of encounter: 10/19/2023   Alberto Bose MD

## 2023-10-25 ENCOUNTER — OFFICE VISIT (OUTPATIENT)
Dept: OTOLARYNGOLOGY | Facility: CLINIC | Age: 73
End: 2023-10-25
Payer: MEDICARE

## 2023-10-25 ENCOUNTER — TELEPHONE (OUTPATIENT)
Dept: OTOLARYNGOLOGY | Facility: CLINIC | Age: 73
End: 2023-10-25

## 2023-10-25 VITALS
BODY MASS INDEX: 30.83 KG/M2 | HEIGHT: 63 IN | DIASTOLIC BLOOD PRESSURE: 74 MMHG | WEIGHT: 174 LBS | SYSTOLIC BLOOD PRESSURE: 130 MMHG

## 2023-10-25 DIAGNOSIS — H93.13 TINNITUS OF BOTH EARS: ICD-10-CM

## 2023-10-25 DIAGNOSIS — H61.23 BILATERAL IMPACTED CERUMEN: ICD-10-CM

## 2023-10-25 DIAGNOSIS — H90.3 SENSORINEURAL HEARING LOSS (SNHL) OF BOTH EARS: Primary | ICD-10-CM

## 2023-10-25 PROCEDURE — 99213 OFFICE O/P EST LOW 20 MIN: CPT | Performed by: OTOLARYNGOLOGY

## 2023-10-25 PROCEDURE — 3075F SYST BP GE 130 - 139MM HG: CPT | Performed by: OTOLARYNGOLOGY

## 2023-10-25 PROCEDURE — 3078F DIAST BP <80 MM HG: CPT | Performed by: OTOLARYNGOLOGY

## 2023-10-25 PROCEDURE — 69210 REMOVE IMPACTED EAR WAX UNI: CPT | Performed by: OTOLARYNGOLOGY

## 2023-10-25 NOTE — PROGRESS NOTES
Patient Name: Criss Edwards   Visit Date: 10/25/2023   Patient ID: [id number]  Provider: Tyrese Terrazas MD    Sex: female  Location: [facility]   YOB: 1950  Location Address: [address] [place] [address], [address],?[place]?[id number]    Primary Care Provider Clarisse Flores MD  Location Phone: [phone]    Referring Provider: No ref. provider found        Chief Complaint  Follow-up (Bilateral impacted cerumen//), Ear Fullness, and Ear Problem    Subjective    History of Present Illness  Criss Edwards is a 73 y.o. female who presents to [facility] today as a consult from No ref. provider found.    She presents to the clinic today for follow-up regarding her ears and hearing.  She noted decrease in her hearing especially on the left side.  She is using hearing aids and had new molds made recently.  She denies any ear drainage or any ear pain issues.  No other significant changes since March.    Past Medical History:   Diagnosis Date    Dizziness     History of back surgery 2013    unkown what surgeries    History of total right hip replacement     HL (hearing loss)     Hypertension        Past Surgical History:   Procedure Laterality Date    HAND SURGERY Left 05/07/2016    HIP ARTHROPLASTY Right     KIDNEY STONE SURGERY           Current Outpatient Medications:     acetaminophen (TYLENOL) 650 MG 8 hr tablet, Take 1 tablet by mouth Every 8 (Eight) Hours As Needed for Mild Pain., Disp: , Rfl:     cyclobenzaprine (FLEXERIL) 5 MG tablet, TAKE 1 TABLET BY MOUTH AT NIGHT AS NEEDED FOR MUSCLE SPASMS, Disp: 30 tablet, Rfl: 2    DULoxetine (CYMBALTA) 60 MG capsule, Take 1 capsule by mouth Daily., Disp: 90 capsule, Rfl: 1    nebivolol (BYSTOLIC) 5 MG tablet, Take 1 tablet by mouth Daily., Disp: 90 tablet, Rfl: 1    ondansetron ODT (ZOFRAN-ODT) 4 MG disintegrating tablet, DISSOLVE ONE TABLET BY MOUTH EVERY 8 HOURS AS NEEDED FOR NAUSEA AND  "VOMITING, Disp: 20 tablet, Rfl: 0    pantoprazole (PROTONIX) 40 MG EC tablet, Take 1 tablet by mouth Daily., Disp: 90 tablet, Rfl: 1    valsartan (DIOVAN) 40 MG tablet, Take 1 tablet by mouth Daily., Disp: 90 tablet, Rfl: 1     Allergies   Allergen Reactions    Codeine Itching    Iodine Hives     DENIES    Contrast Dye (Echo Or Unknown Ct/Mr) GI Intolerance       History reviewed. No pertinent family history.     Social History     Social History Narrative    Not on file       Objective     Vital Signs:   /74   Ht 160 cm (62.99\")   Wt 78.9 kg (174 lb)   BMI 30.83 kg/m²       Physical Exam    Constitutional   Appearance  : well developed, well-nourished, alert and in no acute distress, voice clear and strong    Head  Inspection  : no deformities or lesions  Face  Inspection  : No facial lesions; House-Brackmann I/VI bilaterally  Palpation  : No TMJ crepitus nor  muscle tenderness bilaterally    Eyes  Vision  Visual Fields  : Extraocular movements are intact. No spontaneous or gaze-induced nystagmus.  Conjunctivae  : clear  Sclerae  : clear  Pupils and Irises  : pupils equal, round, and reactive to light.     Ears, Nose, Mouth and Throat    Ears    External Ears  : appearance within normal limits, no lesions present  Otoscopic Examination  : Tympanic membrane appearance within normal limits bilaterally without perforations, well-aerated middle ears  Hearing  : intact to conversational voice both ears  Tunning fork testing:     :    Nose    External Nose  : appearance normal  Intranasal Exam  : mucosa within normal limits, vestibules normal, no intranasal lesions present, septum midline, sinuses non tender to percussion  Oral Cavity    Oral Mucosa  : oral mucosa normal without pallor or cyanosis  Lips  : lip appearance normal  Teeth  : normal dentition for age  Gums  : gums pink, non-swollen, no bleeding present  Tongue  : tongue appearance normal; normal mobility  Palate  : hard palate normal, soft " palate appearance normal with symmetric mobility    Throat    Oropharynx  : no inflammation or lesions present, tonsils within normal limits  Hypopharynx  : appearance within normal limits, superior epiglottis within normal limits  Larynx  : appearance within normal limits, vocal cords within normal limits, no lesions present    Neck  Inspection/Palpation  : normal appearance, no masses or tenderness, trachea midline; thyroid size normal, nontender, no nodules or masses present on palpation    Respiratory  Respiratory Effort  : breathing unlabored  Inspection of Chest  : normal appearance, no retractions    Cardiovascular  Heart  : regular rate and rhythm    Lymphatic  Neck  : no lymphadenopathy present  Supraclavicular Nodes  : no lymphadenopathy present  Preauricular Nodes  : no lymphadenopathy present    Skin and Subcutaneous Tissue  General Inspection  : Regarding face and neck - there are no rashes present, no lesions present, and no areas of discoloration    Neurologic  Cranial Nerves  : cranial nerves II-XII are grossly intact bilaterally  Gait and Station  : normal gait, able to stand without diffculty    Psychiatric  Judgement and Insight  : judgment and insight intact  Mood and Affect  : mood normal, affect appropriate     Ear Cerumen Removal    Date/Time: 10/25/2023 1:51 PM    Performed by: Tyrese Terrazas MD  Authorized by: Tyrese Terrazas MD    Anesthesia:  Local Anesthetic: none  Location details: left ear and right ear  Procedure type: instrumentation, alligator forceps, curette   Sedation:  Patient sedated: no                Assessment and Plan    Diagnoses and all orders for this visit:    1. Sensorineural hearing loss (SNHL) of both ears (Primary)    2. Tinnitus of both ears    3. Bilateral impacted cerumen    Other orders  -     Ear Cerumen Removal    Examination today revealed cerumen impactions on both sides and film over the left eardrum that I was able to remove.  She noted improvement  in her hearing.  She is due to have her hearing aids readjusted.  We did discuss her ringing is related to her sensorineural hearing loss and I recommended background noise distraction techniques.  I am hopeful she does better with her hearing aids adjusted.  I will plan to see her back in the clinic in about 6 months to reassess her ears and clean out any cerumen, or sooner should she have any issues.    Follow Up   No follow-ups on file.  Patient was given instructions and counseling regarding her condition or for health maintenance advice. Please see specific information pulled into the AVS if appropriate.

## 2023-10-25 NOTE — TELEPHONE ENCOUNTER
[facility]-    Pt was in for appt today with Dr. Terrazas. Staff noted after pt left that her hearing aid  had fallen out of her purse.     Attempted to call pt to let her know  was here at the office and could be picked up at the . Pt didn't answer cell- was able to leave detailed voicemail. Attempted to call home number- unable to leave voicemail on this line.     Will place  in brown paper bag with pts name and  at  for pt to  at her convenience    -clr

## 2023-10-31 RX ORDER — METHYLPREDNISOLONE ACETATE 80 MG/ML
160 INJECTION, SUSPENSION INTRA-ARTICULAR; INTRALESIONAL; INTRAMUSCULAR; SOFT TISSUE
Status: COMPLETED | OUTPATIENT
Start: 2023-10-19 | End: 2023-10-19

## 2023-10-31 RX ORDER — LIDOCAINE HYDROCHLORIDE 20 MG/ML
2 INJECTION, SOLUTION EPIDURAL; INFILTRATION; INTRACAUDAL; PERINEURAL
Status: COMPLETED | OUTPATIENT
Start: 2023-10-19 | End: 2023-10-19

## 2023-11-14 ENCOUNTER — CLINICAL SUPPORT (OUTPATIENT)
Dept: FAMILY MEDICINE CLINIC | Age: 73
End: 2023-11-14
Payer: MEDICARE

## 2023-11-14 DIAGNOSIS — Z23 NEED FOR VACCINATION: Primary | ICD-10-CM

## 2023-12-04 DIAGNOSIS — R25.2 LEG CRAMP: ICD-10-CM

## 2023-12-05 RX ORDER — CYCLOBENZAPRINE HCL 5 MG
5 TABLET ORAL NIGHTLY PRN
Qty: 30 TABLET | Refills: 2 | Status: SHIPPED | OUTPATIENT
Start: 2023-12-05

## 2024-01-08 ENCOUNTER — OFFICE VISIT (OUTPATIENT)
Dept: GASTROENTEROLOGY | Facility: CLINIC | Age: 74
End: 2024-01-08
Payer: MEDICARE

## 2024-01-08 VITALS
HEIGHT: 63 IN | WEIGHT: 177 LBS | HEART RATE: 67 BPM | DIASTOLIC BLOOD PRESSURE: 75 MMHG | SYSTOLIC BLOOD PRESSURE: 134 MMHG | BODY MASS INDEX: 31.36 KG/M2

## 2024-01-08 DIAGNOSIS — R11.0 NAUSEA: ICD-10-CM

## 2024-01-08 DIAGNOSIS — K59.00 CONSTIPATION, UNSPECIFIED CONSTIPATION TYPE: ICD-10-CM

## 2024-01-08 DIAGNOSIS — R10.10 PAIN OF UPPER ABDOMEN: ICD-10-CM

## 2024-01-08 DIAGNOSIS — R13.10 DYSPHAGIA, UNSPECIFIED TYPE: ICD-10-CM

## 2024-01-08 DIAGNOSIS — K92.1 BLACK STOOLS: Primary | ICD-10-CM

## 2024-01-08 DIAGNOSIS — R14.0 ABDOMINAL BLOATING: ICD-10-CM

## 2024-01-08 DIAGNOSIS — K21.9 GASTROESOPHAGEAL REFLUX DISEASE, UNSPECIFIED WHETHER ESOPHAGITIS PRESENT: ICD-10-CM

## 2024-01-08 PROCEDURE — 99204 OFFICE O/P NEW MOD 45 MIN: CPT | Performed by: NURSE PRACTITIONER

## 2024-01-08 PROCEDURE — 3075F SYST BP GE 130 - 139MM HG: CPT | Performed by: NURSE PRACTITIONER

## 2024-01-08 PROCEDURE — 3078F DIAST BP <80 MM HG: CPT | Performed by: NURSE PRACTITIONER

## 2024-01-08 PROCEDURE — 1160F RVW MEDS BY RX/DR IN RCRD: CPT | Performed by: NURSE PRACTITIONER

## 2024-01-08 PROCEDURE — 1159F MED LIST DOCD IN RCRD: CPT | Performed by: NURSE PRACTITIONER

## 2024-01-08 RX ORDER — FAMOTIDINE 20 MG/1
20 TABLET, FILM COATED ORAL NIGHTLY
Qty: 30 TABLET | Refills: 3 | Status: SHIPPED | OUTPATIENT
Start: 2024-01-08

## 2024-01-08 NOTE — PATIENT INSTRUCTIONS
Start a nightly stool softener (OK to increase to 2 at night)    Start nightly pepcid (famotidine) for your stomach

## 2024-01-08 NOTE — PROGRESS NOTES
Chief Complaint        Nausea and Constipation    History of Present Illness      Criss Edwards is a 73 y.o. female who presents to [facility] as a new patient for nausea.    She admits her bowels are rocks. She does have fecal urgency. Will also have urinary frequency. She does have issues with bloating and nausea. She will use rectal suppositories PRN. She does have hx GERD/HH and takes protonix 40 mg daily. She will have episodes of black stools. She did eat some blueberries. She admits she had these episodes around NEW YEARS. The last episode was 3-4 days. She will have some issues with globus sensation and swallowing at night. Has dry mouth. She does have rectal bleeding from a suspected hemorrhoids. She had some bleeding this morning. She feels an external hemorrhoids. She is using TUCKS pads. Has tried miralax and fiber without relief. She was taking magnesium and Vit D. She hasn't started them yet. She admits she stopped the magnesium.  She does admit she has lost 10 pounds recently but she admits she has been trying to lose weight.  Good appetite.    She had a CT scan of the abdomen and pelvis done this past July after a fall.  It was negative for any acute abdominal process.  It did show a small hiatal hernia.  History of cholecystectomy.      Last colonoscopy was several years ago at [facility].  Patient reports it was with Dr. Sosa and it was normal.    Never had EGD     GI family history----Father with cirrhosis.     Results       Result Review :   The following data was reviewed by: RAUL Ivan on 01/08/2024     CMP          3/15/2023    08:42 10/17/2023    15:53   CMP   Glucose 129  131    BUN 16  11    Creatinine 1.01  0.87    EGFR 58.5  69.6    Sodium 139  141    Potassium 4.9  5.1    Chloride 104  104    Calcium 10.0  10.8    Total Protein 7.1  7.3    Albumin 4.4  4.6    Globulin 2.7  2.7    Total Bilirubin 0.4  0.3    Alkaline Phosphatase 129   "121    AST (SGOT) 23  35    ALT (SGPT) 25  25    Albumin/Globulin Ratio 1.6  1.7    BUN/Creatinine Ratio 15.8  12.6    Anion Gap 6.8  8.6      CBC          3/15/2023    08:42 10/17/2023    15:53   CBC   WBC 7.44  7.14    RBC 4.55  4.57    Hemoglobin 13.9  13.9    Hematocrit 43.0  43.3    MCV 94.5  94.7    MCH 30.5  30.4    MCHC 32.3  32.1    RDW 13.5  12.9    Platelets 187  211      Lipid Panel          3/15/2023    08:42 10/17/2023    15:53   Lipid Panel   Total Cholesterol 188  188    Triglycerides 123  194    HDL Cholesterol 45  40    VLDL Cholesterol 22  34    LDL Cholesterol  121  114    LDL/HDL Ratio 2.63  2.73      TSH          10/17/2023    15:53   TSH   TSH 3.800        Lipase   Lipase   Date Value Ref Range Status   03/24/2022 31 13 - 60 U/L Final     Amylase No results found for: \"AMYLASE\"  Iron Profile No results found for: \"IRON\", \"TIBC\", \"LABIRON\", \"TRANSFERRIN\"  Ferritin No results found for: \"FERRITIN\"         Past Medical History       Past Medical History:   Diagnosis Date    Dizziness     History of back surgery 2013    unkown what surgeries    History of total right hip replacement     HL (hearing loss)     Hypertension        Past Surgical History:   Procedure Laterality Date    COLONOSCOPY      HAND SURGERY Left 05/07/2016    HIP ARTHROPLASTY Right     KIDNEY STONE SURGERY           Current Outpatient Medications:     acetaminophen (TYLENOL) 650 MG 8 hr tablet, Take 1 tablet by mouth Every 8 (Eight) Hours As Needed for Mild Pain., Disp: , Rfl:     cyclobenzaprine (FLEXERIL) 5 MG tablet, TAKE ONE TABLET BY MOUTH ONCE NIGHTLY AS NEEDED FOR MUSCLE SPASMS, Disp: 30 tablet, Rfl: 2    DULoxetine (CYMBALTA) 60 MG capsule, Take 1 capsule by mouth Daily., Disp: 90 capsule, Rfl: 1    nebivolol (BYSTOLIC) 5 MG tablet, Take 1 tablet by mouth Daily., Disp: 90 tablet, Rfl: 1    ondansetron ODT (ZOFRAN-ODT) 4 MG disintegrating tablet, DISSOLVE ONE TABLET BY MOUTH EVERY 8 HOURS AS NEEDED FOR NAUSEA AND " "VOMITING, Disp: 20 tablet, Rfl: 0    pantoprazole (PROTONIX) 40 MG EC tablet, Take 1 tablet by mouth Daily., Disp: 90 tablet, Rfl: 1    valsartan (DIOVAN) 40 MG tablet, Take 1 tablet by mouth Daily., Disp: 90 tablet, Rfl: 1    famotidine (PEPCID) 20 MG tablet, Take 1 tablet by mouth Every Night., Disp: 30 tablet, Rfl: 3     Allergies   Allergen Reactions    Codeine Itching    Iodinated Contrast Media Other (See Comments)     Other reaction(s): GI Intolerance      Other reaction(s): GI Intolerance    Iodine Hives     DENIES    Contrast Dye (Echo Or Unknown Ct/Mr) GI Intolerance       History reviewed. No pertinent family history.     Social History     Social History Narrative    Not on file       Objective       Objective     Vital Signs:   /75 (BP Location: Right arm, Patient Position: Sitting, Cuff Size: Large Adult)   Pulse 67   Ht 160 cm (63\")   Wt 80.3 kg (177 lb)   BMI 31.35 kg/m²     Body mass index is 31.35 kg/m².    Review of Systems   Constitutional:  Negative for appetite change, chills, diaphoresis, fatigue, fever and unexpected weight change.   HENT:  Positive for trouble swallowing. Negative for nosebleeds, postnasal drip, sore throat and voice change.    Respiratory:  Negative for cough, choking, chest tightness, shortness of breath, wheezing and stridor.    Cardiovascular:  Negative for chest pain, palpitations and leg swelling.   Gastrointestinal:  Positive for abdominal distention, constipation and nausea. Negative for abdominal pain, anal bleeding, blood in stool, diarrhea, rectal pain and vomiting.   Endocrine: Negative for polydipsia, polyphagia and polyuria.   Musculoskeletal:  Negative for gait problem.   Skin:  Negative for rash and wound.   Allergic/Immunologic: Negative for food allergies.   Neurological:  Negative for dizziness, speech difficulty and light-headedness.   Psychiatric/Behavioral:  Negative for confusion, self-injury, sleep disturbance and suicidal ideas.     "     Physical Exam  Constitutional:       General: She is not in acute distress.     Appearance: She is well-developed. She is not ill-appearing.   HENT:      Head: Normocephalic.   Eyes:      Pupils: Pupils are equal, round, and reactive to light.   Cardiovascular:      Rate and Rhythm: Normal rate and regular rhythm.      Heart sounds: Normal heart sounds.   Pulmonary:      Effort: Pulmonary effort is normal.      Breath sounds: Normal breath sounds.   Abdominal:      General: Bowel sounds are normal. There is distension.      Palpations: Abdomen is soft. There is no mass.      Tenderness: There is no abdominal tenderness. There is no guarding or rebound.      Hernia: No hernia is present.   Musculoskeletal:         General: Normal range of motion.   Skin:     General: Skin is warm and dry.   Neurological:      Mental Status: She is alert and oriented to person, place, and time.   Psychiatric:         Speech: Speech normal.         Behavior: Behavior normal.         Judgment: Judgment normal.              Assessment & Plan          Assessment and Plan    Diagnoses and all orders for this visit:    1. Black stools (Primary)  -     Case Request; Standing  -     Follow Anesthesia Guidelines / Protocol; Future  -     Obtain Informed Consent; Future  -     Case Request    2. Abdominal bloating  -     Case Request; Standing  -     Follow Anesthesia Guidelines / Protocol; Future  -     Obtain Informed Consent; Future  -     Case Request    3. Pain of upper abdomen  -     Case Request; Standing  -     Follow Anesthesia Guidelines / Protocol; Future  -     Obtain Informed Consent; Future  -     Case Request    4. Nausea  -     Case Request; Standing  -     Follow Anesthesia Guidelines / Protocol; Future  -     Obtain Informed Consent; Future  -     Case Request    5. Gastroesophageal reflux disease, unspecified whether esophagitis present  -     famotidine (PEPCID) 20 MG tablet; Take 1 tablet by mouth Every Night.   Dispense: 30 tablet; Refill: 3  -     Case Request; Standing  -     Follow Anesthesia Guidelines / Protocol; Future  -     Obtain Informed Consent; Future  -     Case Request    6. Dysphagia, unspecified type  -     Case Request; Standing  -     Follow Anesthesia Guidelines / Protocol; Future  -     Obtain Informed Consent; Future  -     Case Request    7. Constipation, unspecified constipation type    Other orders  -     Verify NPO; Standing    Reviewed medical history with her today.  Given her history and current symptoms recommend EGD with Dr. Lua for further evaluation.  Patient is agreeable to the scope.  For now continue Protonix 40 mg daily.  Will add Pepcid 20 mg at night.  Constipation is definitely an issue for her.  I think this is contributing to all of her other reflux issues.  Recommend she start a nightly stool softener.  Restart her magnesium supplement.  Continue high-fiber diet.  Increase her water intake as tolerated.  Patient to call the office with any issues.  Patient to follow-up with me after her scope.  Patient is agreeable to the plan.    The risk of the endoscopy were discussed in detail. Possible risks/complications, benefits, and alternatives to surgical or invasive procedure have been explained to patient and/or legal guardian; risks include bleeding, infection, and perforation. Patient has been evaluated and can tolerate anesthesia and/or sedation.             Follow Up       Follow Up   Return for F/U AFTER PROCEDURE.  Patient was given instructions and counseling regarding her condition or for health maintenance advice. Please see specific information pulled into the AVS if appropriate.

## 2024-01-17 ENCOUNTER — PATIENT ROUNDING (BHMG ONLY) (OUTPATIENT)
Dept: GASTROENTEROLOGY | Facility: CLINIC | Age: 74
End: 2024-01-17
Payer: MEDICARE

## 2024-01-17 NOTE — PROGRESS NOTES
1/17/2024      Hello, may I speak with Criss Edwards     My name is Libertad. I am calling from [facility]. I show that you had a recent visit with RAUL Henry.    Before we get started may I verify your date of birth? 1950    I am calling to officially welcome you to our practice and ask about your recent visit. Is this a good time to talk? Left Voicemail ok per SANIYA    Tell me about your visit with us. What things went well?    We strive to ensure that we protect your safety and privacy. Is there anything we could have done to improve this during your visit?        We're always looking for ways to make our patients' experiences even better. Do you have recommendations on ways we may improve?    Overall were you satisfied with your first visit to our practice?    I appreciate you taking the time to speak with me today. Is there anything else I can do for you?    I am glad to hear that you had a very good visit and I appreciate you taking the time to provide feedback on this call. We would greatly appreciate you filling out a survey if you receive one in the mail, email or text. This is a great opportunity to provide any additional feedback that you may think of after this call as well.       Thank you, and have a great day.

## 2024-01-19 ENCOUNTER — TELEPHONE (OUTPATIENT)
Dept: GASTROENTEROLOGY | Facility: CLINIC | Age: 74
End: 2024-01-19
Payer: MEDICARE

## 2024-01-24 ENCOUNTER — ANESTHESIA EVENT (OUTPATIENT)
Dept: GASTROENTEROLOGY | Facility: HOSPITAL | Age: 74
End: 2024-01-24
Payer: MEDICARE

## 2024-01-24 NOTE — PRE-PROCEDURE INSTRUCTIONS
Reminded of arrival time at 1130, Entrance C of the [facility] hospital.   Instructed on clear liquid diet the day before, nothing red or purple. Call with any questions about the prep or if in need of the prep.  Reminded them not to eat or drink anything am of procedure unless its a sip of water with medications.

## 2024-01-24 NOTE — ANESTHESIA PREPROCEDURE EVALUATION
" Anesthesia Evaluation     Patient summary reviewed and Nursing notes reviewed   NPO Solid Status: > 8 hours  NPO Liquid Status: > 8 hours           Airway   Mallampati: II  TM distance: >3 FB  Neck ROM: limited  No difficulty expected  Dental - normal exam     Pulmonary     breath sounds clear to auscultation  (-) sleep apnea, not a smoker  Cardiovascular   Exercise tolerance: poor (<4 METS)    Patient on routine beta blocker and Beta blocker given within 24 hours of surgery  Rhythm: regular  Rate: normal    (+) hypertension, valvular problems/murmurs MVP  (-) angina      Neuro/Psych  (+) dizziness/light headedness, numbness, psychiatric history Anxiety and Depression  GI/Hepatic/Renal/Endo    (+) obesity, GERD, renal disease- stones    Musculoskeletal     (+) joint swelling (left shoulder arthropathy)  Abdominal   (+) obese   Substance History      OB/GYN          Other   arthritis,     ROS/Med Hx Other: ECHO 03/09/22: normal EF 68%, moderate MAINE, no obstruction                   Anesthesia Plan    ASA 3     general   total IV anesthesia  (Responds to \"Criss\".  Total IV Anesthesia    Patient understands anesthesia not responsible for dental damage.  )  intravenous induction     Anesthetic plan, risks, benefits, and alternatives have been provided, discussed and informed consent has been obtained with: patient.    Plan discussed with CRNA.    CODE STATUS:         "

## 2024-01-25 ENCOUNTER — HOSPITAL ENCOUNTER (OUTPATIENT)
Facility: HOSPITAL | Age: 74
Setting detail: HOSPITAL OUTPATIENT SURGERY
Discharge: HOME | End: 2024-01-25
Attending: INTERNAL MEDICINE | Admitting: INTERNAL MEDICINE
Payer: MEDICARE

## 2024-01-25 ENCOUNTER — ANESTHESIA (OUTPATIENT)
Dept: GASTROENTEROLOGY | Facility: HOSPITAL | Age: 74
End: 2024-01-25
Payer: MEDICARE

## 2024-01-25 VITALS
BODY MASS INDEX: 30.62 KG/M2 | OXYGEN SATURATION: 93 % | DIASTOLIC BLOOD PRESSURE: 74 MMHG | WEIGHT: 172.84 LBS | SYSTOLIC BLOOD PRESSURE: 113 MMHG | TEMPERATURE: 97.9 F | HEART RATE: 71 BPM | RESPIRATION RATE: 18 BRPM

## 2024-01-25 DIAGNOSIS — K92.1 BLACK STOOLS: ICD-10-CM

## 2024-01-25 DIAGNOSIS — K21.9 GASTROESOPHAGEAL REFLUX DISEASE, UNSPECIFIED WHETHER ESOPHAGITIS PRESENT: ICD-10-CM

## 2024-01-25 DIAGNOSIS — K59.00 CONSTIPATION, UNSPECIFIED CONSTIPATION TYPE: ICD-10-CM

## 2024-01-25 DIAGNOSIS — R14.0 ABDOMINAL BLOATING: ICD-10-CM

## 2024-01-25 DIAGNOSIS — K21.9 GASTROESOPHAGEAL REFLUX DISEASE WITHOUT ESOPHAGITIS: ICD-10-CM

## 2024-01-25 DIAGNOSIS — R11.0 NAUSEA: ICD-10-CM

## 2024-01-25 DIAGNOSIS — R10.10 PAIN OF UPPER ABDOMEN: ICD-10-CM

## 2024-01-25 PROCEDURE — 25810000003 LACTATED RINGERS PER 1000 ML

## 2024-01-25 PROCEDURE — 43239 EGD BIOPSY SINGLE/MULTIPLE: CPT | Performed by: INTERNAL MEDICINE

## 2024-01-25 PROCEDURE — 25010000002 PROPOFOL 10 MG/ML EMULSION: Performed by: NURSE ANESTHETIST, CERTIFIED REGISTERED

## 2024-01-25 PROCEDURE — 43249 ESOPH EGD DILATION <30 MM: CPT | Performed by: INTERNAL MEDICINE

## 2024-01-25 PROCEDURE — C1726 CATH, BAL DIL, NON-VASCULAR: HCPCS | Performed by: INTERNAL MEDICINE

## 2024-01-25 PROCEDURE — 88305 TISSUE EXAM BY PATHOLOGIST: CPT | Performed by: INTERNAL MEDICINE

## 2024-01-25 RX ORDER — HYDROCORTISONE 25 MG/G
CREAM TOPICAL 2 TIMES DAILY
Qty: 28 G | Refills: 0 | Status: SHIPPED | OUTPATIENT
Start: 2024-01-25 | End: 2024-02-08

## 2024-01-25 RX ORDER — ONDANSETRON 4 MG/1
4 TABLET, ORALLY DISINTEGRATING ORAL EVERY 8 HOURS PRN
Qty: 20 TABLET | Refills: 0 | Status: SHIPPED | OUTPATIENT
Start: 2024-01-25

## 2024-01-25 RX ORDER — PROPOFOL 10 MG/ML
VIAL (ML) INTRAVENOUS AS NEEDED
Status: DISCONTINUED | OUTPATIENT
Start: 2024-01-25 | End: 2024-01-25 | Stop reason: SURG

## 2024-01-25 RX ORDER — SODIUM CHLORIDE, SODIUM LACTATE, POTASSIUM CHLORIDE, CALCIUM CHLORIDE 600; 310; 30; 20 MG/100ML; MG/100ML; MG/100ML; MG/100ML
30 INJECTION, SOLUTION INTRAVENOUS CONTINUOUS
Status: DISCONTINUED | OUTPATIENT
Start: 2024-01-25 | End: 2024-01-25 | Stop reason: HOSPADM

## 2024-01-25 RX ORDER — LIDOCAINE HYDROCHLORIDE 20 MG/ML
INJECTION, SOLUTION EPIDURAL; INFILTRATION; INTRACAUDAL; PERINEURAL AS NEEDED
Status: DISCONTINUED | OUTPATIENT
Start: 2024-01-25 | End: 2024-01-25 | Stop reason: SURG

## 2024-01-25 RX ORDER — PANTOPRAZOLE SODIUM 40 MG/1
40 TABLET, DELAYED RELEASE ORAL DAILY
Qty: 90 TABLET | Refills: 0 | Status: SHIPPED | OUTPATIENT
Start: 2024-01-25

## 2024-01-25 RX ADMIN — PROPOFOL 20 MG: 10 INJECTION, EMULSION INTRAVENOUS at 12:30

## 2024-01-25 RX ADMIN — SODIUM CHLORIDE, POTASSIUM CHLORIDE, SODIUM LACTATE AND CALCIUM CHLORIDE 30 ML/HR: 600; 310; 30; 20 INJECTION, SOLUTION INTRAVENOUS at 12:24

## 2024-01-25 RX ADMIN — PROPOFOL 175 MCG/KG/MIN: 10 INJECTION, EMULSION INTRAVENOUS at 12:30

## 2024-01-25 RX ADMIN — LIDOCAINE HYDROCHLORIDE 40 MG: 20 INJECTION, SOLUTION EPIDURAL; INFILTRATION; INTRACAUDAL; PERINEURAL at 12:30

## 2024-01-25 NOTE — H&P
Pre Procedure History & Physical    Chief Complaint:   GERD, dysphagia, upper abd pain, nausea, black stools    Subjective     HPI:   72 yo F here for eval of GERD, dysphagia, upper abd pain, nausea, black stools.    Past Medical History:   Past Medical History:   Diagnosis Date    Dizziness     History of back surgery 2013    unkown what surgeries    History of total right hip replacement     HL (hearing loss)     Hypertension        Past Surgical History:  Past Surgical History:   Procedure Laterality Date    COLONOSCOPY      HAND SURGERY Left 05/07/2016    HIP ARTHROPLASTY Right     KIDNEY STONE SURGERY         Family History:  No family history on file.    Social History:   reports that she has never smoked. She has never been exposed to tobacco smoke. She has never used smokeless tobacco. She reports that she does not drink alcohol and does not use drugs.    Medications:   Medications Prior to Admission   Medication Sig Dispense Refill Last Dose    acetaminophen (TYLENOL) 650 MG 8 hr tablet Take 1 tablet by mouth Every 8 (Eight) Hours As Needed for Mild Pain.   Past Week    DULoxetine (CYMBALTA) 60 MG capsule Take 1 capsule by mouth Daily. 90 capsule 1 1/24/2024    famotidine (PEPCID) 20 MG tablet Take 1 tablet by mouth Every Night. 30 tablet 3 1/24/2024    nebivolol (BYSTOLIC) 5 MG tablet Take 1 tablet by mouth Daily. 90 tablet 1 1/24/2024    ondansetron ODT (ZOFRAN-ODT) 4 MG disintegrating tablet DISSOLVE ONE TABLET BY MOUTH EVERY 8 HOURS AS NEEDED FOR NAUSEA AND VOMITING 20 tablet 0 Past Week    pantoprazole (PROTONIX) 40 MG EC tablet Take 1 tablet by mouth Daily. 90 tablet 1 1/24/2024    valsartan (DIOVAN) 40 MG tablet Take 1 tablet by mouth Daily. 90 tablet 1 1/24/2024    cyclobenzaprine (FLEXERIL) 5 MG tablet TAKE ONE TABLET BY MOUTH ONCE NIGHTLY AS NEEDED FOR MUSCLE SPASMS 30 tablet 2 More than a month       Allergies:  Codeine, Iodinated contrast media, Iodine, Contrast dye (echo or unknown ct/mr), and  Darvon [propoxyphene]    ROS:    Pertinent items are noted in HPI     Objective     Weight 78.4 kg (172 lb 13.5 oz).    Physical Exam   Constitutional: Pt is oriented to person, place, and time and well-developed, well-nourished, and in no distress.   Mouth/Throat: Oropharynx is clear and moist.   Neck: Normal range of motion.   Cardiovascular: Normal rate, regular rhythm and normal heart sounds.    Pulmonary/Chest: Effort normal and breath sounds normal.   Abdominal: Soft. Nontender  Skin: Skin is warm and dry.   Psychiatric: Mood, memory, affect and judgment normal.     Assessment & Plan     Diagnosis:  GERD, dysphagia, upper abd pain, nausea, black stools    Anticipated Surgical Procedure:  EGD    The risks, benefits, and alternatives of this procedure have been discussed with the patient or the responsible party- the patient understands and agrees to proceed.

## 2024-01-25 NOTE — ANESTHESIA POSTPROCEDURE EVALUATION
Patient: Criss Edwards    Procedure Summary       Date: 01/25/24 Room / Location: [facility] ENDOSCOPY 5 / [facility] ENDOSCOPY    Anesthesia Start: 1226 Anesthesia Stop: 1240    Procedure: ESOPHAGOGASTRODUODENOSCOPY Diagnosis:       Abdominal bloating      Pain of upper abdomen      Black stools      Gastroesophageal reflux disease, unspecified whether esophagitis present      Nausea      Constipation, unspecified constipation type      (Abdominal bloating [R14.0])      (Pain of upper abdomen [R10.10])      (Black stools [K92.1])      (Gastroesophageal reflux disease, unspecified whether esophagitis present [K21.9])      (Nausea [R11.0])      (Constipation, unspecified constipation type [K59.00])    Surgeons: Mallory Lua MD Provider: She Berger CRNA    Anesthesia Type: general ASA Status: 3            Anesthesia Type: general    Vitals  Vitals Value Taken Time   /74 01/25/24 1259   Temp 36.6 °C (97.9 °F) 01/25/24 1259   Pulse 71 01/25/24 1259   Resp 18 01/25/24 1259   SpO2 93 % 01/25/24 1259           Post Anesthesia Care and Evaluation    Post-procedure mental status: acceptable.  Pain management: satisfactory to patient    Airway patency: patent  Anesthetic complications: No anesthetic complications    Cardiovascular status: acceptable  Respiratory status: acceptable    Comments: Per chart review

## 2024-01-26 LAB
CYTO UR: NORMAL
LAB AP CASE REPORT: NORMAL
LAB AP CLINICAL INFORMATION: NORMAL
PATH REPORT.FINAL DX SPEC: NORMAL
PATH REPORT.GROSS SPEC: NORMAL

## 2024-01-29 ENCOUNTER — TELEPHONE (OUTPATIENT)
Dept: GASTROENTEROLOGY | Facility: CLINIC | Age: 74
End: 2024-01-29
Payer: MEDICARE

## 2024-01-29 ENCOUNTER — TELEPHONE (OUTPATIENT)
Dept: FAMILY MEDICINE CLINIC | Age: 74
End: 2024-01-29
Payer: MEDICARE

## 2024-01-29 DIAGNOSIS — R91.1 PULMONARY NODULE: Primary | ICD-10-CM

## 2024-01-29 NOTE — TELEPHONE ENCOUNTER
----- Message from RAUL Ivan sent at 1/29/2024 10:19 AM EST -----  Biopsies are consistent with reflux esophagitis.  Continue current PPI therapy and schedule for follow-up.

## 2024-01-29 NOTE — TELEPHONE ENCOUNTER
Patient was notified of results and recommendations. Patient was already scheduled for a follow up on 2/1. Patient verbalized understanding.

## 2024-01-29 NOTE — TELEPHONE ENCOUNTER
----- Message from Clarisse Flores MD sent at 10/17/2023  3:07 PM EDT -----  Please notify pt that it is time for repeat CT chest without contrast for monitoring of dx pulmonary nodule (5 mm).  To be done at [facility] for direct comparison of films.  Please pend order to me.  Thanks, KATHERINE

## 2024-02-01 NOTE — TELEPHONE ENCOUNTER
Patient called back and I let her know about CT and she wanted to know what she needed to do, so sent her back to the nurse.

## 2024-02-06 DIAGNOSIS — R91.1 PULMONARY NODULE: ICD-10-CM

## 2024-02-08 ENCOUNTER — TELEPHONE (OUTPATIENT)
Dept: FAMILY MEDICINE CLINIC | Age: 74
End: 2024-02-08
Payer: MEDICARE

## 2024-02-08 DIAGNOSIS — Z78.0 MENOPAUSE: Primary | ICD-10-CM

## 2024-02-12 ENCOUNTER — OFFICE VISIT (OUTPATIENT)
Dept: GASTROENTEROLOGY | Facility: CLINIC | Age: 74
End: 2024-02-12
Payer: MEDICARE

## 2024-02-12 VITALS
WEIGHT: 176 LBS | HEIGHT: 63 IN | DIASTOLIC BLOOD PRESSURE: 73 MMHG | HEART RATE: 73 BPM | SYSTOLIC BLOOD PRESSURE: 128 MMHG | BODY MASS INDEX: 31.18 KG/M2

## 2024-02-12 DIAGNOSIS — K59.04 CHRONIC IDIOPATHIC CONSTIPATION: ICD-10-CM

## 2024-02-12 DIAGNOSIS — K64.9 HEMORRHOIDS, UNSPECIFIED HEMORRHOID TYPE: ICD-10-CM

## 2024-02-12 DIAGNOSIS — K21.00 GASTROESOPHAGEAL REFLUX DISEASE WITH ESOPHAGITIS WITHOUT HEMORRHAGE: Primary | ICD-10-CM

## 2024-02-12 DIAGNOSIS — R13.10 DYSPHAGIA, UNSPECIFIED TYPE: ICD-10-CM

## 2024-02-12 PROCEDURE — 3074F SYST BP LT 130 MM HG: CPT | Performed by: NURSE PRACTITIONER

## 2024-02-12 PROCEDURE — 1159F MED LIST DOCD IN RCRD: CPT | Performed by: NURSE PRACTITIONER

## 2024-02-12 PROCEDURE — 1160F RVW MEDS BY RX/DR IN RCRD: CPT | Performed by: NURSE PRACTITIONER

## 2024-02-12 PROCEDURE — 3078F DIAST BP <80 MM HG: CPT | Performed by: NURSE PRACTITIONER

## 2024-02-12 PROCEDURE — 99214 OFFICE O/P EST MOD 30 MIN: CPT | Performed by: NURSE PRACTITIONER

## 2024-02-12 RX ORDER — PANTOPRAZOLE SODIUM 40 MG/1
40 TABLET, DELAYED RELEASE ORAL DAILY
Qty: 90 TABLET | Refills: 3 | Status: SHIPPED | OUTPATIENT
Start: 2024-02-12

## 2024-02-12 RX ORDER — HYDROCORTISONE ACETATE 25 MG/1
25 SUPPOSITORY RECTAL 2 TIMES DAILY PRN
Qty: 30 SUPPOSITORY | Refills: 2 | Status: SHIPPED | OUTPATIENT
Start: 2024-02-12

## 2024-02-12 RX ORDER — FAMOTIDINE 20 MG/1
20 TABLET, FILM COATED ORAL NIGHTLY
Qty: 90 TABLET | Refills: 3 | Status: SHIPPED | OUTPATIENT
Start: 2024-02-12

## 2024-02-21 ENCOUNTER — HOSPITAL ENCOUNTER (OUTPATIENT)
Dept: BONE DENSITY | Facility: HOSPITAL | Age: 74
Discharge: HOME | End: 2024-02-21
Admitting: FAMILY MEDICINE
Payer: MEDICARE

## 2024-02-21 DIAGNOSIS — Z78.0 MENOPAUSE: ICD-10-CM

## 2024-02-21 PROCEDURE — 77080 DXA BONE DENSITY AXIAL: CPT

## 2024-03-07 ENCOUNTER — OFFICE VISIT (OUTPATIENT)
Dept: FAMILY MEDICINE CLINIC | Age: 74
End: 2024-03-07
Payer: MEDICARE

## 2024-03-07 ENCOUNTER — LAB (OUTPATIENT)
Dept: LAB | Facility: HOSPITAL | Age: 74
End: 2024-03-07
Payer: MEDICARE

## 2024-03-07 VITALS
WEIGHT: 178 LBS | TEMPERATURE: 97.3 F | OXYGEN SATURATION: 98 % | HEART RATE: 66 BPM | DIASTOLIC BLOOD PRESSURE: 77 MMHG | SYSTOLIC BLOOD PRESSURE: 125 MMHG | HEIGHT: 63 IN | BODY MASS INDEX: 31.54 KG/M2

## 2024-03-07 DIAGNOSIS — F33.1 MAJOR DEPRESSIVE DISORDER, RECURRENT EPISODE, MODERATE DEGREE: ICD-10-CM

## 2024-03-07 DIAGNOSIS — F41.1 GENERALIZED ANXIETY DISORDER: ICD-10-CM

## 2024-03-07 DIAGNOSIS — M85.89 OSTEOPENIA OF MULTIPLE SITES: ICD-10-CM

## 2024-03-07 DIAGNOSIS — I42.2 HYPERTROPHIC CARDIOMYOPATHY: ICD-10-CM

## 2024-03-07 DIAGNOSIS — I10 ESSENTIAL HYPERTENSION: Primary | ICD-10-CM

## 2024-03-07 DIAGNOSIS — K21.9 GASTROESOPHAGEAL REFLUX DISEASE WITHOUT ESOPHAGITIS: ICD-10-CM

## 2024-03-07 DIAGNOSIS — I10 ESSENTIAL HYPERTENSION: ICD-10-CM

## 2024-03-07 PROBLEM — K92.1 BLACK STOOLS: Status: RESOLVED | Noted: 2024-01-08 | Resolved: 2024-03-07

## 2024-03-07 PROBLEM — R10.10 PAIN OF UPPER ABDOMEN: Status: RESOLVED | Noted: 2024-01-08 | Resolved: 2024-03-07

## 2024-03-07 PROBLEM — R11.0 NAUSEA: Status: RESOLVED | Noted: 2024-01-08 | Resolved: 2024-03-07

## 2024-03-07 PROBLEM — Z00.00 ANNUAL PHYSICAL EXAM: Status: RESOLVED | Noted: 2021-09-09 | Resolved: 2024-03-07

## 2024-03-07 LAB
ALBUMIN SERPL-MCNC: 4.2 G/DL (ref 3.5–5.2)
ALBUMIN/GLOB SERPL: 1.6 G/DL
ALP SERPL-CCNC: 112 U/L (ref 39–117)
ALT SERPL W P-5'-P-CCNC: 31 U/L (ref 1–33)
ANION GAP SERPL CALCULATED.3IONS-SCNC: 8 MMOL/L (ref 5–15)
AST SERPL-CCNC: 37 U/L (ref 1–32)
BASOPHILS # BLD AUTO: 0.02 10*3/MM3 (ref 0–0.2)
BASOPHILS NFR BLD AUTO: 0.3 % (ref 0–1.5)
BILIRUB SERPL-MCNC: 0.3 MG/DL (ref 0–1.2)
BUN SERPL-MCNC: 14 MG/DL (ref 8–23)
BUN/CREAT SERPL: 15.9 (ref 7–25)
CALCIUM SPEC-SCNC: 10.1 MG/DL (ref 8.6–10.5)
CHLORIDE SERPL-SCNC: 107 MMOL/L (ref 98–107)
CHOLEST SERPL-MCNC: 193 MG/DL (ref 0–200)
CO2 SERPL-SCNC: 27 MMOL/L (ref 22–29)
CREAT SERPL-MCNC: 0.88 MG/DL (ref 0.57–1)
DEPRECATED RDW RBC AUTO: 45.5 FL (ref 37–54)
EGFRCR SERPLBLD CKD-EPI 2021: 68.6 ML/MIN/1.73
EOSINOPHIL # BLD AUTO: 0.27 10*3/MM3 (ref 0–0.4)
EOSINOPHIL NFR BLD AUTO: 4.3 % (ref 0.3–6.2)
ERYTHROCYTE [DISTWIDTH] IN BLOOD BY AUTOMATED COUNT: 12.8 % (ref 12.3–15.4)
GLOBULIN UR ELPH-MCNC: 2.6 GM/DL
GLUCOSE SERPL-MCNC: 139 MG/DL (ref 65–99)
HCT VFR BLD AUTO: 41.6 % (ref 34–46.6)
HDLC SERPL-MCNC: 44 MG/DL (ref 40–60)
HGB BLD-MCNC: 13.7 G/DL (ref 12–15.9)
IMM GRANULOCYTES # BLD AUTO: 0.03 10*3/MM3 (ref 0–0.05)
IMM GRANULOCYTES NFR BLD AUTO: 0.5 % (ref 0–0.5)
LDLC SERPL CALC-MCNC: 122 MG/DL (ref 0–100)
LDLC/HDLC SERPL: 2.69 {RATIO}
LYMPHOCYTES # BLD AUTO: 1.73 10*3/MM3 (ref 0.7–3.1)
LYMPHOCYTES NFR BLD AUTO: 27.7 % (ref 19.6–45.3)
MCH RBC QN AUTO: 31.2 PG (ref 26.6–33)
MCHC RBC AUTO-ENTMCNC: 32.9 G/DL (ref 31.5–35.7)
MCV RBC AUTO: 94.8 FL (ref 79–97)
MONOCYTES # BLD AUTO: 0.33 10*3/MM3 (ref 0.1–0.9)
MONOCYTES NFR BLD AUTO: 5.3 % (ref 5–12)
NEUTROPHILS NFR BLD AUTO: 3.87 10*3/MM3 (ref 1.7–7)
NEUTROPHILS NFR BLD AUTO: 61.9 % (ref 42.7–76)
PLATELET # BLD AUTO: 174 10*3/MM3 (ref 140–450)
PMV BLD AUTO: 9.8 FL (ref 6–12)
POTASSIUM SERPL-SCNC: 4.6 MMOL/L (ref 3.5–5.2)
PROT SERPL-MCNC: 6.8 G/DL (ref 6–8.5)
RBC # BLD AUTO: 4.39 10*6/MM3 (ref 3.77–5.28)
SODIUM SERPL-SCNC: 142 MMOL/L (ref 136–145)
TRIGL SERPL-MCNC: 153 MG/DL (ref 0–150)
VLDLC SERPL-MCNC: 27 MG/DL (ref 5–40)
WBC NRBC COR # BLD AUTO: 6.25 10*3/MM3 (ref 3.4–10.8)

## 2024-03-07 PROCEDURE — 80053 COMPREHEN METABOLIC PANEL: CPT

## 2024-03-07 PROCEDURE — 85025 COMPLETE CBC W/AUTO DIFF WBC: CPT

## 2024-03-07 PROCEDURE — 36415 COLL VENOUS BLD VENIPUNCTURE: CPT

## 2024-03-07 PROCEDURE — 80061 LIPID PANEL: CPT

## 2024-03-07 RX ORDER — DICLOFENAC SODIUM AND MISOPROSTOL 75; 200 MG/1; UG/1
1 TABLET, DELAYED RELEASE ORAL 2 TIMES DAILY
COMMUNITY

## 2024-03-07 RX ORDER — DULOXETIN HYDROCHLORIDE 60 MG/1
60 CAPSULE, DELAYED RELEASE ORAL DAILY
Qty: 90 CAPSULE | Refills: 1 | Status: SHIPPED | OUTPATIENT
Start: 2024-03-07

## 2024-03-07 RX ORDER — NEBIVOLOL 5 MG/1
5 TABLET ORAL DAILY
Qty: 90 TABLET | Refills: 1 | Status: SHIPPED | OUTPATIENT
Start: 2024-03-07

## 2024-03-07 RX ORDER — VALSARTAN 40 MG/1
40 TABLET ORAL DAILY
Qty: 90 TABLET | Refills: 1 | Status: SHIPPED | OUTPATIENT
Start: 2024-03-07

## 2024-03-07 RX ORDER — MAGNESIUM OXIDE 400 MG/1
400 TABLET ORAL DAILY
COMMUNITY

## 2024-03-07 RX ORDER — ALENDRONATE SODIUM 70 MG/1
70 TABLET ORAL
Qty: 12 TABLET | Refills: 3 | Status: SHIPPED | OUTPATIENT
Start: 2024-03-07

## 2024-03-07 NOTE — ASSESSMENT & PLAN NOTE
Her recent DEXA came back 2/2024, showing osteopenia that qualifies for meds based on FRAX scores.  Her recent bone density scan came back showing osteopenia with a FRAX score which does qualify her to take osteoporosis medication.  We did discuss her options for treatment with medication versus watchful waiting with Citracal D and increased weightbearing exercise.  Today, she opts to pursue a course of Fosamax.  We will plan to recheck a bone density in 2 years.

## 2024-03-07 NOTE — ASSESSMENT & PLAN NOTE
Stable on pantoprazole 40 mg and famotidine 20 mg QHS.  Refills were not needed today.  Following with Gastro. S/p EGD in Jan.  Continue to monitor.  Wean PPI as able.

## 2024-03-07 NOTE — PROGRESS NOTES
"Chief Complaint  Hypertension    Subjective          Criss Edwards presents to [facility] today for follow-up on chronic issues.    Her recent DEXA came back 2/2024, showing osteopenia that qualifies for meds based on FRAX scores.    She has experienced stress incontinence.  Worse with cough or sneezing.  S/p 2 vaginal deliveries.  She wears pads.      She is nebivolol and valsartan for hypertension.  Her blood pressure has been well controlled.  Denies chest pain, palpitations or shortness of breath.  She follows with Cardiology Dr. Nelson for HOCM.      She is on duloxetine for depression and anxiety.  She continues to deal with complicated interpersonal family relationships which are a major stressor for her.  Mood has been \"okay.\"  She is doing the best she can.  Her recent stomach issuse have been difficult.  \"I'm not thinking gloomy.\"     She is on pantoprazole and famotidine for GERD.  She is on [facility] in 2/12/2024 for abdominal pain.  At that time Lola added a nighttime dose of famotidine.  Status post EGD 1/25/2024 by Dr. Lua.  She has been feeling okay since scope except that she has had some bloating.  She is worried that she may have worms.      Current Outpatient Medications:     cyclobenzaprine (FLEXERIL) 5 MG tablet, TAKE ONE TABLET BY MOUTH ONCE NIGHTLY AS NEEDED FOR MUSCLE SPASMS, Disp: 30 tablet, Rfl: 2    diclofenac-miSOPROStol (ARTHROTEC 75) 75-0.2 MG EC tablet, Take 1 tablet by mouth 2 (Two) Times a Day., Disp: , Rfl:     DULoxetine (CYMBALTA) 60 MG capsule, Take 1 capsule by mouth Daily., Disp: 90 capsule, Rfl: 1    famotidine (PEPCID) 20 MG tablet, Take 1 tablet by mouth Every Night., Disp: 90 tablet, Rfl: 3    magnesium oxide (MAG-OX) 400 MG tablet, Take 1 tablet by mouth Daily., Disp: , Rfl:     nebivolol (BYSTOLIC) 5 MG tablet, Take 1 tablet by mouth Daily., Disp: 90 tablet, Rfl: 1    pantoprazole (PROTONIX) 40 MG EC tablet, Take 1 " "tablet by mouth Daily., Disp: 90 tablet, Rfl: 3    valsartan (DIOVAN) 40 MG tablet, Take 1 tablet by mouth Daily., Disp: 90 tablet, Rfl: 1    acetaminophen (TYLENOL) 650 MG 8 hr tablet, Take 1 tablet by mouth Every 8 (Eight) Hours As Needed for Mild Pain. (Patient not taking: Reported on 3/7/2024), Disp: , Rfl:     alendronate (FOSAMAX) 70 MG tablet, Take 1 tablet by mouth Every 7 (Seven) Days., Disp: 12 tablet, Rfl: 3    ondansetron ODT (ZOFRAN-ODT) 4 MG disintegrating tablet, Place 1 tablet on the tongue Every 8 (Eight) Hours As Needed for Nausea or Vomiting., Disp: 20 tablet, Rfl: 0  Medications Discontinued During This Encounter   Medication Reason    hydrocortisone (ANUSOL-HC) 25 MG suppository Cost of medication    DULoxetine (CYMBALTA) 60 MG capsule Reorder    nebivolol (BYSTOLIC) 5 MG tablet Reorder    valsartan (DIOVAN) 40 MG tablet Reorder         Allergies:  Codeine, Iodinated contrast media, Iodine, Contrast dye (echo or unknown ct/mr), and Darvon [propoxyphene]      Objective   Vital Signs:   Vitals:    03/07/24 0809   BP: 125/77   BP Location: Right arm   Patient Position: Sitting   Cuff Size: Adult   Pulse: 66   Temp: 97.3 °F (36.3 °C)   TempSrc: Oral   SpO2: 98%   Weight: 80.7 kg (178 lb)   Height: 160 cm (62.99\")              Physical Exam  Vitals reviewed.   Constitutional:       General: She is not in acute distress.     Appearance: Normal appearance. She is well-developed.   HENT:      Head: Normocephalic and atraumatic.      Right Ear: External ear normal.      Left Ear: External ear normal.   Eyes:      Extraocular Movements: Extraocular movements intact.      Conjunctiva/sclera: Conjunctivae normal.      Pupils: Pupils are equal, round, and reactive to light.   Cardiovascular:      Rate and Rhythm: Normal rate and regular rhythm.      Heart sounds: No murmur heard.  Pulmonary:      Effort: Pulmonary effort is normal.      Breath sounds: Normal breath sounds. No wheezing, rhonchi or rales. "   Abdominal:      General: Bowel sounds are normal. There is no distension.      Palpations: Abdomen is soft.      Tenderness: There is no abdominal tenderness.   Musculoskeletal:         General: Normal range of motion.   Neurological:      Mental Status: She is alert.   Psychiatric:         Mood and Affect: Affect normal.           Lab Results   Component Value Date    GLUCOSE 131 (H) 10/17/2023    BUN 11 10/17/2023    CREATININE 0.87 10/17/2023    EGFRIFNONA 61 09/09/2021    BCR 12.6 10/17/2023    K 5.1 10/17/2023    CO2 28.4 10/17/2023    CALCIUM 10.8 (H) 10/17/2023    ALBUMIN 4.6 10/17/2023    LABIL2 1.3 (L) 01/26/2021    AST 35 (H) 10/17/2023    ALT 25 10/17/2023       Lab Results   Component Value Date    CHOL 188 10/17/2023    CHLPL 179 01/26/2021    TRIG 194 (H) 10/17/2023    HDL 40 10/17/2023     (H) 10/17/2023       Lab Results   Component Value Date    WBC 7.14 10/17/2023    HGB 13.9 10/17/2023    HCT 43.3 10/17/2023    MCV 94.7 10/17/2023     10/17/2023            Assessment and Plan    Assessment & Plan  Essential hypertension    Blood pressure has been running at goal.  Continue valsartan 40 mg daily and nebivolol 5 mg daily.  Refills were needed today.  Labs were needed today.  Continue to monitor.    Hypertrophic cardiomyopathy    Following with Dr. Nelson. On valsartan and nebivolol.       Major depressive disorder, recurrent episode, moderate degree    Stable on duloxetine 60 mg daily.  Refills were needed today.  Continue to monitor.    Generalized anxiety disorder  As per depression.  Gastroesophageal reflux disease without esophagitis  Stable on pantoprazole 40 mg and famotidine 20 mg QHS.  Refills were not needed today.  Following with Gastro. S/p EGD in Jan.  Continue to monitor.  Wean PPI as able.    Osteopenia of multiple sites  Her recent DEXA came back 2/2024, showing osteopenia that qualifies for meds based on FRAX scores.  Her recent bone density scan came back showing  osteopenia with a FRAX score which does qualify her to take osteoporosis medication.  We did discuss her options for treatment with medication versus watchful waiting with Citracal D and increased weightbearing exercise.  Today, she opts to pursue a course of Fosamax.  We will plan to recheck a bone density in 2 years.      Orders Placed This Encounter   Procedures    Comprehensive Metabolic Panel    Lipid Panel    CBC Auto Differential     New Medications Ordered This Visit   Medications    alendronate (FOSAMAX) 70 MG tablet     Sig: Take 1 tablet by mouth Every 7 (Seven) Days.     Dispense:  12 tablet     Refill:  3    DULoxetine (CYMBALTA) 60 MG capsule     Sig: Take 1 capsule by mouth Daily.     Dispense:  90 capsule     Refill:  1    nebivolol (BYSTOLIC) 5 MG tablet     Sig: Take 1 tablet by mouth Daily.     Dispense:  90 tablet     Refill:  1    valsartan (DIOVAN) 40 MG tablet     Sig: Take 1 tablet by mouth Daily.     Dispense:  90 tablet     Refill:  1           Follow Up   Return in about 3 months (around 6/7/2024) for Recheck **30 min**.  Patient was given instructions and counseling regarding her condition or for health maintenance advice. Please see specific information pulled into the AVS if appropriate.           03/07/2024

## 2024-04-24 ENCOUNTER — OFFICE VISIT (OUTPATIENT)
Dept: OTOLARYNGOLOGY | Facility: CLINIC | Age: 74
End: 2024-04-24
Payer: MEDICARE

## 2024-04-24 VITALS
SYSTOLIC BLOOD PRESSURE: 142 MMHG | HEIGHT: 63 IN | OXYGEN SATURATION: 94 % | TEMPERATURE: 97.6 F | HEART RATE: 77 BPM | BODY MASS INDEX: 31.54 KG/M2 | WEIGHT: 178 LBS | DIASTOLIC BLOOD PRESSURE: 82 MMHG

## 2024-04-24 DIAGNOSIS — H81.03 MENIERE'S DISEASE OF BOTH EARS: ICD-10-CM

## 2024-04-24 DIAGNOSIS — H90.3 SENSORINEURAL HEARING LOSS (SNHL) OF BOTH EARS: Primary | ICD-10-CM

## 2024-04-24 DIAGNOSIS — H61.23 BILATERAL IMPACTED CERUMEN: ICD-10-CM

## 2024-04-25 NOTE — PROGRESS NOTES
Patient Name: Criss Edwards   Visit Date: 04/24/2024   Patient ID: [id number]  Provider: Tyrese Terrazas MD    Sex: female  Location: [facility]   YOB: 1950  Location Address: [address] [place] [address], [address],?[place]?[id number]    Primary Care Provider Clarisse Flores MD  Location Phone: [phone]    Referring Provider: No ref. provider found        Chief Complaint  Follow-up (Left ear Fullness, earache and cerumen removal )    Subjective    History of Present Illness  Criss Edwards is a 73 y.o. female who presents to [facility] today as a consult from No ref. provider found.    She presents to the clinic today for follow-up regarding her ears and hearing.  She has significant difficulties with hearing in day-to-day situations, and we previously discussed hearing aids which she obtained but unfortunately has not picked up from her audiologist in about 4 months due to the fact that they do not fit well into her ear canals.  She does have some difficulty with cerumen and previously had issues with vertigo.  She denies any major issues with dizziness lately.  She notes that she is not hearing well in day-to-day situations.  She is not sure that the hearing aids helped her.  She was supposed to bring by a copy of her audiogram, but did not.    Past Medical History:   Diagnosis Date    Dizziness     History of back surgery 2013    unkown what surgeries    History of total right hip replacement     HL (hearing loss)     Hypertension        Past Surgical History:   Procedure Laterality Date    CHOLECYSTECTOMY      COLONOSCOPY      ENDOSCOPY N/A 1/25/2024    Procedure: ESOPHAGOGASTRODUODENOSCOPY;  Surgeon: Mallory Lua MD;  Location: [facility] ENDOSCOPY;  Service: Gastroenterology;  Laterality: N/A;  GASTRIC POLYP ESOPH DILATATION 18MM ESOPH DIVERTICULUIM    HAND SURGERY Left 05/07/2016    HIP ARTHROPLASTY Right     KIDNEY  "STONE SURGERY           Current Outpatient Medications:     acetaminophen (TYLENOL) 650 MG 8 hr tablet, Take 1 tablet by mouth Every 8 (Eight) Hours As Needed for Mild Pain., Disp: , Rfl:     alendronate (FOSAMAX) 70 MG tablet, Take 1 tablet by mouth Every 7 (Seven) Days., Disp: 12 tablet, Rfl: 3    cyclobenzaprine (FLEXERIL) 5 MG tablet, TAKE ONE TABLET BY MOUTH ONCE NIGHTLY AS NEEDED FOR MUSCLE SPASMS, Disp: 30 tablet, Rfl: 2    diclofenac-miSOPROStol (ARTHROTEC 75) 75-0.2 MG EC tablet, Take 1 tablet by mouth 2 (Two) Times a Day., Disp: , Rfl:     DULoxetine (CYMBALTA) 60 MG capsule, Take 1 capsule by mouth Daily., Disp: 90 capsule, Rfl: 1    famotidine (PEPCID) 20 MG tablet, Take 1 tablet by mouth Every Night., Disp: 90 tablet, Rfl: 3    magnesium oxide (MAG-OX) 400 MG tablet, Take 1 tablet by mouth Daily., Disp: , Rfl:     nebivolol (BYSTOLIC) 5 MG tablet, Take 1 tablet by mouth Daily., Disp: 90 tablet, Rfl: 1    ondansetron ODT (ZOFRAN-ODT) 4 MG disintegrating tablet, Place 1 tablet on the tongue Every 8 (Eight) Hours As Needed for Nausea or Vomiting., Disp: 20 tablet, Rfl: 0    pantoprazole (PROTONIX) 40 MG EC tablet, Take 1 tablet by mouth Daily., Disp: 90 tablet, Rfl: 3    valsartan (DIOVAN) 40 MG tablet, Take 1 tablet by mouth Daily., Disp: 90 tablet, Rfl: 1     Allergies   Allergen Reactions    Codeine Itching    Iodinated Contrast Media Other (See Comments)     Other reaction(s): GI Intolerance      Other reaction(s): GI Intolerance    Iodine Hives     DENIES    Contrast Dye (Echo Or Unknown Ct/Mr) GI Intolerance    Darvon [Propoxyphene] GI Intolerance and Headache       History reviewed. No pertinent family history.     Social History     Social History Narrative    Not on file       Objective     Vital Signs:   /82 (BP Location: Left arm, Patient Position: Sitting, Cuff Size: Adult)   Pulse 77   Temp 97.6 °F (36.4 °C) (Oral)   Ht 160 cm (62.99\")   Wt 80.7 kg (178 lb)   SpO2 94%   BMI 31.54 " kg/m²       Physical Exam    Constitutional   Appearance  : well developed, well-nourished, alert and in no acute distress, voice clear and strong    Head  Inspection  : no deformities or lesions  Face  Inspection  : No facial lesions; House-Brackmann I/VI bilaterally  Palpation  : No TMJ crepitus nor  muscle tenderness bilaterally    Eyes  Vision  Visual Fields  : Extraocular movements are intact. No spontaneous or gaze-induced nystagmus.  Conjunctivae  : clear  Sclerae  : clear  Pupils and Irises  : pupils equal, round, and reactive to light.     Ears, Nose, Mouth and Throat    Ears    External Ears  : appearance within normal limits, no lesions present  Otoscopic Examination  : Cerumen impaction on both sides, cleared with curettes and microscope.  Tympanic membrane appearance within normal limits bilaterally without perforations, well-aerated middle ears  Hearing  : intact to conversational voice both ears  Tunning fork testing:     :    Nose    External Nose  : appearance normal  Intranasal Exam  : mucosa within normal limits, vestibules normal, no intranasal lesions present, septum midline, sinuses non tender to percussion  Oral Cavity    Oral Mucosa  : oral mucosa normal without pallor or cyanosis  Lips  : lip appearance normal  Teeth  : normal dentition for age  Gums  : gums pink, non-swollen, no bleeding present  Tongue  : tongue appearance normal; normal mobility  Palate  : hard palate normal, soft palate appearance normal with symmetric mobility    Throat    Oropharynx  : no inflammation or lesions present, tonsils within normal limits  Hypopharynx  : appearance within normal limits, superior epiglottis within normal limits  Larynx  : appearance within normal limits, vocal cords within normal limits, no lesions present    Neck  Inspection/Palpation  : normal appearance, no masses or tenderness, trachea midline; thyroid size normal, nontender, no nodules or masses present on  palpation    Respiratory  Respiratory Effort  : breathing unlabored  Inspection of Chest  : normal appearance, no retractions    Cardiovascular  Heart  : regular rate and rhythm    Lymphatic  Neck  : no lymphadenopathy present  Supraclavicular Nodes  : no lymphadenopathy present  Preauricular Nodes  : no lymphadenopathy present    Skin and Subcutaneous Tissue  General Inspection  : Regarding face and neck - there are no rashes present, no lesions present, and no areas of discoloration    Neurologic  Cranial Nerves  : cranial nerves II-XII are grossly intact bilaterally  Gait and Station  : normal gait, able to stand without diffculty    Psychiatric  Judgement and Insight  : judgment and insight intact  Mood and Affect  : mood normal, affect appropriate     Ear Cerumen Removal    Date/Time: 4/25/2024 1:55 PM    Performed by: Tyrese Terrazas MD  Authorized by: Tyrese Terrazas MD    Anesthesia:  Local Anesthetic: none  Location details: left ear and right ear  Procedure type: instrumentation, curette   Sedation:  Patient sedated: no                Assessment and Plan    Diagnoses and all orders for this visit:    1. Sensorineural hearing loss (SNHL) of both ears (Primary)    2. Meniere's disease of both ears    3. Bilateral impacted cerumen    Examination today revealed cerumen impaction on both sides that I was able to clear.  Her ears look otherwise completely normal I do not see any major issues.  I recommended that she follow-up with her audiologist to  her hearing aids and possibly have new molds made if they are not fitting well.  She agrees and will make an appointment.  I will be glad to see her back on an as-needed basis to reassess her ears or clean out any earwax if she is having issues with this.    Follow Up   No follow-ups on file.  Patient was given instructions and counseling regarding her condition or for health maintenance advice. Please see specific information pulled into the AVS if  appropriate.

## 2024-05-13 ENCOUNTER — OFFICE VISIT (OUTPATIENT)
Dept: GASTROENTEROLOGY | Facility: CLINIC | Age: 74
End: 2024-05-13
Payer: MEDICARE

## 2024-05-13 VITALS
HEIGHT: 63 IN | SYSTOLIC BLOOD PRESSURE: 131 MMHG | HEART RATE: 75 BPM | DIASTOLIC BLOOD PRESSURE: 75 MMHG | WEIGHT: 176.8 LBS | BODY MASS INDEX: 31.33 KG/M2

## 2024-05-13 DIAGNOSIS — K59.04 CHRONIC IDIOPATHIC CONSTIPATION: ICD-10-CM

## 2024-05-13 DIAGNOSIS — R13.10 DYSPHAGIA, UNSPECIFIED TYPE: Primary | ICD-10-CM

## 2024-05-13 DIAGNOSIS — K21.00 GASTROESOPHAGEAL REFLUX DISEASE WITH ESOPHAGITIS WITHOUT HEMORRHAGE: ICD-10-CM

## 2024-05-13 PROCEDURE — 3078F DIAST BP <80 MM HG: CPT | Performed by: NURSE PRACTITIONER

## 2024-05-13 PROCEDURE — 99214 OFFICE O/P EST MOD 30 MIN: CPT | Performed by: NURSE PRACTITIONER

## 2024-05-13 PROCEDURE — 1160F RVW MEDS BY RX/DR IN RCRD: CPT | Performed by: NURSE PRACTITIONER

## 2024-05-13 PROCEDURE — 1159F MED LIST DOCD IN RCRD: CPT | Performed by: NURSE PRACTITIONER

## 2024-05-13 PROCEDURE — 3075F SYST BP GE 130 - 139MM HG: CPT | Performed by: NURSE PRACTITIONER

## 2024-05-13 RX ORDER — FAMOTIDINE 20 MG/1
20 TABLET, FILM COATED ORAL 2 TIMES DAILY
Qty: 180 TABLET | Refills: 3 | Status: SHIPPED | OUTPATIENT
Start: 2024-05-13

## 2024-05-13 NOTE — PROGRESS NOTES
Chief Complaint   Hemorrhoids and Follow-up (Scope )    History of Present Illness       Criss Edwards is a 73 y.o. female who presents to [facility] for follow-up for constipation. She was last seen in the office by me on 2/12/24.     She admits her bowels are rocks. She does have fecal urgency. Will also have urinary frequency. She does have issues with bloating and nausea. She will use rectal suppositories PRN. She does have hx GERD/HH and takes protonix 40 mg daily. She will have episodes of black stools. She did eat some blueberries. She admits she had these episodes around NEW YEARS. The last episode was 3-4 days. She will have some issues with globus sensation and swallowing at night. Has dry mouth. She does have rectal bleeding from a suspected hemorrhoids. She had some bleeding this morning. She feels an external hemorrhoids. She is using TUCKS pads. Has tried miralax and fiber without relief. She was taking magnesium and Vit D. She hasn't started them yet. She admits she stopped the magnesium.  She does admit she has lost 10 pounds recently but she admits she has been trying to lose weight.  Good appetite.     She had a CT scan of the abdomen and pelvis done this past July after a fall.  It was negative for any acute abdominal process.  It did show a small hiatal hernia.  History of cholecystectomy.        Last colonoscopy was several years ago at [facility].  Patient reports it was with Dr. Sosa and it was normal.     Never had EGD      GI family history----Father with cirrhosis.           She underwent EGD with Dr. Lua on 1/25/2024.  EGD showed diverticulum in the lower third of the esophagus.  Z-line regular.  Empirically dilated.  LA grade a reflux esophagitis.  A few gastric polyps were biopsied.Path positive for reflux esophagitis.    She admits her bowels are moving better. Still lots of gas. Still having pressure in her rectum and cannot clean the way she  "wants due to her shoulder issues. Reflux better with pepcid at night and protonix in the morning.   Results       Result Review :       CMP          10/17/2023    15:53 3/7/2024    08:48   CMP   Glucose 131  139    BUN 11  14    Creatinine 0.87  0.88    EGFR 69.6  68.6    Sodium 141  142    Potassium 5.1  4.6    Chloride 104  107    Calcium 10.8  10.1    Total Protein 7.3  6.8    Albumin 4.6  4.2    Globulin 2.7  2.6    Total Bilirubin 0.3  0.3    Alkaline Phosphatase 121  112    AST (SGOT) 35  37    ALT (SGPT) 25  31    Albumin/Globulin Ratio 1.7  1.6    BUN/Creatinine Ratio 12.6  15.9    Anion Gap 8.6  8.0      CBC          10/17/2023    15:53 3/7/2024    08:48   CBC   WBC 7.14  6.25    RBC 4.57  4.39    Hemoglobin 13.9  13.7    Hematocrit 43.3  41.6    MCV 94.7  94.8    MCH 30.4  31.2    MCHC 32.1  32.9    RDW 12.9  12.8    Platelets 211  174      Lipid Panel          10/17/2023    15:53 3/7/2024    08:48   Lipid Panel   Total Cholesterol 188  193    Triglycerides 194  153    HDL Cholesterol 40  44    VLDL Cholesterol 34  27    LDL Cholesterol  114  122    LDL/HDL Ratio 2.73  2.69      TSH          10/17/2023    15:53   TSH   TSH 3.800        Lipase   Lipase   Date Value Ref Range Status   03/24/2022 31 13 - 60 U/L Final     Amylase No results found for: \"AMYLASE\"  Iron Profile No results found for: \"IRON\", \"TIBC\", \"LABIRON\", \"TRANSFERRIN\"  Ferritin No results found for: \"FERRITIN\"            Past Medical History       Past Medical History:   Diagnosis Date    Dizziness     History of back surgery 2013    unkown what surgeries    History of total right hip replacement     HL (hearing loss)     Hypertension        Past Surgical History:   Procedure Laterality Date    CHOLECYSTECTOMY      COLONOSCOPY      ENDOSCOPY N/A 1/25/2024    Procedure: ESOPHAGOGASTRODUODENOSCOPY;  Surgeon: Mallory Lua MD;  Location: [facility] ENDOSCOPY;  Service: Gastroenterology;  Laterality: N/A;  GASTRIC POLYP ESOPH DILATATION " 18MM ESOPH DIVERTICULUIM    HAND SURGERY Left 05/07/2016    HIP ARTHROPLASTY Right     KIDNEY STONE SURGERY           Current Outpatient Medications:     acetaminophen (TYLENOL) 650 MG 8 hr tablet, Take 1 tablet by mouth Every 8 (Eight) Hours As Needed for Mild Pain., Disp: , Rfl:     alendronate (FOSAMAX) 70 MG tablet, Take 1 tablet by mouth Every 7 (Seven) Days., Disp: 12 tablet, Rfl: 3    cyclobenzaprine (FLEXERIL) 5 MG tablet, TAKE ONE TABLET BY MOUTH ONCE NIGHTLY AS NEEDED FOR MUSCLE SPASMS, Disp: 30 tablet, Rfl: 2    DULoxetine (CYMBALTA) 60 MG capsule, Take 1 capsule by mouth Daily., Disp: 90 capsule, Rfl: 1    famotidine (PEPCID) 20 MG tablet, Take 1 tablet by mouth 2 (Two) Times a Day., Disp: 180 tablet, Rfl: 3    magnesium oxide (MAG-OX) 400 MG tablet, Take 1 tablet by mouth Daily., Disp: , Rfl:     nebivolol (BYSTOLIC) 5 MG tablet, Take 1 tablet by mouth Daily., Disp: 90 tablet, Rfl: 1    ondansetron ODT (ZOFRAN-ODT) 4 MG disintegrating tablet, Place 1 tablet on the tongue Every 8 (Eight) Hours As Needed for Nausea or Vomiting., Disp: 20 tablet, Rfl: 0    pantoprazole (PROTONIX) 40 MG EC tablet, Take 1 tablet by mouth Daily., Disp: 90 tablet, Rfl: 3    valsartan (DIOVAN) 40 MG tablet, Take 1 tablet by mouth Daily., Disp: 90 tablet, Rfl: 1    diclofenac-miSOPROStol (ARTHROTEC 75) 75-0.2 MG EC tablet, Take 1 tablet by mouth 2 (Two) Times a Day., Disp: , Rfl:      Allergies   Allergen Reactions    Codeine Itching    Iodinated Contrast Media Other (See Comments)     Other reaction(s): GI Intolerance      Other reaction(s): GI Intolerance    Iodine Hives     DENIES    Contrast Dye (Echo Or Unknown Ct/Mr) GI Intolerance    Darvon [Propoxyphene] GI Intolerance and Headache       History reviewed. No pertinent family history.     Social History     Social History Narrative    Not on file       Objective       Review of Systems   Constitutional:  Negative for appetite change, fatigue, fever, unexpected weight gain  "and unexpected weight loss.   HENT:  Negative for trouble swallowing.    Respiratory:  Negative for cough, choking, chest tightness, shortness of breath, wheezing and stridor.    Cardiovascular:  Negative for chest pain, palpitations and leg swelling.   Gastrointestinal:  Positive for abdominal distention, constipation, GERD and indigestion. Negative for abdominal pain, anal bleeding, blood in stool, diarrhea, nausea, rectal pain and vomiting.   Musculoskeletal:  Negative for back pain.        Vital Signs:   /75 (BP Location: Left arm, Patient Position: Sitting, Cuff Size: Adult)   Pulse 75   Ht 160 cm (62.99\")   Wt 80.2 kg (176 lb 12.8 oz)   BMI 31.33 kg/m²       Physical Exam  Constitutional:       General: She is not in acute distress.     Appearance: She is well-developed. She is not ill-appearing.   HENT:      Head: Normocephalic.   Eyes:      Pupils: Pupils are equal, round, and reactive to light.   Cardiovascular:      Rate and Rhythm: Normal rate and regular rhythm.      Heart sounds: Normal heart sounds.   Pulmonary:      Effort: Pulmonary effort is normal.      Breath sounds: Normal breath sounds.   Abdominal:      General: Bowel sounds are normal. There is distension.      Palpations: Abdomen is soft. There is no mass.      Tenderness: There is no abdominal tenderness. There is no guarding or rebound.      Hernia: No hernia is present.   Musculoskeletal:         General: Normal range of motion.   Skin:     General: Skin is warm and dry.   Neurological:      Mental Status: She is alert and oriented to person, place, and time.   Psychiatric:         Speech: Speech normal.         Behavior: Behavior normal.         Judgment: Judgment normal.           Assessment & Plan          Assessment and Plan    Diagnoses and all orders for this visit:    1. Dysphagia, unspecified type (Primary)    2. Gastroesophageal reflux disease with esophagitis without hemorrhage  -     famotidine (PEPCID) 20 MG tablet; " Take 1 tablet by mouth 2 (Two) Times a Day.  Dispense: 180 tablet; Refill: 3    3. Chronic idiopathic constipation    Reviewed EGD results with her today.  GERD seems definitely better on Protonix in the morning and Pepcid at night.  Okay to increase the Pepcid to twice daily as needed.  Continue GERD precautions.  Constipation is better right now.  Continue high-fiber diet.  She is swallowing so much better since the EGD with dilation.  Still struggling with some gas and bloating.  I think she is also still experiencing some side effects from her medications.  Still struggling with hemorrhoids.  I offered to refer her to Dr. Carpio for further evaluation but she wants to hold off on that for now.  Continue to use hydrocortisone cream rectally over-the-counter.  Patient to call the office with any issues.  Patient to follow-up with me in 3 months.  Patient is agreeable to the plan.            Follow Up       Follow Up   Return in about 3 months (around 8/13/2024) for GERD, CONSTIPATION.  Patient was given instructions and counseling regarding her condition or for health maintenance advice. Please see specific information pulled into the AVS if appropriate.

## 2024-05-13 NOTE — PATIENT INSTRUCTIONS
Continue pepcid (famotidine) twice as a day as needed for reflux     Continue protonix (pantoprazole) 40 mg daily in the morning        60-year-old female, history of left breast cancer on chemotherapy, hypertension, hyperlipidemia, diabetes, presents with abdominal pain, loss of appetite, diarrhea. Vitals unremarkable. Physical exam significant for epigastric tenderness to palpation, abdomen soft. Concern for pancreatitis 2/2 chemotherapy vs DKA as patient missed her insulin because she has not been eating vs chemotherapy side effects, will obtain labs, CTAP, 60-year-old female, history of left breast cancer on chemotherapy, hypertension, hyperlipidemia, diabetes, presents with abdominal pain, loss of appetite, diarrhea. Vitals unremarkable. Physical exam significant for epigastric tenderness to palpation, abdomen soft. Concern for pancreatitis 2/2 chemotherapy vs DKA as patient missed her insulin because she has not been eating vs chemotherapy side effects, will obtain labs, CTAP, GI cocktail, reassess

## 2024-06-07 ENCOUNTER — OFFICE VISIT (OUTPATIENT)
Dept: FAMILY MEDICINE CLINIC | Age: 74
End: 2024-06-07
Payer: MEDICARE

## 2024-06-07 VITALS
DIASTOLIC BLOOD PRESSURE: 66 MMHG | OXYGEN SATURATION: 97 % | BODY MASS INDEX: 31.01 KG/M2 | HEART RATE: 68 BPM | SYSTOLIC BLOOD PRESSURE: 125 MMHG | HEIGHT: 63 IN | WEIGHT: 175 LBS

## 2024-06-07 DIAGNOSIS — E66.09 CLASS 1 OBESITY DUE TO EXCESS CALORIES WITH SERIOUS COMORBIDITY AND BODY MASS INDEX (BMI) OF 31.0 TO 31.9 IN ADULT: ICD-10-CM

## 2024-06-07 DIAGNOSIS — F33.1 MAJOR DEPRESSIVE DISORDER, RECURRENT EPISODE, MODERATE DEGREE: ICD-10-CM

## 2024-06-07 DIAGNOSIS — M85.89 OSTEOPENIA OF MULTIPLE SITES: ICD-10-CM

## 2024-06-07 DIAGNOSIS — I10 ESSENTIAL HYPERTENSION: Primary | ICD-10-CM

## 2024-06-07 DIAGNOSIS — I42.2 HYPERTROPHIC CARDIOMYOPATHY: ICD-10-CM

## 2024-06-07 DIAGNOSIS — M15.9 GENERALIZED OSTEOARTHRITIS: ICD-10-CM

## 2024-06-07 DIAGNOSIS — F41.1 GENERALIZED ANXIETY DISORDER: ICD-10-CM

## 2024-06-07 DIAGNOSIS — K21.9 GASTROESOPHAGEAL REFLUX DISEASE WITHOUT ESOPHAGITIS: ICD-10-CM

## 2024-06-07 PROBLEM — M25.461 EFFUSION OF RIGHT KNEE: Status: RESOLVED | Noted: 2023-08-28 | Resolved: 2024-06-07

## 2024-06-07 PROBLEM — R14.0 ABDOMINAL BLOATING: Status: RESOLVED | Noted: 2024-01-08 | Resolved: 2024-06-07

## 2024-06-07 PROBLEM — S22.41XD CLOSED FRACTURE OF MULTIPLE RIBS OF RIGHT SIDE WITH ROUTINE HEALING: Status: RESOLVED | Noted: 2023-08-17 | Resolved: 2024-06-07

## 2024-06-07 PROCEDURE — 3078F DIAST BP <80 MM HG: CPT | Performed by: FAMILY MEDICINE

## 2024-06-07 PROCEDURE — 99214 OFFICE O/P EST MOD 30 MIN: CPT | Performed by: FAMILY MEDICINE

## 2024-06-07 PROCEDURE — 1159F MED LIST DOCD IN RCRD: CPT | Performed by: FAMILY MEDICINE

## 2024-06-07 PROCEDURE — 1160F RVW MEDS BY RX/DR IN RCRD: CPT | Performed by: FAMILY MEDICINE

## 2024-06-07 PROCEDURE — 3074F SYST BP LT 130 MM HG: CPT | Performed by: FAMILY MEDICINE

## 2024-06-07 RX ORDER — MELOXICAM 15 MG/1
15 TABLET ORAL DAILY PRN
Qty: 30 TABLET | Refills: 2 | Status: SHIPPED | OUTPATIENT
Start: 2024-06-07

## 2024-06-07 RX ORDER — ALENDRONATE SODIUM 70 MG/1
70 TABLET ORAL
Start: 2024-06-07

## 2024-06-07 NOTE — ASSESSMENT & PLAN NOTE
Blood pressure has been running at goal.  Continue nebivolol 5 mg daily and valsartan 40 mg daily.  Refills were not needed today.  Labs were not needed today.  Continue to monitor.

## 2024-06-07 NOTE — ASSESSMENT & PLAN NOTE
She is up-to-date on DEXA, last done 2/2024, showing osteopenia that qualifies for meds based on FRAX scores.  We did start her on alendronate at her last visit but she is complaining of bone pain.  Trial off Fosamax for 4 weeks to see if sx improve.

## 2024-06-07 NOTE — ASSESSMENT & PLAN NOTE
Stable on pantoprazole 40 mg daily and famotidine 20 mg twice daily.  Refills were not needed today.  Continue to monitor.  Wean PPI as able.  Following with [facility].

## 2024-06-07 NOTE — PROGRESS NOTES
"Chief Complaint  Hypertension    Subjective          Criss Edwards presents to [facility] today for routine follow-up.     She is nebivolol and valsartan for HTN.  BP has been well controlled.  No chest pain, palpitations or shortness of breath.  She is following with Cardiology Dr. Nelson for HOCM.  Her last LDL came back elevated so Dr. Nelson sent her in atorvastatin 20 mg to the pharmacy.  She has not yet picked that up.        She is on duloxetine for depression and anxiety.  Mood has been \"I'm no good.\"  She continues to deal with complicated interpersonal family relationships which are a major stressor for her.  On further questioning, her mood has been okay but she's stressed out because she is caregiving for her friend who was recently in a bad car wreck.      She has L shoulder pain.  She went to go get a shot but decided to put that off for now.       She is on pantoprazole and famotidine for GERD.  She is following with [facility] and was last seen there less than a month ago.  Famotidine was increased to twice daily dosing at that time.  Status post EGD 1/25/2024 by Dr. Lua.     She is up-to-date on DEXA, last done 2/2024, showing osteopenia that qualifies for meds based on FRAX scores.  We did start her on alendronate at her last visit.  She felt funny with the first couple of doses but that wore off.   However, \"my bones just ache all over.\"  She has been on meloxicam in the past.        Current Outpatient Medications:     acetaminophen (TYLENOL) 650 MG 8 hr tablet, Take 1 tablet by mouth Every 8 (Eight) Hours As Needed for Mild Pain., Disp: , Rfl:     alendronate (FOSAMAX) 70 MG tablet, Take 1 tablet by mouth Every 7 (Seven) Days., Disp: , Rfl:     Cholecalciferol (D3 PO), Take  by mouth., Disp: , Rfl:     cyclobenzaprine (FLEXERIL) 5 MG tablet, TAKE ONE TABLET BY MOUTH ONCE NIGHTLY AS NEEDED FOR MUSCLE SPASMS, Disp: 30 tablet, Rfl: 2    " "diclofenac-miSOPROStol (ARTHROTEC 75) 75-0.2 MG EC tablet, Take 1 tablet by mouth 2 (Two) Times a Day., Disp: , Rfl:     DULoxetine (CYMBALTA) 60 MG capsule, Take 1 capsule by mouth Daily., Disp: 90 capsule, Rfl: 1    famotidine (PEPCID) 20 MG tablet, Take 1 tablet by mouth 2 (Two) Times a Day., Disp: 180 tablet, Rfl: 3    magnesium oxide (MAG-OX) 400 MG tablet, Take 1 tablet by mouth Daily., Disp: , Rfl:     nebivolol (BYSTOLIC) 5 MG tablet, Take 1 tablet by mouth Daily., Disp: 90 tablet, Rfl: 1    ondansetron ODT (ZOFRAN-ODT) 4 MG disintegrating tablet, Place 1 tablet on the tongue Every 8 (Eight) Hours As Needed for Nausea or Vomiting., Disp: 20 tablet, Rfl: 0    pantoprazole (PROTONIX) 40 MG EC tablet, Take 1 tablet by mouth Daily., Disp: 90 tablet, Rfl: 3    valsartan (DIOVAN) 40 MG tablet, Take 1 tablet by mouth Daily., Disp: 90 tablet, Rfl: 1    meloxicam (MOBIC) 15 MG tablet, Take 1 tablet by mouth Daily As Needed (joint pain). Take with food., Disp: 30 tablet, Rfl: 2  Medications Discontinued During This Encounter   Medication Reason    alendronate (FOSAMAX) 70 MG tablet            Allergies:  Codeine, Iodinated contrast media, Iodine, Contrast dye (echo or unknown ct/mr), and Darvon [propoxyphene]      Objective   Vital Signs:   Vitals:    06/07/24 1019   BP: 125/66   BP Location: Left arm   Patient Position: Sitting   Cuff Size: Adult   Pulse: 68   SpO2: 97%   Weight: 79.4 kg (175 lb)   Height: 160 cm (62.99\")      Body mass index is 31.01 kg/m².   BMI is >= 30 and <35. (Class 1 Obesity). The following options were offered after discussion;: exercise counseling/recommendations and nutrition counseling/recommendations         Physical Exam  Vitals reviewed.   Constitutional:       General: She is not in acute distress.     Appearance: Normal appearance. She is well-developed.   HENT:      Head: Normocephalic and atraumatic.      Right Ear: External ear normal.      Left Ear: External ear normal.   Eyes:    "   Extraocular Movements: Extraocular movements intact.      Conjunctiva/sclera: Conjunctivae normal.      Pupils: Pupils are equal, round, and reactive to light.   Cardiovascular:      Rate and Rhythm: Normal rate and regular rhythm.      Heart sounds: No murmur heard.  Pulmonary:      Effort: Pulmonary effort is normal.      Breath sounds: Normal breath sounds. No wheezing, rhonchi or rales.   Abdominal:      General: Bowel sounds are normal. There is no distension.      Palpations: Abdomen is soft.      Tenderness: There is no abdominal tenderness.   Musculoskeletal:         General: Normal range of motion.   Neurological:      Mental Status: She is alert.   Psychiatric:         Mood and Affect: Affect normal.         Lab Results   Component Value Date    GLUCOSE 139 (H) 03/07/2024    BUN 14 03/07/2024    CREATININE 0.88 03/07/2024    EGFRIFNONA 61 09/09/2021    BCR 15.9 03/07/2024    K 4.6 03/07/2024    CO2 27.0 03/07/2024    CALCIUM 10.1 03/07/2024    ALBUMIN 4.2 03/07/2024    LABIL2 1.3 (L) 01/26/2021    AST 37 (H) 03/07/2024    ALT 31 03/07/2024       Lab Results   Component Value Date    CHOL 193 03/07/2024    CHLPL 179 01/26/2021    TRIG 153 (H) 03/07/2024    HDL 44 03/07/2024     (H) 03/07/2024       Lab Results   Component Value Date    WBC 6.25 03/07/2024    HGB 13.7 03/07/2024    HCT 41.6 03/07/2024    MCV 94.8 03/07/2024     03/07/2024            Assessment and Plan    Assessment & Plan  Essential hypertension    Blood pressure has been running at goal.  Continue nebivolol 5 mg daily and valsartan 40 mg daily.  Refills were not needed today.  Labs were not needed today.  Continue to monitor.   Hypertrophic cardiomyopathy    Following with Dr. [facility].  Stable on beta blocker and ARB.  Major depressive disorder, recurrent episode, moderate degree    Stable on duloxetine 60 mg daily.  Refills were not needed today.  Continue to monitor.   Generalized anxiety disorder    As per  depression plan.  Gastroesophageal reflux disease without esophagitis  Stable on pantoprazole 40 mg daily and famotidine 20 mg twice daily.  Refills were not needed today.  Continue to monitor.  Wean PPI as able.  Following with [facility].  Osteopenia of multiple sites  She is up-to-date on DEXA, last done 2/2024, showing osteopenia that qualifies for meds based on FRAX scores.  We did start her on alendronate at her last visit but she is complaining of bone pain.  Trial off Fosamax for 4 weeks to see if sx improve.  Generalized osteoarthritis  Restarting meloxicam for generalized joint pain.  Trial off alendronate as above.  Class 1 obesity due to excess calories with serious comorbidity and body mass index (BMI) of 31.0 to 31.9 in adult  Patient's (Body mass index is 31.01 kg/m².) indicates that they are obese (BMI >30) with health conditions that include hypertension and GERD . Weight is newly identified. BMI  is above average; BMI management plan is completed. We discussed portion control and increasing exercise.          New Medications Ordered This Visit   Medications    alendronate (FOSAMAX) 70 MG tablet     Sig: Take 1 tablet by mouth Every 7 (Seven) Days.    meloxicam (MOBIC) 15 MG tablet     Sig: Take 1 tablet by mouth Daily As Needed (joint pain). Take with food.     Dispense:  30 tablet     Refill:  2             Follow Up   Return in about 4 months (around 10/7/2024) for Medicare Wellness.  Patient was given instructions and counseling regarding her condition or for health maintenance advice. Please see specific information pulled into the AVS if appropriate.           03/07/2024

## 2024-07-08 ENCOUNTER — TELEPHONE (OUTPATIENT)
Dept: FAMILY MEDICINE CLINIC | Age: 74
End: 2024-07-08
Payer: MEDICARE

## 2024-07-08 DIAGNOSIS — Z12.31 ENCOUNTER FOR SCREENING MAMMOGRAM FOR BREAST CANCER: Primary | ICD-10-CM

## 2024-07-08 NOTE — LETTER
July 8, 2024    Criss Edwards  [address] [address]            Dear Criss,    It is time for your yearly screening mammogram.  Our Referrals department will be calling to get this scheduled for you.  If you wish, you can call [phone] to schedule your own appointment.      Sincerely,    [facility]

## 2024-07-08 NOTE — TELEPHONE ENCOUNTER
----- Message from Clarisse Flores sent at 7/7/2023 11:06 AM EDT -----  Regarding: f/u screening mammo  Please call pt to let her know that she is due for her yearly screening mammogram.  Please pend order for screening mammo and send to me.  Thanks, KATHERINE

## 2024-08-08 ENCOUNTER — HOSPITAL ENCOUNTER (OUTPATIENT)
Dept: MAMMOGRAPHY | Facility: HOSPITAL | Age: 74
Discharge: HOME | End: 2024-08-08
Admitting: FAMILY MEDICINE
Payer: MEDICARE

## 2024-08-08 DIAGNOSIS — Z12.31 ENCOUNTER FOR SCREENING MAMMOGRAM FOR BREAST CANCER: ICD-10-CM

## 2024-08-08 PROCEDURE — 77063 BREAST TOMOSYNTHESIS BI: CPT

## 2024-08-08 PROCEDURE — 77067 SCR MAMMO BI INCL CAD: CPT

## 2024-08-12 ENCOUNTER — OFFICE VISIT (OUTPATIENT)
Dept: GASTROENTEROLOGY | Facility: CLINIC | Age: 74
End: 2024-08-12
Payer: MEDICARE

## 2024-08-12 VITALS
OXYGEN SATURATION: 98 % | RESPIRATION RATE: 18 BRPM | WEIGHT: 174.2 LBS | HEIGHT: 63 IN | DIASTOLIC BLOOD PRESSURE: 77 MMHG | BODY MASS INDEX: 30.87 KG/M2 | SYSTOLIC BLOOD PRESSURE: 129 MMHG | HEART RATE: 76 BPM

## 2024-08-12 DIAGNOSIS — R13.10 DYSPHAGIA, UNSPECIFIED TYPE: ICD-10-CM

## 2024-08-12 DIAGNOSIS — K59.04 CHRONIC IDIOPATHIC CONSTIPATION: ICD-10-CM

## 2024-08-12 DIAGNOSIS — K21.00 GASTROESOPHAGEAL REFLUX DISEASE WITH ESOPHAGITIS WITHOUT HEMORRHAGE: Primary | ICD-10-CM

## 2024-08-12 DIAGNOSIS — R14.0 ABDOMINAL BLOATING: ICD-10-CM

## 2024-08-12 PROCEDURE — 3074F SYST BP LT 130 MM HG: CPT | Performed by: NURSE PRACTITIONER

## 2024-08-12 PROCEDURE — 99214 OFFICE O/P EST MOD 30 MIN: CPT | Performed by: NURSE PRACTITIONER

## 2024-08-12 PROCEDURE — 3078F DIAST BP <80 MM HG: CPT | Performed by: NURSE PRACTITIONER

## 2024-08-12 PROCEDURE — 1160F RVW MEDS BY RX/DR IN RCRD: CPT | Performed by: NURSE PRACTITIONER

## 2024-08-12 PROCEDURE — 1159F MED LIST DOCD IN RCRD: CPT | Performed by: NURSE PRACTITIONER

## 2024-08-12 RX ORDER — IBUPROFEN 800 MG/1
TABLET ORAL
COMMUNITY
Start: 2024-06-21

## 2024-08-12 RX ORDER — ATORVASTATIN CALCIUM 20 MG/1
20 TABLET, FILM COATED ORAL DAILY
COMMUNITY
Start: 2024-06-10 | End: 2025-06-10

## 2024-08-12 NOTE — PROGRESS NOTES
Chief Complaint   gastroesophageal reflux disease (Follow-up)    History of Present Illness       Criss Edwards is a 73 y.o. female who presents to [facility] for follow-up for GERD.  She was last seen in the office by me on 5/13/2024.    She admits her bowels are rocks. She does have fecal urgency. Will also have urinary frequency. She does have issues with bloating and nausea. She will use rectal suppositories PRN. She does have hx GERD/HH and takes protonix 40 mg daily. She will have episodes of black stools. She did eat some blueberries. She admits she had these episodes around NEW YEARS. The last episode was 3-4 days. She will have some issues with globus sensation and swallowing at night. Has dry mouth. She does have rectal bleeding from a suspected hemorrhoids. She had some bleeding this morning. She feels an external hemorrhoids. She is using TUCKS pads. Has tried miralax and fiber without relief. She was taking magnesium and Vit D. She hasn't started them yet. She admits she stopped the magnesium.  She does admit she has lost 10 pounds recently but she admits she has been trying to lose weight.  Good appetite.     She had a CT scan of the abdomen and pelvis done this past July after a fall.  It was negative for any acute abdominal process.  It did show a small hiatal hernia.  History of cholecystectomy.        Last colonoscopy was several years ago at [facility].  Patient reports it was with Dr. Sosa and it was normal.     Never had EGD      GI family history----Father with cirrhosis.            She underwent EGD with Dr. Lua on 1/25/2024.  EGD showed diverticulum in the lower third of the esophagus.  Z-line regular.  Empirically dilated.  LA grade a reflux esophagitis.  A few gastric polyps were biopsied.Path positive for reflux esophagitis.     She admits pepcid at night is really helping her. She is happy to report the pepcid is really helping her bowels, gas  "and bloating. She tells me her bowels are moving better now. Taking the pepcid just at night. Still taking protonix in the morning. Appetite is good. She is swallowing better on pepcid.       Results       Result Review :       CMP          10/17/2023    15:53 3/7/2024    08:48   CMP   Glucose 131  139    BUN 11  14    Creatinine 0.87  0.88    EGFR 69.6  68.6    Sodium 141  142    Potassium 5.1  4.6    Chloride 104  107    Calcium 10.8  10.1    Total Protein 7.3  6.8    Albumin 4.6  4.2    Globulin 2.7  2.6    Total Bilirubin 0.3  0.3    Alkaline Phosphatase 121  112    AST (SGOT) 35  37    ALT (SGPT) 25  31    Albumin/Globulin Ratio 1.7  1.6    BUN/Creatinine Ratio 12.6  15.9    Anion Gap 8.6  8.0      CBC          10/17/2023    15:53 3/7/2024    08:48   CBC   WBC 7.14  6.25    RBC 4.57  4.39    Hemoglobin 13.9  13.7    Hematocrit 43.3  41.6    MCV 94.7  94.8    MCH 30.4  31.2    MCHC 32.1  32.9    RDW 12.9  12.8    Platelets 211  174      CBC w/diff          10/17/2023    15:53 3/7/2024    08:48   CBC w/Diff   WBC 7.14  6.25    RBC 4.57  4.39    Hemoglobin 13.9  13.7    Hematocrit 43.3  41.6    MCV 94.7  94.8    MCH 30.4  31.2    MCHC 32.1  32.9    RDW 12.9  12.8    Platelets 211  174    Neutrophil Rel % 57.3  61.9    Immature Granulocyte Rel % 0.0  0.5    Lymphocyte Rel % 32.1  27.7    Monocyte Rel % 6.4  5.3    Eosinophil Rel % 3.8  4.3    Basophil Rel % 0.4  0.3      Lipid Panel          10/17/2023    15:53 3/7/2024    08:48   Lipid Panel   Total Cholesterol 188  193    Triglycerides 194  153    HDL Cholesterol 40  44    VLDL Cholesterol 34  27    LDL Cholesterol  114  122    LDL/HDL Ratio 2.73  2.69      TSH          10/17/2023    15:53   TSH   TSH 3.800        Lipase   Lipase   Date Value Ref Range Status   03/24/2022 31 13 - 60 U/L Final     Amylase No results found for: \"AMYLASE\"  Iron Profile No results found for: \"IRON\", \"TIBC\", \"LABIRON\", \"TRANSFERRIN\"  Ferritin No results found for: \"FERRITIN\"        "     Past Medical History       Past Medical History:   Diagnosis Date    Dizziness     History of back surgery 2013    unkown what surgeries    History of total right hip replacement     HL (hearing loss)     Hypertension        Past Surgical History:   Procedure Laterality Date    CHOLECYSTECTOMY      COLONOSCOPY      ENDOSCOPY N/A 1/25/2024    Procedure: ESOPHAGOGASTRODUODENOSCOPY;  Surgeon: Mallory Lua MD;  Location: [facility] ENDOSCOPY;  Service: Gastroenterology;  Laterality: N/A;  GASTRIC POLYP ESOPH DILATATION 18MM ESOPH DIVERTICULUIM    HAND SURGERY Left 05/07/2016    HIP ARTHROPLASTY Right     KIDNEY STONE SURGERY           Current Outpatient Medications:     Cholecalciferol (D3 PO), Take  by mouth., Disp: , Rfl:     cyclobenzaprine (FLEXERIL) 5 MG tablet, TAKE ONE TABLET BY MOUTH ONCE NIGHTLY AS NEEDED FOR MUSCLE SPASMS, Disp: 30 tablet, Rfl: 2    DULoxetine (CYMBALTA) 60 MG capsule, Take 1 capsule by mouth Daily., Disp: 90 capsule, Rfl: 1    famotidine (PEPCID) 20 MG tablet, Take 1 tablet by mouth 2 (Two) Times a Day., Disp: 180 tablet, Rfl: 3    magnesium oxide (MAG-OX) 400 MG tablet, Take 1 tablet by mouth Daily., Disp: , Rfl:     meloxicam (MOBIC) 15 MG tablet, Take 1 tablet by mouth Daily As Needed (joint pain). Take with food., Disp: 30 tablet, Rfl: 2    nebivolol (BYSTOLIC) 5 MG tablet, Take 1 tablet by mouth Daily., Disp: 90 tablet, Rfl: 1    ondansetron ODT (ZOFRAN-ODT) 4 MG disintegrating tablet, Place 1 tablet on the tongue Every 8 (Eight) Hours As Needed for Nausea or Vomiting., Disp: 20 tablet, Rfl: 0    pantoprazole (PROTONIX) 40 MG EC tablet, Take 1 tablet by mouth Daily., Disp: 90 tablet, Rfl: 3    valsartan (DIOVAN) 40 MG tablet, Take 1 tablet by mouth Daily., Disp: 90 tablet, Rfl: 1    acetaminophen (TYLENOL) 650 MG 8 hr tablet, Take 1 tablet by mouth Every 8 (Eight) Hours As Needed for Mild Pain. (Patient not taking: Reported on 8/12/2024), Disp: , Rfl:     alendronate (FOSAMAX)  "70 MG tablet, Take 1 tablet by mouth Every 7 (Seven) Days. (Patient not taking: Reported on 8/12/2024), Disp: , Rfl:     atorvastatin (LIPITOR) 20 MG tablet, Take 1 tablet by mouth Daily. (Patient not taking: Reported on 8/12/2024), Disp: , Rfl:     diclofenac-miSOPROStol (ARTHROTEC 75) 75-0.2 MG EC tablet, Take 1 tablet by mouth 2 (Two) Times a Day. (Patient not taking: Reported on 8/12/2024), Disp: , Rfl:     ibuprofen (ADVIL,MOTRIN) 800 MG tablet, , Disp: , Rfl:      Allergies   Allergen Reactions    Codeine Itching    Iodinated Contrast Media Other (See Comments)     Other reaction(s): GI Intolerance      Other reaction(s): GI Intolerance    Iodine Hives     DENIES    Contrast Dye (Echo Or Unknown Ct/Mr) GI Intolerance    Darvon [Propoxyphene] GI Intolerance and Headache       History reviewed. No pertinent family history.     Social History     Social History Narrative    Not on file       Objective       Review of Systems   Constitutional:  Negative for appetite change, fatigue, fever, unexpected weight gain and unexpected weight loss.   HENT:  Negative for trouble swallowing.    Respiratory:  Negative for cough, choking, chest tightness, shortness of breath, wheezing and stridor.    Cardiovascular:  Negative for chest pain, palpitations and leg swelling.   Gastrointestinal:  Positive for abdominal distention. Negative for abdominal pain, anal bleeding, blood in stool, constipation, diarrhea, nausea, rectal pain, vomiting, GERD and indigestion.   Skin:  Positive for rash.        Vital Signs:   /77 (BP Location: Right arm, Patient Position: Sitting, Cuff Size: Adult)   Pulse 76   Resp 18   Ht 160 cm (62.99\")   Wt 79 kg (174 lb 3.2 oz)   SpO2 98%   BMI 30.87 kg/m²       Physical Exam  Constitutional:       General: She is not in acute distress.     Appearance: She is well-developed. She is not ill-appearing.   HENT:      Head: Normocephalic.   Eyes:      Pupils: Pupils are equal, round, and reactive " to light.   Cardiovascular:      Rate and Rhythm: Normal rate and regular rhythm.      Heart sounds: Normal heart sounds.   Pulmonary:      Effort: Pulmonary effort is normal.      Breath sounds: Normal breath sounds.   Abdominal:      General: Bowel sounds are normal. There is no distension.      Palpations: Abdomen is soft. There is no mass.      Tenderness: There is no abdominal tenderness. There is no guarding or rebound.      Hernia: No hernia is present.   Musculoskeletal:         General: Normal range of motion.   Skin:     General: Skin is warm and dry.      Findings: Rash present.   Neurological:      Mental Status: She is alert and oriented to person, place, and time.   Psychiatric:         Speech: Speech normal.         Behavior: Behavior normal.         Judgment: Judgment normal.           Assessment & Plan          Assessment and Plan    Diagnoses and all orders for this visit:    1. Gastroesophageal reflux disease with esophagitis without hemorrhage (Primary)    2. Abdominal bloating    3. Dysphagia, unspecified type    4. Chronic idiopathic constipation    GERD is significantly better with the addition of Pepcid 20 mg at night.  Still continue Protonix in the morning.  Continue GERD precautions.  Recommended she could increase Pepcid to 20 mg twice daily when needed.  Continue GERD precautions.  Overall her gas and bloating is significantly better with the addition of Pepcid.  She is happy to report her bowels are moving really well currently.  Longer having any trouble swallowing except when she eats pork rinds.  Overall she is doing much better.  She is happy to report how much better she is doing today.  Patient to call the office with any issues.  Patient to follow-up with me in 6 months.  Patient is agreeable to the plan.            Follow Up       Follow Up   Return in about 6 months (around 2/12/2025) for GERD, BLOATING.  Patient was given instructions and counseling regarding her condition or  for health maintenance advice. Please see specific information pulled into the AVS if appropriate.

## 2024-08-14 DIAGNOSIS — R25.2 LEG CRAMP: ICD-10-CM

## 2024-08-14 RX ORDER — CYCLOBENZAPRINE HCL 5 MG
TABLET ORAL
Qty: 30 TABLET | Refills: 2 | Status: SHIPPED | OUTPATIENT
Start: 2024-08-14

## 2024-08-15 DIAGNOSIS — R25.2 LEG CRAMP: ICD-10-CM

## 2024-08-15 RX ORDER — LANOLIN ALCOHOL/MO/W.PET/CERES
1 CREAM (GRAM) TOPICAL DAILY
Qty: 90 TABLET | Refills: 1 | Status: SHIPPED | OUTPATIENT
Start: 2024-08-15

## 2024-09-12 ENCOUNTER — OFFICE VISIT (OUTPATIENT)
Dept: FAMILY MEDICINE CLINIC | Age: 74
End: 2024-09-12
Payer: MEDICARE

## 2024-09-12 ENCOUNTER — HOSPITAL ENCOUNTER (OUTPATIENT)
Dept: GENERAL RADIOLOGY | Facility: HOSPITAL | Age: 74
Discharge: HOME | End: 2024-09-12
Admitting: INTERNAL MEDICINE
Payer: MEDICARE

## 2024-09-12 VITALS
WEIGHT: 174.8 LBS | SYSTOLIC BLOOD PRESSURE: 112 MMHG | HEART RATE: 61 BPM | DIASTOLIC BLOOD PRESSURE: 70 MMHG | HEIGHT: 63 IN | BODY MASS INDEX: 30.97 KG/M2 | OXYGEN SATURATION: 94 %

## 2024-09-12 DIAGNOSIS — R07.81 RIB PAIN ON RIGHT SIDE: Primary | ICD-10-CM
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
## 2024-09-12 DIAGNOSIS — R07.81 RIB PAIN ON RIGHT SIDE: ICD-10-CM

## 2024-09-12 DIAGNOSIS — R26.89 IMBALANCE: ICD-10-CM

## 2024-09-12 PROCEDURE — 3074F SYST BP LT 130 MM HG: CPT | Performed by: INTERNAL MEDICINE

## 2024-09-12 PROCEDURE — 71100 X-RAY EXAM RIBS UNI 2 VIEWS: CPT

## 2024-09-12 PROCEDURE — 3078F DIAST BP <80 MM HG: CPT | Performed by: INTERNAL MEDICINE

## 2024-09-12 PROCEDURE — 99214 OFFICE O/P EST MOD 30 MIN: CPT | Performed by: INTERNAL MEDICINE

## 2024-09-12 RX ORDER — TRIAMCINOLONE ACETONIDE 1 MG/G
1 CREAM TOPICAL 2 TIMES DAILY
Qty: 30 G | Refills: 0 | Status: SHIPPED | OUTPATIENT
Start: 2024-09-12 | End: 2024-09-12

## 2024-09-12 RX ORDER — TRIAMCINOLONE ACETONIDE 1 MG/G
1 CREAM TOPICAL 2 TIMES DAILY PRN
Qty: 30 G | Refills: 0 | Status: SHIPPED | OUTPATIENT
Start: 2024-09-12

## 2024-09-12 RX ORDER — ACETAMINOPHEN AND CODEINE PHOSPHATE 300; 30 MG/1; MG/1
1 TABLET ORAL EVERY 4 HOURS PRN
Qty: 20 TABLET | Refills: 0 | Status: SHIPPED | OUTPATIENT
Start: 2024-09-12

## 2024-09-12 NOTE — ASSESSMENT & PLAN NOTE
Patient is status post a fall on her right side 4 days ago resulting in pain alongside her right chest.  Patient was splinting due to pain that is unrelieved by meloxicam  We will obtain unilateral x-ray of the right ribs to rule out fracture  Patient reports she has tolerated Tylenol with codeine it can just cause itching at times however she reports her pain as 9 out of 10 secondary to her injury and this is not improving after 4 days.  Prescribed Tylenol 3 1 tab every 6-8 hours as needed for pain dispense 20

## 2024-09-12 NOTE — ASSESSMENT & PLAN NOTE
Triamcinolone topically twice daily to affected area of bilateral lower extremities x 3 days and then as needed for no longer than 10 days

## 2024-09-12 NOTE — PROGRESS NOTES
"Chief Complaint  Fall (MONDAY EVENING. Possible broken ribs. Xrays are wanted. ) and Chest Pain (Rib pain from falling. )  Acute care visit  Subjective      Criss Edwards is a 74 y.o. female who presents to [facility]     Patient Care Team:  Clarisse Flores MD as PCP - General (Family Medicine)  Alberto Bose MD as Surgeon (Orthopedic Surgery)  Sammy Nelson MD as Consulting Physician (Cardiology)  Lola Hutton APRN as Nurse Practitioner (Nurse Practitioner)  Ms. Borrego is a 74-year-old female with history of osteopenia, osteoarthritis of the left knee and the right hip status post hip replacement as well as hypertension who presents with chief complaint of right sided rib pain.  Patient reports that she fell on Monday, 9/9/2024.  She did not present to the emergency department.  She did attempt to take her meloxicam for pain relief however this did not help.  She reports splinting and some cough associated with the inability to take a deep breath due to pain.  She denies hitting her head.  Also with complaints of multiple insect bites of her lower extremities resulting in severe itching unrelieved with calamine lotion.  Objective   Vital Signs:   Vitals:    09/12/24 1004   BP: 112/70   BP Location: Left arm   Patient Position: Sitting   Cuff Size: Large Adult   Pulse: 61   SpO2: 94%   Weight: 79.3 kg (174 lb 12.8 oz)   Height: 160 cm (62.99\")     Body mass index is 30.97 kg/m².    Wt Readings from Last 3 Encounters:   09/12/24 79.3 kg (174 lb 12.8 oz)   08/12/24 79 kg (174 lb 3.2 oz)   06/07/24 79.4 kg (175 lb)     BP Readings from Last 3 Encounters:   09/12/24 112/70   08/12/24 129/77   06/07/24 125/66       Health Maintenance   Topic Date Due    RSV Vaccine - Adults (1 - 1-dose 60+ series) Never done    ZOSTER VACCINE (2 of 3) 06/26/2010    TDAP/TD VACCINES (2 - Tdap) 10/01/2022    COVID-19 Vaccine (6 - 2023-24 season) 09/01/2024    INFLUENZA VACCINE  " 08/01/2024    ANNUAL WELLNESS VISIT  10/17/2024    BMI FOLLOWUP  06/07/2025    DXA SCAN  02/21/2026    MAMMOGRAM  08/08/2026    COLORECTAL CANCER SCREENING  04/11/2028    HEPATITIS C SCREENING  Completed    Pneumococcal Vaccine 65+  Completed       Physical Exam   GEN:NAD, well nourished  HEENT:NCAT, PERRL, EOMI, OP clear, MMM  NECK:supple, no JVD, no carotid bruits  CVA:RRR s1, s2 no m/r/g  CHEST:CTA B, no wheezes or rhonchi.  Splinting noted.  Upon palpation of the lateral right chest there is evidence of painful nodular area consistent with possible contusion however no skin hemorrhaging or bruising noted  ABD:soft, nontender, nondistended +bs  EXT:no c/c/e 2+PP B  NEURO:CN II-XII grossly nonfocal, no evidence of asterixes or tremor  PSYCH: appropriate mood and affect  :deferred  SKIN: warm, dry, multiple areas of excoriation after scratching with multiple areas of erythema in various stages of healing.  No evidence of target lesions or induration.  This is found over patient's bilateral lower extremities to her feet extending up to her mid thigh and behind both knees    Result Review   The following data was reviewed by: Rahel Hooper MD on 09/12/2024:  [x]  Tests & Results  [x]  Hospitalization/Emergency Department/Urgent Care  [x]  Internal/External Consultant Notes    Procedures          ASSESSMENT/PLAN  Diagnoses and all orders for this visit:    1. Rib pain on right side (Primary)  Assessment & Plan:  Patient is status post a fall on her right side 4 days ago resulting in pain alongside her right chest.  Patient was splinting due to pain that is unrelieved by meloxicam  We will obtain unilateral x-ray of the right ribs to rule out fracture  Patient reports she has tolerated Tylenol with codeine it can just cause itching at times however she reports her pain as 9 out of 10 secondary to her injury and this is not improving after 4 days.  Prescribed Tylenol 3 1 tab every 6-8 hours as needed for pain  dispense 20    Orders:  -     XR Ribs 2 View Right; Future  -     acetaminophen-codeine (TYLENOL with CODEINE #3) 300-30 MG per tablet; Take 1 tablet by mouth Every 4 (Four) Hours As Needed for Moderate Pain.  Dispense: 20 tablet; Refill: 0    2. Multiple insect bites  Assessment & Plan:  Triamcinolone topically twice daily to affected area of bilateral lower extremities x 3 days and then as needed for no longer than 10 days      3. Imbalance  Assessment & Plan:  Status post fall 4 days ago  Currently does not request help at home as she does live alone and does not use assist devices        Other orders  -     Discontinue: triamcinolone (KENALOG) 0.1 % cream; Apply 1 Application topically to the appropriate area as directed 2 (Two) Times a Day.  Dispense: 30 g; Refill: 0  -     triamcinolone (KENALOG) 0.1 % cream; Apply 1 Application topically to the appropriate area as directed 2 (Two) Times a Day As Needed for Irritation or Rash (call doctor if not improved in 10 days).  Dispense: 30 g; Refill: 0                Criss Edwards  reports that she has never smoked. She has never been exposed to tobacco smoke. She has never used smokeless tobacco. I have educated her on the risk of diseases from using tobacco products such as .                  FOLLOW UP  No follow-ups on file.  Has an appointment on 10/22/2024 with her primary care provider Dr. Flores  Patient was given instructions and counseling regarding her condition or for health maintenance advice. Please see specific information pulled into the AVS if appropriate.       Rahel Hooper MD  09/12/24  10:27 EDT

## 2024-09-12 NOTE — ASSESSMENT & PLAN NOTE
Status post fall 4 days ago  Currently does not request help at home as she does live alone and does not use assist devices

## 2024-09-25 ENCOUNTER — TELEPHONE (OUTPATIENT)
Dept: GASTROENTEROLOGY | Facility: CLINIC | Age: 74
End: 2024-09-25
Payer: MEDICARE

## 2024-09-27 ENCOUNTER — LAB (OUTPATIENT)
Dept: LAB | Facility: HOSPITAL | Age: 74
End: 2024-09-27
Payer: MEDICARE

## 2024-09-27 ENCOUNTER — OFFICE VISIT (OUTPATIENT)
Dept: FAMILY MEDICINE CLINIC | Age: 74
End: 2024-09-27
Payer: MEDICARE

## 2024-09-27 ENCOUNTER — HOSPITAL ENCOUNTER (OUTPATIENT)
Dept: GENERAL RADIOLOGY | Facility: HOSPITAL | Age: 74
Discharge: HOME | End: 2024-09-27
Payer: MEDICARE

## 2024-09-27 VITALS
TEMPERATURE: 97.7 F | OXYGEN SATURATION: 100 % | HEIGHT: 63 IN | WEIGHT: 169.2 LBS | HEART RATE: 79 BPM | BODY MASS INDEX: 29.98 KG/M2 | SYSTOLIC BLOOD PRESSURE: 121 MMHG | DIASTOLIC BLOOD PRESSURE: 77 MMHG

## 2024-09-27 DIAGNOSIS — K59.00 CONSTIPATION, UNSPECIFIED CONSTIPATION TYPE: ICD-10-CM

## 2024-09-27 DIAGNOSIS — K21.9 GASTROESOPHAGEAL REFLUX DISEASE WITHOUT ESOPHAGITIS: ICD-10-CM

## 2024-09-27 DIAGNOSIS — L29.9 PRURITUS: Primary | ICD-10-CM

## 2024-09-27 DIAGNOSIS — R10.84 GENERALIZED ABDOMINAL PAIN: ICD-10-CM

## 2024-09-27 DIAGNOSIS — R11.0 NAUSEA: ICD-10-CM
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
## 2024-09-27 LAB
ALBUMIN SERPL-MCNC: 4.7 G/DL (ref 3.5–5.2)
ALBUMIN/GLOB SERPL: 1.7 G/DL
ALP SERPL-CCNC: 109 U/L (ref 39–117)
ALT SERPL W P-5'-P-CCNC: 30 U/L (ref 1–33)
ANION GAP SERPL CALCULATED.3IONS-SCNC: 11 MMOL/L (ref 5–15)
AST SERPL-CCNC: 37 U/L (ref 1–32)
BASOPHILS # BLD AUTO: 0.05 10*3/MM3 (ref 0–0.2)
BASOPHILS NFR BLD AUTO: 0.8 % (ref 0–1.5)
BILIRUB SERPL-MCNC: 0.4 MG/DL (ref 0–1.2)
BUN SERPL-MCNC: 12 MG/DL (ref 8–23)
BUN/CREAT SERPL: 12.1 (ref 7–25)
CALCIUM SPEC-SCNC: 10.5 MG/DL (ref 8.6–10.5)
CHLORIDE SERPL-SCNC: 102 MMOL/L (ref 98–107)
CO2 SERPL-SCNC: 26 MMOL/L (ref 22–29)
CREAT SERPL-MCNC: 0.99 MG/DL (ref 0.57–1)
DEPRECATED RDW RBC AUTO: 47.3 FL (ref 37–54)
EGFRCR SERPLBLD CKD-EPI 2021: 59.2 ML/MIN/1.73
EOSINOPHIL # BLD AUTO: 0.19 10*3/MM3 (ref 0–0.4)
EOSINOPHIL NFR BLD AUTO: 2.9 % (ref 0.3–6.2)
ERYTHROCYTE [DISTWIDTH] IN BLOOD BY AUTOMATED COUNT: 13.5 % (ref 12.3–15.4)
GLOBULIN UR ELPH-MCNC: 2.8 GM/DL
GLUCOSE SERPL-MCNC: 135 MG/DL (ref 65–99)
HCT VFR BLD AUTO: 45.3 % (ref 34–46.6)
HGB BLD-MCNC: 14.8 G/DL (ref 12–15.9)
IMM GRANULOCYTES # BLD AUTO: 0.02 10*3/MM3 (ref 0–0.05)
IMM GRANULOCYTES NFR BLD AUTO: 0.3 % (ref 0–0.5)
LIPASE SERPL-CCNC: 50 U/L (ref 13–60)
LYMPHOCYTES # BLD AUTO: 2.34 10*3/MM3 (ref 0.7–3.1)
LYMPHOCYTES NFR BLD AUTO: 36.1 % (ref 19.6–45.3)
MCH RBC QN AUTO: 30.8 PG (ref 26.6–33)
MCHC RBC AUTO-ENTMCNC: 32.7 G/DL (ref 31.5–35.7)
MCV RBC AUTO: 94.4 FL (ref 79–97)
MONOCYTES # BLD AUTO: 0.45 10*3/MM3 (ref 0.1–0.9)
MONOCYTES NFR BLD AUTO: 6.9 % (ref 5–12)
NEUTROPHILS NFR BLD AUTO: 3.44 10*3/MM3 (ref 1.7–7)
NEUTROPHILS NFR BLD AUTO: 53 % (ref 42.7–76)
PLATELET # BLD AUTO: 210 10*3/MM3 (ref 140–450)
PMV BLD AUTO: 9.3 FL (ref 6–12)
POTASSIUM SERPL-SCNC: 5.1 MMOL/L (ref 3.5–5.2)
PROT SERPL-MCNC: 7.5 G/DL (ref 6–8.5)
RBC # BLD AUTO: 4.8 10*6/MM3 (ref 3.77–5.28)
SODIUM SERPL-SCNC: 139 MMOL/L (ref 136–145)
WBC NRBC COR # BLD AUTO: 6.49 10*3/MM3 (ref 3.4–10.8)

## 2024-09-27 PROCEDURE — 1159F MED LIST DOCD IN RCRD: CPT | Performed by: FAMILY MEDICINE

## 2024-09-27 PROCEDURE — 3074F SYST BP LT 130 MM HG: CPT | Performed by: FAMILY MEDICINE

## 2024-09-27 PROCEDURE — 83690 ASSAY OF LIPASE: CPT

## 2024-09-27 PROCEDURE — 74018 RADEX ABDOMEN 1 VIEW: CPT

## 2024-09-27 PROCEDURE — 80053 COMPREHEN METABOLIC PANEL: CPT

## 2024-09-27 PROCEDURE — 99214 OFFICE O/P EST MOD 30 MIN: CPT | Performed by: FAMILY MEDICINE

## 2024-09-27 PROCEDURE — 3078F DIAST BP <80 MM HG: CPT | Performed by: FAMILY MEDICINE

## 2024-09-27 PROCEDURE — 1160F RVW MEDS BY RX/DR IN RCRD: CPT | Performed by: FAMILY MEDICINE

## 2024-09-27 PROCEDURE — G2211 COMPLEX E/M VISIT ADD ON: HCPCS | Performed by: FAMILY MEDICINE

## 2024-09-27 PROCEDURE — 36415 COLL VENOUS BLD VENIPUNCTURE: CPT

## 2024-09-27 PROCEDURE — 85025 COMPLETE CBC W/AUTO DIFF WBC: CPT

## 2024-09-27 RX ORDER — SIMETHICONE 80 MG
80 TABLET,CHEWABLE ORAL EVERY 6 HOURS PRN
Qty: 60 TABLET | Refills: 0 | Status: SHIPPED | OUTPATIENT
Start: 2024-09-27

## 2024-09-27 RX ORDER — DOCUSATE SODIUM 100 MG/1
100 CAPSULE, LIQUID FILLED ORAL 3 TIMES DAILY PRN
Qty: 60 CAPSULE | Refills: 0 | Status: SHIPPED | OUTPATIENT
Start: 2024-09-27

## 2024-09-27 RX ORDER — ONDANSETRON 4 MG/1
4 TABLET, ORALLY DISINTEGRATING ORAL EVERY 8 HOURS PRN
Qty: 20 TABLET | Refills: 0 | Status: SHIPPED | OUTPATIENT
Start: 2024-09-27

## 2024-09-27 RX ORDER — HYDROXYZINE HYDROCHLORIDE 10 MG/1
10 TABLET, FILM COATED ORAL 3 TIMES DAILY PRN
Qty: 30 TABLET | Refills: 0 | Status: SHIPPED | OUTPATIENT
Start: 2024-09-27

## 2024-10-06 DIAGNOSIS — L29.9 PRURITUS: ICD-10-CM

## 2024-10-07 RX ORDER — HYDROXYZINE HYDROCHLORIDE 10 MG/1
TABLET, FILM COATED ORAL
Qty: 30 TABLET | Refills: 0 | Status: SHIPPED | OUTPATIENT
Start: 2024-10-07

## 2024-10-08 ENCOUNTER — CLINICAL SUPPORT (OUTPATIENT)
Dept: FAMILY MEDICINE CLINIC | Age: 74
End: 2024-10-08
Payer: MEDICARE

## 2024-10-08 DIAGNOSIS — H61.23 BILATERAL IMPACTED CERUMEN: Primary | ICD-10-CM

## 2024-10-08 PROCEDURE — 69209 REMOVE IMPACTED EAR WAX UNI: CPT | Performed by: FAMILY MEDICINE

## 2024-10-08 NOTE — PROGRESS NOTES
Ear Cerumen Removal    Date/Time: 10/8/2024 3:08 PM    Performed by: James Alcantara MA  Authorized by: Clarisse Flores MD    Anesthesia:  Local Anesthetic: none  Location details: left ear and right ear  Patient tolerance: patient tolerated the procedure well with no immediate complications  Procedure type: irrigation   Sedation:  Patient sedated: no

## 2024-10-22 ENCOUNTER — OFFICE VISIT (OUTPATIENT)
Dept: FAMILY MEDICINE CLINIC | Age: 74
End: 2024-10-22
Payer: MEDICARE

## 2024-10-22 VITALS
HEIGHT: 63 IN | SYSTOLIC BLOOD PRESSURE: 156 MMHG | HEART RATE: 74 BPM | WEIGHT: 176 LBS | OXYGEN SATURATION: 94 % | TEMPERATURE: 97.7 F | BODY MASS INDEX: 31.18 KG/M2 | DIASTOLIC BLOOD PRESSURE: 85 MMHG

## 2024-10-22 DIAGNOSIS — F33.1 MAJOR DEPRESSIVE DISORDER, RECURRENT EPISODE, MODERATE DEGREE: ICD-10-CM

## 2024-10-22 DIAGNOSIS — I42.2 HYPERTROPHIC CARDIOMYOPATHY: ICD-10-CM

## 2024-10-22 DIAGNOSIS — K21.9 GASTROESOPHAGEAL REFLUX DISEASE WITHOUT ESOPHAGITIS: ICD-10-CM

## 2024-10-22 DIAGNOSIS — F41.1 GENERALIZED ANXIETY DISORDER: ICD-10-CM

## 2024-10-22 DIAGNOSIS — J30.1 SEASONAL ALLERGIC RHINITIS DUE TO POLLEN: ICD-10-CM

## 2024-10-22 DIAGNOSIS — Z23 ENCOUNTER FOR IMMUNIZATION: ICD-10-CM

## 2024-10-22 DIAGNOSIS — Z00.00 ANNUAL PHYSICAL EXAM: Primary | ICD-10-CM

## 2024-10-22 DIAGNOSIS — I10 ESSENTIAL HYPERTENSION: ICD-10-CM

## 2024-10-22 PROBLEM — K59.00 CONSTIPATION: Status: RESOLVED | Noted: 2024-01-08 | Resolved: 2024-10-22

## 2024-10-22 PROCEDURE — 99214 OFFICE O/P EST MOD 30 MIN: CPT | Performed by: FAMILY MEDICINE

## 2024-10-22 PROCEDURE — 1160F RVW MEDS BY RX/DR IN RCRD: CPT | Performed by: FAMILY MEDICINE

## 2024-10-22 PROCEDURE — G0439 PPPS, SUBSEQ VISIT: HCPCS | Performed by: FAMILY MEDICINE

## 2024-10-22 PROCEDURE — 90662 IIV NO PRSV INCREASED AG IM: CPT | Performed by: FAMILY MEDICINE

## 2024-10-22 PROCEDURE — 3079F DIAST BP 80-89 MM HG: CPT | Performed by: FAMILY MEDICINE

## 2024-10-22 PROCEDURE — 3077F SYST BP >= 140 MM HG: CPT | Performed by: FAMILY MEDICINE

## 2024-10-22 PROCEDURE — 1159F MED LIST DOCD IN RCRD: CPT | Performed by: FAMILY MEDICINE

## 2024-10-22 PROCEDURE — 1125F AMNT PAIN NOTED PAIN PRSNT: CPT | Performed by: FAMILY MEDICINE

## 2024-10-22 PROCEDURE — G0008 ADMIN INFLUENZA VIRUS VAC: HCPCS | Performed by: FAMILY MEDICINE

## 2024-10-22 PROCEDURE — 1170F FXNL STATUS ASSESSED: CPT | Performed by: FAMILY MEDICINE

## 2024-10-22 RX ORDER — NEBIVOLOL 5 MG/1
5 TABLET ORAL DAILY
Qty: 90 TABLET | Refills: 1 | Status: SHIPPED | OUTPATIENT
Start: 2024-10-22

## 2024-10-22 RX ORDER — FLUTICASONE PROPIONATE 50 UG/1
2 SPRAY, METERED NASAL DAILY
Qty: 16 G | Refills: 5 | Status: SHIPPED | OUTPATIENT
Start: 2024-10-22 | End: 2025-10-22

## 2024-10-22 RX ORDER — VALSARTAN 40 MG/1
40 TABLET ORAL DAILY
Qty: 90 TABLET | Refills: 1 | Status: SHIPPED | OUTPATIENT
Start: 2024-10-22

## 2024-10-22 NOTE — ASSESSMENT & PLAN NOTE
Blood pressure has been running at goal.  Continue nebivolol 5 mg daily and valsartan 40 mg daily.  Refills were needed today.  Labs were not needed today.  Continue to monitor.

## 2024-10-22 NOTE — ASSESSMENT & PLAN NOTE
not currently on medication for depression/anxiety.  She has previously been on duloxetine but does not feel she needs that any longer.  Continue to monitor closely.

## 2024-10-22 NOTE — PROGRESS NOTES
Subjective   The ABCs of the Annual Wellness Visit  Medicare Wellness Visit      Criss Edwards is a 74 y.o. patient who presents for a Medicare Wellness Visit.    She is UTD on colonoscopy, last done 4/2018 and this was normal.  Ten year repeat recommended.  Pap smears are no longer indicated by age and history; s/p hysterectomy.  She is UTD on mammogram, last done 8/2024 and this was normal.  She is UTD on DEXA, last done 2/2024 and this showed osteopenia.  She is UTD on [id number] (6/2022), Pneumovax (1/2018), Puvwyri31 (11/2016), Zostavax (5/2010).  She is due for Shingrix, RSV, Td (10/2012), COVID, flu.  She is UTD on routine labs including CMP, lipids and CBC.     The following portions of the patient's history were reviewed and   updated as appropriate: allergies, current medications, past family history, past medical history, past social history, past surgical history, and problem list.    Compared to one year ago, the patient's physical   health is better.  Compared to one year ago, the patient's mental   health is better.    Recent Hospitalizations:  She was not admitted to the hospital during the last year.     Current Medical Providers:  Patient Care Team:  Clarisse Flores MD as PCP - General (Family Medicine)  Alberto Bose MD as Surgeon (Orthopedic Surgery)  Sammy Nelson MD as Consulting Physician (Cardiology)  Lola Hutton APRN as Nurse Practitioner (Nurse Practitioner)    Outpatient Medications Prior to Visit   Medication Sig Dispense Refill    acetaminophen (TYLENOL) 650 MG 8 hr tablet Take 1 tablet by mouth Every 8 (Eight) Hours As Needed for Mild Pain.      acetaminophen-codeine (TYLENOL with CODEINE #3) 300-30 MG per tablet Take 1 tablet by mouth Every 4 (Four) Hours As Needed for Moderate Pain. 20 tablet 0    alendronate (FOSAMAX) 70 MG tablet Take 1 tablet by mouth Every 7 (Seven) Days.      Cholecalciferol (D3 PO) Take  by mouth.      cyclobenzaprine (FLEXERIL) 5 MG  tablet TAKE 1 TABLET BY MOUTH ONCE NIGHTLY AS NEEDED FOR MUSCLE SPASMS 30 tablet 2    docusate sodium (Colace) 100 MG capsule Take 1 capsule by mouth 3 (Three) Times a Day As Needed for Constipation. 60 capsule 0    famotidine (PEPCID) 20 MG tablet Take 1 tablet by mouth 2 (Two) Times a Day. (Patient taking differently: Take 1 tablet by mouth Every Night.) 180 tablet 3    hydrOXYzine (ATARAX) 10 MG tablet TAKE 1 TABLET BY MOUTH 3 TIMES A DAY AS NEEDED FOR ITCHING 30 tablet 0    Magnesium Oxide -Mg Supplement 400 (240 Mg) MG tablet TAKE 1 TABLET BY MOUTH DAILY 90 tablet 1    ondansetron ODT (ZOFRAN-ODT) 4 MG disintegrating tablet Place 1 tablet on the tongue Every 8 (Eight) Hours As Needed for Nausea or Vomiting. 20 tablet 0    pantoprazole (PROTONIX) 40 MG EC tablet Take 1 tablet by mouth Daily. 90 tablet 3    simethicone (MYLICON) 80 MG chewable tablet Chew 1 tablet Every 6 (Six) Hours As Needed for Flatulence. 60 tablet 0    triamcinolone (KENALOG) 0.1 % cream Apply 1 Application topically to the appropriate area as directed 2 (Two) Times a Day As Needed for Irritation or Rash (call doctor if not improved in 10 days). 30 g 0    nebivolol (BYSTOLIC) 5 MG tablet Take 1 tablet by mouth Daily. 90 tablet 1    valsartan (DIOVAN) 40 MG tablet Take 1 tablet by mouth Daily. 90 tablet 1    diclofenac-miSOPROStol (ARTHROTEC 75) 75-0.2 MG EC tablet Take 1 tablet by mouth 2 (Two) Times a Day. (Patient not taking: Reported on 10/22/2024)       No facility-administered medications prior to visit.     Opioid medication/s are on active medication list.  and I have evaluated her active treatment plan and pain score trends (see table).  Vitals:    10/22/24 1130   PainSc:   6   PainLoc: Back     I have reviewed the chart for potential of high risk medication and harmful drug interactions in the elderly.        Aspirin is not on active medication list.  Aspirin use is not indicated based on review of current medical condition/s. Risk  "of harm outweighs potential benefits.  .    Patient Active Problem List   Diagnosis    Primary osteoarthritis of left knee    Status post total hip replacement, right    Hearing loss    Essential hypertension    Hypertrophic cardiomyopathy    Nephrolithiasis    Meniere's disease    Mitral valve prolapse    Mixed stress and urge urinary incontinence    Major depressive disorder, recurrent episode, moderate degree    Generalized osteoarthritis    Osteopenia of multiple sites    Seasonal allergic rhinitis due to pollen    Vitamin D deficiency    Gastroesophageal reflux disease without esophagitis    Lumbar radiculopathy    Allergic dermatitis    Generalized anxiety disorder    Primary osteoarthritis, left shoulder    Imbalance    Chronic left shoulder pain    Other fatigue    Rib pain on right side     Advance Care Planning Advance Directive is not on file.  ACP discussion was held with the patient during this visit. Patient does not have an advance directive, information provided.            Objective   Vitals:    10/22/24 1130 10/22/24 1214   BP: 155/94 156/85   BP Location: Right arm Right arm   Patient Position: Sitting Sitting   Cuff Size: Adult Adult   Pulse: 71 74   Temp: 97.7 °F (36.5 °C)    TempSrc: Oral    SpO2: 94%    Weight: 79.8 kg (176 lb)    Height: 160 cm (62.99\")    PainSc:   6    PainLoc: Back        Estimated body mass index is 31.18 kg/m² as calculated from the following:    Height as of this encounter: 160 cm (62.99\").    Weight as of this encounter: 79.8 kg (176 lb).            Does the patient have evidence of cognitive impairment? No                                                                                                Health  Risk Assessment    Smoking Status:  Social History     Tobacco Use   Smoking Status Never    Passive exposure: Never   Smokeless Tobacco Never     Alcohol Consumption:  Social History     Substance and Sexual Activity   Alcohol Use No       Fall Risk Screen  STEADI " Fall Risk Assessment was completed, and patient is at HIGH risk for falls. Assessment completed on:10/22/2024    Depression Screening:      10/22/2024    11:37 AM   PHQ-2/PHQ-9 Depression Screening   Little interest or pleasure in doing things Not at all   Feeling down, depressed, or hopeless Not at all     Health Habits and Functional and Cognitive Screening:      10/22/2024    11:43 AM   Functional & Cognitive Status   Do you have difficulty preparing food and eating? No   Do you have difficulty bathing yourself, getting dressed or grooming yourself? No   Do you have difficulty using the toilet? No   Do you have difficulty moving around from place to place? No   Do you have trouble with steps or getting out of a bed or a chair? No   Current Diet Unhealthy Diet   Dental Exam Up to date   Eye Exam Up to date   Exercise (times per week) 0 times per week   Current Exercises Include No Regular Exercise   Do you need help using the phone?  No   Are you deaf or do you have serious difficulty hearing?  Yes   Do you need help to go to places out of walking distance? No   Do you need help shopping? No   Do you need help preparing meals?  No   Do you need help with housework?  No   Do you need help with laundry? No   Do you need help taking your medications? No   Do you need help managing money? No   Do you ever drive or ride in a car without wearing a seat belt? No   Have you felt unusual stress, anger or loneliness in the last month? No   Who do you live with? Alone   If you need help, do you have trouble finding someone available to you? Yes   Have you been bothered in the last four weeks by sexual problems? No   Do you have difficulty concentrating, remembering or making decisions? Yes           Age-appropriate Screening Schedule:  Refer to the list below for future screening recommendations based on patient's age, sex and/or medical conditions. Orders for these recommended tests are listed in the plan section. The  "patient has been provided with a written plan.    Health Maintenance List  Health Maintenance   Topic Date Due    ZOSTER VACCINE (2 of 3) 06/26/2010    TDAP/TD VACCINES (2 - Tdap) 10/01/2022    RSV Vaccine - Adults (1 - 1-dose 75+ series) Never done    COVID-19 Vaccine (6 - 2023-24 season) 10/24/2024 (Originally 9/1/2024)    INFLUENZA VACCINE  03/31/2025 (Originally 8/1/2024)    BMI FOLLOWUP  06/07/2025    ANNUAL WELLNESS VISIT  10/22/2025    DXA SCAN  02/21/2026    MAMMOGRAM  08/08/2026    COLORECTAL CANCER SCREENING  04/11/2028    HEPATITIS C SCREENING  Completed    Pneumococcal Vaccine 65+  Completed                                                                                                                                                CMS Preventative Services Quick Reference  Risk Factors Identified During Encounter  Immunizations Discussed/Encouraged: Tdap, Influenza, Shingrix, COVID19, and RSV (Respiratory Syncytial Virus)    The above risks/problems have been discussed with the patient.  Pertinent information has been shared with the patient in the After Visit Summary.  An After Visit Summary and PPPS were made available to the patient.    Follow Up:   Next Medicare Wellness visit to be scheduled in 1 year.         Additional E&M Note during same encounter follows:  Patient has additional, significant, and separately identifiable condition(s)/problem(s) that require work above and beyond the Medicare Wellness Visit     Chief Complaint  Medicare Wellness-subsequent    Subjective   HPI  Criss is also being seen today for additional medical problem/s.    She is nebivolol and valsartan for hypertension.  Her BP has been well controlled.  Denies chest pain, palpitations or shortness of breath.  Follows with Cardiology Dr. Nelson for HOCM.        She is no longer on duloxetine for depression and anxiety.  Mood has been \"better.\"  She continues to deal with complicated interpersonal family relationships which " "are a major stressor for her.  She is under less stress now that she is no longer caregiving for her friend post-MVA.       She is on pantoprazole and famotidine for GERD.  Follows with [facility].  Status post EGD 1/25/2024 by Dr. Lua.  \"I feel like a horse with colic.\"  She is now on simethicone and that has helped the bloating and gas a considerable amount.     She is up-to-date on DEXA, last done 2/2024, showing osteopenia that qualifies for meds based on FRAX scores.  She is on alendronate.    Review of Systems   Constitutional:  Positive for fatigue. Negative for chills and fever.   HENT:  Positive for hearing loss (+hearing aids, just got them reprogrammed, now somewhat better). Negative for congestion and rhinorrhea.    Eyes:  Negative for pain and visual disturbance.   Respiratory:  Negative for cough and shortness of breath.    Cardiovascular:  Negative for chest pain and palpitations.   Gastrointestinal:  Negative for abdominal pain, constipation, diarrhea, nausea and vomiting.   Genitourinary:  Negative for difficulty urinating, dysuria, frequency and urgency.   Musculoskeletal:  Positive for arthralgias. Negative for myalgias.   Neurological:  Negative for weakness and numbness.   Psychiatric/Behavioral:  Negative for dysphoric mood and sleep disturbance. The patient is nervous/anxious.               Objective   Vital Signs:  /85 (BP Location: Right arm, Patient Position: Sitting, Cuff Size: Adult)   Pulse 74   Temp 97.7 °F (36.5 °C) (Oral)   Ht 160 cm (62.99\")   Wt 79.8 kg (176 lb)   SpO2 94%   BMI 31.18 kg/m²   Physical Exam  Vitals reviewed.   Constitutional:       General: She is not in acute distress.     Appearance: Normal appearance. She is well-developed.   HENT:      Head: Normocephalic and atraumatic.      Right Ear: External ear normal.      Left Ear: External ear normal.      Mouth/Throat:      Mouth: Mucous membranes are moist.   Eyes:      Extraocular Movements: " Extraocular movements intact.      Conjunctiva/sclera: Conjunctivae normal.      Pupils: Pupils are equal, round, and reactive to light.   Cardiovascular:      Rate and Rhythm: Normal rate and regular rhythm.      Heart sounds: No murmur heard.  Pulmonary:      Effort: Pulmonary effort is normal.      Breath sounds: Normal breath sounds. No wheezing, rhonchi or rales.   Abdominal:      General: Bowel sounds are normal. There is no distension.      Palpations: Abdomen is soft.      Tenderness: There is no abdominal tenderness.   Musculoskeletal:         General: Normal range of motion.   Skin:     General: Skin is warm and dry.   Neurological:      Mental Status: She is alert and oriented to person, place, and time.      Deep Tendon Reflexes: Reflexes normal.   Psychiatric:         Mood and Affect: Affect normal. Mood is anxious.         Behavior: Behavior normal.         Thought Content: Thought content normal.         Judgment: Judgment normal.       Lab Results   Component Value Date    GLUCOSE 135 (H) 09/27/2024    BUN 12 09/27/2024    CREATININE 0.99 09/27/2024    EGFR 59.2 (L) 09/27/2024    BCR 12.1 09/27/2024    K 5.1 09/27/2024    CO2 26.0 09/27/2024    CALCIUM 10.5 09/27/2024    ALBUMIN 4.7 09/27/2024    BILITOT 0.4 09/27/2024    AST 37 (H) 09/27/2024    ALT 30 09/27/2024       Lab Results   Component Value Date    CHOL 193 03/07/2024    CHLPL 179 01/26/2021    TRIG 153 (H) 03/07/2024    HDL 44 03/07/2024     (H) 03/07/2024       Lab Results   Component Value Date    WBC 6.49 09/27/2024    HGB 14.8 09/27/2024    HCT 45.3 09/27/2024    MCV 94.4 09/27/2024     09/27/2024               Assessment and Plan               Annual physical exam  She is UTD on colonoscopy, last done 4/2018 and this was normal.  Ten year repeat recommended.  Pap smears are no longer indicated by age and history; s/p hysterectomy.  She is UTD on mammogram, last done 8/2024 and this was normal.  She is UTD on DEXA, last  done 2/2024 and this showed osteopenia.  She is UTD on [id number] (6/2022), Pneumovax (1/2018), Jniggqa60 (11/2016), Zostavax (5/2010).  She is due for Shingrix, RSV, Td (10/2012), COVID, flu.  High dose flu given today  COVID declined.  Shingrix, RSV and Tdap can be done at the pharmacy.  She is UTD on routine labs including CMP, lipids and CBC.   Essential hypertension  Blood pressure has been running at goal.  Continue nebivolol 5 mg daily and valsartan 40 mg daily.  Refills were needed today.  Labs were not needed today.  Continue to monitor.  Hypertrophic cardiomyopathy    Following with Dr. Nelson.  Stable on valsartan and nebivolol.  Major depressive disorder, recurrent episode, moderate degree   not currently on medication for depression/anxiety.  She has previously been on duloxetine but does not feel she needs that any longer.  Continue to monitor closely.  Generalized anxiety disorder  As per depression plan.  Gastroesophageal reflux disease without esophagitis  Stable on pantoprazole 40 mg daily.  Refills were not needed today.  Continue to monitor.  Wean PPI as able.  Seasonal allergic rhinitis due to pollen  Start daily Flonase.  Prescription sent.  Encounter for immunization      Orders Placed This Encounter   Procedures    Fluzone High-Dose 65+yrs     New Medications Ordered This Visit   Medications    fluticasone (FLONASE) 50 MCG/ACT nasal spray     Sig: Administer 2 sprays into the nostril(s) as directed by provider Daily.     Dispense:  16 g     Refill:  5    nebivolol (BYSTOLIC) 5 MG tablet     Sig: Take 1 tablet by mouth Daily.     Dispense:  90 tablet     Refill:  1    valsartan (DIOVAN) 40 MG tablet     Sig: Take 1 tablet by mouth Daily.     Dispense:  90 tablet     Refill:  1          Follow Up   No follow-ups on file.  Patient was given instructions and counseling regarding her condition or for health maintenance advice. Please see specific information pulled into the AVS if appropriate.

## 2024-10-24 ENCOUNTER — TELEPHONE (OUTPATIENT)
Dept: FAMILY MEDICINE CLINIC | Age: 74
End: 2024-10-24
Payer: MEDICARE

## 2024-10-24 DIAGNOSIS — R30.0 DYSURIA: Primary | ICD-10-CM

## 2024-10-24 RX ORDER — PHENAZOPYRIDINE HYDROCHLORIDE 100 MG/1
100 TABLET, FILM COATED ORAL 3 TIMES DAILY PRN
Qty: 10 TABLET | Refills: 0 | Status: SHIPPED | OUTPATIENT
Start: 2024-10-24 | End: 2024-10-28 | Stop reason: SDUPTHER

## 2024-10-24 NOTE — TELEPHONE ENCOUNTER
I will send her in a prescription for Azo.  If she continues having burning, please have her come in for an appointment for evaluation.  Thanks, KATHERINE

## 2024-10-24 NOTE — TELEPHONE ENCOUNTER
Patient states she has been passing kidney stones.  States she has passed them and the pain is gone but now she has burning.  She wants to know if you will send in something for the burning?

## 2024-10-28 ENCOUNTER — OFFICE VISIT (OUTPATIENT)
Dept: FAMILY MEDICINE CLINIC | Age: 74
End: 2024-10-28
Payer: MEDICARE

## 2024-10-28 VITALS
BODY MASS INDEX: 30.55 KG/M2 | WEIGHT: 172.4 LBS | TEMPERATURE: 97.7 F | HEART RATE: 90 BPM | HEIGHT: 63 IN | SYSTOLIC BLOOD PRESSURE: 134 MMHG | DIASTOLIC BLOOD PRESSURE: 72 MMHG

## 2024-10-28 DIAGNOSIS — R30.0 DYSURIA: ICD-10-CM

## 2024-10-28 DIAGNOSIS — M85.89 OSTEOPENIA OF MULTIPLE SITES: ICD-10-CM

## 2024-10-28 DIAGNOSIS — L29.9 PRURITUS: ICD-10-CM

## 2024-10-28 DIAGNOSIS — N20.0 NEPHROLITHIASIS: Primary | ICD-10-CM

## 2024-10-28 LAB
BACTERIA UR QL AUTO: ABNORMAL /HPF
BILIRUB UR QL STRIP: ABNORMAL
CLARITY UR: ABNORMAL
COLOR UR: ABNORMAL
GLUCOSE UR STRIP-MCNC: ABNORMAL MG/DL
HGB UR QL STRIP.AUTO: ABNORMAL
KETONES UR QL STRIP: ABNORMAL
LEUKOCYTE ESTERASE UR QL STRIP.AUTO: ABNORMAL
NITRITE UR QL STRIP: ABNORMAL
PH UR STRIP.AUTO: ABNORMAL [PH]
PROT UR QL STRIP: ABNORMAL
RBC # UR STRIP: ABNORMAL /HPF
REF LAB TEST METHOD: ABNORMAL
SP GR UR STRIP: 1.02 (ref 1–1.03)
SQUAMOUS #/AREA URNS HPF: ABNORMAL /HPF
UROBILINOGEN UR QL STRIP: ABNORMAL
WBC # UR STRIP: ABNORMAL /HPF

## 2024-10-28 PROCEDURE — 99214 OFFICE O/P EST MOD 30 MIN: CPT | Performed by: NURSE PRACTITIONER

## 2024-10-28 PROCEDURE — 1125F AMNT PAIN NOTED PAIN PRSNT: CPT | Performed by: NURSE PRACTITIONER

## 2024-10-28 PROCEDURE — 3078F DIAST BP <80 MM HG: CPT | Performed by: NURSE PRACTITIONER

## 2024-10-28 PROCEDURE — 87186 SC STD MICRODIL/AGAR DIL: CPT | Performed by: NURSE PRACTITIONER

## 2024-10-28 PROCEDURE — 3075F SYST BP GE 130 - 139MM HG: CPT | Performed by: NURSE PRACTITIONER

## 2024-10-28 PROCEDURE — 87077 CULTURE AEROBIC IDENTIFY: CPT | Performed by: NURSE PRACTITIONER

## 2024-10-28 PROCEDURE — 81001 URINALYSIS AUTO W/SCOPE: CPT | Performed by: NURSE PRACTITIONER

## 2024-10-28 PROCEDURE — 87086 URINE CULTURE/COLONY COUNT: CPT | Performed by: NURSE PRACTITIONER

## 2024-10-28 RX ORDER — PHENAZOPYRIDINE HYDROCHLORIDE 100 MG/1
100 TABLET, FILM COATED ORAL 3 TIMES DAILY PRN
Qty: 20 TABLET | Refills: 0 | Status: SHIPPED | OUTPATIENT
Start: 2024-10-28

## 2024-10-28 RX ORDER — NITROFURANTOIN 25; 75 MG/1; MG/1
100 CAPSULE ORAL 2 TIMES DAILY
Qty: 14 CAPSULE | Refills: 0 | Status: SHIPPED | OUTPATIENT
Start: 2024-10-28

## 2024-10-28 RX ORDER — HYDROXYZINE HYDROCHLORIDE 10 MG/1
10 TABLET, FILM COATED ORAL EVERY 8 HOURS PRN
Qty: 60 TABLET | Refills: 0 | Status: SHIPPED | OUTPATIENT
Start: 2024-10-28

## 2024-10-28 NOTE — PROGRESS NOTES
Chief Complaint  Flank Pain (Follow up after passing kidney stones. She is still having pain in lower stomach. )    Subjective          Criss Edwards presents to [facility]    History of Present Illness  History of kidney stone  Saw blood in her urine, dysuria  Symptoms started: 5-6 days ago   Associated symptoms: R flank pain, lower abd pain  Treatment tried: pyridium reports this helps /sent 10-24-24 and drinks plenty of water  Has history of constipation, is better, has stool softner and occ issues with her hemorrhoid    CT of abd  at [facility]   Nonobstructing stones within a horseshoe kidney.  Has seen urology/Dr Jimenez's, last note     PMH changes:    HTN  Renal stones    Surgeries:    EGD 22  CLN   Cholecystecomy  Complete hysterectomy  Renal stones procedure   Cataract's removal  R THR   Back surgery    Family history:    Mother:  at 80; MI, CVA, T2DM  Father:  age 68; CHF  Brother : 3, CAD (age 47)   Sister:  4, 2,  ; CAD, T2DM, sepsis  42     Social history:    Occupation: retired / [employer]   Marital status: single   Children: 2 children         Past Medical History:   Diagnosis Date    Dizziness     History of back surgery 2013    unkown what surgeries    History of total right hip replacement     HL (hearing loss)     Hypertension        Allergies   Allergen Reactions    Codeine Itching    Iodinated Contrast Media Other (See Comments)     Other reaction(s): GI Intolerance      Other reaction(s): GI Intolerance    Iodine Hives     DENIES    Contrast Dye (Echo Or Unknown Ct/Mr) GI Intolerance    Darvon [Propoxyphene] GI Intolerance and Headache        Past Surgical History:   Procedure Laterality Date    CHOLECYSTECTOMY      COLONOSCOPY      ENDOSCOPY N/A 2024    Procedure: ESOPHAGOGASTRODUODENOSCOPY;  Surgeon: Mallory Lua MD;  Location: [facility] ENDOSCOPY;  Service: Gastroenterology;  Laterality: N/A;   GASTRIC POLYP ESOPH DILATATION 18MM ESOPH DIVERTICULUIM    HAND SURGERY Left 05/07/2016    HIP ARTHROPLASTY Right     KIDNEY STONE SURGERY          Social History     Tobacco Use    Smoking status: Never     Passive exposure: Never    Smokeless tobacco: Never   Substance Use Topics    Alcohol use: No       History reviewed. No pertinent family history.     Health Maintenance Due   Topic Date Due    ZOSTER VACCINE (2 of 3) 06/26/2010    TDAP/TD VACCINES (2 - Tdap) 10/01/2022        Current Outpatient Medications on File Prior to Visit   Medication Sig    alendronate (FOSAMAX) 70 MG tablet Take 1 tablet by mouth Every 7 (Seven) Days.    Cholecalciferol (D3 PO) Take  by mouth.    cyclobenzaprine (FLEXERIL) 5 MG tablet TAKE 1 TABLET BY MOUTH ONCE NIGHTLY AS NEEDED FOR MUSCLE SPASMS    docusate sodium (Colace) 100 MG capsule Take 1 capsule by mouth 3 (Three) Times a Day As Needed for Constipation.    famotidine (PEPCID) 20 MG tablet Take 1 tablet by mouth 2 (Two) Times a Day. (Patient taking differently: Take 1 tablet by mouth Every Night.)    fluticasone (FLONASE) 50 MCG/ACT nasal spray Administer 2 sprays into the nostril(s) as directed by provider Daily.    nebivolol (BYSTOLIC) 5 MG tablet Take 1 tablet by mouth Daily.    ondansetron ODT (ZOFRAN-ODT) 4 MG disintegrating tablet Place 1 tablet on the tongue Every 8 (Eight) Hours As Needed for Nausea or Vomiting.    pantoprazole (PROTONIX) 40 MG EC tablet Take 1 tablet by mouth Daily.    simethicone (MYLICON) 80 MG chewable tablet Chew 1 tablet Every 6 (Six) Hours As Needed for Flatulence.    valsartan (DIOVAN) 40 MG tablet Take 1 tablet by mouth Daily.    [DISCONTINUED] hydrOXYzine (ATARAX) 10 MG tablet TAKE 1 TABLET BY MOUTH 3 TIMES A DAY AS NEEDED FOR ITCHING    [DISCONTINUED] phenazopyridine (PYRIDIUM) 100 MG tablet Take 1 tablet by mouth 3 (Three) Times a Day As Needed for Bladder Spasms.    acetaminophen-codeine (TYLENOL with CODEINE #3) 300-30 MG per tablet Take 1  tablet by mouth Every 4 (Four) Hours As Needed for Moderate Pain. (Patient not taking: Reported on 10/28/2024)    [DISCONTINUED] acetaminophen (TYLENOL) 650 MG 8 hr tablet Take 1 tablet by mouth Every 8 (Eight) Hours As Needed for Mild Pain. (Patient not taking: Reported on 10/28/2024)    [DISCONTINUED] Magnesium Oxide -Mg Supplement 400 (240 Mg) MG tablet TAKE 1 TABLET BY MOUTH DAILY (Patient not taking: Reported on 10/28/2024)    [DISCONTINUED] triamcinolone (KENALOG) 0.1 % cream Apply 1 Application topically to the appropriate area as directed 2 (Two) Times a Day As Needed for Irritation or Rash (call doctor if not improved in 10 days). (Patient not taking: Reported on 10/28/2024)     No current facility-administered medications on file prior to visit.       Immunization History   Administered Date(s) Administered    COVID-19 (MODERNA) 1st,2nd,3rd Dose Monovalent 03/08/2021, 04/08/2021    COVID-19 (PFIZER) 12YRS+ (COMIRNATY) 11/14/2023    COVID-19 (PFIZER) BIVALENT 12+YRS 11/18/2022    COVID-19 (PFIZER) Purple Cap Monovalent 12/09/2021    Fluzone High-Dose 65+YRS 09/20/2017, 10/22/2024    Fluzone High-Dose 65+yrs 12/09/2021, 09/14/2022, 10/17/2023    Pneumococcal Conjugate 20-Valent (PCV20) 06/24/2022    Pneumococcal Polysaccharide (PPSV23) 01/23/2018    Td (TDVAX) 10/01/2012    Zostavax 05/01/2010       Review of Systems   Constitutional:  Negative for fever.   Respiratory:  Negative for cough.    Cardiovascular:  Negative for chest pain.   Gastrointestinal:  Positive for constipation (but is better now) and nausea. Negative for blood in stool.   Genitourinary:  Positive for dysuria, flank pain (Right) and hematuria.   Skin:         Itches and needs refill of hydroxyzine needed         Objective     Vitals:    10/28/24 1313   BP: 134/72   BP Location: Right arm   Patient Position: Sitting   Cuff Size: Adult   Pulse: 90   Temp: 97.7 °F (36.5 °C)   TempSrc: Oral   Weight: 78.2 kg (172 lb 6.4 oz)   Height: 160 cm  "(62.99\")            Physical Exam  Constitutional:       Appearance: Normal appearance.   Cardiovascular:      Rate and Rhythm: Normal rate and regular rhythm.      Heart sounds: No murmur heard.  Pulmonary:      Effort: Pulmonary effort is normal.      Breath sounds: Normal breath sounds.   Abdominal:      Palpations: Abdomen is soft.      Tenderness: There is abdominal tenderness (generalized but increased to RUQ). There is right CVA tenderness and left CVA tenderness.   Neurological:      Mental Status: She is alert.   Psychiatric:         Mood and Affect: Mood normal.         Behavior: Behavior normal.         Result Review :     The following data was reviewed by: RAUL Spence on 10/28/2024:                       Assessment and Plan      Diagnoses and all orders for this visit:    1. Nephrolithiasis (Primary)  Assessment & Plan:  Reviewed CT from 7-2023, drink adequate water, checking a full u/a, may need to get back in with urology ;   Reviewed her last labs here and her chart.     Orders:  -     Urinalysis With Culture If Indicated - Urine, Clean Catch  -     Urinalysis, Microscopic Only - Urine, Clean Catch  -     Urine Culture - Urine, Urine, Clean Catch    2. Pruritus  Assessment & Plan:  Refilled hydroxyzine     Orders:  -     hydrOXYzine (ATARAX) 10 MG tablet; Take 1 tablet by mouth Every 8 (Eight) Hours As Needed for Itching.  Dispense: 60 tablet; Refill: 0    3. Osteopenia of multiple sites  Assessment & Plan:  Asked about her DEXA done 2-2024, reviewed with pt, to follow up with her PCP re this       4. Dysuria  Assessment & Plan:  Urinalysis: Urine with 6-10 RBC's, TNTC WBC's, 4+ bacteria, only 0-2 epi's, covering with macrobid, culture pending   Increase water intake, empty bladder frequently, call or return in 2 days if not improving.       Orders:  -     phenazopyridine (PYRIDIUM) 100 MG tablet; Take 1 tablet by mouth 3 (Three) Times a Day As Needed for Bladder Spasms.  Dispense: 20 " tablet; Refill: 0  -     nitrofurantoin, macrocrystal-monohydrate, (Macrobid) 100 MG capsule; Take 1 capsule by mouth 2 (Two) Times a Day.  Dispense: 14 capsule; Refill: 0                I spent 37 minutes caring for Criss on this date of service. This time includes time spent by me in the following activities:preparing for the visit, reviewing tests, obtaining and/or reviewing a separately obtained history, performing a medically appropriate examination and/or evaluation , ordering medications, tests, or procedures, and documenting information in the medical record    Follow Up     Return if symptoms worsen or fail to improve.    Patient was given instructions and counseling regarding her condition or for health maintenance advice. Please see specific information pulled into the AVS if appropriate.

## 2024-10-28 NOTE — ASSESSMENT & PLAN NOTE
Urinalysis: Urine with 6-10 RBC's, TNTC WBC's, 4+ bacteria, only 0-2 epi's, covering with macrobid, culture pending   Increase water intake, empty bladder frequently, call or return in 2 days if not improving.

## 2024-10-28 NOTE — ASSESSMENT & PLAN NOTE
Reviewed CT from 7-2023, drink adequate water, checking a full u/a, may need to get back in with urology ;   Reviewed her last labs here and her chart.

## 2024-10-30 LAB — BACTERIA SPEC AEROBE CULT: ABNORMAL

## 2024-11-08 ENCOUNTER — LAB (OUTPATIENT)
Dept: LAB | Facility: HOSPITAL | Age: 74
End: 2024-11-08
Payer: MEDICARE

## 2024-11-08 ENCOUNTER — OFFICE VISIT (OUTPATIENT)
Dept: FAMILY MEDICINE CLINIC | Age: 74
End: 2024-11-08
Payer: MEDICARE

## 2024-11-08 VITALS
WEIGHT: 174.2 LBS | OXYGEN SATURATION: 95 % | HEART RATE: 80 BPM | DIASTOLIC BLOOD PRESSURE: 88 MMHG | TEMPERATURE: 98.2 F | HEIGHT: 63 IN | SYSTOLIC BLOOD PRESSURE: 128 MMHG | BODY MASS INDEX: 30.87 KG/M2

## 2024-11-08 DIAGNOSIS — K21.00 GASTROESOPHAGEAL REFLUX DISEASE WITH ESOPHAGITIS WITHOUT HEMORRHAGE: ICD-10-CM

## 2024-11-08 DIAGNOSIS — N30.00 ACUTE CYSTITIS WITHOUT HEMATURIA: Primary | ICD-10-CM

## 2024-11-08 DIAGNOSIS — Z13.1 SCREENING FOR DIABETES MELLITUS: ICD-10-CM

## 2024-11-08 DIAGNOSIS — R61 HYPERHIDROSIS: ICD-10-CM

## 2024-11-08 LAB
ANION GAP SERPL CALCULATED.3IONS-SCNC: 10 MMOL/L (ref 5–15)
BACTERIA UR QL AUTO: ABNORMAL /HPF
BASOPHILS # BLD AUTO: 0.03 10*3/MM3 (ref 0–0.2)
BASOPHILS NFR BLD AUTO: 0.4 % (ref 0–1.5)
BILIRUB BLD-MCNC: NEGATIVE MG/DL
BILIRUB UR QL STRIP: NEGATIVE
BUN SERPL-MCNC: 12 MG/DL (ref 8–23)
BUN/CREAT SERPL: 17.6 (ref 7–25)
CALCIUM SPEC-SCNC: 9.9 MG/DL (ref 8.6–10.5)
CHLORIDE SERPL-SCNC: 102 MMOL/L (ref 98–107)
CLARITY UR: ABNORMAL
CLARITY, POC: ABNORMAL
CO2 SERPL-SCNC: 26 MMOL/L (ref 22–29)
COLOR UR: ABNORMAL
COLOR UR: YELLOW
CREAT SERPL-MCNC: 0.68 MG/DL (ref 0.57–1)
DEPRECATED RDW RBC AUTO: 50.1 FL (ref 37–54)
EGFRCR SERPLBLD CKD-EPI 2021: 90.4 ML/MIN/1.73
EOSINOPHIL # BLD AUTO: 0.1 10*3/MM3 (ref 0–0.4)
EOSINOPHIL NFR BLD AUTO: 1.2 % (ref 0.3–6.2)
ERYTHROCYTE [DISTWIDTH] IN BLOOD BY AUTOMATED COUNT: 14 % (ref 12.3–15.4)
EXPIRATION DATE: ABNORMAL
GLUCOSE SERPL-MCNC: 121 MG/DL (ref 65–99)
GLUCOSE UR STRIP-MCNC: NEGATIVE MG/DL
GLUCOSE UR STRIP-MCNC: NEGATIVE MG/DL
HCT VFR BLD AUTO: 40.2 % (ref 34–46.6)
HGB BLD-MCNC: 12.8 G/DL (ref 12–15.9)
HGB UR QL STRIP.AUTO: ABNORMAL
IMM GRANULOCYTES # BLD AUTO: 0.05 10*3/MM3 (ref 0–0.05)
IMM GRANULOCYTES NFR BLD AUTO: 0.6 % (ref 0–0.5)
KETONES UR QL STRIP: NEGATIVE
KETONES UR QL: NEGATIVE
LEUKOCYTE EST, POC: ABNORMAL
LEUKOCYTE ESTERASE UR QL STRIP.AUTO: ABNORMAL
LYMPHOCYTES # BLD AUTO: 1.31 10*3/MM3 (ref 0.7–3.1)
LYMPHOCYTES NFR BLD AUTO: 15.4 % (ref 19.6–45.3)
Lab: ABNORMAL
MCH RBC QN AUTO: 30.6 PG (ref 26.6–33)
MCHC RBC AUTO-ENTMCNC: 31.8 G/DL (ref 31.5–35.7)
MCV RBC AUTO: 96.2 FL (ref 79–97)
MONOCYTES # BLD AUTO: 0.65 10*3/MM3 (ref 0.1–0.9)
MONOCYTES NFR BLD AUTO: 7.6 % (ref 5–12)
NEUTROPHILS NFR BLD AUTO: 6.39 10*3/MM3 (ref 1.7–7)
NEUTROPHILS NFR BLD AUTO: 74.8 % (ref 42.7–76)
NITRITE UR QL STRIP: POSITIVE
NITRITE UR-MCNC: POSITIVE MG/ML
PH UR STRIP.AUTO: 6 [PH] (ref 5–8)
PH UR: 6 [PH] (ref 5–8)
PLATELET # BLD AUTO: 179 10*3/MM3 (ref 140–450)
PMV BLD AUTO: 9.5 FL (ref 6–12)
POTASSIUM SERPL-SCNC: 5.4 MMOL/L (ref 3.5–5.2)
PROT UR QL STRIP: ABNORMAL
PROT UR STRIP-MCNC: NEGATIVE MG/DL
RBC # BLD AUTO: 4.18 10*6/MM3 (ref 3.77–5.28)
RBC # UR STRIP: ABNORMAL /HPF
RBC # UR STRIP: ABNORMAL /UL
REF LAB TEST METHOD: ABNORMAL
SODIUM SERPL-SCNC: 138 MMOL/L (ref 136–145)
SP GR UR STRIP: 1.02 (ref 1–1.03)
SP GR UR: 1.02 (ref 1–1.03)
SQUAMOUS #/AREA URNS HPF: ABNORMAL /HPF
TSH SERPL DL<=0.05 MIU/L-ACNC: 3.71 UIU/ML (ref 0.27–4.2)
UROBILINOGEN UR QL STRIP: ABNORMAL
UROBILINOGEN UR QL: NORMAL
WBC # UR STRIP: ABNORMAL /HPF
WBC CLUMPS # UR AUTO: ABNORMAL /HPF
WBC NRBC COR # BLD AUTO: 8.53 10*3/MM3 (ref 3.4–10.8)

## 2024-11-08 PROCEDURE — 81001 URINALYSIS AUTO W/SCOPE: CPT | Performed by: INTERNAL MEDICINE

## 2024-11-08 PROCEDURE — 87086 URINE CULTURE/COLONY COUNT: CPT | Performed by: INTERNAL MEDICINE

## 2024-11-08 PROCEDURE — 87186 SC STD MICRODIL/AGAR DIL: CPT | Performed by: INTERNAL MEDICINE

## 2024-11-08 PROCEDURE — 1125F AMNT PAIN NOTED PAIN PRSNT: CPT | Performed by: INTERNAL MEDICINE

## 2024-11-08 PROCEDURE — 1159F MED LIST DOCD IN RCRD: CPT | Performed by: INTERNAL MEDICINE

## 2024-11-08 PROCEDURE — 3079F DIAST BP 80-89 MM HG: CPT | Performed by: INTERNAL MEDICINE

## 2024-11-08 PROCEDURE — 81003 URINALYSIS AUTO W/O SCOPE: CPT | Performed by: INTERNAL MEDICINE

## 2024-11-08 PROCEDURE — 85025 COMPLETE CBC W/AUTO DIFF WBC: CPT | Performed by: INTERNAL MEDICINE

## 2024-11-08 PROCEDURE — 84443 ASSAY THYROID STIM HORMONE: CPT | Performed by: INTERNAL MEDICINE

## 2024-11-08 PROCEDURE — 36415 COLL VENOUS BLD VENIPUNCTURE: CPT | Performed by: INTERNAL MEDICINE

## 2024-11-08 PROCEDURE — 83036 HEMOGLOBIN GLYCOSYLATED A1C: CPT | Performed by: INTERNAL MEDICINE

## 2024-11-08 PROCEDURE — 87077 CULTURE AEROBIC IDENTIFY: CPT | Performed by: INTERNAL MEDICINE

## 2024-11-08 PROCEDURE — 80048 BASIC METABOLIC PNL TOTAL CA: CPT | Performed by: INTERNAL MEDICINE

## 2024-11-08 PROCEDURE — 99214 OFFICE O/P EST MOD 30 MIN: CPT | Performed by: INTERNAL MEDICINE

## 2024-11-08 PROCEDURE — 1160F RVW MEDS BY RX/DR IN RCRD: CPT | Performed by: INTERNAL MEDICINE

## 2024-11-08 PROCEDURE — 3074F SYST BP LT 130 MM HG: CPT | Performed by: INTERNAL MEDICINE

## 2024-11-08 NOTE — PROGRESS NOTES
"Chief Complaint  Chills, Abdominal Pain, Fatigue, and Urinary Frequency (C/O urinary burning; states blood in urine )    Subjective      Criss Edwards is a 74 y.o. female who presents to [facility]     Patient Care Team:  Clarisse Flores MD as PCP - General (Family Medicine)  Alberto Bose MD as Surgeon (Orthopedic Surgery)  Sammy Nelson MD as Consulting Physician (Cardiology)  Lola Hutton APRN as Nurse Practitioner (Nurse Practitioner)  Patient presents to same-day clinic with chief complaint of urinary frequency and dysuria as well as feeling like \"she is going to die\".  She reports having some abdominal pain as well as fatigue.  She was last evaluated for a rash on her legs and this has improved.  Today she reports a multitude of symptoms with the most outstanding complaint being that of dysuria.  She denies any chest pain shortness of air nausea or vomiting.  She denies any blood in her stools or urine.    Review of Systems  Full review of systems obtained and pertinent positives states in above HPI.  Otherwise all others reviewed and are negative.    Objective   Vital Signs:   Vitals:    11/08/24 0937   BP: 128/88   BP Location: Right arm   Patient Position: Sitting   Cuff Size: Adult   Pulse: 80   Temp: 98.2 °F (36.8 °C)   TempSrc: Oral   SpO2: 95%   Weight: 79 kg (174 lb 3.2 oz)   Height: 160 cm (62.99\")     Body mass index is 30.87 kg/m².    Wt Readings from Last 3 Encounters:   11/08/24 79 kg (174 lb 3.2 oz)   10/28/24 78.2 kg (172 lb 6.4 oz)   10/22/24 79.8 kg (176 lb)     BP Readings from Last 3 Encounters:   11/08/24 128/88   10/28/24 134/72   10/22/24 156/85       Health Maintenance   Topic Date Due    ZOSTER VACCINE (2 of 3) 06/26/2010    TDAP/TD VACCINES (2 - Tdap) 10/01/2022    COVID-19 Vaccine (6 - 2024-25 season) 04/30/2025 (Originally 9/1/2024)    RSV Vaccine - Adults (1 - 1-dose 75+ series) 06/24/2025 (Originally 8/17/2023)    BMI " FOLLOWUP  06/07/2025    ANNUAL WELLNESS VISIT  10/22/2025    DXA SCAN  02/21/2026    MAMMOGRAM  08/08/2026    COLORECTAL CANCER SCREENING  04/11/2028    HEPATITIS C SCREENING  Completed    INFLUENZA VACCINE  Completed    Pneumococcal Vaccine 65+  Completed       Physical Exam   Anxious female in mild distress due to anxiety  Well-nourished  Normocephalic atraumatic extraocular movements are intact  Clear to auscultation bilaterally  Regular rate and rhythm S1-S2  Abdomen is soft nontender nondistended positive CVA tenderness  Extremities with no clubbing cyanosis or edema 2+ peripheral pulses bilaterally in all 4 extremities  Result Review   The following data was reviewed by: Rahel Hooper MD on 11/08/2024:  [x]  Tests & Results  []  Hospitalization/Emergency Department/Urgent Care  []  Internal/External Consultant Notes    Procedures          ASSESSMENT/PLAN  Diagnoses and all orders for this visit:    1. Acute cystitis without hematuria (Primary)  Comments:  Patient recently treated with Macrobid 100 mg twice daily.  Will repeat urinalysis with microscopy and await culture in order to tailor antibiotic therapy  Orders:  -     POCT urinalysis dipstick, automated  -     CBC & Differential  -     Basic metabolic panel  -     Urinalysis With Microscopic - Urine, Clean Catch  -     Urine Culture - Urine, Urine, Clean Catch  -     Hemoglobin A1c    2. Hyperhidrosis  Comments:  Encourage fluid intake.  May be due to fevers and chills.  Follow-up urine culture  Orders:  -     TSH Rfx On Abnormal To Free T4    3. Screening for diabetes mellitus  Comments:  Check hemoglobin A1c                    FOLLOW UP  No follow-ups on file.  Patient was given instructions and counseling regarding her condition or for health maintenance advice. Please see specific information pulled into the AVS if appropriate.       Rahel Hooper MD  11/08/24  10:40 EST        Urine culture resulted E. coli pansensitive previously on  Macrobid.  Given treatment failure will prescribe Levaquin 500 mg p.o. daily x 10 days

## 2024-11-09 LAB — HBA1C MFR BLD: 7 % (ref 4.8–5.6)

## 2024-11-10 LAB — BACTERIA SPEC AEROBE CULT: ABNORMAL

## 2024-11-11 RX ORDER — LEVOFLOXACIN 500 MG/1
500 TABLET, FILM COATED ORAL DAILY
Qty: 10 TABLET | Refills: 0 | Status: SHIPPED | OUTPATIENT
Start: 2024-11-11

## 2024-11-11 RX ORDER — FAMOTIDINE 20 MG/1
20 TABLET, FILM COATED ORAL NIGHTLY
Start: 2024-11-11

## 2024-11-19 DIAGNOSIS — I10 ESSENTIAL HYPERTENSION: ICD-10-CM

## 2024-11-19 RX ORDER — NEBIVOLOL 5 MG/1
5 TABLET ORAL DAILY
Qty: 90 TABLET | Refills: 1 | OUTPATIENT
Start: 2024-11-19

## 2024-11-26 ENCOUNTER — TELEPHONE (OUTPATIENT)
Dept: FAMILY MEDICINE CLINIC | Age: 74
End: 2024-11-26
Payer: MEDICARE

## 2024-11-26 ENCOUNTER — OFFICE VISIT (OUTPATIENT)
Dept: FAMILY MEDICINE CLINIC | Age: 74
End: 2024-11-26
Payer: MEDICARE

## 2024-11-26 ENCOUNTER — HOSPITAL ENCOUNTER (OUTPATIENT)
Dept: CT IMAGING | Facility: HOSPITAL | Age: 74
Discharge: HOME | End: 2024-11-26
Payer: MEDICARE

## 2024-11-26 ENCOUNTER — LAB (OUTPATIENT)
Dept: LAB | Facility: HOSPITAL | Age: 74
End: 2024-11-26
Payer: MEDICARE

## 2024-11-26 VITALS
HEIGHT: 63 IN | SYSTOLIC BLOOD PRESSURE: 128 MMHG | DIASTOLIC BLOOD PRESSURE: 69 MMHG | OXYGEN SATURATION: 98 % | TEMPERATURE: 97.7 F | HEART RATE: 78 BPM | WEIGHT: 174.4 LBS | BODY MASS INDEX: 30.9 KG/M2

## 2024-11-26 DIAGNOSIS — R82.998 CALCIUM OXALATE CRYSTALS IN URINE: ICD-10-CM

## 2024-11-26 DIAGNOSIS — R73.09 ELEVATED HEMOGLOBIN A1C: ICD-10-CM

## 2024-11-26 DIAGNOSIS — R30.0 DYSURIA: ICD-10-CM

## 2024-11-26 DIAGNOSIS — R30.0 DYSURIA: Primary | ICD-10-CM

## 2024-11-26 DIAGNOSIS — R10.9 FLANK PAIN: ICD-10-CM

## 2024-11-26 LAB
ALBUMIN SERPL-MCNC: 4.2 G/DL (ref 3.5–5.2)
ALBUMIN/GLOB SERPL: 1.6 G/DL
ALP SERPL-CCNC: 119 U/L (ref 39–117)
ALT SERPL W P-5'-P-CCNC: 23 U/L (ref 1–33)
ANION GAP SERPL CALCULATED.3IONS-SCNC: 8.6 MMOL/L (ref 5–15)
AST SERPL-CCNC: 23 U/L (ref 1–32)
BACTERIA UR QL AUTO: ABNORMAL /HPF
BASOPHILS # BLD AUTO: 0.04 10*3/MM3 (ref 0–0.2)
BASOPHILS NFR BLD AUTO: 0.5 % (ref 0–1.5)
BILIRUB SERPL-MCNC: <0.2 MG/DL (ref 0–1.2)
BILIRUB UR QL STRIP: NEGATIVE
BUN SERPL-MCNC: 14 MG/DL (ref 8–23)
BUN/CREAT SERPL: 17.5 (ref 7–25)
CALCIUM SPEC-SCNC: 9.8 MG/DL (ref 8.6–10.5)
CHLORIDE SERPL-SCNC: 104 MMOL/L (ref 98–107)
CLARITY UR: CLEAR
CO2 SERPL-SCNC: 28.4 MMOL/L (ref 22–29)
COD CRY URNS QL: ABNORMAL /HPF
COLOR UR: YELLOW
CREAT SERPL-MCNC: 0.8 MG/DL (ref 0.57–1)
DEPRECATED RDW RBC AUTO: 48.2 FL (ref 37–54)
EGFRCR SERPLBLD CKD-EPI 2021: 76.5 ML/MIN/1.73
EOSINOPHIL # BLD AUTO: 0.2 10*3/MM3 (ref 0–0.4)
EOSINOPHIL NFR BLD AUTO: 2.7 % (ref 0.3–6.2)
ERYTHROCYTE [DISTWIDTH] IN BLOOD BY AUTOMATED COUNT: 13.5 % (ref 12.3–15.4)
GLOBULIN UR ELPH-MCNC: 2.7 GM/DL
GLUCOSE SERPL-MCNC: 127 MG/DL (ref 65–99)
GLUCOSE UR STRIP-MCNC: NEGATIVE MG/DL
HCT VFR BLD AUTO: 40.2 % (ref 34–46.6)
HGB BLD-MCNC: 12.9 G/DL (ref 12–15.9)
HGB UR QL STRIP.AUTO: NEGATIVE
IMM GRANULOCYTES # BLD AUTO: 0.04 10*3/MM3 (ref 0–0.05)
IMM GRANULOCYTES NFR BLD AUTO: 0.5 % (ref 0–0.5)
KETONES UR QL STRIP: NEGATIVE
LEUKOCYTE ESTERASE UR QL STRIP.AUTO: NEGATIVE
LYMPHOCYTES # BLD AUTO: 2.15 10*3/MM3 (ref 0.7–3.1)
LYMPHOCYTES NFR BLD AUTO: 29 % (ref 19.6–45.3)
MCH RBC QN AUTO: 30.6 PG (ref 26.6–33)
MCHC RBC AUTO-ENTMCNC: 32.1 G/DL (ref 31.5–35.7)
MCV RBC AUTO: 95.5 FL (ref 79–97)
MONOCYTES # BLD AUTO: 0.41 10*3/MM3 (ref 0.1–0.9)
MONOCYTES NFR BLD AUTO: 5.5 % (ref 5–12)
NEUTROPHILS NFR BLD AUTO: 4.58 10*3/MM3 (ref 1.7–7)
NEUTROPHILS NFR BLD AUTO: 61.8 % (ref 42.7–76)
NITRITE UR QL STRIP: NEGATIVE
PH UR STRIP.AUTO: 5.5 [PH] (ref 5–8)
PLATELET # BLD AUTO: 239 10*3/MM3 (ref 140–450)
PMV BLD AUTO: 9.4 FL (ref 6–12)
POTASSIUM SERPL-SCNC: 4.3 MMOL/L (ref 3.5–5.2)
PROT SERPL-MCNC: 6.9 G/DL (ref 6–8.5)
PROT UR QL STRIP: NEGATIVE
RBC # BLD AUTO: 4.21 10*6/MM3 (ref 3.77–5.28)
RBC # UR STRIP: ABNORMAL /HPF
REF LAB TEST METHOD: ABNORMAL
SODIUM SERPL-SCNC: 141 MMOL/L (ref 136–145)
SP GR UR STRIP: >=1.03 (ref 1–1.03)
SQUAMOUS #/AREA URNS HPF: ABNORMAL /HPF
UROBILINOGEN UR QL STRIP: NORMAL
WBC # UR STRIP: ABNORMAL /HPF
WBC NRBC COR # BLD AUTO: 7.42 10*3/MM3 (ref 3.4–10.8)

## 2024-11-26 PROCEDURE — 1159F MED LIST DOCD IN RCRD: CPT

## 2024-11-26 PROCEDURE — 1125F AMNT PAIN NOTED PAIN PRSNT: CPT

## 2024-11-26 PROCEDURE — 36415 COLL VENOUS BLD VENIPUNCTURE: CPT

## 2024-11-26 PROCEDURE — 3074F SYST BP LT 130 MM HG: CPT

## 2024-11-26 PROCEDURE — 80053 COMPREHEN METABOLIC PANEL: CPT

## 2024-11-26 PROCEDURE — 81001 URINALYSIS AUTO W/SCOPE: CPT

## 2024-11-26 PROCEDURE — 74176 CT ABD & PELVIS W/O CONTRAST: CPT

## 2024-11-26 PROCEDURE — 87086 URINE CULTURE/COLONY COUNT: CPT

## 2024-11-26 PROCEDURE — 3078F DIAST BP <80 MM HG: CPT

## 2024-11-26 PROCEDURE — 85025 COMPLETE CBC W/AUTO DIFF WBC: CPT

## 2024-11-26 PROCEDURE — 99213 OFFICE O/P EST LOW 20 MIN: CPT

## 2024-11-26 PROCEDURE — 1160F RVW MEDS BY RX/DR IN RCRD: CPT

## 2024-11-26 NOTE — PROGRESS NOTES
"Subjective     CHIEF COMPLAINT    Chief Complaint   Patient presents with    Difficulty Urinating     History of Present Illness  Patient is a 74-year-old female, presenting to the clinic today with concerns for UTI.  She reports dysuria, pelvic pressure and urgency.  Notes some lower abdominal pain and chills.  No fevers.  Does not have any hematuria.  She was seen in office on 10- by Debbie Bonner and treated with Macrobid for UTI.  Seen again in office by Dr. Hooper on 11-8-2024 and treated with Levaquin for 10 days for UTI.  She reports a history of kidney stones.  Most recent CT scan from July 2023 showed nonobstructing stones identified within a horseshoe kidney, 4 mm stone in the lower pole on the left and a 2 mm stone in the lower pole of the right renal moiety.  She does not follow with urology.  Typically she states she is able to pass stones without issues.    Review of Systems   Constitutional:  Positive for chills. Negative for fever.   Gastrointestinal:  Positive for abdominal pain. Negative for nausea and vomiting.   Genitourinary:  Positive for dysuria (\"a little\"), flank pain, pelvic pain (pressure) and urgency. Negative for frequency and hematuria.     Past Medical History:   Diagnosis Date    Dizziness     History of back surgery 2013    unkown what surgeries    History of total right hip replacement     HL (hearing loss)     Hypertension        Past Surgical History:   Procedure Laterality Date    CHOLECYSTECTOMY      COLONOSCOPY      ENDOSCOPY N/A 1/25/2024    Procedure: ESOPHAGOGASTRODUODENOSCOPY;  Surgeon: Mallory Lua MD;  Location: [facility] ENDOSCOPY;  Service: Gastroenterology;  Laterality: N/A;  GASTRIC POLYP ESOPH DILATATION 18MM ESOPH DIVERTICULUIM    HAND SURGERY Left 05/07/2016    HIP ARTHROPLASTY Right     KIDNEY STONE SURGERY          History reviewed. No pertinent family history.         Social History     Socioeconomic History    Marital status: Single   Tobacco " Use    Smoking status: Never     Passive exposure: Never    Smokeless tobacco: Never   Vaping Use    Vaping status: Never Used   Substance and Sexual Activity    Alcohol use: No    Drug use: No    Sexual activity: Defer            Allergies   Allergen Reactions    Codeine Itching    Iodinated Contrast Media Other (See Comments)     Other reaction(s): GI Intolerance      Other reaction(s): GI Intolerance    Iodine Hives     DENIES    Contrast Dye (Echo Or Unknown Ct/Mr) GI Intolerance    Darvon [Propoxyphene] GI Intolerance and Headache            Current Outpatient Medications on File Prior to Visit   Medication Sig Dispense Refill    alendronate (FOSAMAX) 70 MG tablet Take 1 tablet by mouth Every 7 (Seven) Days.      Cholecalciferol (D3 PO) Take  by mouth.      cyclobenzaprine (FLEXERIL) 5 MG tablet TAKE 1 TABLET BY MOUTH ONCE NIGHTLY AS NEEDED FOR MUSCLE SPASMS 30 tablet 2    docusate sodium (Colace) 100 MG capsule Take 1 capsule by mouth 3 (Three) Times a Day As Needed for Constipation. 60 capsule 0    famotidine (PEPCID) 20 MG tablet Take 1 tablet by mouth Every Night.      fluticasone (FLONASE) 50 MCG/ACT nasal spray Administer 2 sprays into the nostril(s) as directed by provider Daily. 16 g 5    hydrOXYzine (ATARAX) 10 MG tablet Take 1 tablet by mouth Every 8 (Eight) Hours As Needed for Itching. 60 tablet 0    nebivolol (BYSTOLIC) 5 MG tablet Take 1 tablet by mouth Daily. 90 tablet 1    ondansetron ODT (ZOFRAN-ODT) 4 MG disintegrating tablet Place 1 tablet on the tongue Every 8 (Eight) Hours As Needed for Nausea or Vomiting. 20 tablet 0    pantoprazole (PROTONIX) 40 MG EC tablet Take 1 tablet by mouth Daily. 90 tablet 3    simethicone (MYLICON) 80 MG chewable tablet Chew 1 tablet Every 6 (Six) Hours As Needed for Flatulence. 60 tablet 0    valsartan (DIOVAN) 40 MG tablet Take 1 tablet by mouth Daily. 90 tablet 1    [DISCONTINUED] levoFLOXacin (LEVAQUIN) 500 MG tablet Take 1 tablet by mouth Daily. 10 tablet 0  "    No current facility-administered medications on file prior to visit.     /69   Pulse 78   Temp 97.7 °F (36.5 °C) (Oral)   Ht 160 cm (62.99\")   Wt 79.1 kg (174 lb 6.4 oz)   SpO2 98%   BMI 30.90 kg/m²     Objective     Physical Exam  Vitals and nursing note reviewed.   Constitutional:       General: She is not in acute distress.     Appearance: Normal appearance. She is not ill-appearing.   Eyes:      Extraocular Movements: Extraocular movements intact.      Pupils: Pupils are equal, round, and reactive to light.   Cardiovascular:      Rate and Rhythm: Normal rate and regular rhythm.      Heart sounds: Normal heart sounds. No murmur heard.  Pulmonary:      Effort: Pulmonary effort is normal. No accessory muscle usage or respiratory distress.      Breath sounds: Normal breath sounds. No wheezing or rhonchi.   Abdominal:      General: Bowel sounds are normal.      Palpations: Abdomen is soft.      Tenderness: There is abdominal tenderness (mild) in the suprapubic area. There is right CVA tenderness. There is no left CVA tenderness, guarding or rebound.   Skin:     General: Skin is warm and dry.   Neurological:      General: No focal deficit present.      Mental Status: She is alert and oriented to person, place, and time.   Psychiatric:         Mood and Affect: Mood and affect normal.         Behavior: Behavior normal.       Lab Results (last 24 hours)       Procedure Component Value Units Date/Time    Urinalysis With Microscopic - Urine, Clean Catch [[id number]]  (Abnormal) Collected: 11/26/24 1252    Specimen: Urine, Clean Catch Updated: 11/26/24 1309    Narrative:      The following orders were created for panel order Urinalysis With Microscopic - Urine, Clean Catch.  Procedure                               Abnormality         Status                     ---------                               -----------         ------                     Urinalysis without micro...[[id number]]  Normal              " Final result               Urinalysis, Microscopic ...[[id number]]  Abnormal            Final result                 Please view results for these tests on the individual orders.    Urine Culture - Urine, Urine, Clean Catch [[id number]] Collected: 11/26/24 1252    Specimen: Urine, Clean Catch Updated: 11/26/24 1254    Urinalysis without microscopic (no culture) - Urine, Clean Catch [[id number]]  (Normal) Collected: 11/26/24 1252    Specimen: Urine, Clean Catch Updated: 11/26/24 1259     Color, UA Yellow     Appearance, UA Clear     pH, UA 5.5     Specific Gravity, UA >=1.030     Glucose, UA Negative     Ketones, UA Negative     Bilirubin, UA Negative     Blood, UA Negative     Protein, UA Negative     Leuk Esterase, UA Negative     Nitrite, UA Negative     Urobilinogen, UA 0.2 E.U./dL    Urinalysis, Microscopic Only - Urine, Clean Catch [[id number]]  (Abnormal) Collected: 11/26/24 1252    Specimen: Urine, Clean Catch Updated: 11/26/24 1309     RBC, UA None Seen /HPF      WBC, UA 0-2 /HPF      Bacteria, UA None Seen /HPF      Squamous Epithelial Cells, UA 3-6 /HPF      Calcium Oxalate Crystals, UA Small/1+ /HPF      Methodology Manual Light Microscopy    CBC w AUTO Differential [[id number]]  (Normal) Collected: 11/26/24 1338    Specimen: Blood Updated: 11/26/24 1354    Narrative:      The following orders were created for panel order CBC w AUTO Differential.  Procedure                               Abnormality         Status                     ---------                               -----------         ------                     CBC Auto Differential[[id number]]        Normal              Final result                 Please view results for these tests on the individual orders.    Comprehensive metabolic panel [[id number]] Collected: 11/26/24 1338    Specimen: Blood Updated: 11/26/24 1338    CBC Auto Differential [[id number]]  (Normal) Collected: 11/26/24 1338    Specimen: Blood Updated: 11/26/24 1354     WBC 7.42 10*3/mm3       RBC 4.21 10*6/mm3      Hemoglobin 12.9 g/dL      Hematocrit 40.2 %      MCV 95.5 fL      MCH 30.6 pg      MCHC 32.1 g/dL      RDW 13.5 %      RDW-SD 48.2 fl      MPV 9.4 fL      Platelets 239 10*3/mm3      Neutrophil % 61.8 %      Lymphocyte % 29.0 %      Monocyte % 5.5 %      Eosinophil % 2.7 %      Basophil % 0.5 %      Immature Grans % 0.5 %      Neutrophils, Absolute 4.58 10*3/mm3      Lymphocytes, Absolute 2.15 10*3/mm3      Monocytes, Absolute 0.41 10*3/mm3      Eosinophils, Absolute 0.20 10*3/mm3      Basophils, Absolute 0.04 10*3/mm3      Immature Grans, Absolute 0.04 10*3/mm3             The following data was reviewed for today's visit:  Office Visit with Debbie Bonner APRN (10/28/2024)    Hemoglobin A1c (11/08/2024 11:03)    Urine Culture - Urine, Urine, Clean Catch (10/28/2024 13:24)    Urine Culture - Urine, Urine, Clean Catch (11/08/2024 11:03)    Office Visit with Rahel Hooper MD (11/08/2024)  Assessment & Plan  Dysuria  Urine does not look concerning for UTI at this time.  Will send for culture to confirm.  There is calcium oxalate crystals noted in urine, patient may be passing a kidney stone.  She is not acutely ill on exam today.  Will check CBC, CMP and CT of abdomen pelvis.  Pending results, may need to follow-up with urology.  Recommend increased water intake, avoid bladder irritants.  Strict ER precautions advised     Orders:    Urinalysis With Microscopic - Urine, Clean Catch; Future    Urine Culture - Urine, Urine, Clean Catch; Future    Urinalysis With Microscopic - Urine, Clean Catch    Urine Culture - Urine, Urine, Clean Catch    CBC w AUTO Differential; Future    Comprehensive metabolic panel; Future    Flank pain    Orders:    CT Abdomen Pelvis Stone Protocol; Future    Calcium oxalate crystals in urine    Orders:    CT Abdomen Pelvis Stone Protocol; Future    CBC w AUTO Differential; Future    Comprehensive metabolic panel; Future    Elevated hemoglobin A1c  Noted on  most recent labs, discussed with PCP who will address with patient at follow-up visit.  No glycosuria noted.               Follow-up:  Return if symptoms worsen or fail to improve.  Patient was given instructions and counseling regarding her condition or for health maintenance advice. Please see specific information pulled into the AVS if appropriate.

## 2024-11-26 NOTE — ASSESSMENT & PLAN NOTE
Urine does not look concerning for UTI at this time.  Will send for culture to confirm.  There is calcium oxalate crystals noted in urine, patient may be passing a kidney stone.  She is not acutely ill on exam today.  Will check CBC, CMP and CT of abdomen pelvis.  Pending results, may need to follow-up with urology.  Recommend increased water intake, avoid bladder irritants.  Strict ER precautions advised     Orders:    Urinalysis With Microscopic - Urine, Clean Catch; Future    Urine Culture - Urine, Urine, Clean Catch; Future    Urinalysis With Microscopic - Urine, Clean Catch    Urine Culture - Urine, Urine, Clean Catch    CBC w AUTO Differential; Future    Comprehensive metabolic panel; Future

## 2024-11-26 NOTE — TELEPHONE ENCOUNTER
Caller: Criss Edwards    Relationship: Self    Best call back number: [phone]     What medication are you requesting: SOMETHING TO TREAT PERSISTENT UTI    What are your current symptoms: ABDOMINAL PAIN AND BACK PAIN WITH INFREQUENT NAUSEA     Have you had these symptoms before:    [x] Yes  [] No    Have you been treated for these symptoms before:   [x] Yes  [] No    If a prescription is needed, what is your preferred pharmacy and phone number: [facility] [id number] - [place], [place] - [facility] VIKY HERNANDEZ [facility] [phone] [facility] [phone]      Additional notes:    PATIENT STATES SHE HAS TAKEN TWO DIFFERENT ANTIBIOTICS AND THE LAST ONE SHE TOOK HELPED A LOT BUT SHE IS STILL HAVING SOME SYMPTOMS. PATIENT STATES TO PLEASE CALL HER TO LET HER KNOW WHEN OR IF SOMETHING HAS BEEN SENT IN.

## 2024-11-27 DIAGNOSIS — N20.0 RENAL CALCULI: Primary | ICD-10-CM

## 2024-11-27 LAB — BACTERIA SPEC AEROBE CULT: NORMAL

## 2024-12-05 ENCOUNTER — TELEPHONE (OUTPATIENT)
Dept: UROLOGY | Age: 74
End: 2024-12-05
Payer: MEDICARE

## 2024-12-05 NOTE — TELEPHONE ENCOUNTER
Called patient and explained that we are book out till her appointment and she explained that she is on the wait list for any cancellations. I told her if the pain gets worse or she feel likes she has a uti to go to her PCP to get a test or the er for the pain the patient voiced her understandings.

## 2024-12-05 NOTE — TELEPHONE ENCOUNTER
Provider: PHANI WRIGHT    Caller: FAVIAN LIMON    Relationship to Patient: SELF    Reason for Call: PATIENT IS WORRIED TO DEATH TO HAVE TO WAIT UNTIL FEBRUARY FOR AN APPOINTMENT. HER SISTER  FROM SEPSIS AT A YOUNG AGE FROM AN UTI. SHE IS ASKING IF ANYTHING CANCELS AT EITHER OFFICE TO PLEASE CALL HER FOR A SOONER APPOINTMENT. SHE IS HAVING PAIN AND FREQUENT URINATION. WILL GO TO ER AT [facility] IF PAIN SO BAD     When was the patient last seen: 20    BEST NUMBER IS HER CELL [phone]

## 2025-01-24 ENCOUNTER — OFFICE VISIT (OUTPATIENT)
Dept: FAMILY MEDICINE CLINIC | Age: 75
End: 2025-01-24
Payer: MEDICARE

## 2025-01-24 VITALS
HEIGHT: 63 IN | TEMPERATURE: 98.1 F | DIASTOLIC BLOOD PRESSURE: 72 MMHG | WEIGHT: 182.2 LBS | OXYGEN SATURATION: 96 % | BODY MASS INDEX: 32.28 KG/M2 | SYSTOLIC BLOOD PRESSURE: 150 MMHG | HEART RATE: 81 BPM

## 2025-01-24 DIAGNOSIS — E11.9 TYPE 2 DIABETES MELLITUS WITHOUT COMPLICATION, WITHOUT LONG-TERM CURRENT USE OF INSULIN: Primary | ICD-10-CM

## 2025-01-24 DIAGNOSIS — I42.2 HYPERTROPHIC CARDIOMYOPATHY: ICD-10-CM

## 2025-01-24 DIAGNOSIS — F41.1 GENERALIZED ANXIETY DISORDER: ICD-10-CM

## 2025-01-24 DIAGNOSIS — I10 ESSENTIAL HYPERTENSION: ICD-10-CM

## 2025-01-24 DIAGNOSIS — M15.9 GENERALIZED OSTEOARTHRITIS: ICD-10-CM

## 2025-01-24 DIAGNOSIS — R10.84 GENERALIZED ABDOMINAL PAIN: ICD-10-CM

## 2025-01-24 DIAGNOSIS — M85.89 OSTEOPENIA OF MULTIPLE SITES: ICD-10-CM

## 2025-01-24 DIAGNOSIS — F33.1 MAJOR DEPRESSIVE DISORDER, RECURRENT EPISODE, MODERATE DEGREE: ICD-10-CM

## 2025-01-24 DIAGNOSIS — K21.00 GASTROESOPHAGEAL REFLUX DISEASE WITH ESOPHAGITIS WITHOUT HEMORRHAGE: ICD-10-CM

## 2025-01-24 PROBLEM — N20.0 NEPHROLITHIASIS: Status: RESOLVED | Noted: 2019-03-06 | Resolved: 2025-01-24

## 2025-01-24 PROBLEM — H91.90 HEARING LOSS: Status: RESOLVED | Noted: 2021-08-04 | Resolved: 2025-01-24

## 2025-01-24 PROBLEM — G89.29 CHRONIC LEFT SHOULDER PAIN: Status: RESOLVED | Noted: 2023-05-07 | Resolved: 2025-01-24

## 2025-01-24 PROBLEM — R07.81 RIB PAIN ON RIGHT SIDE: Status: RESOLVED | Noted: 2024-09-12 | Resolved: 2025-01-24

## 2025-01-24 PROBLEM — L29.9 PRURITUS: Status: RESOLVED | Noted: 2024-10-28 | Resolved: 2025-01-24

## 2025-01-24 PROBLEM — M25.512 CHRONIC LEFT SHOULDER PAIN: Status: RESOLVED | Noted: 2023-05-07 | Resolved: 2025-01-24

## 2025-01-24 PROBLEM — R26.89 IMBALANCE: Status: RESOLVED | Noted: 2023-03-22 | Resolved: 2025-01-24

## 2025-01-24 PROBLEM — R30.0 DYSURIA: Status: RESOLVED | Noted: 2019-03-06 | Resolved: 2025-01-24

## 2025-01-24 LAB
ALBUMIN UR-MCNC: 1.4 MG/DL
CREAT UR-MCNC: 120 MG/DL
EXPIRATION DATE: ABNORMAL
HBA1C MFR BLD: 7.2 % (ref 4.5–5.7)
Lab: ABNORMAL
MICROALBUMIN/CREAT UR: 11.7 MG/G (ref 0–29)

## 2025-01-24 PROCEDURE — 1125F AMNT PAIN NOTED PAIN PRSNT: CPT | Performed by: FAMILY MEDICINE

## 2025-01-24 PROCEDURE — 3078F DIAST BP <80 MM HG: CPT | Performed by: FAMILY MEDICINE

## 2025-01-24 PROCEDURE — 83036 HEMOGLOBIN GLYCOSYLATED A1C: CPT | Performed by: FAMILY MEDICINE

## 2025-01-24 PROCEDURE — 82043 UR ALBUMIN QUANTITATIVE: CPT | Performed by: FAMILY MEDICINE

## 2025-01-24 PROCEDURE — 1160F RVW MEDS BY RX/DR IN RCRD: CPT | Performed by: FAMILY MEDICINE

## 2025-01-24 PROCEDURE — 1159F MED LIST DOCD IN RCRD: CPT | Performed by: FAMILY MEDICINE

## 2025-01-24 PROCEDURE — 3077F SYST BP >= 140 MM HG: CPT | Performed by: FAMILY MEDICINE

## 2025-01-24 PROCEDURE — 3051F HG A1C>EQUAL 7.0%<8.0%: CPT | Performed by: FAMILY MEDICINE

## 2025-01-24 PROCEDURE — 82570 ASSAY OF URINE CREATININE: CPT | Performed by: FAMILY MEDICINE

## 2025-01-24 PROCEDURE — 99214 OFFICE O/P EST MOD 30 MIN: CPT | Performed by: FAMILY MEDICINE

## 2025-01-24 RX ORDER — MELOXICAM 15 MG/1
15 TABLET ORAL DAILY PRN
Qty: 30 TABLET | Refills: 0 | Status: SHIPPED | OUTPATIENT
Start: 2025-01-24

## 2025-01-24 RX ORDER — SIMETHICONE 80 MG
80 TABLET,CHEWABLE ORAL EVERY 6 HOURS PRN
Qty: 60 TABLET | Refills: 0 | Status: SHIPPED | OUTPATIENT
Start: 2025-01-24

## 2025-01-24 RX ORDER — FAMOTIDINE 20 MG/1
20 TABLET, FILM COATED ORAL NIGHTLY
Qty: 90 TABLET | Refills: 1 | Status: SHIPPED | OUTPATIENT
Start: 2025-01-24

## 2025-01-24 NOTE — ASSESSMENT & PLAN NOTE
New diagnosis of diabetes.  A1c 7.0.  We discussed her blood work including the A1c and what it means.  We are going to start her on metformin 500 mg daily.  Prescription sent.  Referral placed for diabetic eye exam.  Foot exam today was normal.  MAB/Cr ratio ordered today.  I will see her back in 3 months.  At that time we will recheck an A1c.  Referral will be placed to [facility] for dietician and nurse educator.      Orders:    POC Glycosylated Hemoglobin (Hb A1C)    Microalbumin / Creatinine Urine Ratio - Urine, Clean Catch; Future

## 2025-01-24 NOTE — ASSESSMENT & PLAN NOTE
She is up-to-date on DEXA, last done 2/2024, showing osteopenia that qualifies for meds based on FRAX scores. We did start her on alendronate at her last visit.

## 2025-01-24 NOTE — ASSESSMENT & PLAN NOTE
Trial of meloxicam for diffuse aches and pains.  Orders:    meloxicam (MOBIC) 15 MG tablet; Take 1 tablet by mouth Daily As Needed (joint pain). Take with food.

## 2025-01-24 NOTE — ASSESSMENT & PLAN NOTE
Blood pressure has been running at goal.  Continue nebivolol 5 mg daily and valsartan 40 mg daily.  Refills were not needed today.  Labs were not needed today.  Continue to monitor.        84.4

## 2025-01-24 NOTE — PROGRESS NOTES
"Chief Complaint  Hypertension    Subjective          Criss Edwards presents to [facility] today for routine f/u of chronic issues.    \"My whole skeleton hurts.\"  \"I'm dying.\"    New diagnosis of diabetes with A1c 7.0 ordered by Dr. Hooper in November.    She is nebivolol and valsartan for HTN.  Her blood pressure has been well controlled.  No chest pain, palpitations or shortness of breath.  She follows with Cardiology Dr. Nelson for HOCM.        She is no longer on meds for depression and anxiety.  She continues to deal with complicated interpersonal family relationships which are major stressors.  She has previously been on duloxetine.     She is on pantoprazole and famotidine for GERD.  She follows with [facility].  S/p EGD 1/2024 by Dr. uLa.       She is up-to-date on DEXA, last done 2/2024, showing osteopenia that qualifies for meds based on FRAX scores.  She is on alendronate.      Current Outpatient Medications:     alendronate (FOSAMAX) 70 MG tablet, Take 1 tablet by mouth Every 7 (Seven) Days., Disp: , Rfl:     Cholecalciferol (D3 PO), Take  by mouth., Disp: , Rfl:     cyclobenzaprine (FLEXERIL) 5 MG tablet, TAKE 1 TABLET BY MOUTH ONCE NIGHTLY AS NEEDED FOR MUSCLE SPASMS, Disp: 30 tablet, Rfl: 2    docusate sodium (Colace) 100 MG capsule, Take 1 capsule by mouth 3 (Three) Times a Day As Needed for Constipation., Disp: 60 capsule, Rfl: 0    famotidine (PEPCID) 20 MG tablet, Take 1 tablet by mouth Every Night., Disp: 90 tablet, Rfl: 1    fluticasone (FLONASE) 50 MCG/ACT nasal spray, Administer 2 sprays into the nostril(s) as directed by provider Daily., Disp: 16 g, Rfl: 5    hydrOXYzine (ATARAX) 10 MG tablet, Take 1 tablet by mouth Every 8 (Eight) Hours As Needed for Itching., Disp: 60 tablet, Rfl: 0    nebivolol (BYSTOLIC) 5 MG tablet, Take 1 tablet by mouth Daily., Disp: 90 tablet, Rfl: 1    ondansetron ODT (ZOFRAN-ODT) 4 MG disintegrating tablet, Place 1 " "tablet on the tongue Every 8 (Eight) Hours As Needed for Nausea or Vomiting., Disp: 20 tablet, Rfl: 0    pantoprazole (PROTONIX) 40 MG EC tablet, Take 1 tablet by mouth Daily., Disp: 90 tablet, Rfl: 3    simethicone (MYLICON) 80 MG chewable tablet, Chew 1 tablet Every 6 (Six) Hours As Needed for Flatulence., Disp: 60 tablet, Rfl: 0    valsartan (DIOVAN) 40 MG tablet, Take 1 tablet by mouth Daily., Disp: 90 tablet, Rfl: 1    meloxicam (MOBIC) 15 MG tablet, Take 1 tablet by mouth Daily As Needed (joint pain). Take with food., Disp: 30 tablet, Rfl: 0  Medications Discontinued During This Encounter   Medication Reason    simethicone (MYLICON) 80 MG chewable tablet Reorder    famotidine (PEPCID) 20 MG tablet Reorder         Allergies:  Codeine, Iodinated contrast media, Iodine, Contrast dye (echo or unknown ct/mr), and Darvon [propoxyphene]      Objective   Vital Signs:   Vitals:    01/24/25 0857   BP: 145/83   BP Location: Right arm   Patient Position: Sitting   Cuff Size: Adult   Pulse: 81   Temp: 98.1 °F (36.7 °C)   TempSrc: Oral   SpO2: 96%   Weight: 82.6 kg (182 lb 3.2 oz)   Height: 160 cm (62.99\")     Body mass index is 32.28 kg/m².           Physical Exam  Vitals reviewed.   Constitutional:       General: She is not in acute distress.     Appearance: Normal appearance. She is well-developed.   HENT:      Head: Normocephalic and atraumatic.      Right Ear: External ear normal. Tympanic membrane is not perforated or erythematous.      Left Ear: External ear normal. Tympanic membrane is not perforated, erythematous or retracted.   Eyes:      Extraocular Movements: Extraocular movements intact.      Conjunctiva/sclera: Conjunctivae normal.      Pupils: Pupils are equal, round, and reactive to light.   Cardiovascular:      Rate and Rhythm: Normal rate and regular rhythm.      Pulses:           Dorsalis pedis pulses are 2+ on the right side and 2+ on the left side.      Heart sounds: No murmur heard.  Pulmonary:      " Effort: Pulmonary effort is normal.      Breath sounds: Normal breath sounds. No wheezing, rhonchi or rales.   Abdominal:      General: Bowel sounds are normal. There is no distension.      Palpations: Abdomen is soft.      Tenderness: There is no abdominal tenderness.   Musculoskeletal:         General: Normal range of motion.   Feet:      Right foot:      Protective Sensation: 3 sites tested.  3 sites sensed.      Skin integrity: Skin integrity normal. No ulcer or blister.      Toenail Condition: Right toenails are normal.      Left foot:      Protective Sensation: 3 sites tested.  3 sites sensed.      Skin integrity: Skin integrity normal. No ulcer or blister.      Toenail Condition: Left toenails are normal.      Comments: Diabetic Foot Exam Performed and Monofilament Test Performed     Neurological:      Mental Status: She is alert.   Psychiatric:         Mood and Affect: Affect normal.           Lab Results   Component Value Date    GLUCOSE 127 (H) 11/26/2024    BUN 14 11/26/2024    CREATININE 0.80 11/26/2024    EGFRIFNONA 61 09/09/2021    BCR 17.5 11/26/2024    K 4.3 11/26/2024    CO2 28.4 11/26/2024    CALCIUM 9.8 11/26/2024    ALBUMIN 4.2 11/26/2024    LABIL2 1.3 (L) 01/26/2021    AST 23 11/26/2024    ALT 23 11/26/2024       Lab Results   Component Value Date    CHOL 193 03/07/2024    CHLPL 179 01/26/2021    TRIG 153 (H) 03/07/2024    HDL 44 03/07/2024     (H) 03/07/2024       Lab Results   Component Value Date    WBC 7.42 11/26/2024    HGB 12.9 11/26/2024    HCT 40.2 11/26/2024    MCV 95.5 11/26/2024     11/26/2024            Assessment and Plan    Assessment & Plan  Type 2 diabetes mellitus without complication, without long-term current use of insulin    New diagnosis of diabetes.  A1c 7.0.  We discussed her blood work including the A1c and what it means.  We are going to start her on metformin 500 mg daily.  Prescription sent.  Referral placed for diabetic eye exam.  Foot exam today was  normal.  MAB/Cr ratio ordered today.  I will see her back in 3 months.  At that time we will recheck an A1c.  [facility]erral will [facility] for dietician and nurse educator.      Orders:    POC Glycosylated Hemoglobin (Hb A1C)    Microalbumin / Creatinine Urine Ratio - Urine, Clean Catch; Future    Essential hypertension    Blood pressure has been running at goal.  Continue nebivolol 5 mg daily and valsartan 40 mg daily.  Refills were not needed today.  Labs were not needed today.  Continue to monitor.       Hypertrophic cardiomyopathy    Following with Dr. Nelson.  Stable on valsartan and nebivolol.       Osteopenia of multiple sites  She is up-to-date on DEXA, last done 2/2024, showing osteopenia that qualifies for meds based on FRAX scores. We did start her on alendronate at her last visit.        Major depressive disorder, recurrent episode, moderate degree    Not currently on meds.       Generalized anxiety disorder    As per depression plan.        Generalized osteoarthritis  Trial of meloxicam for diffuse aches and pains.  Orders:    meloxicam (MOBIC) 15 MG tablet; Take 1 tablet by mouth Daily As Needed (joint pain). Take with food.    Gastroesophageal reflux disease with esophagitis without hemorrhage  Stable on pantoprazole 40 mg daily and famotidine 40 mg daily.  Following with GI.  Refills were needed today.  Continue to monitor.  Wean PPI as able.  Orders:    famotidine (PEPCID) 20 MG tablet; Take 1 tablet by mouth Every Night.    Generalized abdominal pain  Refills sent.  Encouraged her to use her medication.  Orders:    simethicone (MYLICON) 80 MG chewable tablet; Chew 1 tablet Every 6 (Six) Hours As Needed for Flatulence.              Follow Up   Return in about 3 months (around 4/24/2025) for Recheck.  Patient was given instructions and counseling regarding her condition or for health maintenance advice. Please see specific information pulled into the AVS if appropriate.            01/24/2025

## 2025-02-13 ENCOUNTER — OFFICE VISIT (OUTPATIENT)
Dept: UROLOGY | Age: 75
End: 2025-02-13
Payer: MEDICARE

## 2025-02-13 VITALS — BODY MASS INDEX: 32.25 KG/M2 | HEIGHT: 63 IN | WEIGHT: 182 LBS

## 2025-02-13 DIAGNOSIS — N20.0 RENAL STONE: Primary | ICD-10-CM

## 2025-02-13 DIAGNOSIS — Z91.041 ALLERGY TO CONTRAST MEDIA (USED FOR DIAGNOSTIC X-RAYS): ICD-10-CM

## 2025-02-13 DIAGNOSIS — R31.0 GROSS HEMATURIA: ICD-10-CM

## 2025-02-13 DIAGNOSIS — N95.8 GENITOURINARY SYNDROME OF MENOPAUSE: ICD-10-CM

## 2025-02-13 DIAGNOSIS — B37.9 CANDIDIASIS: ICD-10-CM

## 2025-02-13 LAB
BILIRUB BLD-MCNC: NEGATIVE MG/DL
CLARITY, POC: CLEAR
COLOR UR: YELLOW
EXPIRATION DATE: ABNORMAL
GLUCOSE UR STRIP-MCNC: NEGATIVE MG/DL
KETONES UR QL: NEGATIVE
LEUKOCYTE EST, POC: ABNORMAL
Lab: ABNORMAL
NITRITE UR-MCNC: NEGATIVE MG/ML
PH UR: 6 [PH] (ref 5–8)
PROT UR STRIP-MCNC: NEGATIVE MG/DL
RBC # UR STRIP: NEGATIVE /UL
SP GR UR: 1.02 (ref 1–1.03)
URINE VOLUME: NORMAL
UROBILINOGEN UR QL: ABNORMAL

## 2025-02-13 RX ORDER — PREDNISONE 50 MG/1
TABLET ORAL
Qty: 3 TABLET | Refills: 0 | Status: SHIPPED | OUTPATIENT
Start: 2025-02-13

## 2025-02-13 RX ORDER — NITROFURANTOIN 25; 75 MG/1; MG/1
CAPSULE ORAL
Qty: 6 CAPSULE | Refills: 0 | Status: SHIPPED | OUTPATIENT
Start: 2025-02-13

## 2025-02-13 RX ORDER — NYSTATIN AND TRIAMCINOLONE ACETONIDE 100000; 1 [USP'U]/G; MG/G
OINTMENT TOPICAL
Qty: 30 G | Refills: 1 | Status: SHIPPED | OUTPATIENT
Start: 2025-02-13

## 2025-02-13 RX ORDER — ESTRADIOL 0.1 MG/G
CREAM VAGINAL
Qty: 42.5 G | Refills: 6 | Status: SHIPPED | OUTPATIENT
Start: 2025-02-13

## 2025-02-13 NOTE — PROGRESS NOTES
Chief Complaint: Flank Pain (Patient states that she had a CT scan done and they found an obstruction she has had bilateral flank pain since fall time 2024. )    Subjective         History of Present Illness  Criss Edwards is a 74 y.o. female presents to [facility] to be seen for renal stone.    Patient previously known to Dr. Tiwari.    Patient apparently has an extensive history of renal stones.    She was referred here from her primary care office After being seen several times back in the fall for urinary tract infections.    At the visit back in November on 11/26/24 the patient had reported dysuria pelvic pressure and urgency with lower abdominal pain and chills.  The patient had been seen just 4 weeks prior for UTI symptoms and had been treated and had been seen once again at the beginning of November and treated with 10 days of levofloxacin for UTI.  The patient had had a CT scan from 2023 that revealed nonobstructing stones identified with a horseshoe kidney 4 mm stone in the lower pole on the left and a 2 mm stone in the left lower pole of the right renal moiety.    Her UA was not concerning for UTI at that time however she was sent a urine culture to confirm or refute diagnosis of UTI.  There were calcium oxalate crystals noted in her urine and there was some concern that she was passing a kidney stone.  The patient was noted to not be acutely ill on exam and had lab work ordered as well as a CT of the abdomen and pelvis stone protocol ordered.    The patient CT scan was performed on 11/27/2024 and the initial read revealed nonobstructing bilateral renal calculi no ureteric stone.  There was an addendum made on 11/27/2024 that stated there was a 3 mm calculus to the left of the abdominal aorta.  A 2 mm calculus anterior to the abdominal aorta.  A 1 mm calculus within the interpolar right renal moiety.  Also a 5 mm calculus anterior to the aortic bifurcation.    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     She states bilateral pain in the mid lower back for the last year.     She states the pain radiates up and down her back.    She states she gets severe mid back pain radiating to the groin/ bladder area.    She states she has nausea off and on with the pain.    She states that she has pain and burning with urination.    She has a hx of renal stones dating back to 1990s and has had surgery for these numerous times.    Ua today is negative for uti     She states her skin burns with urination.    She wears a pad.    She has incontinence    She drinks tea and a cup of coffee in the am and water.    She states she saw blood in the urine last week but she states this was when she wiped and had seen on the toilet tissue.    She states she has abd swelling that causes issues breathing.    She had a hysterectomy in 1984.    She was on HRT for several years was taken off of this by cardiology due to HTN.     The patient is mainly concerned with the fact that she may have some type of a malignancy.    No family hx of  malignancies.    Urine CX:     11/26/24:25,000 colony-forming units per milliliter normal urogenital talia    11/08/24:E. coli greater than 100,000 colony form units per milliliter pansensitive.    10/28/24:E. coli greater than 100,000 colony-forming units per milliliter pansensitive        Objective     Past Medical History:   Diagnosis Date    Dizziness     History of back surgery 2013    unkown what surgeries    History of total right hip replacement     HL (hearing loss)     Hypertension        Past Surgical History:   Procedure Laterality Date    CHOLECYSTECTOMY      COLONOSCOPY      ENDOSCOPY N/A 1/25/2024    Procedure: ESOPHAGOGASTRODUODENOSCOPY;  Surgeon: Mallory Lua MD;  Location: [facility] ENDOSCOPY;  Service: Gastroenterology;  Laterality: N/A;  GASTRIC POLYP ESOPH DILATATION 18MM ESOPH DIVERTICULUIM    HAND SURGERY Left 05/07/2016    HIP ARTHROPLASTY Right      KIDNEY STONE SURGERY           Current Outpatient Medications:     alendronate (FOSAMAX) 70 MG tablet, Take 1 tablet by mouth Every 7 (Seven) Days., Disp: , Rfl:     Cholecalciferol (D3 PO), Take  by mouth., Disp: , Rfl:     cyclobenzaprine (FLEXERIL) 5 MG tablet, TAKE 1 TABLET BY MOUTH ONCE NIGHTLY AS NEEDED FOR MUSCLE SPASMS, Disp: 30 tablet, Rfl: 2    docusate sodium (Colace) 100 MG capsule, Take 1 capsule by mouth 3 (Three) Times a Day As Needed for Constipation., Disp: 60 capsule, Rfl: 0    famotidine (PEPCID) 20 MG tablet, Take 1 tablet by mouth Every Night., Disp: 90 tablet, Rfl: 1    fluticasone (FLONASE) 50 MCG/ACT nasal spray, Administer 2 sprays into the nostril(s) as directed by provider Daily., Disp: 16 g, Rfl: 5    hydrOXYzine (ATARAX) 10 MG tablet, Take 1 tablet by mouth Every 8 (Eight) Hours As Needed for Itching., Disp: 60 tablet, Rfl: 0    meloxicam (MOBIC) 15 MG tablet, Take 1 tablet by mouth Daily As Needed (joint pain). Take with food., Disp: 30 tablet, Rfl: 0    nebivolol (BYSTOLIC) 5 MG tablet, Take 1 tablet by mouth Daily., Disp: 90 tablet, Rfl: 1    ondansetron ODT (ZOFRAN-ODT) 4 MG disintegrating tablet, Place 1 tablet on the tongue Every 8 (Eight) Hours As Needed for Nausea or Vomiting., Disp: 20 tablet, Rfl: 0    pantoprazole (PROTONIX) 40 MG EC tablet, Take 1 tablet by mouth Daily., Disp: 90 tablet, Rfl: 3    simethicone (MYLICON) 80 MG chewable tablet, Chew 1 tablet Every 6 (Six) Hours As Needed for Flatulence., Disp: 60 tablet, Rfl: 0    valsartan (DIOVAN) 40 MG tablet, Take 1 tablet by mouth Daily., Disp: 90 tablet, Rfl: 1    diphenhydrAMINE (BENADRYL) 50 MG tablet, 1 tablet by mouth 1 hour prior to imaging study, Disp: 1 tablet, Rfl: 0    estradiol (ESTRACE) 0.1 MG/GM vaginal cream, Apply 0.5gm  inside the vagina and rub in and 0.5gm around the vestibule and massage and as well as around urethra.  3 times a week at night, Disp: 42.5 g, Rfl: 6    nitrofurantoin,  "macrocrystal-monohydrate, (MACROBID) 100 MG capsule, 1 capsule by mouth twice a day starting the day before cystoscopy., Disp: 6 capsule, Rfl: 0    nystatin-triamcinolone (MYCOLOG) 656461-8.1 UNIT/GM-% ointment, Apply to affected area 2 times daily, Disp: 30 g, Rfl: 1    predniSONE (DELTASONE) 50 MG tablet, 1 tablet by mouth 13 hours prior to imaging study, 1 tablet by mouth 7 hours prior to imaging study, 1 tablet by mouth 1 hour prior to imaging study, Disp: 3 tablet, Rfl: 0    Allergies   Allergen Reactions    Codeine Itching    Iodinated Contrast Media Other (See Comments)     Other reaction(s): GI Intolerance      Other reaction(s): GI Intolerance    Iodine Hives     DENIES    Contrast Dye (Echo Or Unknown Ct/Mr) GI Intolerance    Darvon [Propoxyphene] GI Intolerance and Headache        History reviewed. No pertinent family history.    Social History     Socioeconomic History    Marital status: Single   Tobacco Use    Smoking status: Never     Passive exposure: Never    Smokeless tobacco: Never   Vaping Use    Vaping status: Never Used   Substance and Sexual Activity    Alcohol use: No    Drug use: No    Sexual activity: Defer       Vital Signs:   Ht 160 cm (63\")   Wt 82.6 kg (182 lb)   BMI 32.24 kg/m²      Physical Exam     Result Review :   The following data was reviewed by: RAUL Dejesus on 02/13/2025:  Results for orders placed or performed in visit on 02/13/25   POC Urinalysis Dipstick, Automated    Collection Time: 02/13/25 10:42 AM    Specimen: Urine   Result Value Ref Range    Color Yellow Yellow, Straw, Dark Yellow, Donya    Clarity, UA Clear Clear    Specific Gravity  1.025 1.005 - 1.030    pH, Urine 6.0 5.0 - 8.0    Leukocytes Trace (A) Negative    Nitrite, UA Negative Negative    Protein, POC Negative Negative mg/dL    Glucose, UA Negative Negative mg/dL    Ketones, UA Negative Negative    Urobilinogen, UA 0.2 E.U./dL Normal, 0.2 E.U./dL    Bilirubin Negative Negative    Blood, UA " Negative Negative    Lot Number [id number]     Expiration Date 10/2,025    Bladder Scan    Collection Time: 02/13/25 11:57 AM   Result Value Ref Range    Urine Volume 6ml         Bladder Scan interpretation 02/13/2025    Estimation of residual urine via BVI 3000 [address]athon Bladder Scan  MA/nurse performing: taz lees ma  Residual Urine: 6 ml  Indication: Renal stone    Genitourinary syndrome of menopause    Gross hematuria    Candidiasis    Allergy to contrast media (used for diagnostic x-rays)   Position: Supine  Examination: Incremental scanning of the suprapubic area using 2.0 MHz transducer using copious amounts of acoustic gel.   Findings: An anechoic area was demonstrated which represented the bladder, with measurement of residual urine as noted. I inspected this myself. In that the residual urine was stable or insignificant, refer to plan for treatment and plan necessary at this time.                  ADDENDUM #1  0.3 cm calculus to the left of the abdominal aorta. 0.2 cm calculus anterior to the abdominal aorta. 0.1 cm calculus within the interpolar right renal moiety. 0.5 cm calculus anterior to the aortic bifurcation.     Electronically Signed: Bran Teixeira MD    11/27/2024 3:10 PM EST    Workstation ID: [id number]  ORIGINAL REPORT:  CT ABDOMEN PELVIS STONE PROTOCOL     Date of Exam: 11/26/2024 3:58 PM EST     Indication: calcium oxlate crystal in urine.     Comparison: 4/19/2017     Technique: Axial CT images were obtained of the abdomen and pelvis without the administration of contrast. Reconstructed coronal and sagittal images were also obtained. Automated exposure control and iterative construction methods were used.     FINDINGS:     Lung bases: Calcified granulomata.     Liver: Calcified granulomata     Spleen: Calcified granulomata     Pancreas:No pancreatic masses. No evidence of pancreatitis.     Gallbladder and common bile duct: Prior cholecystectomy.     Adrenal glands:No adrenal masses     Kidneys  and ureters: Kidneys no renal calculi. No calculi present within the ureters. Normal caliber ureters.     Urinary bladder:No urinary bladder wall thickening. No bladder masses.     Small bowel:Normal caliber small bowel.     Large bowel: Colonic diverticulosis without evidence of diverticulitis.     Appendix: Not seen however there is no evidence of appendicitis.     GENITOURINARY: Prior hysterectomy.     Ascites or pneumoperitoneum:None.     Adenopathy:None present     Osseous structures: Right hip replacement. Degenerative changes of the left hip. The pubic bones are intact. The sacrum and sacroiliac joints are normal. Degenerative changes of the lumbar spine.     Other findings: None     IMPRESSION: Nonobstructing bilateral renal calculi. No ureteric calculi.              Electronically Signed: Bran Teixeira MD    11/26/2024 4:25 PM EST    Workstation ID: [id number]    Procedures        Assessment and Plan    Diagnoses and all orders for this visit:    1. Renal stone (Primary)  -     POC Urinalysis Dipstick, Automated  -     Bladder Scan    2. Genitourinary syndrome of menopause  -     estradiol (ESTRACE) 0.1 MG/GM vaginal cream; Apply 0.5gm  inside the vagina and rub in and 0.5gm around the vestibule and massage and as well as around urethra.  3 times a week at night  Dispense: 42.5 g; Refill: 6  -     Pathnostics Guidance UTI -; Future    3. Gross hematuria  -     CT Abdomen Pelvis With & Without Contrast; Future  -     Cystoscopy; Future  -     nitrofurantoin, macrocrystal-monohydrate, (MACROBID) 100 MG capsule; 1 capsule by mouth twice a day starting the day before cystoscopy.  Dispense: 6 capsule; Refill: 0    4. Candidiasis  -     nystatin-triamcinolone (MYCOLOG) 930431-8.1 UNIT/GM-% ointment; Apply to affected area 2 times daily  Dispense: 30 g; Refill: 1    5. Allergy to contrast media (used for diagnostic x-rays)  -     predniSONE (DELTASONE) 50 MG tablet; 1 tablet by mouth 13 hours prior to imaging study,  1 tablet by mouth 7 hours prior to imaging study, 1 tablet by mouth 1 hour prior to imaging study  Dispense: 3 tablet; Refill: 0  -     diphenhydrAMINE (BENADRYL) 50 MG tablet; 1 tablet by mouth 1 hour prior to imaging study  Dispense: 1 tablet; Refill: 0        Discussed with the patient I do not believe her back pain is related to any renal stones.     Discussed that her pain in her back may be related to a musculoskeletal issue.    Did discuss that her abd swelling  is likely not related to a  issue and needs to discuss with GI.    I did discuss with the patient her symptoms do sound pretty consistent with genitourinary syndrome of menopause.    Discussed  syndrome of menopause is a chronic condition that can affect the genitals and urinary tract in females. It typically results from hormonal shifts during menopause, though there can be other causes. symptoms may include vaginal burning, vaginal itching or irritation, reduced vaginal lubrication  decreased elasticity of the vagina, impaired sexual function, pain during penetrative sex, pain during urination, greater sense of urgency and frequency in urination, increased risk of vaginal, urinary tract, and bladder infections. discussed that the best treatment for this condition is vaginal estrogen cream. discsussed that this is a localized form of estrogen that is only absorbed to the vulva. vagina and urethra.  Blackbox warning discussed. Specific instructions provided regarding placement of cream at the urethra and urethral meatus 3 times weekly.  Sent to pharmacy    Time limits for the patient's visit limited me to be able to do an exam on the patient however given her symptoms we will go ahead and treat her with topical nystatin cream as I do believe with the irritation she is speaking of she may benefit from treatment for candidiasis infection locally.    I discussed the patient's CT scan in depth.  I did reiterate that the patient is with nonobstructing  stones in her kidneys which are unlikely to be the cause of her back pain.    She is also emptying her bladder well.          I spent 45 minutes caring for Criss on this date of service. This time includes time spent by me in the following activities:reviewing tests, obtaining and/or reviewing a separately obtained history, performing a medically appropriate examination and/or evaluation , counseling and educating the patient/family/caregiver, ordering medications, tests, or procedures, and documenting information in the medical record  Follow Up   Return for With Dr. Tiwari For Cystoscopy, 3 months with me in [place].  Patient was given instructions and counseling regarding her condition or for health maintenance advice. Please see specific information pulled into the AVS if appropriate.         This document has been electronically signed by RAUL Dejesus  February 13, 2025 13:40 EST

## 2025-02-17 ENCOUNTER — OFFICE VISIT (OUTPATIENT)
Dept: GASTROENTEROLOGY | Facility: CLINIC | Age: 75
End: 2025-02-17
Payer: MEDICARE

## 2025-02-17 ENCOUNTER — TELEPHONE (OUTPATIENT)
Dept: UROLOGY | Age: 75
End: 2025-02-17
Payer: MEDICARE

## 2025-02-17 VITALS
HEIGHT: 63 IN | HEART RATE: 91 BPM | BODY MASS INDEX: 32.25 KG/M2 | DIASTOLIC BLOOD PRESSURE: 64 MMHG | OXYGEN SATURATION: 97 % | WEIGHT: 182 LBS | SYSTOLIC BLOOD PRESSURE: 118 MMHG

## 2025-02-17 DIAGNOSIS — B37.0 THRUSH: ICD-10-CM

## 2025-02-17 DIAGNOSIS — R14.0 ABDOMINAL BLOATING: ICD-10-CM

## 2025-02-17 DIAGNOSIS — K21.00 GASTROESOPHAGEAL REFLUX DISEASE WITH ESOPHAGITIS WITHOUT HEMORRHAGE: Primary | ICD-10-CM

## 2025-02-17 DIAGNOSIS — K59.04 CHRONIC IDIOPATHIC CONSTIPATION: ICD-10-CM

## 2025-02-17 PROCEDURE — 99214 OFFICE O/P EST MOD 30 MIN: CPT | Performed by: NURSE PRACTITIONER

## 2025-02-17 PROCEDURE — 1160F RVW MEDS BY RX/DR IN RCRD: CPT | Performed by: NURSE PRACTITIONER

## 2025-02-17 PROCEDURE — 3078F DIAST BP <80 MM HG: CPT | Performed by: NURSE PRACTITIONER

## 2025-02-17 PROCEDURE — 3074F SYST BP LT 130 MM HG: CPT | Performed by: NURSE PRACTITIONER

## 2025-02-17 PROCEDURE — 1159F MED LIST DOCD IN RCRD: CPT | Performed by: NURSE PRACTITIONER

## 2025-02-17 RX ORDER — DOCUSATE SODIUM 100 MG/1
100 CAPSULE, LIQUID FILLED ORAL 3 TIMES DAILY PRN
Qty: 60 CAPSULE | Refills: 11 | Status: SHIPPED | OUTPATIENT
Start: 2025-02-17

## 2025-02-17 RX ORDER — NYSTATIN 100000 [USP'U]/ML
500000 SUSPENSION ORAL 4 TIMES DAILY
Qty: 2500 ML | Refills: 0 | Status: SHIPPED | OUTPATIENT
Start: 2025-02-17 | End: 2025-02-22

## 2025-02-17 NOTE — PROGRESS NOTES
Chief Complaint   Bloated, Difficulty Swallowing, Gas, and Abdominal Pain    History of Present Illness       Criss Edwards is a 74 y.o. female who presents to [facility] for follow-up for dysphagia.  She was last seen in the office by me on 8/12/2024.    She admits her bowels are rocks. She does have fecal urgency. Will also have urinary frequency. She does have issues with bloating and nausea. She will use rectal suppositories PRN. She does have hx GERD/HH and takes protonix 40 mg daily. She will have episodes of black stools. She did eat some blueberries. She admits she had these episodes around NEW YEARS. The last episode was 3-4 days. She will have some issues with globus sensation and swallowing at night. Has dry mouth. She does have rectal bleeding from a suspected hemorrhoids. She had some bleeding this morning. She feels an external hemorrhoids. She is using TUCKS pads. Has tried miralax and fiber without relief. She was taking magnesium and Vit D. She hasn't started them yet. She admits she stopped the magnesium.  She does admit she has lost 10 pounds recently but she admits she has been trying to lose weight.  Good appetite.     She had a CT scan of the abdomen and pelvis done this past July after a fall.  It was negative for any acute abdominal process.  It did show a small hiatal hernia.  History of cholecystectomy.        Last colonoscopy was several years ago at [facility].  Patient reports it was with Dr. Sosa and it was normal.     Never had EGD      GI family history----Father with cirrhosis.            She underwent EGD with Dr. Lua on 1/25/2024.  EGD showed diverticulum in the lower third of the esophagus.  Z-line regular.  Empirically dilated.  LA grade a reflux esophagitis.  A few gastric polyps were biopsied.Path positive for reflux esophagitis.     Still having bloating and constipation. Trying to drink more water. Did well with colace but she ran  "out of it. Doing better with pepcid and protonix. Having thrush.      Results       Result Review :       CMP          9/27/2024    09:00 11/8/2024    11:03 11/26/2024    13:38   CMP   Glucose 135  121  127    BUN 12  12  14    Creatinine 0.99  0.68  0.80    EGFR 59.2  90.4  76.5    Sodium 139  138  141    Potassium 5.1  5.4  4.3    Chloride 102  102  104    Calcium 10.5  9.9  9.8    Total Protein 7.5   6.9    Albumin 4.7   4.2    Globulin 2.8   2.7    Total Bilirubin 0.4   <0.2    Alkaline Phosphatase 109   119    AST (SGOT) 37   23    ALT (SGPT) 30   23    Albumin/Globulin Ratio 1.7   1.6    BUN/Creatinine Ratio 12.1  17.6  17.5    Anion Gap 11.0  10.0  8.6      CBC          9/27/2024    09:00 11/8/2024    11:03 11/26/2024    13:38   CBC   WBC 6.49  8.53  7.42    RBC 4.80  4.18  4.21    Hemoglobin 14.8  12.8  12.9    Hematocrit 45.3  40.2  40.2    MCV 94.4  96.2  95.5    MCH 30.8  30.6  30.6    MCHC 32.7  31.8  32.1    RDW 13.5  14.0  13.5    Platelets 210  179  239      CBC w/diff          9/27/2024    09:00 11/8/2024    11:03 11/26/2024    13:38   CBC w/Diff   WBC 6.49  8.53  7.42    RBC 4.80  4.18  4.21    Hemoglobin 14.8  12.8  12.9    Hematocrit 45.3  40.2  40.2    MCV 94.4  96.2  95.5    MCH 30.8  30.6  30.6    MCHC 32.7  31.8  32.1    RDW 13.5  14.0  13.5    Platelets 210  179  239    Neutrophil Rel % 53.0  74.8  61.8    Immature Granulocyte Rel % 0.3  0.6  0.5    Lymphocyte Rel % 36.1  15.4  29.0    Monocyte Rel % 6.9  7.6  5.5    Eosinophil Rel % 2.9  1.2  2.7    Basophil Rel % 0.8  0.4  0.5      Lipid Panel          3/7/2024    08:48   Lipid Panel   Total Cholesterol 193    Triglycerides 153    HDL Cholesterol 44    VLDL Cholesterol 27    LDL Cholesterol  122    LDL/HDL Ratio 2.69      TSH          11/8/2024    11:03   TSH   TSH 3.710        Lipase   Lipase   Date Value Ref Range Status   09/27/2024 50 13 - 60 U/L Final     Amylase No results found for: \"AMYLASE\"  Iron Profile No results found for: " "\"IRON\", \"TIBC\", \"LABIRON\", \"TRANSFERRIN\"  Ferritin No results found for: \"FERRITIN\"  ESR (Sed Rate) No results found for: \"SEDRATE\"  CRP (C-Reactive) No results found for: \"CRP\"  Liver Workup No results found for: \"AFPTM\", \"DSDNA\", \"EXPANDEDENA\", \"SMOOTHMUSCAB\", \"CERULOPLSM\", \"FERRITIN\", \"LABIMMURE\", \"TOTIGGREF\", \"IGA\", \"IGM\", \"IRON\", \"TIBC\", \"LABIRON\", \"TRANSFERRIN\", \"MITOAB\", \"PROTIME\", \"INR\", \"AFP\"            Past Medical History       Past Medical History:   Diagnosis Date    Dizziness     History of back surgery 2013    unkown what surgeries    History of total right hip replacement     HL (hearing loss)     Hypertension        Past Surgical History:   Procedure Laterality Date    CHOLECYSTECTOMY      COLONOSCOPY      ENDOSCOPY N/A 1/25/2024    Procedure: ESOPHAGOGASTRODUODENOSCOPY;  Surgeon: Mallroy Lua MD;  Location: [facility] ENDOSCOPY;  Service: Gastroenterology;  Laterality: N/A;  GASTRIC POLYP ESOPH DILATATION 18MM ESOPH DIVERTICULUIM    HAND SURGERY Left 05/07/2016    HIP ARTHROPLASTY Right     KIDNEY STONE SURGERY           Current Outpatient Medications:     alendronate (FOSAMAX) 70 MG tablet, Take 1 tablet by mouth Every 7 (Seven) Days., Disp: , Rfl:     docusate sodium (Colace) 100 MG capsule, Take 1 capsule by mouth 3 (Three) Times a Day As Needed for Constipation., Disp: 60 capsule, Rfl: 11    famotidine (PEPCID) 20 MG tablet, Take 1 tablet by mouth Every Night., Disp: 90 tablet, Rfl: 1    fluticasone (FLONASE) 50 MCG/ACT nasal spray, Administer 2 sprays into the nostril(s) as directed by provider Daily., Disp: 16 g, Rfl: 5    hydrOXYzine (ATARAX) 10 MG tablet, Take 1 tablet by mouth Every 8 (Eight) Hours As Needed for Itching., Disp: 60 tablet, Rfl: 0    meloxicam (MOBIC) 15 MG tablet, Take 1 tablet by mouth Daily As Needed (joint pain). Take with food., Disp: 30 tablet, Rfl: 0    nebivolol (BYSTOLIC) 5 MG tablet, Take 1 tablet by mouth Daily., Disp: 90 tablet, Rfl: 1    " nystatin-triamcinolone (MYCOLOG) 186657-8.1 UNIT/GM-% ointment, Apply to affected area 2 times daily, Disp: 30 g, Rfl: 1    ondansetron ODT (ZOFRAN-ODT) 4 MG disintegrating tablet, Place 1 tablet on the tongue Every 8 (Eight) Hours As Needed for Nausea or Vomiting., Disp: 20 tablet, Rfl: 0    pantoprazole (PROTONIX) 40 MG EC tablet, Take 1 tablet by mouth Daily., Disp: 90 tablet, Rfl: 3    simethicone (MYLICON) 80 MG chewable tablet, Chew 1 tablet Every 6 (Six) Hours As Needed for Flatulence., Disp: 60 tablet, Rfl: 0    valsartan (DIOVAN) 40 MG tablet, Take 1 tablet by mouth Daily., Disp: 90 tablet, Rfl: 1    Cholecalciferol (D3 PO), Take  by mouth., Disp: , Rfl:     cyclobenzaprine (FLEXERIL) 5 MG tablet, TAKE 1 TABLET BY MOUTH ONCE NIGHTLY AS NEEDED FOR MUSCLE SPASMS, Disp: 30 tablet, Rfl: 2    diphenhydrAMINE (BENADRYL) 50 MG tablet, 1 tablet by mouth 1 hour prior to imaging study, Disp: 1 tablet, Rfl: 0    estradiol (ESTRACE) 0.1 MG/GM vaginal cream, Apply 0.5gm  inside the vagina and rub in and 0.5gm around the vestibule and massage and as well as around urethra.  3 times a week at night, Disp: 42.5 g, Rfl: 6    nitrofurantoin, macrocrystal-monohydrate, (MACROBID) 100 MG capsule, 1 capsule by mouth twice a day starting the day before cystoscopy. (Patient not taking: Reported on 2/17/2025), Disp: 6 capsule, Rfl: 0    nystatin (MYCOSTATIN) 100,000 unit/mL suspension, Swish and swallow 5 mL 4 (Four) Times a Day for 5 days., Disp: 2500 mL, Rfl: 0    predniSONE (DELTASONE) 50 MG tablet, 1 tablet by mouth 13 hours prior to imaging study, 1 tablet by mouth 7 hours prior to imaging study, 1 tablet by mouth 1 hour prior to imaging study (Patient not taking: Reported on 2/17/2025), Disp: 3 tablet, Rfl: 0     Allergies   Allergen Reactions    Codeine Itching    Iodinated Contrast Media Other (See Comments)     Other reaction(s): GI Intolerance      Other reaction(s): GI Intolerance    Iodine Hives     DENIES    Contrast  "Dye (Echo Or Unknown Ct/Mr) GI Intolerance    Darvon [Propoxyphene] GI Intolerance and Headache       Family History   Problem Relation Age of Onset    Colon cancer Neg Hx         Social History     Social History Narrative    Not on file       Objective       Review of Systems   Constitutional:  Negative for appetite change, fatigue, fever, unexpected weight gain and unexpected weight loss.   HENT:  Positive for mouth sores and sore throat.    Respiratory:  Negative for cough, choking, chest tightness, shortness of breath, wheezing and stridor.    Cardiovascular:  Negative for chest pain, palpitations and leg swelling.   Gastrointestinal:  Positive for abdominal distention and constipation. Negative for abdominal pain, anal bleeding, blood in stool, diarrhea, nausea, rectal pain, vomiting, GERD and indigestion.        Vital Signs:   /64 (BP Location: Right arm, Patient Position: Sitting, Cuff Size: Adult)   Pulse 91   Ht 160 cm (62.99\")   Wt 82.6 kg (182 lb)   SpO2 97%   BMI 32.25 kg/m²       Physical Exam  Constitutional:       General: She is not in acute distress.     Appearance: She is well-developed. She is not ill-appearing.   HENT:      Head: Normocephalic.      Mouth/Throat:      Pharynx: Posterior oropharyngeal erythema present.      Comments: White plaques noted on her tongue  Eyes:      Pupils: Pupils are equal, round, and reactive to light.   Cardiovascular:      Rate and Rhythm: Normal rate and regular rhythm.      Heart sounds: Normal heart sounds.   Pulmonary:      Effort: Pulmonary effort is normal.      Breath sounds: Normal breath sounds.   Abdominal:      General: Bowel sounds are normal. There is distension.      Palpations: Abdomen is soft. There is no mass.      Tenderness: There is no abdominal tenderness. There is no guarding or rebound.      Hernia: No hernia is present.   Musculoskeletal:         General: Normal range of motion.   Skin:     General: Skin is warm and dry. "   Neurological:      Mental Status: She is alert and oriented to person, place, and time.   Psychiatric:         Speech: Speech normal.         Behavior: Behavior normal.         Judgment: Judgment normal.           Assessment & Plan          Assessment and Plan    Diagnoses and all orders for this visit:    1. Gastroesophageal reflux disease with esophagitis without hemorrhage (Primary)    2. Chronic idiopathic constipation  -     docusate sodium (Colace) 100 MG capsule; Take 1 capsule by mouth 3 (Three) Times a Day As Needed for Constipation.  Dispense: 60 capsule; Refill: 11    3. Thrush  -     nystatin (MYCOSTATIN) 100,000 unit/mL suspension; Swish and swallow 5 mL 4 (Four) Times a Day for 5 days.  Dispense: 2500 mL; Refill: 0    4. Abdominal bloating      Viewed medical history with her today.  GERD sounds good on Protonix in the morning and Pepcid at night.  Okay to use Pepcid twice daily when needed.  Continue GERD precautions.  Still struggling with constipation, gas and bloating.  She needs to resume her Colace.  It sounds like it worked really well for her.  I will refill this.  She needs to take it every night to help stay regular.  Sounds like she has thrush.  I will send in some nystatin.  Patient to follow-up with her PCP about that.  Patient to call the office with any issues.  Patient to follow-up with me in 6 months.  Patient is agreeable to the plan.          Follow Up       Follow Up   Return in about 6 months (around 8/17/2025) for GERD, CONSTIPATION.  Patient was given instructions and counseling regarding her condition or for health maintenance advice. Please see specific information pulled into the AVS if appropriate.

## 2025-02-17 NOTE — TELEPHONE ENCOUNTER
PATIENT CALLED AND SHE HAS A CT ABDOMEN PELVIS WITH AND WITHOUT CONTRAST ON 02/19/25 AT [facility].  SHE WANTS TO RESCHEDULE THE CT, AND WANTS IT DONE IN [place], IF ANY WAY POSSIBLE.  SHE CAN'T HAVE IT DONE ON 02/19/25, DUE TO WEATHER THAT MAY BE COMING.    SHE HAS CONTRAST ALLERGY AND WAS PRESCRIBED PRE-MEDICATION.  SHE SAID SHE HAS THE MEDICATION, AND WILL BE FINE.    I TOLD HER THAT I THINK THE CT HAS TO BE DONE IN THE HOSPITAL SETTING, SINCE SHE IS ALLERGIC TO CONTRAST.    SHE SAID IT STILL NEEDS TO BE RESCHEDULED.    #[phone]

## 2025-02-20 DIAGNOSIS — L29.9 PRURITUS: ICD-10-CM

## 2025-02-20 RX ORDER — HYDROXYZINE HYDROCHLORIDE 10 MG/1
10 TABLET, FILM COATED ORAL EVERY 8 HOURS PRN
Qty: 60 TABLET | Refills: 0 | Status: SHIPPED | OUTPATIENT
Start: 2025-02-20

## 2025-02-24 ENCOUNTER — TELEPHONE (OUTPATIENT)
Dept: GASTROENTEROLOGY | Facility: CLINIC | Age: 75
End: 2025-02-24
Payer: MEDICARE

## 2025-02-24 NOTE — TELEPHONE ENCOUNTER
"Hub staff attempted to follow warm transfer process and was unsuccessful     Caller: Criss Edwards    Relationship to patient: Self    Best call back number: [phone]    Patient is needing: PATIENT CALLED IN AND STATED THAT LAST MONDAY SHE HAD VERY BAD PAIN AFTER TAKING PEDIALYTE AND FAMOTIDINE, FEELING LIKE SHE WAS GOING TO EXPLODE, SWELLING UNDER HER BREAST MAKING IT HARD TO BREATHE AND HAVING PRESSURE ON HER RECTUM. PATIENT STATES THAT TODAY HER SWELLING HAS MOVED DOWN TO HER NAVEL AND THERE IS NO PRESSURE IN HER RECTUM. PATIENT STATES THAT SHE HAD STARTED THE \"SWISHING\" THAT SHE WAS SUPPOSED TO DO AND IS NOW 5 DAYS IN AND STATES THAT HER TONGUE AND THROAT IS STILL NOT CLEAR. PATIENT WOULD LIKE TO SPEAK WITH SOMEONE IN REGARD TO NEXT STEPS. PLEASE CALL BACK ANYTIME, OKAY TO LEAVE .        "

## 2025-02-27 ENCOUNTER — TELEPHONE (OUTPATIENT)
Dept: FAMILY MEDICINE CLINIC | Age: 75
End: 2025-02-27
Payer: MEDICARE

## 2025-02-27 RX ORDER — FAMOTIDINE 20 MG/1
20 TABLET, FILM COATED ORAL 2 TIMES DAILY
Qty: 60 TABLET | Refills: 1 | Status: SHIPPED | OUTPATIENT
Start: 2025-02-27 | End: 2025-02-28 | Stop reason: SDUPTHER

## 2025-02-27 NOTE — TELEPHONE ENCOUNTER
Called patient and advised her to call her PCP about her potential oral thrush. Patient verbalized understanding. Patient needs her famotidine rx updated as she was advised to take for BID, sent in under second sign. Patient also stated that the rectal pressure has subsided and she is taking her Protonix and pepcid.

## 2025-02-27 NOTE — TELEPHONE ENCOUNTER
We will have to get her in for an appointment to be seen.  Nystatin is generally the treatment for thrush, although she needs to be taking it regularly.  If the nystatin is not working, we need to lay eyes on her so we can see if something else may be going on.  It is possible she will need a referral to ENT depending on what we find.  Thanks, KATHERINE

## 2025-02-27 NOTE — TELEPHONE ENCOUNTER
Patient called stating her thrush is not getting any better after using the nystatin that Neetu Hutton sent in for her. Patient stated she was told to follow up with PCP regarding thrush. Patient is requesting something else sent in because her throat is hurting and it is hard to swallow. Please see Neetu Hutton's note from today.

## 2025-02-28 ENCOUNTER — OFFICE VISIT (OUTPATIENT)
Dept: FAMILY MEDICINE CLINIC | Age: 75
End: 2025-02-28
Payer: MEDICARE

## 2025-02-28 ENCOUNTER — DOCUMENTATION (OUTPATIENT)
Dept: GASTROENTEROLOGY | Facility: CLINIC | Age: 75
End: 2025-02-28
Payer: MEDICARE

## 2025-02-28 VITALS
WEIGHT: 182 LBS | TEMPERATURE: 97.9 F | HEART RATE: 74 BPM | SYSTOLIC BLOOD PRESSURE: 136 MMHG | BODY MASS INDEX: 32.25 KG/M2 | HEIGHT: 63 IN | DIASTOLIC BLOOD PRESSURE: 69 MMHG | OXYGEN SATURATION: 96 %

## 2025-02-28 DIAGNOSIS — J02.9 SORE THROAT: ICD-10-CM

## 2025-02-28 DIAGNOSIS — M85.89 OSTEOPENIA OF MULTIPLE SITES: ICD-10-CM

## 2025-02-28 DIAGNOSIS — K21.00 GASTROESOPHAGEAL REFLUX DISEASE WITH ESOPHAGITIS WITHOUT HEMORRHAGE: Primary | ICD-10-CM

## 2025-02-28 DIAGNOSIS — B37.81 THRUSH OF MOUTH AND ESOPHAGUS: Primary | ICD-10-CM

## 2025-02-28 DIAGNOSIS — B37.0 THRUSH OF MOUTH AND ESOPHAGUS: Primary | ICD-10-CM

## 2025-02-28 LAB
EXPIRATION DATE: NORMAL
INTERNAL CONTROL: NORMAL
Lab: NORMAL
S PYO AG THROAT QL: NEGATIVE

## 2025-02-28 PROCEDURE — 1125F AMNT PAIN NOTED PAIN PRSNT: CPT | Performed by: STUDENT IN AN ORGANIZED HEALTH CARE EDUCATION/TRAINING PROGRAM

## 2025-02-28 PROCEDURE — 87880 STREP A ASSAY W/OPTIC: CPT | Performed by: STUDENT IN AN ORGANIZED HEALTH CARE EDUCATION/TRAINING PROGRAM

## 2025-02-28 PROCEDURE — 3078F DIAST BP <80 MM HG: CPT | Performed by: STUDENT IN AN ORGANIZED HEALTH CARE EDUCATION/TRAINING PROGRAM

## 2025-02-28 PROCEDURE — 99213 OFFICE O/P EST LOW 20 MIN: CPT | Performed by: STUDENT IN AN ORGANIZED HEALTH CARE EDUCATION/TRAINING PROGRAM

## 2025-02-28 PROCEDURE — 3075F SYST BP GE 130 - 139MM HG: CPT | Performed by: STUDENT IN AN ORGANIZED HEALTH CARE EDUCATION/TRAINING PROGRAM

## 2025-02-28 PROCEDURE — 87081 CULTURE SCREEN ONLY: CPT | Performed by: STUDENT IN AN ORGANIZED HEALTH CARE EDUCATION/TRAINING PROGRAM

## 2025-02-28 RX ORDER — ALENDRONATE SODIUM 70 MG/1
70 TABLET ORAL WEEKLY
Qty: 12 TABLET | Refills: 3 | Status: SHIPPED | OUTPATIENT
Start: 2025-02-28

## 2025-02-28 RX ORDER — DIPHENHYDRAMINE HYDROCHLORIDE AND LIDOCAINE HYDROCHLORIDE AND ALUMINUM HYDROXIDE AND MAGNESIUM HYDRO
5 KIT EVERY 6 HOURS
Qty: 119 ML | Refills: 0 | Status: SHIPPED | OUTPATIENT
Start: 2025-02-28 | End: 2025-03-03 | Stop reason: SDUPTHER

## 2025-02-28 RX ORDER — FAMOTIDINE 20 MG/1
20 TABLET, FILM COATED ORAL 2 TIMES DAILY
Qty: 60 TABLET | Refills: 1 | Status: SHIPPED | OUTPATIENT
Start: 2025-02-28

## 2025-02-28 RX ORDER — FLUCONAZOLE 100 MG/1
100 TABLET ORAL DAILY
Qty: 8 TABLET | Refills: 0 | Status: SHIPPED | OUTPATIENT
Start: 2025-02-28

## 2025-02-28 RX ORDER — NYSTATIN 100000 [USP'U]/ML
500000 SUSPENSION ORAL 4 TIMES DAILY
COMMUNITY

## 2025-02-28 NOTE — PROGRESS NOTES
Chief Complaint     Thrush (Pt states that she has thrush, pt has been using Nystatin suspension x 8 days and that this has not helped her./) and Sore Throat    History of Present Illness     Criss Edwards is a 74 y.o. female who presents to [facility] with complaints of throat.       History of Present Illness  The patient is a 74-year-old female who presents with concerns for thrush.    She was referred to our clinic due to persistent symptoms of thrush, despite being on medication. She has been experiencing severe discomfort in her stomach and throat, which has escalated to the point where she is unable to speak. She reports a sore throat, chills, and a sensation of coldness that she is unable to alleviate. She also experiences drooling when lying down, sinus pressure, and clear nasal discharge. She has not been coughing up any sputum. She has been experiencing nausea, vomiting, and diarrhea. She has been managing her symptoms with Pedialyte, famotidine, and gas relief medication. She has been maintaining a mostly liquid diet due to her inability to chew and swallow. She reports a loss of taste and a sensation of something in her mouth. She has been experiencing difficulty swallowing for the past few weeks. She has been taking nystatin 4 times daily since the previous Thursday, but it has not provided any relief. Her symptoms began on 01/24/2025, during a visit to Dr. Flores, and have persisted since then. She has also consulted a dentist. She has not been in contact with anyone recently. She has been experiencing intermittent chills since the onset of her symptoms. She has been experiencing continuous nausea, which she attributes to her stomach issues. She experienced diarrhea for about 3 days, which resolved after taking Pedialyte. She has been experiencing weight gain, which she attributes to gas, as she does not eat enough. She was previously losing weight and had reached  174 pounds, but she gained 10 to 11 pounds overnight following a flare-up of her stomach issues.    Supplemental Information  She has a history of shingles.    MEDICATIONS  Current: nystatin, famotidine, Pedialyte         History      Past Medical History:   Diagnosis Date    Dizziness     History of back surgery 2013    unkown what surgeries    History of total right hip replacement     HL (hearing loss)     Hypertension        Past Surgical History:   Procedure Laterality Date    CHOLECYSTECTOMY      COLONOSCOPY      ENDOSCOPY N/A 1/25/2024    Procedure: ESOPHAGOGASTRODUODENOSCOPY;  Surgeon: Mallory Lua MD;  Location: [facility] ENDOSCOPY;  Service: Gastroenterology;  Laterality: N/A;  GASTRIC POLYP ESOPH DILATATION 18MM ESOPH DIVERTICULUIM    HAND SURGERY Left 05/07/2016    HIP ARTHROPLASTY Right     KIDNEY STONE SURGERY         Family History   Problem Relation Age of Onset    Colon cancer Neg Hx         Current Medications        Current Outpatient Medications:     alendronate (FOSAMAX) 70 MG tablet, Take 1 tablet by mouth 1 (One) Time Per Week. Take on an empty stomach before breakfast with 8 ounces of water. Remain upright for 30 minutes after taking., Disp: 12 tablet, Rfl: 3    Cholecalciferol (D3 PO), Take  by mouth., Disp: , Rfl:     cyclobenzaprine (FLEXERIL) 5 MG tablet, TAKE 1 TABLET BY MOUTH ONCE NIGHTLY AS NEEDED FOR MUSCLE SPASMS, Disp: 30 tablet, Rfl: 2    diphenhydrAMINE (BENADRYL) 50 MG tablet, 1 tablet by mouth 1 hour prior to imaging study, Disp: 1 tablet, Rfl: 0    docusate sodium (Colace) 100 MG capsule, Take 1 capsule by mouth 3 (Three) Times a Day As Needed for Constipation., Disp: 60 capsule, Rfl: 11    estradiol (ESTRACE) 0.1 MG/GM vaginal cream, Apply 0.5gm  inside the vagina and rub in and 0.5gm around the vestibule and massage and as well as around urethra.  3 times a week at night, Disp: 42.5 g, Rfl: 6    famotidine (PEPCID) 20 MG tablet, Take 1 tablet by mouth Every Night.,  Disp: 90 tablet, Rfl: 1    famotidine (PEPCID) 20 MG tablet, Take 1 tablet by mouth 2 (Two) Times a Day., Disp: 60 tablet, Rfl: 1    meloxicam (MOBIC) 15 MG tablet, Take 1 tablet by mouth Daily As Needed (joint pain). Take with food., Disp: 30 tablet, Rfl: 0    nebivolol (BYSTOLIC) 5 MG tablet, Take 1 tablet by mouth Daily., Disp: 90 tablet, Rfl: 1    nystatin (MYCOSTATIN) 100,000 unit/mL suspension, Swish and swallow 5 mL 4 (Four) Times a Day., Disp: , Rfl:     ondansetron ODT (ZOFRAN-ODT) 4 MG disintegrating tablet, Place 1 tablet on the tongue Every 8 (Eight) Hours As Needed for Nausea or Vomiting., Disp: 20 tablet, Rfl: 0    pantoprazole (PROTONIX) 40 MG EC tablet, Take 1 tablet by mouth Daily., Disp: 90 tablet, Rfl: 3    predniSONE (DELTASONE) 50 MG tablet, 1 tablet by mouth 13 hours prior to imaging study, 1 tablet by mouth 7 hours prior to imaging study, 1 tablet by mouth 1 hour prior to imaging study, Disp: 3 tablet, Rfl: 0    simethicone (MYLICON) 80 MG chewable tablet, Chew 1 tablet Every 6 (Six) Hours As Needed for Flatulence., Disp: 60 tablet, Rfl: 0    valsartan (DIOVAN) 40 MG tablet, Take 1 tablet by mouth Daily., Disp: 90 tablet, Rfl: 1    DPH-Lido-AlHydr-MgHydr-Simeth (First Mouthwash, Magic Mouthwash,) suspension, Swish and swallow 5 mL Every 6 (Six) Hours., Disp: 119 mL, Rfl: 0    fluconazole (Diflucan) 100 MG tablet, Take 1 tablet by mouth Daily. Take 2 tablets by mouth today and then 1 tablet daily the next 6 days, Disp: 8 tablet, Rfl: 0    fluticasone (FLONASE) 50 MCG/ACT nasal spray, Administer 2 sprays into the nostril(s) as directed by provider Daily. (Patient not taking: Reported on 2/28/2025), Disp: 16 g, Rfl: 5    hydrOXYzine (ATARAX) 10 MG tablet, TAKE ONE TABLET BY MOUTH EVERY 8 HOURS AS NEEDED FOR ITCHING (Patient not taking: Reported on 2/28/2025), Disp: 60 tablet, Rfl: 0    nitrofurantoin, macrocrystal-monohydrate, (MACROBID) 100 MG capsule, 1 capsule by mouth twice a day starting the  "day before cystoscopy. (Patient not taking: Reported on 2/17/2025), Disp: 6 capsule, Rfl: 0    nystatin-triamcinolone (MYCOLOG) 273867-3.1 UNIT/GM-% ointment, Apply to affected area 2 times daily, Disp: 30 g, Rfl: 1     Allergies     Allergies   Allergen Reactions    Codeine Itching    Iodinated Contrast Media Other (See Comments)     Other reaction(s): GI Intolerance      Other reaction(s): GI Intolerance    Iodine Hives     DENIES    Contrast Dye (Echo Or Unknown Ct/Mr) GI Intolerance    Darvon [Propoxyphene] GI Intolerance and Headache       Social History       Social History     Social History Narrative    Not on file       Immunizations     Immunization:  Immunization History   Administered Date(s) Administered    COVID-19 (MODERNA) 1st,2nd,3rd Dose Monovalent 03/08/2021, 04/08/2021    COVID-19 (PFIZER) 12YRS+ (COMIRNATY) 11/14/2023    COVID-19 (PFIZER) BIVALENT 12+YRS 11/18/2022    COVID-19 (PFIZER) Purple Cap Monovalent 12/09/2021    Fluzone High-Dose 65+YRS 09/20/2017, 10/22/2024    Fluzone High-Dose 65+yrs 12/09/2021, 09/14/2022, 10/17/2023    Pneumococcal Conjugate 20-Valent (PCV20) 06/24/2022    Pneumococcal Polysaccharide (PPSV23) 01/23/2018    Td (TDVAX) 10/01/2012    Zostavax 05/01/2010          Objective     Objective     Vital Signs:   /69 (BP Location: Right arm, Patient Position: Sitting)   Pulse 74   Temp 97.9 °F (36.6 °C) (Oral)   Ht 160 cm (62.99\")   Wt 82.6 kg (182 lb)   SpO2 96% Comment: room air  BMI 32.25 kg/m²       Physical Exam  Vitals and nursing note reviewed.   Constitutional:       Appearance: Normal appearance. She is obese.   HENT:      Head: Normocephalic.      Nose: Nose normal.      Mouth/Throat:      Pharynx: Uvula midline. Posterior oropharyngeal erythema present.      Comments: Yellow/white patchy area at the back at that time  Eyes:      Conjunctiva/sclera: Conjunctivae normal.      Pupils: Pupils are equal, round, and reactive to light.   Cardiovascular:      " Rate and Rhythm: Normal rate and regular rhythm.      Pulses: Normal pulses.      Heart sounds: Normal heart sounds.   Pulmonary:      Effort: Pulmonary effort is normal.      Breath sounds: Normal breath sounds.   Abdominal:      General: Bowel sounds are normal.      Palpations: Abdomen is soft.   Musculoskeletal:         General: Normal range of motion.      Cervical back: Normal range of motion and neck supple.   Lymphadenopathy:      Cervical: No cervical adenopathy.   Skin:     General: Skin is warm and dry.   Neurological:      General: No focal deficit present.      Mental Status: She is alert and oriented to person, place, and time.   Psychiatric:         Attention and Perception: Attention normal.         Mood and Affect: Mood and affect normal.         Behavior: Behavior normal. Behavior is cooperative.         Physical Exam  Oral exam was performed.  Lungs were auscultated.      Results    The following data was reviewed by: RAUL Jimenez on 03/03/25             POCT rapid strep A (02/28/2025 14:26)  Beta Strep Culture, Throat - Swab, Throat (02/28/2025 14:26)  Results  Laboratory Studies  Strep test is negative, will send for culture.         Assessment and Plan        Assessment and Plan       Thrush of mouth and esophagus    Orders:    fluconazole (Diflucan) 100 MG tablet; Take 1 tablet by mouth Daily. Take 2 tablets by mouth today and then 1 tablet daily the next 6 days    Sore throat    Orders:    POCT rapid strep A    Beta Strep Culture, Throat - , Throat; Future    Beta Strep Culture, Throat - Swab, Throat  Discussed the use of Chloraseptic spray/lozenges, cough drops, hot tea with honey.     Educated on hospital precautions  Assessment & Plan  1. Oral thrush.  The strep test yielded a negative result. A prescription for fluconazole has been issued, with instructions to take 2 tablets today, followed by 1 tablet daily for the subsequent 6 days. Additionally, a different mouthwash has been  prescribed to potentially alleviate her throat discomfort. The prescriptions have been sent to [facility] pharmacy. If there is no improvement upon completion of the medication regimen, she is advised to inform us.        Follow Up        Follow Up   Return for With PCP, Next scheduled follow up, sooner if condition worsens.  Patient was given instructions and counseling regarding her condition or for health maintenance advice. Please see specific information pulled into the AVS if appropriate.      Patient or patient representative verbalized consent for the use of Ambient Listening during the visit with  RAUL Jimenez for chart documentation. 3/3/2025  18:11 EST

## 2025-03-02 LAB — BACTERIA SPEC AEROBE CULT: NORMAL

## 2025-03-03 ENCOUNTER — TELEPHONE (OUTPATIENT)
Dept: FAMILY MEDICINE CLINIC | Age: 75
End: 2025-03-03
Payer: MEDICARE

## 2025-03-03 DIAGNOSIS — J02.9 SORE THROAT: ICD-10-CM

## 2025-03-03 DIAGNOSIS — B37.0 THRUSH OF MOUTH AND ESOPHAGUS: ICD-10-CM

## 2025-03-03 DIAGNOSIS — B37.81 THRUSH OF MOUTH AND ESOPHAGUS: ICD-10-CM

## 2025-03-03 RX ORDER — DIPHENHYDRAMINE HYDROCHLORIDE AND LIDOCAINE HYDROCHLORIDE AND ALUMINUM HYDROXIDE AND MAGNESIUM HYDRO
5 KIT EVERY 6 HOURS
Qty: 119 ML | Refills: 0 | Status: SHIPPED | OUTPATIENT
Start: 2025-03-03

## 2025-03-03 NOTE — TELEPHONE ENCOUNTER
Caller: Criss Edwards    Relationship: Self    Best call back number: [phone]     Which medication are you concerned about: DPH-Lido-AlHydr-MgHydr-Simeth (First Mouthwash, Magic Mouthwash,) suspension     Who prescribed you this medication: HOLLY NIELSON    When did you start taking this medication:     What are your concerns: [facility] NO LONGER MAKES UP COMPOUNDS. PLEASE CALL IN SOMETHING ELSE OR SEND TO ANOTHER PHARMACY IN [place]    PLEASE CALL AND ADVISE    How long have you had these concerns:

## 2025-03-31 RX ORDER — FAMOTIDINE 20 MG/1
20 TABLET, FILM COATED ORAL 2 TIMES DAILY
Qty: 60 TABLET | Refills: 1 | Status: SHIPPED | OUTPATIENT
Start: 2025-03-31

## 2025-03-31 NOTE — TELEPHONE ENCOUNTER
Medication Requested Famotidine 20mg    Last Refill 2/28/2025    Last OV 2/17/2025    Next OV 8/18/2025    Medication pended for approval and correct pharmacy verified Yes

## 2025-04-07 DIAGNOSIS — R25.2 LEG CRAMP: ICD-10-CM

## 2025-04-07 RX ORDER — CYCLOBENZAPRINE HCL 5 MG
5 TABLET ORAL NIGHTLY PRN
Qty: 30 TABLET | Refills: 0 | Status: SHIPPED | OUTPATIENT
Start: 2025-04-07

## 2025-04-08 ENCOUNTER — TELEPHONE (OUTPATIENT)
Dept: UROLOGY | Age: 75
End: 2025-04-08
Payer: MEDICARE

## 2025-04-08 NOTE — TELEPHONE ENCOUNTER
PATIENT CALLED.  SHE WANTS TO CONFIRM THE IMAGING SHE HAS SCHEDULED.  SHE IS ALLERGIC TO THE CONTRAST.  SHE NEEDS CLARIFICATION ON THE PRE-MEDICATION.    SHE IS SCHEDULED FOR A CYSTO.  SHE NEEDS CLARIFICATION ON IT, ALSO.    SHE HAS 4 MEDICATIONS AND SHE NEEDS CLARIFICATION ON WHEN TO TAKE THEM.    #[phone]

## 2025-04-08 NOTE — TELEPHONE ENCOUNTER
SPENT 15 MINUTES GOING OVER THE INSTRUCTIONS WITH THE PATIENT. SHE HAS WRITTEN THEM DOWN AND WAS ABLE TO REPEAT THEM TO ME.

## 2025-04-08 NOTE — TELEPHONE ENCOUNTER
The medications were sent to the patient's pharmacy and it specifically states that she is to take 1 tablet of the prednisone by mouth 13 hours prior to her imaging 1 tablet 7 hours prior to her imaging study and 1 tablet 1 hour prior to imaging study.  She is also supposed to take a Benadryl 1 hour prior to her imaging study this is to make sure that she does not have an allergic reaction this is  also while she is scheduled at the hospital for the CT scan

## 2025-04-09 ENCOUNTER — DOCUMENTATION (OUTPATIENT)
Dept: UROLOGY | Facility: CLINIC | Age: 75
End: 2025-04-09
Payer: MEDICARE

## 2025-04-10 ENCOUNTER — HOSPITAL ENCOUNTER (OUTPATIENT)
Dept: CT IMAGING | Facility: HOSPITAL | Age: 75
Discharge: HOME | End: 2025-04-10
Admitting: NURSE PRACTITIONER
Payer: MEDICARE

## 2025-04-10 DIAGNOSIS — R31.0 GROSS HEMATURIA: ICD-10-CM

## 2025-04-10 LAB
CREAT BLDA-MCNC: 0.7 MG/DL (ref 0.6–1.3)
EGFRCR SERPLBLD CKD-EPI 2021: 89.8 ML/MIN/1.73

## 2025-04-10 PROCEDURE — 82565 ASSAY OF CREATININE: CPT

## 2025-04-10 PROCEDURE — 25510000001 IOPAMIDOL PER 1 ML: Performed by: NURSE PRACTITIONER

## 2025-04-10 PROCEDURE — 74178 CT ABD&PLV WO CNTR FLWD CNTR: CPT

## 2025-04-10 RX ORDER — IOPAMIDOL 755 MG/ML
100 INJECTION, SOLUTION INTRAVASCULAR
Status: COMPLETED | OUTPATIENT
Start: 2025-04-10 | End: 2025-04-10

## 2025-04-10 RX ADMIN — IOPAMIDOL 100 ML: 755 INJECTION, SOLUTION INTRAVENOUS at 12:41

## 2025-04-11 ENCOUNTER — TELEPHONE (OUTPATIENT)
Dept: UROLOGY | Age: 75
End: 2025-04-11
Payer: MEDICARE

## 2025-04-11 DIAGNOSIS — N20.1 URETERAL STONE: Primary | ICD-10-CM

## 2025-04-21 DIAGNOSIS — R10.84 GENERALIZED ABDOMINAL PAIN: ICD-10-CM

## 2025-04-22 PROBLEM — N20.0 NEPHROLITHIASIS: Status: ACTIVE | Noted: 2025-04-22

## 2025-04-22 RX ORDER — SIMETHICONE 80 MG
TABLET,CHEWABLE ORAL
Qty: 60 TABLET | Refills: 5 | Status: SHIPPED | OUTPATIENT
Start: 2025-04-22

## 2025-04-22 NOTE — PROGRESS NOTES
Chief Complaint: Urologic complaint    Subjective         History of Present Illness  Criss Edwards is a 74 y.o. female      Horseshoe kidney  Nephrolithiasis  Left ureteral stone  Gross hematuria        With a lot of suprapubic pressure.  Going on for several months.  More to the left groin.  Very bothersome.      Patient with bilateral thoracic back pain that radiates to her groins.  Changes with movement. Very  Bothersome      UA negative today.    4/10/25 0.7, GFR 89  4/10/25 CT uro-- horseshoe kidney with a few punctate nonobstructive calculi.  No hydronephrosis.  3 mm stone left UVJ or posterior bladder lumen.  Small hiatal hernia.    No GH      Bilateral pain lower back over the last year  Severe mid back pain rating to the groin/bladder area.    hx of renal stones dating back to 1990s and has had surgery for these numerous times.    Incontinence    2/25 NP-Estrace cream    2/25 UA - negative     hysterectomy in 1984.    No family history of  malignancy    PVR    2/25   000      Urine CX:      11/26/24:25,000 colony-forming units per milliliter normal urogenital talia     11/08/24:E. coli greater than 100,000 colony form units per milliliter pansensitive.     10/28/24:E. coli greater than 100,000 colony-forming units per milliliter pansensitive      Objective                   Assessment and Plan    Diagnoses and all orders for this visit:    1. Nephrolithiasis (Primary)        Ureteral stone  Suprapubic pain      Discussed with patient I do not think her thoracic pain is caused by anything urologic.  Discussed she should see PCP for this      Patient is passing a small left-sided ureteral  stone.  We did discuss I think her suprapubic/left groin pain is from this.  We did discuss possibility of surgery to check the bladder and remove the stone but this time after discussion patient is not interested in ureteroscopy.  I do think this is reasonable secondary to the size of the stone.    Discussed with the  patient if they have a fever greater than 100.5, intractable nausea/vomiting or intractable pain they should go to the emergency room.      Patient's been having some pain smoke-filled her narcotics given today.  Risk and benefits discussed.        Follow-up in 4 weeks with renal ultrasound.   Discussed with patient if she does not not feeling better at that point or she starts having a lot more pain in the next few weeks to let me know and we go ahead and repeat CT to see if the stone is still there if at that point the stone is still there we can consider removal with surgery

## 2025-04-25 ENCOUNTER — PROCEDURE VISIT (OUTPATIENT)
Dept: UROLOGY | Age: 75
End: 2025-04-25
Payer: MEDICARE

## 2025-04-25 VITALS — WEIGHT: 186 LBS | BODY MASS INDEX: 32.96 KG/M2

## 2025-04-25 DIAGNOSIS — N20.0 NEPHROLITHIASIS: Primary | ICD-10-CM

## 2025-04-25 DIAGNOSIS — R31.0 GROSS HEMATURIA: ICD-10-CM

## 2025-04-25 RX ORDER — HYDROCODONE BITARTRATE AND ACETAMINOPHEN 5; 325 MG/1; MG/1
1 TABLET ORAL EVERY 6 HOURS PRN
Qty: 12 TABLET | Refills: 0 | Status: SHIPPED | OUTPATIENT
Start: 2025-04-25

## 2025-05-09 ENCOUNTER — OFFICE VISIT (OUTPATIENT)
Dept: FAMILY MEDICINE CLINIC | Age: 75
End: 2025-05-09
Payer: MEDICARE

## 2025-05-09 ENCOUNTER — TELEPHONE (OUTPATIENT)
Dept: FAMILY MEDICINE CLINIC | Age: 75
End: 2025-05-09

## 2025-05-09 ENCOUNTER — LAB (OUTPATIENT)
Dept: LAB | Facility: HOSPITAL | Age: 75
End: 2025-05-09
Payer: MEDICARE

## 2025-05-09 ENCOUNTER — HOSPITAL ENCOUNTER (OUTPATIENT)
Dept: GENERAL RADIOLOGY | Facility: HOSPITAL | Age: 75
Discharge: HOME | End: 2025-05-09
Payer: MEDICARE

## 2025-05-09 VITALS
DIASTOLIC BLOOD PRESSURE: 76 MMHG | OXYGEN SATURATION: 93 % | HEIGHT: 63 IN | WEIGHT: 182.2 LBS | HEART RATE: 100 BPM | BODY MASS INDEX: 32.28 KG/M2 | SYSTOLIC BLOOD PRESSURE: 115 MMHG | TEMPERATURE: 98.4 F

## 2025-05-09 DIAGNOSIS — R10.2 SUPRAPUBIC PRESSURE: ICD-10-CM

## 2025-05-09 DIAGNOSIS — R06.02 SHORTNESS OF BREATH: ICD-10-CM

## 2025-05-09 DIAGNOSIS — E11.9 TYPE 2 DIABETES MELLITUS WITHOUT COMPLICATION, WITHOUT LONG-TERM CURRENT USE OF INSULIN: ICD-10-CM

## 2025-05-09 DIAGNOSIS — K21.9 GASTROESOPHAGEAL REFLUX DISEASE WITHOUT ESOPHAGITIS: ICD-10-CM

## 2025-05-09 DIAGNOSIS — R11.0 NAUSEA: ICD-10-CM

## 2025-05-09 DIAGNOSIS — R06.02 SHORTNESS OF BREATH: Primary | ICD-10-CM

## 2025-05-09 DIAGNOSIS — R41.0 CONFUSION: ICD-10-CM

## 2025-05-09 LAB
ALBUMIN SERPL-MCNC: 4.6 G/DL (ref 3.5–5.2)
ALBUMIN/GLOB SERPL: 1.4 G/DL
ALP SERPL-CCNC: 85 U/L (ref 39–117)
ALT SERPL W P-5'-P-CCNC: 47 U/L (ref 1–33)
ANION GAP SERPL CALCULATED.3IONS-SCNC: 12.9 MMOL/L (ref 5–15)
AST SERPL-CCNC: 72 U/L (ref 1–32)
BASOPHILS # BLD AUTO: 0.04 10*3/MM3 (ref 0–0.2)
BASOPHILS NFR BLD AUTO: 0.4 % (ref 0–1.5)
BILIRUB SERPL-MCNC: 0.6 MG/DL (ref 0–1.2)
BUN SERPL-MCNC: 16 MG/DL (ref 8–23)
BUN/CREAT SERPL: 18.4 (ref 7–25)
CALCIUM SPEC-SCNC: 11 MG/DL (ref 8.6–10.5)
CHLORIDE SERPL-SCNC: 100 MMOL/L (ref 98–107)
CHOLEST SERPL-MCNC: 193 MG/DL (ref 0–200)
CO2 SERPL-SCNC: 27.1 MMOL/L (ref 22–29)
CREAT SERPL-MCNC: 0.87 MG/DL (ref 0.57–1)
D DIMER PPP FEU-MCNC: 0.46 MCGFEU/ML (ref 0–0.76)
DEPRECATED RDW RBC AUTO: 48.7 FL (ref 37–54)
EGFRCR SERPLBLD CKD-EPI 2021: 69.1 ML/MIN/1.73
EOSINOPHIL # BLD AUTO: 0.17 10*3/MM3 (ref 0–0.4)
EOSINOPHIL NFR BLD AUTO: 1.9 % (ref 0.3–6.2)
ERYTHROCYTE [DISTWIDTH] IN BLOOD BY AUTOMATED COUNT: 13.7 % (ref 12.3–15.4)
GLOBULIN UR ELPH-MCNC: 3.3 GM/DL
GLUCOSE SERPL-MCNC: 145 MG/DL (ref 65–99)
HBA1C MFR BLD: 7.4 % (ref 4.8–5.6)
HCT VFR BLD AUTO: 47.4 % (ref 34–46.6)
HDLC SERPL-MCNC: 48 MG/DL (ref 40–60)
HGB BLD-MCNC: 15.2 G/DL (ref 12–15.9)
IMM GRANULOCYTES # BLD AUTO: 0.04 10*3/MM3 (ref 0–0.05)
IMM GRANULOCYTES NFR BLD AUTO: 0.4 % (ref 0–0.5)
LDLC SERPL CALC-MCNC: 117 MG/DL (ref 0–100)
LDLC/HDLC SERPL: 2.37 {RATIO}
LYMPHOCYTES # BLD AUTO: 2.17 10*3/MM3 (ref 0.7–3.1)
LYMPHOCYTES NFR BLD AUTO: 24.2 % (ref 19.6–45.3)
MCH RBC QN AUTO: 30.8 PG (ref 26.6–33)
MCHC RBC AUTO-ENTMCNC: 32.1 G/DL (ref 31.5–35.7)
MCV RBC AUTO: 96.1 FL (ref 79–97)
MONOCYTES # BLD AUTO: 0.61 10*3/MM3 (ref 0.1–0.9)
MONOCYTES NFR BLD AUTO: 6.8 % (ref 5–12)
NEUTROPHILS NFR BLD AUTO: 5.92 10*3/MM3 (ref 1.7–7)
NEUTROPHILS NFR BLD AUTO: 66.3 % (ref 42.7–76)
NT-PROBNP SERPL-MCNC: <36 PG/ML (ref 0–1800)
PLATELET # BLD AUTO: 254 10*3/MM3 (ref 140–450)
PMV BLD AUTO: 9.6 FL (ref 6–12)
POTASSIUM SERPL-SCNC: 4.6 MMOL/L (ref 3.5–5.2)
PROT SERPL-MCNC: 7.9 G/DL (ref 6–8.5)
RBC # BLD AUTO: 4.93 10*6/MM3 (ref 3.77–5.28)
SODIUM SERPL-SCNC: 140 MMOL/L (ref 136–145)
TRIGL SERPL-MCNC: 157 MG/DL (ref 0–150)
VLDLC SERPL-MCNC: 28 MG/DL (ref 5–40)
WBC NRBC COR # BLD AUTO: 8.95 10*3/MM3 (ref 3.4–10.8)

## 2025-05-09 PROCEDURE — 80061 LIPID PANEL: CPT

## 2025-05-09 PROCEDURE — 85379 FIBRIN DEGRADATION QUANT: CPT

## 2025-05-09 PROCEDURE — 83036 HEMOGLOBIN GLYCOSYLATED A1C: CPT

## 2025-05-09 PROCEDURE — 85025 COMPLETE CBC W/AUTO DIFF WBC: CPT

## 2025-05-09 PROCEDURE — 36415 COLL VENOUS BLD VENIPUNCTURE: CPT

## 2025-05-09 PROCEDURE — 71046 X-RAY EXAM CHEST 2 VIEWS: CPT

## 2025-05-09 PROCEDURE — 83880 ASSAY OF NATRIURETIC PEPTIDE: CPT

## 2025-05-09 PROCEDURE — 80053 COMPREHEN METABOLIC PANEL: CPT

## 2025-05-09 RX ORDER — ONDANSETRON 4 MG/1
4 TABLET, ORALLY DISINTEGRATING ORAL EVERY 8 HOURS PRN
Qty: 20 TABLET | Refills: 0 | Status: ON HOLD | OUTPATIENT
Start: 2025-05-09

## 2025-05-09 NOTE — ASSESSMENT & PLAN NOTE
She is no longer on meds for depression and anxiety.  She continues to deal with complicated interpersonal family relationships which are major stressors.  She has previously been on duloxetine.  She is on ARB.    Orders:    Comprehensive Metabolic Panel; Future    Lipid Panel; Future    Hemoglobin A1c; Future    metFORMIN (GLUCOPHAGE) 500 MG tablet; Take 1 tablet by mouth Daily With Breakfast for 180 days.

## 2025-05-09 NOTE — PROGRESS NOTES
"Chief Complaint  Diabetes (3 months) and Shortness of Breath (X 6 days, confused yesterday)    Subjective          Criss Edwards presents to [facility] today for follow-up of routine issues.     She had an episode of confusion yesterday.  \"I don't even know what day it is.\"  She did drive herself today without too much issue.  She is also able to tell me today not only who the president is, but also who the new harrison is who was just elected yesterday.  She has also been complaining of shortness of breath for the past 6 days.  No fevers but she does frequently have chills (somewhat worse than normal).  Mostly occurring with exertion.  She has had a lot of chest pain.  She had a lot of pressure  in the middle of her chest.  Radiating to the L shoulder.  The chest pressure was worse with exertion.  She initially had a significant cough but that has improved.  +Nausea/vomiting.  She has been using Zofran, needs refills today.  She feels a lot of suprapubic pressure.  +Incontinence, for which she is following with Dr. Tiwari [facility].  She recently took a course of antibiotics prior to her testing.  She is nebivolol and valsartan for HTN.  She follows with Cardiology Dr. Nelson for HOCM.  She saw him on 3/20.  Last stress test was 12/2023 and that was okay at that time.  She had echo repeated on 4/8/25 but we do not have that report.    She went to the audiologist on Monday to have her hearing aids adjusted.    She was started on metformin for new diagnosis of diabetes at her last visit but is not taking that today.  She's not sure why she stopped it.  Last A1c was 7.1 in Nov.  She is UTD on eye exam (12/2024).  She is up-to-date on foot exam (1/2025).      She is no longer on meds for depression and anxiety.  She continues to deal with complicated interpersonal family relationships which are major stressors.  She has previously been on duloxetine.  She is on ARB.     She is on " pantoprazole and famotidine for GERD.  She follows with [facility].  S/p EGD 1/2024 by Dr. Lua.       She is on alendronate for osteoporosis.  She is up-to-date on DEXA, last done 2/2024, showing osteopenia that qualifies for meds based on FRAX scores.        Current Outpatient Medications:     alendronate (FOSAMAX) 70 MG tablet, Take 1 tablet by mouth 1 (One) Time Per Week. Take on an empty stomach before breakfast with 8 ounces of water. Remain upright for 30 minutes after taking., Disp: 12 tablet, Rfl: 3    Cholecalciferol (D3 PO), Take  by mouth., Disp: , Rfl:     cyclobenzaprine (FLEXERIL) 5 MG tablet, TAKE ONE TABLET BY MOUTH ONCE NIGHTLY AS NEEDED FOR MUSCLE SPASMS, Disp: 30 tablet, Rfl: 0    docusate sodium (Colace) 100 MG capsule, Take 1 capsule by mouth 3 (Three) Times a Day As Needed for Constipation., Disp: 60 capsule, Rfl: 11    DPH-Lido-AlHydr-MgHydr-Simeth (First Mouthwash, Magic Mouthwash,) suspension, Swish and swallow 5 mL Every 6 (Six) Hours., Disp: 119 mL, Rfl: 0    estradiol (ESTRACE) 0.1 MG/GM vaginal cream, Apply 0.5gm  inside the vagina and rub in and 0.5gm around the vestibule and massage and as well as around urethra.  3 times a week at night, Disp: 42.5 g, Rfl: 6    famotidine (PEPCID) 20 MG tablet, Take 1 tablet by mouth 2 (Two) Times a Day., Disp: 60 tablet, Rfl: 1    fluticasone (FLONASE) 50 MCG/ACT nasal spray, Administer 2 sprays into the nostril(s) as directed by provider Daily., Disp: 16 g, Rfl: 5    HYDROcodone-acetaminophen (NORCO) 5-325 MG per tablet, Take 1 tablet by mouth Every 6 (Six) Hours As Needed for Moderate Pain., Disp: 12 tablet, Rfl: 0    meloxicam (MOBIC) 15 MG tablet, Take 1 tablet by mouth Daily As Needed (joint pain). Take with food., Disp: 30 tablet, Rfl: 0    nebivolol (BYSTOLIC) 5 MG tablet, Take 1 tablet by mouth Daily., Disp: 90 tablet, Rfl: 1    nystatin-triamcinolone (MYCOLOG) 609109-3.1 UNIT/GM-% ointment, Apply to affected area 2 times daily,  Disp: 30 g, Rfl: 1    ondansetron ODT (ZOFRAN-ODT) 4 MG disintegrating tablet, Place 1 tablet on the tongue Every 8 (Eight) Hours As Needed for Nausea or Vomiting., Disp: 20 tablet, Rfl: 0    pantoprazole (PROTONIX) 40 MG EC tablet, Take 1 tablet by mouth Daily., Disp: 90 tablet, Rfl: 3    simethicone (MYLICON) 80 MG chewable tablet, CHEW 1 TABLET BY MOUTH EVERY 6 HOURS AS NEEDED FOR FLATULENCE, Disp: 60 tablet, Rfl: 5    valsartan (DIOVAN) 40 MG tablet, Take 1 tablet by mouth Daily., Disp: 90 tablet, Rfl: 1    hydrOXYzine (ATARAX) 10 MG tablet, TAKE ONE TABLET BY MOUTH EVERY 8 HOURS AS NEEDED FOR ITCHING (Patient not taking: Reported on 5/9/2025), Disp: 60 tablet, Rfl: 0    metFORMIN (GLUCOPHAGE) 500 MG tablet, Take 1 tablet by mouth Daily With Breakfast for 180 days., Disp: 30 tablet, Rfl: 5    nitrofurantoin, macrocrystal-monohydrate, (MACROBID) 100 MG capsule, 1 capsule by mouth twice a day starting the day before cystoscopy. (Patient not taking: Reported on 5/9/2025), Disp: 6 capsule, Rfl: 0    predniSONE (DELTASONE) 50 MG tablet, 1 tablet by mouth 13 hours prior to imaging study, 1 tablet by mouth 7 hours prior to imaging study, 1 tablet by mouth 1 hour prior to imaging study (Patient not taking: Reported on 5/9/2025), Disp: 3 tablet, Rfl: 0  Medications Discontinued During This Encounter   Medication Reason    diphenhydrAMINE (BENADRYL) 50 MG tablet Historical Med - Therapy completed    famotidine (PEPCID) 20 MG tablet Duplicate order    fluconazole (Diflucan) 100 MG tablet *Therapy completed    nystatin (MYCOSTATIN) 100,000 unit/mL suspension *Therapy completed    ondansetron ODT (ZOFRAN-ODT) 4 MG disintegrating tablet Reorder         Allergies:  Codeine, Iodinated contrast media, Iodine, Contrast dye (echo or unknown ct/mr), and Darvon [propoxyphene]      Objective   Vital Signs:   Vitals:    05/09/25 0855   BP: 115/76   BP Location: Left arm   Patient Position: Sitting   Pulse: 100   Temp: 98.4 °F (36.9  "°C)   TempSrc: Oral   SpO2: 93%  Comment: on RA   Weight: 82.6 kg (182 lb 3.2 oz)   Height: 160 cm (63\")     Body mass index is 32.28 kg/m².           Physical Exam  Vitals reviewed.   Constitutional:       General: She is not in acute distress.     Appearance: Normal appearance. She is well-developed.   HENT:      Head: Normocephalic and atraumatic.      Right Ear: External ear normal. Tympanic membrane is not perforated or erythematous.      Left Ear: External ear normal. Tympanic membrane is not perforated, erythematous or retracted.   Eyes:      Extraocular Movements: Extraocular movements intact.      Conjunctiva/sclera: Conjunctivae normal.      Pupils: Pupils are equal, round, and reactive to light.   Cardiovascular:      Rate and Rhythm: Normal rate and regular rhythm.      Pulses:           Dorsalis pedis pulses are 2+ on the right side and 2+ on the left side.      Heart sounds: No murmur heard.  Pulmonary:      Effort: Pulmonary effort is normal.      Breath sounds: Normal breath sounds. No wheezing, rhonchi or rales.   Abdominal:      General: Bowel sounds are normal. There is no distension.      Palpations: Abdomen is soft.      Tenderness: There is abdominal tenderness (suprapubic TTP).   Musculoskeletal:         General: Normal range of motion.   Feet:      Right foot:      Protective Sensation: 3 sites tested.  3 sites sensed.      Skin integrity: Skin integrity normal. No ulcer or blister.      Toenail Condition: Right toenails are normal.      Left foot:      Protective Sensation: 3 sites tested.  3 sites sensed.      Skin integrity: Skin integrity normal. No ulcer or blister.      Toenail Condition: Left toenails are normal.      Comments: Diabetic Foot Exam Performed and Monofilament Test Performed     Neurological:      Mental Status: She is alert.   Psychiatric:         Mood and Affect: Affect normal.           Lab Results   Component Value Date    GLUCOSE 127 (H) 11/26/2024    BUN 14 " 11/26/2024    CREATININE 0.70 04/10/2025    EGFRIFNONA 61 09/09/2021    BCR 17.5 11/26/2024    K 4.3 11/26/2024    CO2 28.4 11/26/2024    CALCIUM 9.8 11/26/2024    ALBUMIN 4.2 11/26/2024    AST 23 11/26/2024    ALT 23 11/26/2024       Lab Results   Component Value Date    CHOL 193 03/07/2024    CHLPL 179 01/26/2021    TRIG 153 (H) 03/07/2024    HDL 44 03/07/2024     (H) 03/07/2024       Lab Results   Component Value Date    WBC 7.42 11/26/2024    HGB 12.9 11/26/2024    HCT 40.2 11/26/2024    MCV 95.5 11/26/2024     11/26/2024            Assessment and Plan    Assessment & Plan  Shortness of breath  Shortness of breath today is potentially concerning for infectious versus cardiac etiology.  We will contact her cardiologist to let them know the situation and I have encouraged her to do the same.  Last tress test was done 12/2023 and was normal at that time.  Dr. Nelson just performed an echo on her within the last few weeks so that is reassuring, but given her symptoms, I still think it would be beneficial to get them involved.  In the meantime, we will pursue an infectious etiology rule out with chest x-ray and labs today as below.  I am also going to check a D-dimer and a BNP as her oxygen level is normal but on the low end of the range for her.  Orders:    CBC & Differential; Future    XR Chest PA & Lateral; Future    D-dimer, Quantitative; Future    proBNP; Future    Suprapubic pressure  She has had recurrent urinary tract issues for which she is currently following with Urology.  She just saw Dr. Tiwari on 4/25/2025 for cystoscopy for diagnoses including horseshoe kidney, nephrolithiasis with left ureteral stone and gross hematuria.  Rechecking urinalysis today.  Orders:    CBC & Differential; Future    Urinalysis With Culture If Indicated -; Future    Confusion    Extensively discussed today with Criss if she should be driving.  She does report that she is unable to tell me the date or the month,  but upon further questioning, she is able to tell me not only the name of the president but also the name of the newly elected Pap from yesterday.  Therefore, I do feel comfortable letting her drive herself home as the confusion does not seem to be severe.       Type 2 diabetes mellitus without complication, without long-term current use of insulin  She is no longer on meds for depression and anxiety.  She continues to deal with complicated interpersonal family relationships which are major stressors.  She has previously been on duloxetine.  She is on ARB.    Orders:    Comprehensive Metabolic Panel; Future    Lipid Panel; Future    Hemoglobin A1c; Future    metFORMIN (GLUCOPHAGE) 500 MG tablet; Take 1 tablet by mouth Daily With Breakfast for 180 days.    Nausea  Refills sent.  Orders:    ondansetron ODT (ZOFRAN-ODT) 4 MG disintegrating tablet; Place 1 tablet on the tongue Every 8 (Eight) Hours As Needed for Nausea or Vomiting.    Gastroesophageal reflux disease without esophagitis  Refills sent.  Orders:    ondansetron ODT (ZOFRAN-ODT) 4 MG disintegrating tablet; Place 1 tablet on the tongue Every 8 (Eight) Hours As Needed for Nausea or Vomiting.              Follow Up   Return in about 3 months (around 8/9/2025), or if symptoms worsen or fail to improve, for Recheck.  Patient was given instructions and counseling regarding her condition or for health maintenance advice. Please see specific information pulled into the AVS if appropriate.           01/24/2025

## 2025-05-09 NOTE — ASSESSMENT & PLAN NOTE
Refills sent.  Orders:    ondansetron ODT (ZOFRAN-ODT) 4 MG disintegrating tablet; Place 1 tablet on the tongue Every 8 (Eight) Hours As Needed for Nausea or Vomiting.

## 2025-05-09 NOTE — TELEPHONE ENCOUNTER
Caller: Criss Edwards    Relationship: Self    Best call back number: [phone]     What is the best time to reach you: ANYTIME     Who are you requesting to speak with (clinical staff, provider,  specific staff member): CLINICAL     What was the call regarding: PATIENT IS CALLING WAS SENT HOME WITH A URINE TEST AND PATIENT IS UNABLE TO PRODUCE ANY URINE, POSSIBLY WILL NEED ANOTHER KIT, ONLY  [facility].

## 2025-05-10 ENCOUNTER — APPOINTMENT (OUTPATIENT)
Dept: MRI IMAGING | Facility: HOSPITAL | Age: 75
DRG: 065 | End: 2025-05-10
Payer: MEDICARE

## 2025-05-10 ENCOUNTER — APPOINTMENT (OUTPATIENT)
Dept: GENERAL RADIOLOGY | Facility: HOSPITAL | Age: 75
DRG: 065 | End: 2025-05-10
Payer: MEDICARE

## 2025-05-10 ENCOUNTER — APPOINTMENT (OUTPATIENT)
Dept: CT IMAGING | Facility: HOSPITAL | Age: 75
DRG: 065 | End: 2025-05-10
Payer: MEDICARE

## 2025-05-10 ENCOUNTER — HOSPITAL ENCOUNTER (INPATIENT)
Facility: HOSPITAL | Age: 75
LOS: 3 days | Discharge: NURSING, REHAB OR LONG-TERM CARE | DRG: 065 | End: 2025-05-13
Attending: EMERGENCY MEDICINE | Admitting: INTERNAL MEDICINE
Payer: MEDICARE

## 2025-05-10 DIAGNOSIS — R13.14 PHARYNGOESOPHAGEAL DYSPHAGIA: ICD-10-CM

## 2025-05-10 DIAGNOSIS — I63.9 ACUTE CVA (CEREBROVASCULAR ACCIDENT): ICD-10-CM

## 2025-05-10 DIAGNOSIS — I63.81 ACUTE THALAMIC INFARCTION: Primary | ICD-10-CM

## 2025-05-10 DIAGNOSIS — R53.1 WEAKNESS: ICD-10-CM

## 2025-05-10 DIAGNOSIS — R26.2 DIFFICULTY WALKING: ICD-10-CM

## 2025-05-10 LAB
ALBUMIN SERPL-MCNC: 4.4 G/DL (ref 3.5–5.2)
ALBUMIN/GLOB SERPL: 1.5 G/DL
ALP SERPL-CCNC: 94 U/L (ref 39–117)
ALT SERPL W P-5'-P-CCNC: 38 U/L (ref 1–33)
ANION GAP SERPL CALCULATED.3IONS-SCNC: 9.7 MMOL/L (ref 5–15)
AST SERPL-CCNC: 45 U/L (ref 1–32)
BACTERIA UR QL AUTO: ABNORMAL /HPF
BASOPHILS # BLD AUTO: 0.05 10*3/MM3 (ref 0–0.2)
BASOPHILS NFR BLD AUTO: 0.7 % (ref 0–1.5)
BILIRUB SERPL-MCNC: 0.3 MG/DL (ref 0–1.2)
BILIRUB UR QL STRIP: NEGATIVE
BUN SERPL-MCNC: 13 MG/DL (ref 8–23)
BUN/CREAT SERPL: 18.1 (ref 7–25)
CALCIUM SPEC-SCNC: 10.8 MG/DL (ref 8.6–10.5)
CHLORIDE SERPL-SCNC: 100 MMOL/L (ref 98–107)
CLARITY UR: ABNORMAL
CO2 SERPL-SCNC: 28.3 MMOL/L (ref 22–29)
COD CRY URNS QL: ABNORMAL /HPF
COLOR UR: ABNORMAL
CREAT SERPL-MCNC: 0.72 MG/DL (ref 0.57–1)
D-LACTATE SERPL-SCNC: 1.8 MMOL/L (ref 0.5–2)
D-LACTATE SERPL-SCNC: 2.1 MMOL/L (ref 0.5–2)
DEPRECATED RDW RBC AUTO: 47.7 FL (ref 37–54)
EGFRCR SERPLBLD CKD-EPI 2021: 86.8 ML/MIN/1.73
EOSINOPHIL # BLD AUTO: 0.19 10*3/MM3 (ref 0–0.4)
EOSINOPHIL NFR BLD AUTO: 2.7 % (ref 0.3–6.2)
ERYTHROCYTE [DISTWIDTH] IN BLOOD BY AUTOMATED COUNT: 13.6 % (ref 12.3–15.4)
FLUAV RNA RESP QL NAA+PROBE: NOT DETECTED
FLUBV RNA RESP QL NAA+PROBE: NOT DETECTED
GEN 5 1HR TROPONIN T REFLEX: 8 NG/L
GLOBULIN UR ELPH-MCNC: 3 GM/DL
GLUCOSE BLDC GLUCOMTR-MCNC: 140 MG/DL (ref 70–99)
GLUCOSE SERPL-MCNC: 158 MG/DL (ref 65–99)
GLUCOSE UR STRIP-MCNC: NEGATIVE MG/DL
HCT VFR BLD AUTO: 44.3 % (ref 34–46.6)
HGB BLD-MCNC: 14.5 G/DL (ref 12–15.9)
HGB UR QL STRIP.AUTO: NEGATIVE
HOLD SPECIMEN: NORMAL
HOLD SPECIMEN: NORMAL
HYALINE CASTS UR QL AUTO: ABNORMAL /LPF
IMM GRANULOCYTES # BLD AUTO: 0.03 10*3/MM3 (ref 0–0.05)
IMM GRANULOCYTES NFR BLD AUTO: 0.4 % (ref 0–0.5)
INR PPP: 1.09 (ref 0.86–1.15)
KETONES UR QL STRIP: ABNORMAL
LEUKOCYTE ESTERASE UR QL STRIP.AUTO: ABNORMAL
LIPASE SERPL-CCNC: 38 U/L (ref 13–60)
LYMPHOCYTES # BLD AUTO: 1.92 10*3/MM3 (ref 0.7–3.1)
LYMPHOCYTES NFR BLD AUTO: 27.4 % (ref 19.6–45.3)
MAGNESIUM SERPL-MCNC: 1.7 MG/DL (ref 1.6–2.4)
MCH RBC QN AUTO: 31.3 PG (ref 26.6–33)
MCHC RBC AUTO-ENTMCNC: 32.7 G/DL (ref 31.5–35.7)
MCV RBC AUTO: 95.5 FL (ref 79–97)
MONOCYTES # BLD AUTO: 0.42 10*3/MM3 (ref 0.1–0.9)
MONOCYTES NFR BLD AUTO: 6 % (ref 5–12)
NEUTROPHILS NFR BLD AUTO: 4.39 10*3/MM3 (ref 1.7–7)
NEUTROPHILS NFR BLD AUTO: 62.8 % (ref 42.7–76)
NITRITE UR QL STRIP: NEGATIVE
NRBC BLD AUTO-RTO: 0 /100 WBC (ref 0–0.2)
NT-PROBNP SERPL-MCNC: <36 PG/ML (ref 0–1800)
PH UR STRIP.AUTO: <=5 [PH] (ref 5–8)
PLATELET # BLD AUTO: 207 10*3/MM3 (ref 140–450)
PMV BLD AUTO: 9.7 FL (ref 6–12)
POTASSIUM SERPL-SCNC: 4.2 MMOL/L (ref 3.5–5.2)
PROT SERPL-MCNC: 7.4 G/DL (ref 6–8.5)
PROT UR QL STRIP: ABNORMAL
PROTHROMBIN TIME: 14.5 SECONDS (ref 11.8–14.9)
RBC # BLD AUTO: 4.64 10*6/MM3 (ref 3.77–5.28)
RBC # UR STRIP: ABNORMAL /HPF
REF LAB TEST METHOD: ABNORMAL
RSV RNA RESP QL NAA+PROBE: NOT DETECTED
SARS-COV-2 RNA RESP QL NAA+PROBE: NOT DETECTED
SODIUM SERPL-SCNC: 138 MMOL/L (ref 136–145)
SP GR UR STRIP: 1.02 (ref 1–1.03)
SQUAMOUS #/AREA URNS HPF: ABNORMAL /HPF
TROPONIN T NUMERIC DELTA: 0 NG/L
TROPONIN T SERPL HS-MCNC: 8 NG/L
UROBILINOGEN UR QL STRIP: ABNORMAL
WBC # UR STRIP: ABNORMAL /HPF
WBC NRBC COR # BLD AUTO: 7 10*3/MM3 (ref 3.4–10.8)
WHOLE BLOOD HOLD COAG: NORMAL
WHOLE BLOOD HOLD SPECIMEN: NORMAL

## 2025-05-10 PROCEDURE — 70553 MRI BRAIN STEM W/O & W/DYE: CPT

## 2025-05-10 PROCEDURE — 83605 ASSAY OF LACTIC ACID: CPT | Performed by: EMERGENCY MEDICINE

## 2025-05-10 PROCEDURE — 80053 COMPREHEN METABOLIC PANEL: CPT | Performed by: EMERGENCY MEDICINE

## 2025-05-10 PROCEDURE — 84484 ASSAY OF TROPONIN QUANT: CPT

## 2025-05-10 PROCEDURE — A9577 INJ MULTIHANCE: HCPCS | Performed by: EMERGENCY MEDICINE

## 2025-05-10 PROCEDURE — 87637 SARSCOV2&INF A&B&RSV AMP PRB: CPT

## 2025-05-10 PROCEDURE — 83735 ASSAY OF MAGNESIUM: CPT

## 2025-05-10 PROCEDURE — 70450 CT HEAD/BRAIN W/O DYE: CPT

## 2025-05-10 PROCEDURE — 85025 COMPLETE CBC W/AUTO DIFF WBC: CPT | Performed by: EMERGENCY MEDICINE

## 2025-05-10 PROCEDURE — 81001 URINALYSIS AUTO W/SCOPE: CPT | Performed by: EMERGENCY MEDICINE

## 2025-05-10 PROCEDURE — 99222 1ST HOSP IP/OBS MODERATE 55: CPT | Performed by: PSYCHIATRY & NEUROLOGY

## 2025-05-10 PROCEDURE — 25510000002 GADOBENATE DIMEGLUMINE 529 MG/ML SOLUTION: Performed by: EMERGENCY MEDICINE

## 2025-05-10 PROCEDURE — 99223 1ST HOSP IP/OBS HIGH 75: CPT | Performed by: STUDENT IN AN ORGANIZED HEALTH CARE EDUCATION/TRAINING PROGRAM

## 2025-05-10 PROCEDURE — 83690 ASSAY OF LIPASE: CPT | Performed by: EMERGENCY MEDICINE

## 2025-05-10 PROCEDURE — 74018 RADEX ABDOMEN 1 VIEW: CPT

## 2025-05-10 PROCEDURE — 71045 X-RAY EXAM CHEST 1 VIEW: CPT

## 2025-05-10 PROCEDURE — 36415 COLL VENOUS BLD VENIPUNCTURE: CPT

## 2025-05-10 PROCEDURE — 99291 CRITICAL CARE FIRST HOUR: CPT

## 2025-05-10 PROCEDURE — 25010000002 FAMOTIDINE 10 MG/ML SOLUTION

## 2025-05-10 PROCEDURE — 85610 PROTHROMBIN TIME: CPT

## 2025-05-10 PROCEDURE — 82948 REAGENT STRIP/BLOOD GLUCOSE: CPT

## 2025-05-10 PROCEDURE — 73502 X-RAY EXAM HIP UNI 2-3 VIEWS: CPT

## 2025-05-10 PROCEDURE — 83880 ASSAY OF NATRIURETIC PEPTIDE: CPT

## 2025-05-10 RX ORDER — SODIUM CHLORIDE 9 MG/ML
40 INJECTION, SOLUTION INTRAVENOUS AS NEEDED
Status: DISCONTINUED | OUTPATIENT
Start: 2025-05-10 | End: 2025-05-13 | Stop reason: HOSPADM

## 2025-05-10 RX ORDER — BISACODYL 5 MG/1
5 TABLET, DELAYED RELEASE ORAL DAILY PRN
Status: DISCONTINUED | OUTPATIENT
Start: 2025-05-10 | End: 2025-05-13 | Stop reason: HOSPADM

## 2025-05-10 RX ORDER — NITROGLYCERIN 0.4 MG/1
0.4 TABLET SUBLINGUAL
Status: DISCONTINUED | OUTPATIENT
Start: 2025-05-10 | End: 2025-05-13 | Stop reason: HOSPADM

## 2025-05-10 RX ORDER — SODIUM CHLORIDE 0.9 % (FLUSH) 0.9 %
10 SYRINGE (ML) INJECTION AS NEEDED
Status: DISCONTINUED | OUTPATIENT
Start: 2025-05-10 | End: 2025-05-13 | Stop reason: HOSPADM

## 2025-05-10 RX ORDER — AMOXICILLIN 250 MG
2 CAPSULE ORAL 2 TIMES DAILY PRN
Status: DISCONTINUED | OUTPATIENT
Start: 2025-05-10 | End: 2025-05-13 | Stop reason: HOSPADM

## 2025-05-10 RX ORDER — QUETIAPINE FUMARATE 25 MG/1
25 TABLET, FILM COATED ORAL NIGHTLY
Status: DISCONTINUED | OUTPATIENT
Start: 2025-05-10 | End: 2025-05-13 | Stop reason: HOSPADM

## 2025-05-10 RX ORDER — ATORVASTATIN CALCIUM 40 MG/1
80 TABLET, FILM COATED ORAL NIGHTLY
Status: DISCONTINUED | OUTPATIENT
Start: 2025-05-10 | End: 2025-05-13 | Stop reason: HOSPADM

## 2025-05-10 RX ORDER — ASPIRIN 81 MG/1
324 TABLET, CHEWABLE ORAL ONCE
Status: COMPLETED | OUTPATIENT
Start: 2025-05-10 | End: 2025-05-10

## 2025-05-10 RX ORDER — POLYETHYLENE GLYCOL 3350 17 G/17G
17 POWDER, FOR SOLUTION ORAL DAILY PRN
Status: DISCONTINUED | OUTPATIENT
Start: 2025-05-10 | End: 2025-05-13 | Stop reason: HOSPADM

## 2025-05-10 RX ORDER — ASPIRIN 81 MG/1
81 TABLET, CHEWABLE ORAL DAILY
Status: DISCONTINUED | OUTPATIENT
Start: 2025-05-10 | End: 2025-05-13 | Stop reason: HOSPADM

## 2025-05-10 RX ORDER — ONDANSETRON 2 MG/ML
4 INJECTION INTRAMUSCULAR; INTRAVENOUS EVERY 6 HOURS PRN
Status: DISCONTINUED | OUTPATIENT
Start: 2025-05-10 | End: 2025-05-13 | Stop reason: HOSPADM

## 2025-05-10 RX ORDER — ONDANSETRON 4 MG/1
4 TABLET, ORALLY DISINTEGRATING ORAL EVERY 6 HOURS PRN
Status: DISCONTINUED | OUTPATIENT
Start: 2025-05-10 | End: 2025-05-13 | Stop reason: HOSPADM

## 2025-05-10 RX ORDER — FAMOTIDINE 10 MG/ML
20 INJECTION, SOLUTION INTRAVENOUS ONCE
Status: COMPLETED | OUTPATIENT
Start: 2025-05-10 | End: 2025-05-10

## 2025-05-10 RX ORDER — BISACODYL 10 MG
10 SUPPOSITORY, RECTAL RECTAL DAILY PRN
Status: DISCONTINUED | OUTPATIENT
Start: 2025-05-10 | End: 2025-05-13 | Stop reason: HOSPADM

## 2025-05-10 RX ORDER — ASPIRIN 300 MG/1
300 SUPPOSITORY RECTAL DAILY
Status: DISCONTINUED | OUTPATIENT
Start: 2025-05-10 | End: 2025-05-13 | Stop reason: HOSPADM

## 2025-05-10 RX ORDER — DICYCLOMINE HCL 20 MG
20 TABLET ORAL ONCE
Status: COMPLETED | OUTPATIENT
Start: 2025-05-10 | End: 2025-05-10

## 2025-05-10 RX ORDER — SIMETHICONE 80 MG
80 TABLET,CHEWABLE ORAL 4 TIMES DAILY PRN
Status: DISCONTINUED | OUTPATIENT
Start: 2025-05-10 | End: 2025-05-13 | Stop reason: HOSPADM

## 2025-05-10 RX ORDER — SODIUM CHLORIDE 0.9 % (FLUSH) 0.9 %
10 SYRINGE (ML) INJECTION EVERY 12 HOURS SCHEDULED
Status: DISCONTINUED | OUTPATIENT
Start: 2025-05-10 | End: 2025-05-13 | Stop reason: HOSPADM

## 2025-05-10 RX ORDER — LORAZEPAM 0.5 MG/1
1 TABLET ORAL ONCE
Status: COMPLETED | OUTPATIENT
Start: 2025-05-10 | End: 2025-05-10

## 2025-05-10 RX ADMIN — LORAZEPAM 1 MG: 0.5 TABLET ORAL at 13:24

## 2025-05-10 RX ADMIN — FAMOTIDINE 20 MG: 10 INJECTION INTRAVENOUS at 12:01

## 2025-05-10 RX ADMIN — Medication 10 ML: at 20:52

## 2025-05-10 RX ADMIN — DICYCLOMINE HYDROCHLORIDE 20 MG: 20 TABLET ORAL at 12:00

## 2025-05-10 RX ADMIN — ASPIRIN 300 MG: 300 SUPPOSITORY RECTAL at 19:56

## 2025-05-10 RX ADMIN — GADOBENATE DIMEGLUMINE 17 ML: 529 INJECTION, SOLUTION INTRAVENOUS at 14:18

## 2025-05-10 RX ADMIN — ASPIRIN 324 MG: 81 TABLET, CHEWABLE ORAL at 15:34

## 2025-05-10 NOTE — ED NOTES
RN spoke with nurse manager Erika who stated she was calling on behalf of [facility] to ask if pt has had imaging or pertinent health information. Questions answered. Provider notified.

## 2025-05-10 NOTE — H&P
Patient Care Team:  Clarisse Flores MD as PCP - General (Family Medicine)  Alberto Bose MD as Surgeon (Orthopedic Surgery)  Sammy Nelson MD as Consulting Physician (Cardiology)  Lola [facility] APRN as Nurse Practitioner (Nurse Practitioner)    Chief complaint altered mental status    Subjective     Patient is a 74 y.o. female presents with altered mental status.  She comes emergency department with her friend.  Apparently her altered mental status is significantly improved from earlier in the visit.  Patient went to the PCP yesterday with confusion and states that she has had difficulty with her memory and with her thought processes today.  She has also had some generalized weakness and dizziness and also complains of multiple other complaints such as right hip pain for which she had a hip replacement as well as occasional abdominal swelling and urinary incontinence.  All this is being worked up as an outpatient.    Review of Systems   Pertinent items are noted in HPI    History  Past Medical History:   Diagnosis Date    Dizziness     History of back surgery 2013    unkown what surgeries    History of total right hip replacement     HL (hearing loss)     Hypertension      Past Surgical History:   Procedure Laterality Date    CHOLECYSTECTOMY      COLONOSCOPY      ENDOSCOPY N/A 1/25/2024    Procedure: ESOPHAGOGASTRODUODENOSCOPY;  Surgeon: Mallory Lua MD;  Location: [facility];  Service: Gastroenterology;  Laterality: N/A;  GASTRIC POLYP ESOPH DILATATION 18MM ESOPH DIVERTICULUIM    HAND SURGERY Left 05/07/2016    HIP ARTHROPLASTY Right     KIDNEY STONE SURGERY       Family History   Problem Relation Age of Onset    Colon cancer Neg Hx      Social History     Tobacco Use    Smoking status: Never     Passive exposure: Never    Smokeless tobacco: Never   Vaping Use    Vaping status: Never Used   Substance Use Topics    Alcohol use: No    Drug use: No     (Not in a hospital  admission)   Allergies:  Codeine, Iodinated contrast media, Iodine, Contrast dye (echo or unknown ct/mr), and Darvon [propoxyphene]    Objective     Vital Signs  Temp:  [97.6 °F (36.4 °C)-97.7 °F (36.5 °C)] 97.6 °F (36.4 °C)  Heart Rate:  [] 84  Resp:  [16-19] 16  BP: (121-148)/(80-87) 148/87    Physical Exam:      General Appearance:  Alert, cooperative, in no acute distress   Head:  Normocephalic, without obvious abnormality, atraumatic   Eyes:  Lids and lashes normal, conjunctivae and sclerae normal, no icterus, no pallor, corneas clear, PERRLA   Ears:  Ears appear intact with no abnormalities noted   Throat:  No oral lesions, no thrush, oral mucosa moist   Neck:  No adenopathy, supple, trachea midline, no thyromegaly, no carotid bruit, no JVD   Back:  No kyphosis present, no scoliosis present, no skin lesions, erythema or scars, no tenderness to percussion, or palpation, range of motion normal   Lungs:  Clear to auscultation,respirations regular, even and unlabored    Heart:  Regular rhythm and normal rate, normal S1 and S2, no murmur, no gallop, no rub, no click   Breast Exam:  Deferred   Abdomen:  Normal bowel sounds, no masses, no organomegaly, soft non-tender, non-distended, no guarding, no rebound tenderness   Genitalia:  Deferred   Extremities:  Moves all extremities well, no edema, no cyanosis, no redness   Pulses:  Pulses palpable and equal bilaterally   Skin:  No bleeding, bruising or rash   Lymph nodes:  No palpable adenopathy   Neurologic:  Cranial nerves 2 - 12 grossly intact, sensation intact, DTR present and equal bilaterally       Results Review:    I reviewed the patient's new clinical results.  I reviewed the patient's new imaging results and agree with the interpretation.  I reviewed the patient's other test results and agree with the interpretation  I personally viewed and interpreted the patient's EKG/Telemetry data    Assessment & Plan       Stroke  -- Right thalamic  Altered mental  status;   --seemingly multifactorial secondary to aging, sundowning as patient does get anxious and fearful and confused at night and has night terrors accompanied with acute stroke  Chronic urinary incontinence  Right hip pain, chronic  Intermittent abdominal bloating  Mild lactic acidosis, not clinically significant      Admit to hospitalist service  Telemetry  Neurochecks  Aspirin, statin  PT OT speech  IV fluids  DVT prophylaxis  2 view x-ray of the hip ordered  Started simethicone for abdominal bloating  Continue outpatient urology workup for incontinence and kidney stones  Will give trial of Seroquel for insomnia as well as some sundowning and night terrors  Frequent reorientation  Restart pertinent home medications  Full code        Tereso Moore MD  05/10/25  17:13 EDT

## 2025-05-10 NOTE — CONSULTS
[facility]    Stat Consult    Patient Name:   Criss Edwards  YOB: 1950  Identification Number:   MRN - [id number]  Date of Service:   05/10/2025 12:37:27    Diagnosis:        I63.89 - Cerebrovascular accident (CVA) due to other mechanism ([facility])    Impression  Patient presented with altered mental status. MRI brain shows a right thalamic stroke. Would recommend ASA for secondary prevention. Would recommend inpatient evaluation for stroke.  Discussed case with patient and friend at bedside which showed great understanding .      Recommendations:  Our recommendations are outlined below.    Diagnostic Studies :  Holter/Loop Recorder as outpatient with cardiology follow up    Antithrombotic Medication :  Aspirin 81 mg PO daily  Statins for LDL goal less than 70    Nursing Recommendations :  IV Fluids, avoid dextrose containing fluids, Maintain euglycemia  Neuro checks q4 hrs x 24 hrs and then per shift  Head of bed 30 degrees  Continue with Telemetry    Consultations :  Recommend Speech therapy if failed dysphagia screen  Physical therapy/Occupational therapy    DVT Prophylaxis :  SCDs, Pneumatic Compression  Lovenox or LMW Heparin    Disposition :  Neurology will follow        ----------------------------------------------------------------------------------------------------        Metrics:  Dispatch Time: 05/10/2025 12:35:43  Callback Response Time: 05/10/2025 12:37:42    Primary Provider Notified of Diagnostic Impression and Management Plan on: 05/10/2025 15:55:57      CT HEAD:  I personally reviewed all the CT images that were available to me and it showed: 1. No evidence of acute hemorrhage or midline shift. 2. New low-attenuation anteriorly in the right thalamus possibly an area of old infarction. Suggest a follow-up MRI examination with and without intravenous gadolinium to exclude an acute infarct or mass.      Imaging  MRI brain  1.5 cm x 6  mm acute infarct anteriorly in the right thalamus.      ----------------------------------------------------------------------------------------------------    Chief Complaint:  AMS    History of Present Illness:  Patient is a 74 year old Female.  Past Medical history of hypertension presented with altered mental status. Presented with alerted mental status change, last known well unknow as she lives alone. Her friend is at bedside  Yesterday she went to see her primary care drove herself and was acting strange and not remembering.  THis morning was lethargic up and about not feeling weel.       Past Medical History:       Hypertension    Medications:    No Anticoagulant use   No Antiplatelet use  Reviewed EMR for current medications    Allergies:   Reviewed    Social History:  Drug Use: No    Family History:    There is no family history of premature cerebrovascular disease pertinent to this consultation    ROS :  14 Points Review of Systems was performed and was negative except mentioned in HPI.    Past Surgical History:  There Is No Surgical History Contributory To Today’s Visit       Examination:  BP(143/87), Pulse(86),  1A: Level of Consciousness - Alert; keenly responsive + 0  1B: Ask Month and Age - Both Questions Right + 0  1C: Blink Eyes & Squeeze Hands - Performs Both Tasks + 0  2: Test Horizontal Extraocular Movements - Normal + 0  3: Test Visual Fields - No Visual Loss + 0  4: Test Facial Palsy (Use Grimace if Obtunded) - Normal symmetry + 0  5A: Test Left Arm Motor Drift - No Drift for 10 Seconds + 0  5B: Test Right Arm Motor Drift - No Drift for 10 Seconds + 0  6A: Test Left Leg Motor Drift - No Drift for 5 Seconds + 0  6B: Test Right Leg Motor Drift - No Drift for 5 Seconds + 0  7: Test Limb Ataxia (FNF/Heel-Shin) - No Ataxia + 0  8: Test Sensation - Mild-Moderate Loss: Less Sharp/More Dull + 1  9: Test Language/Aphasia - Normal; No aphasia + 0  10: Test Dysarthria - Normal + 0  11: Test  Extinction/Inattention - No abnormality + 0    NIHSS Score: 1    Spoke with : Cat         This consult was conducted in real time using interactive audio and video technology. Patient was informed of the technology being used for this visit and agreed to proceed. Patient located in hospital and provider located at home/office setting.    Patient is being evaluated for possible acute neurologic impairment and high probability of imminent or life - threatening deterioration.I spent total of 30 minutes providing care to this patient, including time for face to face visit via telemedicine, review of medical records, imaging studies and discussion of findings with providers, the patient and / or family.      Dr Jaime Olivarez      [facility]  For Inpatient follow-up with [facility] physician please call [facility] at [phone]. As we are not an outpatient service for any post hospital discharge needs please contact the hospital for assistance.    If you have any questions for the [facility] physicians or need to reconsult for clinical or diagnostic changes please contact us via [facility] at [phone].

## 2025-05-10 NOTE — ED PROVIDER NOTES
Time: 11:19 AM EDT  Date of encounter:  5/10/2025  Independent Historian/Clinical History and Information was obtained by:   Patient and Family    History is limited by: N/A    Chief Complaint: Altered mental status      History of Present Illness:  Patient is a 74 y.o. year old female who presents to the emergency department for evaluation of altered mental status.  Patient's emergency department today along with her friend for evaluation of altered mental status.  Patient went to the PCP yesterday and was having a lot of confusion and her friend went to check on her today.  The confusion has improved and patient is alert and oriented x 3.  Patient does state that she remembers being very confused yesterday and the day before and gave an example that she left her dog outside which is not typical for her because the dog is an inside dog and always with her.  Patient does live alone.  She states that she has felt generalized weakness and dizziness.  Has not had any fever.  Has had nausea without vomiting or diarrhea.  Patient has chronic abdominal pain and bloating which is a complaint again today.  She has had issues with urinary incontinence and urinary retention and friend is unsure if she may have a UTI.  Patient has associated dysuria.  Patient had mild cough and shortness of air for the last week as well.  No chest pain.      Patient Care Team  Primary Care Provider: Clarisse Flores MD    Past Medical History:     Allergies   Allergen Reactions    Codeine Itching    Iodinated Contrast Media Other (See Comments)     Other reaction(s): GI Intolerance      Other reaction(s): GI Intolerance    Iodine Hives     DENIES    Contrast Dye (Echo Or Unknown Ct/Mr) GI Intolerance    Darvon [Propoxyphene] GI Intolerance and Headache     Past Medical History:   Diagnosis Date    Dizziness     History of back surgery 2013    unkown what surgeries    History of total right hip replacement     HL (hearing loss)      Hypertension      Past Surgical History:   Procedure Laterality Date    CHOLECYSTECTOMY      COLONOSCOPY      ENDOSCOPY N/A 1/25/2024    Procedure: ESOPHAGOGASTRODUODENOSCOPY;  Surgeon: Mallory Lua MD;  Location: [facility] ENDOSCOPY;  Service: Gastroenterology;  Laterality: N/A;  GASTRIC POLYP ESOPH DILATATION 18MM ESOPH DIVERTICULUIM    HAND SURGERY Left 05/07/2016    HIP ARTHROPLASTY Right     KIDNEY STONE SURGERY       Family History   Problem Relation Age of Onset    Colon cancer Neg Hx        Home Medications:  Prior to Admission medications    Medication Sig Start Date End Date Taking? Authorizing Provider   alendronate (FOSAMAX) 70 MG tablet Take 1 tablet by mouth 1 (One) Time Per Week. Take on an empty stomach before breakfast with 8 ounces of water. Remain upright for 30 minutes after taking. 2/28/25   Clarisse Flores MD   Cholecalciferol (D3 PO) Take  by mouth.    Provider, MD Jero   cyclobenzaprine (FLEXERIL) 5 MG tablet TAKE ONE TABLET BY MOUTH ONCE NIGHTLY AS NEEDED FOR MUSCLE SPASMS 4/7/25   Сергей Cary MD   docusate sodium (Colace) 100 MG capsule Take 1 capsule by mouth 3 (Three) Times a Day As Needed for Constipation. 2/17/25   Lola Hutton APRN   DPH-Lido-AlHydr-MgHydr-Simeth (First Mouthwash, Magic Mouthwash,) suspension Swish and swallow 5 mL Every 6 (Six) Hours. 3/3/25   Clarisse Flores MD   estradiol (ESTRACE) 0.1 MG/GM vaginal cream Apply 0.5gm  inside the vagina and rub in and 0.5gm around the vestibule and massage and as well as around urethra.  3 times a week at night 2/13/25   Shital Julien APRN   famotidine (PEPCID) 20 MG tablet Take 1 tablet by mouth 2 (Two) Times a Day. 3/31/25   Lola Hutton APRN   fluticasone (FLONASE) 50 MCG/ACT nasal spray Administer 2 sprays into the nostril(s) as directed by provider Daily. 10/22/24 10/22/25  Clarisse Flores MD   HYDROcodone-acetaminophen (NORCO) 5-325 MG per tablet  Take 1 tablet by mouth Every 6 (Six) Hours As Needed for Moderate Pain. 4/25/25   Thong Tiwari MD   hydrOXYzine (ATARAX) 10 MG tablet TAKE ONE TABLET BY MOUTH EVERY 8 HOURS AS NEEDED FOR ITCHING  Patient not taking: Reported on 5/9/2025 2/20/25   Clarisse Flores MD   meloxicam (MOBIC) 15 MG tablet Take 1 tablet by mouth Daily As Needed (joint pain). Take with food. 1/24/25   Clarisse Flores MD   metFORMIN (GLUCOPHAGE) 500 MG tablet Take 1 tablet by mouth Daily With Breakfast for 180 days. 5/9/25 11/5/25  Clarisse Flores MD   nebivolol (BYSTOLIC) 5 MG tablet Take 1 tablet by mouth Daily. 10/22/24   Clarisse Flores MD   nitrofurantoin, macrocrystal-monohydrate, (MACROBID) 100 MG capsule 1 capsule by mouth twice a day starting the day before cystoscopy.  Patient not taking: Reported on 5/9/2025 2/13/25   Shital Julien APRN   nystatin-triamcinolone (MYCOLOG) 433658-8.1 UNIT/GM-% ointment Apply to affected area 2 times daily 2/13/25   Shital Julien APRN   ondansetron ODT (ZOFRAN-ODT) 4 MG disintegrating tablet Place 1 tablet on the tongue Every 8 (Eight) Hours As Needed for Nausea or Vomiting. 5/9/25   Clarisse Flores MD   pantoprazole (PROTONIX) 40 MG EC tablet Take 1 tablet by mouth Daily. 2/12/24   Lola Hutton APRN   predniSONE (DELTASONE) 50 MG tablet 1 tablet by mouth 13 hours prior to imaging study, 1 tablet by mouth 7 hours prior to imaging study, 1 tablet by mouth 1 hour prior to imaging study  Patient not taking: Reported on 5/9/2025 2/13/25   Shital Julien APRN   simethicone (MYLICON) 80 MG chewable tablet CHEW 1 TABLET BY MOUTH EVERY 6 HOURS AS NEEDED FOR FLATULENCE 4/22/25   Clarisse Flores MD   valsartan (DIOVAN) 40 MG tablet Take 1 tablet by mouth Daily. 10/22/24   Clarisse Flores MD        Social History:   Social History     Tobacco Use    Smoking status: Never     Passive exposure: Never    Smokeless tobacco:  "Never   Vaping Use    Vaping status: Never Used   Substance Use Topics    Alcohol use: No    Drug use: No         Review of Systems:  Review of Systems   Constitutional:  Negative for fever.   Eyes:  Negative for visual disturbance.   Respiratory:  Positive for cough and shortness of breath.    Cardiovascular:  Negative for chest pain.   Gastrointestinal:  Positive for abdominal pain (Chronic, unchanged) and nausea. Negative for diarrhea and vomiting.   Genitourinary:  Positive for dysuria. Negative for frequency and hematuria.   Neurological:  Positive for dizziness and weakness. Negative for numbness.        Physical Exam:  /87   Pulse 86   Temp 97.7 °F (36.5 °C) (Oral)   Resp 19   Ht 160 cm (63\")   Wt 84.3 kg (185 lb 13.6 oz)   SpO2 95%   BMI 32.92 kg/m²     Physical Exam  Vitals and nursing note reviewed.   Constitutional:       General: She is not in acute distress.     Appearance: Normal appearance. She is well-developed. She is not ill-appearing, toxic-appearing or diaphoretic.   HENT:      Head: Normocephalic and atraumatic.      Nose: Nose normal.   Eyes:      Extraocular Movements: Extraocular movements intact.      Right eye: Normal extraocular motion and no nystagmus.      Left eye: Normal extraocular motion and no nystagmus.      Conjunctiva/sclera: Conjunctivae normal.      Pupils: Pupils are equal, round, and reactive to light.   Cardiovascular:      Rate and Rhythm: Normal rate and regular rhythm.      Heart sounds: Normal heart sounds.   Pulmonary:      Effort: Pulmonary effort is normal.      Breath sounds: Normal breath sounds.   Abdominal:      General: Abdomen is flat. Bowel sounds are normal.      Palpations: Abdomen is soft. There is no mass.      Tenderness: There is no abdominal tenderness. There is no guarding.   Musculoskeletal:         General: Normal range of motion.      Cervical back: Normal range of motion and neck supple.   Skin:     General: Skin is warm and dry. "   Neurological:      General: No focal deficit present.      Mental Status: She is alert and oriented to person, place, and time.      Cranial Nerves: No cranial nerve deficit or facial asymmetry.      Sensory: No sensory deficit.      Motor: No weakness.      Coordination: Coordination normal.   Psychiatric:         Mood and Affect: Mood normal.         Speech: Speech normal.         Behavior: Behavior normal.         Thought Content: Thought content normal.         Judgment: Judgment normal.                  Medical Decision Making:    Comorbidities that affect care:    Pretension, diabetes mellitus, GERD    External Notes reviewed:    Previous Clinic Note: Family medicine office visit from 5 - 9 - 25      The following orders were placed and all results were independently analyzed by me:  Orders Placed This Encounter   Procedures    COVID-19, FLU A/B, RSV PCR 1 HR TAT - Swab, Nasopharynx    CT Head Without Contrast    XR Abdomen KUB    XR Chest 1 View    MRI Brain With & Without Contrast    [place] Draw    Comprehensive Metabolic Panel    Lipase    Urinalysis With Microscopic If Indicated (No Culture) - Urine, Clean Catch    Lactic Acid, Plasma    CBC Auto Differential    Urinalysis, Microscopic Only - Urine, Clean Catch    High Sensitivity Troponin T    BNP    Magnesium    Protime-INR    High Sensitivity Troponin T 1Hr    STAT Lactic Acid, Reflex    NPO Diet NPO Type: Strict NPO    Undress & Gown    Code Status and Medical Interventions: CPR (Attempt to Resuscitate); Full Support    IP General Consult (Use specialty-specific consult if known)    Inpatient Hospitalist Consult    Insert Peripheral IV    Inpatient Admission    CBC & Differential    Green Top (Gel)    Lavender Top    Gold Top - SST    Light Blue Top       Medications Given in the Emergency Department:  Medications   sodium chloride 0.9 % flush 10 mL (has no administration in time range)   famotidine (PEPCID) injection 20 mg (20 mg Intravenous Given  5/10/25 1201)   dicyclomine (BENTYL) tablet 20 mg (20 mg Oral Given 5/10/25 1200)   LORazepam (ATIVAN) tablet 1 mg (1 mg Oral Given 5/10/25 1324)   gadobenate dimeglumine (MULTIHANCE) injection 20 mL (17 mL Intravenous Given 5/10/25 1418)   aspirin chewable tablet 324 mg (324 mg Oral Given 5/10/25 1534)        ED Course:    ED Course as of 05/10/25 1630   Sat May 10, 2025   1554 Discussed patient with Dr. Olivarez with teleneurology.  Stated give aspirin and inpatient admission for PT OT. [MD]   1604 Discussed patient with Dr. Moore who will accept patient for admission. [MD]      ED Course User Index  [MD] Ross Shelton PA-C       Labs:    Lab Results (last 24 hours)       Procedure Component Value Units Date/Time    Urinalysis With Microscopic If Indicated (No Culture) - Urine, Clean Catch [[id number]]  (Abnormal) Collected: 05/10/25 1055    Specimen: Urine, Clean Catch Updated: 05/10/25 1125     Color, UA Dark Yellow     Appearance, UA Cloudy     pH, UA <=5.0     Specific Gravity, UA 1.025     Glucose, UA Negative     Ketones, UA Trace     Bilirubin, UA Negative     Blood, UA Negative     Protein, UA Trace     Leuk Esterase, UA Trace     Nitrite, UA Negative     Urobilinogen, UA 1.0 E.U./dL    Urinalysis, Microscopic Only - Urine, Clean Catch [[id number]]  (Abnormal) Collected: 05/10/25 1055    Specimen: Urine, Clean Catch Updated: 05/10/25 1125     RBC, UA None Seen /HPF      WBC, UA 0-2 /HPF      Bacteria, UA 1+ /HPF      Squamous Epithelial Cells, UA Too Numerous to Count /HPF      Hyaline Casts, UA 0-2 /LPF      Calcium Oxalate Crystals, UA Moderate/2+ /HPF      Methodology Manual Light Microscopy    CBC & Differential [[id number]]  (Normal) Collected: 05/10/25 1151    Specimen: Blood Updated: 05/10/25 1201    Narrative:      The following orders were created for panel order CBC & Differential.  Procedure                               Abnormality         Status                     ---------                                -----------         ------                     CBC Auto Differential[[id number]]        Normal              Final result                 Please view results for these tests on the individual orders.    Comprehensive Metabolic Panel [[id number]]  (Abnormal) Collected: 05/10/25 1151    Specimen: Blood Updated: 05/10/25 1223     Glucose 158 mg/dL      BUN 13 mg/dL      Creatinine 0.72 mg/dL      Sodium 138 mmol/L      Potassium 4.2 mmol/L      Chloride 100 mmol/L      CO2 28.3 mmol/L      Calcium 10.8 mg/dL      Total Protein 7.4 g/dL      Albumin 4.4 g/dL      ALT (SGPT) 38 U/L      AST (SGOT) 45 U/L      Alkaline Phosphatase 94 U/L      Total Bilirubin 0.3 mg/dL      Globulin 3.0 gm/dL      A/G Ratio 1.5 g/dL      BUN/Creatinine Ratio 18.1     Anion Gap 9.7 mmol/L      eGFR 86.8 mL/min/1.73     Narrative:      GFR Categories in Chronic Kidney Disease (CKD)              GFR Category          GFR (mL/min/1.73)    Interpretation  G1                    90 or greater        Normal or high (1)  G2                    60-89                Mild decrease (1)  G3a                   45-59                Mild to moderate decrease  G3b                   30-44                Moderate to severe decrease  G4                    15-29                Severe decrease  G5                    14 or less           Kidney failure    (1)In the absence of evidence of kidney disease, neither GFR category G1 or G2 fulfill the criteria for CKD.    eGFR calculation 2021 CKD-EPI creatinine equation, which does not include race as a factor    Lipase [[id number]]  (Normal) Collected: 05/10/25 1151    Specimen: Blood Updated: 05/10/25 1223     Lipase 38 U/L     Lactic Acid, Plasma [[id number]]  (Abnormal) Collected: 05/10/25 1151    Specimen: Blood Updated: 05/10/25 1235     Lactate 2.1 mmol/L     CBC Auto Differential [[id number]]  (Normal) Collected: 05/10/25 1151    Specimen: Blood Updated: 05/10/25 1201     WBC 7.00 10*3/mm3      RBC  4.64 10*6/mm3      Hemoglobin 14.5 g/dL      Hematocrit 44.3 %      MCV 95.5 fL      MCH 31.3 pg      MCHC 32.7 g/dL      RDW 13.6 %      RDW-SD 47.7 fl      MPV 9.7 fL      Platelets 207 10*3/mm3      Neutrophil % 62.8 %      Lymphocyte % 27.4 %      Monocyte % 6.0 %      Eosinophil % 2.7 %      Basophil % 0.7 %      Immature Grans % 0.4 %      Neutrophils, Absolute 4.39 10*3/mm3      Lymphocytes, Absolute 1.92 10*3/mm3      Monocytes, Absolute 0.42 10*3/mm3      Eosinophils, Absolute 0.19 10*3/mm3      Basophils, Absolute 0.05 10*3/mm3      Immature Grans, Absolute 0.03 10*3/mm3      nRBC 0.0 /100 WBC     High Sensitivity Troponin T [[id number]]  (Normal) Collected: 05/10/25 1151    Specimen: Blood Updated: 05/10/25 1223     HS Troponin T 8 ng/L     Narrative:      High Sensitive Troponin T Reference Range:  <14.0 ng/L- Negative Female for AMI  <22.0 ng/L- Negative Male for AMI  >=14 - Abnormal Female indicating possible myocardial injury.  >=22 - Abnormal Male indicating possible myocardial injury.   Clinicians would have to utilize clinical acumen, EKG, Troponin, and serial changes to determine if it is an Acute Myocardial Infarction or myocardial injury due to an underlying chronic condition.         BNP [[id number]]  (Normal) Collected: 05/10/25 1151    Specimen: Blood Updated: 05/10/25 1221     proBNP <36.0 pg/mL     Narrative:      This assay is used as an aid in the diagnosis of individuals suspected of having heart failure. It can be used as an aid in the diagnosis of acute decompensated heart failure (ADHF) in patients presenting with signs and symptoms of ADHF to the emergency department (ED). In addition, NT-proBNP of <300 pg/mL indicates ADHF is not likely.    Age Range Result Interpretation  NT-proBNP Concentration (pg/mL:      <50             Positive            >450                   Gray                 300-450                    Negative             <300    50-75           Positive            >900                   Paredes                300-900                  Negative            <300      >75             Positive            >1800                  Gray                300-1800                  Negative            <300    Magnesium [[id number]]  (Normal) Collected: 05/10/25 1151    Specimen: Blood Updated: 05/10/25 1223     Magnesium 1.7 mg/dL     Protime-INR [[id number]]  (Normal) Collected: 05/10/25 1151    Specimen: Blood Updated: 05/10/25 1230     Protime 14.5 Seconds      INR 1.09    Narrative:      Suggested Therapeutic Ranges For Oral Anticoagulant Therapy:  Level of Therapy                      INR Target Range  Standard Dose                            2.0-3.0  High Dose                                2.5-3.5  Patients not receiving anticoagulant  Therapy Normal Range                     0.86-1.15    COVID-19, FLU A/B, RSV PCR 1 HR TAT - Swab, Nasopharynx [[id number]]  (Normal) Collected: 05/10/25 1154    Specimen: Swab from Nasopharynx Updated: 05/10/25 1247     COVID19 Not Detected     Influenza A PCR Not Detected     Influenza B PCR Not Detected     RSV, PCR Not Detected    Narrative:      Fact sheet for providers: https://www.fda.gov/media/062010/download    Fact sheet for patients: https://www.fda.gov/media/949193/download    Test performed by PCR.    High Sensitivity Troponin T 1Hr [[id number]]  (Normal) Collected: 05/10/25 1327    Specimen: Blood Updated: 05/10/25 1351     HS Troponin T 8 ng/L      Troponin T Numeric Delta 0 ng/L     Narrative:      High Sensitive Troponin T Reference Range:  <14.0 ng/L- Negative Female for AMI  <22.0 ng/L- Negative Male for AMI  >=14 - Abnormal Female indicating possible myocardial injury.  >=22 - Abnormal Male indicating possible myocardial injury.   Clinicians would have to utilize clinical acumen, EKG, Troponin, and serial changes to determine if it is an Acute Myocardial Infarction or myocardial injury due to an underlying chronic condition.         STAT Lactic  Acid, Reflex [[id number]]  (Normal) Collected: 05/10/25 1528    Specimen: Blood Updated: 05/10/25 1556     Lactate 1.8 mmol/L              Imaging:    MRI Brain With & Without Contrast  Result Date: 5/10/2025  MRI BRAIN W WO CONTRAST Date of Exam: 5/10/2025 1:46 PM EDT Indication: AMS, follow-up from CT read of area new infarct, unsure if acute or old.  Comparison: CT scan of the head 5/10/2025. MRI of the brain 6/11/2021. Technique:  Routine multiplanar/multisequence sequence images of the brain were obtained before and after the uneventful administration of Multihance. Findings: There is a 1.5 cm x 6 mm area of restricted diffusion anteriorly in the right thalamus consistent with an area of acute infarction. There is mild generalized atrophy with prominence of the ventricular system. No evidence of acute intracranial hemorrhage,  mass or midline shift. No extra-axial fluid collections are identified. There are chronic appearing changes in the white matter. There are no areas of abnormal contrast enhancement. There is mild proptosis similar to the prior examination..     Impression: 1.5 cm x 6 mm acute infarct anteriorly in the right thalamus. Electronically Signed: Chucho Mcdonnell MD  5/10/2025 3:21 PM EDT  Workstation ID: [id number]    CT Head Without Contrast  Result Date: 5/10/2025  CT HEAD WO CONTRAST Date of Exam: 5/10/2025 11:30 AM EDT Indication: AMS. Comparison: 6/1/2021 Technique: Axial CT images were obtained of the head without contrast administration.  Reconstructed coronal and sagittal images were also obtained. Automated exposure control and iterative construction methods were used. Findings: There is mild motion artifact. No evidence of acute intracranial hemorrhage or midline shift. No extra-axial fluid collections are identified. Stable mild prominence of the ventricular system with chronic appearing changes in the white matter. There are no skull fractures. New low-attenuation anteriorly in the right  thalamus. The paranasal sinuses and mastoid air cells are clear.     Impression: 1. No evidence of acute hemorrhage or midline shift. 2. New low-attenuation anteriorly in the right thalamus possibly an area of old infarction. Suggest a follow-up MRI examination with and without intravenous gadolinium to exclude an acute infarct or mass. Electronically Signed: Chucho Mcdonnell MD  5/10/2025 12:08 PM EDT  Workstation ID: [id number]    XR Chest 1 View  Result Date: 5/10/2025  XR CHEST 1 VW Date of Exam: 5/10/2025 11:41 AM EDT Indication: Dyspnea Comparison: 5/9/2025. Findings: There is mild enlargement of the cardiac silhouette. No evidence of pneumothorax or large pleural effusion. There is patchy airspace opacity in the lung fields right greater than left.     Impression: Patchy airspace opacity in the lung fields right greater than left possibly atelectasis or pneumonia. Electronically Signed: Chucho Mcdonnell MD  5/10/2025 12:00 PM EDT  Workstation ID: [id number]    XR Abdomen KUB  Result Date: 5/10/2025  XR ABDOMEN KUB Date of Exam: 5/10/2025 11:15 AM EDT Indication: abdominal pain Comparison: KUB 9/27/2024. CT scan of the abdomen and pelvis 4/10/2025. Findings: Prior cholecystectomy and right hip arthroplasty. No evidence of bowel obstruction. Scoliosis and degenerative change are present in the lumbar spine. The patient's horseshoe kidney and punctate renal calcifications are not well visualized on this examination.     Impression: No evidence of bowel obstruction. Horseshoe kidney with punctate renal calcifications is not well visualized on this examination. Electronically Signed: Chucho Mcdonnell MD  5/10/2025 11:42 AM EDT  Workstation ID: [id number]        Differential Diagnosis and Discussion:    Altered Mental Status: Based on the patient's signs and symptoms, differential diagnosis includes but is not limited to meningitis, stroke, sepsis, subarachnoid hemorrhage, intracranial bleeding, encephalitis, and metabolic  encephalopathy.    PROCEDURES:    Labs were collected in the emergency department and all labs were reviewed and interpreted by me.  X-ray were performed in the emergency department and all X-ray impressions were independently interpreted by me.  CT scan was performed in the emergency department and the CT scan radiology impression was interpreted by me.  MRI was performed in the emergency department and the MRI impression was interpreted by me.     No orders to display       Procedures    MDM  Number of Diagnoses or Management Options  Acute thalamic infarction  Diagnosis management comments: Patient presented to the emergency department today for evaluation of altered mental status.  Patient was back to baseline during my evaluation but stated 2 days ago and yesterday she did have some confusion.  CBC notes normal white blood cell count hemoglobin Bakkar.  CMP notes glucose of 158 calcium 10.8 and mildly elevated ALT and AST of 30 and 45 with normal kidney function.  Troponin unremarkable.  BNP negative.  Lipase unremarkable.  Magnesium unremarkable.  Lactic acid was initially elevated 2.1 but on repeat evaluation was 1.8 patient is not febrile or tachycardic concerning for any infection with normal white blood cell count as well.  Rapid influenza, COVID, RSV testing was negative.  Urinalysis was negative for any acute infection.  X-ray KUB was completed due to patient's complaint of chronic abdominal pain but is unremarkable.  Chest x-ray completed as well as patient did complain of some shortness of breath x 1 week and there is questionable pneumonia however patient has normal white count and no fever so will not begin antibiotics at this time.  CT head had new finding concerning for possible old versus acute infarct thus MRI was added at that time.  Discussed patient with supervising physician at this time who said to go ahead and consult teleneuro.  Patient is out of window for any acute intervention.  MRI did  note an acute right thalamus infarct.  Patient did have an NIH of 0.  Discussed patient with Dr. Olivarez with teleneurology who stated to admit patient for PT OT following the stroke and begin aspirin.  Aspirin had already been given in the emergency department after MRI results obtained.  I discussed patient then with  who will accept patient for admission.       Amount and/or Complexity of Data Reviewed  Clinical lab tests: reviewed and ordered  Tests in the radiology section of CPT®: reviewed and ordered    Risk of Complications, Morbidity, and/or Mortality  Presenting problems: moderate  Diagnostic procedures: moderate  Management options: moderate    Patient Progress  Patient progress: stable             The patient presents with symptoms concerning for a stroke. The patient was evaluated by me immediately upon arrival. Extensive history and physical was obtained. Family friend was present to give further insight into patients onset of symptoms.  CT scan findings were discussed with radiology. The case was also discussed at length with telehealth neurology. Nursing staff was instructed on how to proceed with patient care. Patient was then placed on the cardiac monitor and monitored for arrhythmia that could be contributing to stroke like symptoms. Patient's blood pressure was monitored and controled consistent with stroke guidelines. Treatment option's for patient's symptoms include asa after inclusion and exclusion criteria was weighed. The patient's mental status and neurological symptoms were monitored throughout their stay for worsening decline.     Total Critical Care time of 35 minutes. Total critical care time documented does not include time spent on separately billed procedures for services of nurses or physician assistants. I personally saw and examined the patient. I have reviewed all diagnostic interpretations and treatment plans as written. I was present for the key portions of any  procedures performed and the inclusive time noted in any critical care statement. Critical care time includes patient management by me, time spent at the patients bedside,  time to review lab and imaging results, discussing patient care, documentation in the medical record, and time spent with family or caregiver.    Patient Care Considerations:    ANTIBIOTICS: I considered prescribing antibiotics as an outpatient however no bacterial focus of infection was found.      Consultants/Shared Management Plan:    Hospitalist: I have discussed the case with Dr. Moore who agrees to accept the patient for admission.  SHARED VISIT: I have discussed the case with my supervising physician, Dr. Vazquez who states once CT resulted go ahead and consult teleneuro and proceed with MRI. The substantive portion of the medical decision was made by the attesting physician who made or approve the management plan and will take responsibility for the patient.  Clinical findings were discussed and ultimate disposition was made in consult with supervising physician.  Consultant: I have discussed the case with Dr. Olivarez who states admit for PT OT and inpatient care following stroke    Social Determinants of Health:    Patient is independent, reliable, and has access to care.       Disposition and Care Coordination:    Admit:   Through independent evaluation of the patient's history, physical, and imperical data, the patient meets criteria for inpatient admission to the hospital.      Final diagnoses:   Acute thalamic infarction        ED Disposition       ED Disposition   Decision to Admit    Condition   --    Comment   Level of Care: Progressive Care [20]   Diagnosis: Stroke [603009]   Certification: I Certify That Inpatient Hospital Services Are Medically Necessary For Greater Than 2 Midnights                 This medical record created using voice recognition software.             Ross Shelton PA-C  05/10/25 0577

## 2025-05-10 NOTE — ED PROVIDER NOTES
"SHARED VISIT ATTESTATION:    This visit was performed by myself and an APC.  I personally approved the management plan/medical decision making and take responsibility for the patient management.      SHARED VISIT NOTE:    Patient is 74 y.o. year old female that presents to the ED for evaluation of abdominal pain, altered mental status.     Physical Exam    ED Course:    /87   Pulse 86   Temp 97.7 °F (36.5 °C) (Oral)   Resp 19   Ht 160 cm (63\")   Wt 84.3 kg (185 lb 13.6 oz)   SpO2 95%   BMI 32.92 kg/m²   Results for orders placed or performed during the hospital encounter of 05/10/25   Urinalysis With Microscopic If Indicated (No Culture) - Urine, Clean Catch    Collection Time: 05/10/25 10:55 AM    Specimen: Urine, Clean Catch   Result Value Ref Range    Color, UA Dark Yellow (A) Yellow, Straw    Appearance, UA Cloudy (A) Clear    pH, UA <=5.0 5.0 - 8.0    Specific Gravity, UA 1.025 1.005 - 1.030    Glucose, UA Negative Negative    Ketones, UA Trace (A) Negative    Bilirubin, UA Negative Negative    Blood, UA Negative Negative    Protein, UA Trace (A) Negative    Leuk Esterase, UA Trace (A) Negative    Nitrite, UA Negative Negative    Urobilinogen, UA 1.0 E.U./dL 0.2 - 1.0 E.U./dL   Urinalysis, Microscopic Only - Urine, Clean Catch    Collection Time: 05/10/25 10:55 AM    Specimen: Urine, Clean Catch   Result Value Ref Range    RBC, UA None Seen None Seen, 0-2 /HPF    WBC, UA 0-2 None Seen, 0-2 /HPF    Bacteria, UA 1+ (A) None Seen /HPF    Squamous Epithelial Cells, UA Too Numerous to Count (A) None Seen, 0-2 /HPF    Hyaline Casts, UA 0-2 None Seen /LPF    Calcium Oxalate Crystals, UA Moderate/2+ None Seen /HPF    Methodology Manual Light Microscopy    Comprehensive Metabolic Panel    Collection Time: 05/10/25 11:51 AM    Specimen: Blood   Result Value Ref Range    Glucose 158 (H) 65 - 99 mg/dL    BUN 13 8 - 23 mg/dL    Creatinine 0.72 0.57 - 1.00 mg/dL    Sodium 138 136 - 145 mmol/L    Potassium 4.2 " 3.5 - 5.2 mmol/L    Chloride 100 98 - 107 mmol/L    CO2 28.3 22.0 - 29.0 mmol/L    Calcium 10.8 (H) 8.6 - 10.5 mg/dL    Total Protein 7.4 6.0 - 8.5 g/dL    Albumin 4.4 3.5 - 5.2 g/dL    ALT (SGPT) 38 (H) 1 - 33 U/L    AST (SGOT) 45 (H) 1 - 32 U/L    Alkaline Phosphatase 94 39 - 117 U/L    Total Bilirubin 0.3 0.0 - 1.2 mg/dL    Globulin 3.0 gm/dL    A/G Ratio 1.5 g/dL    BUN/Creatinine Ratio 18.1 7.0 - 25.0    Anion Gap 9.7 5.0 - 15.0 mmol/L    eGFR 86.8 >60.0 mL/min/1.73   Lipase    Collection Time: 05/10/25 11:51 AM    Specimen: Blood   Result Value Ref Range    Lipase 38 13 - 60 U/L   Lactic Acid, Plasma    Collection Time: 05/10/25 11:51 AM    Specimen: Blood   Result Value Ref Range    Lactate 2.1 (C) 0.5 - 2.0 mmol/L   CBC Auto Differential    Collection Time: 05/10/25 11:51 AM    Specimen: Blood   Result Value Ref Range    WBC 7.00 3.40 - 10.80 10*3/mm3    RBC 4.64 3.77 - 5.28 10*6/mm3    Hemoglobin 14.5 12.0 - 15.9 g/dL    Hematocrit 44.3 34.0 - 46.6 %    MCV 95.5 79.0 - 97.0 fL    MCH 31.3 26.6 - 33.0 pg    MCHC 32.7 31.5 - 35.7 g/dL    RDW 13.6 12.3 - 15.4 %    RDW-SD 47.7 37.0 - 54.0 fl    MPV 9.7 6.0 - 12.0 fL    Platelets 207 140 - 450 10*3/mm3    Neutrophil % 62.8 42.7 - 76.0 %    Lymphocyte % 27.4 19.6 - 45.3 %    Monocyte % 6.0 5.0 - 12.0 %    Eosinophil % 2.7 0.3 - 6.2 %    Basophil % 0.7 0.0 - 1.5 %    Immature Grans % 0.4 0.0 - 0.5 %    Neutrophils, Absolute 4.39 1.70 - 7.00 10*3/mm3    Lymphocytes, Absolute 1.92 0.70 - 3.10 10*3/mm3    Monocytes, Absolute 0.42 0.10 - 0.90 10*3/mm3    Eosinophils, Absolute 0.19 0.00 - 0.40 10*3/mm3    Basophils, Absolute 0.05 0.00 - 0.20 10*3/mm3    Immature Grans, Absolute 0.03 0.00 - 0.05 10*3/mm3    nRBC 0.0 0.0 - 0.2 /100 WBC   High Sensitivity Troponin T    Collection Time: 05/10/25 11:51 AM    Specimen: Blood   Result Value Ref Range    HS Troponin T 8 <14 ng/L   BNP    Collection Time: 05/10/25 11:51 AM    Specimen: Blood   Result Value Ref Range    proBNP  <36.0 0.0 - 1,800.0 pg/mL   Magnesium    Collection Time: 05/10/25 11:51 AM    Specimen: Blood   Result Value Ref Range    Magnesium 1.7 1.6 - 2.4 mg/dL   Protime-INR    Collection Time: 05/10/25 11:51 AM    Specimen: Blood   Result Value Ref Range    Protime 14.5 11.8 - 14.9 Seconds    INR 1.09 0.86 - 1.15   Green Top (Gel)    Collection Time: 05/10/25 11:51 AM   Result Value Ref Range    Extra Tube Hold for add-ons.    Lavender Top    Collection Time: 05/10/25 11:51 AM   Result Value Ref Range    Extra Tube hold for add-on    Gold Top - SST    Collection Time: 05/10/25 11:51 AM   Result Value Ref Range    Extra Tube Hold for add-ons.    Light Blue Top    Collection Time: 05/10/25 11:51 AM   Result Value Ref Range    Extra Tube Hold for add-ons.    COVID-19, FLU A/B, RSV PCR 1 HR TAT - Swab, Nasopharynx    Collection Time: 05/10/25 11:54 AM    Specimen: Nasopharynx; Swab   Result Value Ref Range    COVID19 Not Detected Not Detected - Ref. Range    Influenza A PCR Not Detected Not Detected    Influenza B PCR Not Detected Not Detected    RSV, PCR Not Detected Not Detected   High Sensitivity Troponin T 1Hr    Collection Time: 05/10/25  1:27 PM    Specimen: Blood   Result Value Ref Range    HS Troponin T 8 <14 ng/L    Troponin T Numeric Delta 0 Abnormal if >/=3 ng/L     Medications   sodium chloride 0.9 % flush 10 mL (has no administration in time range)   famotidine (PEPCID) injection 20 mg (20 mg Intravenous Given 5/10/25 1201)   dicyclomine (BENTYL) tablet 20 mg (20 mg Oral Given 5/10/25 1200)   LORazepam (ATIVAN) tablet 1 mg (1 mg Oral Given 5/10/25 1324)   gadobenate dimeglumine (MULTIHANCE) injection 20 mL (17 mL Intravenous Given 5/10/25 1418)   aspirin chewable tablet 324 mg (324 mg Oral Given 5/10/25 1534)     MRI Brain With & Without Contrast  Result Date: 5/10/2025  Narrative: MRI BRAIN W WO CONTRAST Date of Exam: 5/10/2025 1:46 PM EDT Indication: AMS, follow-up from CT read of area new infarct, unsure if  acute or old.  Comparison: CT scan of the head 5/10/2025. MRI of the brain 6/11/2021. Technique:  Routine multiplanar/multisequence sequence images of the brain were obtained before and after the uneventful administration of Multihance. Findings: There is a 1.5 cm x 6 mm area of restricted diffusion anteriorly in the right thalamus consistent with an area of acute infarction. There is mild generalized atrophy with prominence of the ventricular system. No evidence of acute intracranial hemorrhage,  mass or midline shift. No extra-axial fluid collections are identified. There are chronic appearing changes in the white matter. There are no areas of abnormal contrast enhancement. There is mild proptosis similar to the prior examination..     Impression: Impression: 1.5 cm x 6 mm acute infarct anteriorly in the right thalamus. Electronically Signed: Chucho Mcdonnell MD  5/10/2025 3:21 PM EDT  Workstation ID: [id number]    CT Head Without Contrast  Result Date: 5/10/2025  Narrative: CT HEAD WO CONTRAST Date of Exam: 5/10/2025 11:30 AM EDT Indication: AMS. Comparison: 6/1/2021 Technique: Axial CT images were obtained of the head without contrast administration.  Reconstructed coronal and sagittal images were also obtained. Automated exposure control and iterative construction methods were used. Findings: There is mild motion artifact. No evidence of acute intracranial hemorrhage or midline shift. No extra-axial fluid collections are identified. Stable mild prominence of the ventricular system with chronic appearing changes in the white matter. There are no skull fractures. New low-attenuation anteriorly in the right thalamus. The paranasal sinuses and mastoid air cells are clear.     Impression: Impression: 1. No evidence of acute hemorrhage or midline shift. 2. New low-attenuation anteriorly in the right thalamus possibly an area of old infarction. Suggest a follow-up MRI examination with and without intravenous gadolinium to  exclude an acute infarct or mass. Electronically Signed: Chucho Mcdonnell MD  5/10/2025 12:08 PM EDT  Workstation ID: [id number]    XR Chest 1 View  Result Date: 5/10/2025  Narrative: XR CHEST 1 VW Date of Exam: 5/10/2025 11:41 AM EDT Indication: Dyspnea Comparison: 5/9/2025. Findings: There is mild enlargement of the cardiac silhouette. No evidence of pneumothorax or large pleural effusion. There is patchy airspace opacity in the lung fields right greater than left.     Impression: Impression: Patchy airspace opacity in the lung fields right greater than left possibly atelectasis or pneumonia. Electronically Signed: Chucho Mcdonnell MD  5/10/2025 12:00 PM EDT  Workstation ID: [id number]    XR Abdomen KUB  Result Date: 5/10/2025  Narrative: XR ABDOMEN KUB Date of Exam: 5/10/2025 11:15 AM EDT Indication: abdominal pain Comparison: KUB 9/27/2024. CT scan of the abdomen and pelvis 4/10/2025. Findings: Prior cholecystectomy and right hip arthroplasty. No evidence of bowel obstruction. Scoliosis and degenerative change are present in the lumbar spine. The patient's horseshoe kidney and punctate renal calcifications are not well visualized on this examination.     Impression: Impression: No evidence of bowel obstruction. Horseshoe kidney with punctate renal calcifications is not well visualized on this examination. Electronically Signed: Chucho Mcdonnell MD  5/10/2025 11:42 AM EDT  Workstation ID: [id number]      MDM:    Sharon Vazquez MD  15:37 EDT  05/10/25         Moo Vazquez MD  05/10/25 1537

## 2025-05-11 ENCOUNTER — APPOINTMENT (OUTPATIENT)
Dept: CARDIOLOGY | Facility: HOSPITAL | Age: 75
DRG: 065 | End: 2025-05-11
Payer: MEDICARE

## 2025-05-11 LAB
AORTIC DIMENSIONLESS INDEX: 1.13 (DI)
AV MEAN PRESS GRAD SYS DOP V1V2: 2 MMHG
AV VMAX SYS DOP: 98.6 CM/SEC
BH CV ECHO MEAS - AO MAX PG: 3.9 MMHG
BH CV ECHO MEAS - AO ROOT DIAM: 3.1 CM
BH CV ECHO MEAS - AO V2 VTI: 14.4 CM
BH CV ECHO MEAS - AVA(I,D): 3.6 CM2
BH CV ECHO MEAS - EDV(CUBED): 103.8 ML
BH CV ECHO MEAS - EDV(MOD-SP2): 17.3 ML
BH CV ECHO MEAS - EDV(MOD-SP4): 40.1 ML
BH CV ECHO MEAS - EF(MOD-SP2): 67.7 %
BH CV ECHO MEAS - EF(MOD-SP4): 75.8 %
BH CV ECHO MEAS - ESV(CUBED): 13.8 ML
BH CV ECHO MEAS - ESV(MOD-SP2): 5.6 ML
BH CV ECHO MEAS - ESV(MOD-SP4): 9.7 ML
BH CV ECHO MEAS - FS: 48.9 %
BH CV ECHO MEAS - IVS/LVPW: 1 CM
BH CV ECHO MEAS - IVSD: 1 CM
BH CV ECHO MEAS - LA DIMENSION: 3.4 CM
BH CV ECHO MEAS - LAT PEAK E' VEL: 11.4 CM/SEC
BH CV ECHO MEAS - LV DIASTOLIC VOL/BSA (35-75): 21.4 CM2
BH CV ECHO MEAS - LV MASS(C)D: 164.5 GRAMS
BH CV ECHO MEAS - LV MAX PG: 4.9 MMHG
BH CV ECHO MEAS - LV MEAN PG: 3 MMHG
BH CV ECHO MEAS - LV SYSTOLIC VOL/BSA (12-30): 5.2 CM2
BH CV ECHO MEAS - LV V1 MAX: 111 CM/SEC
BH CV ECHO MEAS - LV V1 VTI: 16.3 CM
BH CV ECHO MEAS - LVIDD: 4.7 CM
BH CV ECHO MEAS - LVIDS: 2.4 CM
BH CV ECHO MEAS - LVOT AREA: 3.1 CM2
BH CV ECHO MEAS - LVOT DIAM: 2 CM
BH CV ECHO MEAS - LVPWD: 1 CM
BH CV ECHO MEAS - MED PEAK E' VEL: 6.9 CM/SEC
BH CV ECHO MEAS - MV A MAX VEL: 91.7 CM/SEC
BH CV ECHO MEAS - MV DEC SLOPE: 301 CM/SEC2
BH CV ECHO MEAS - MV DEC TIME: 0.11 SEC
BH CV ECHO MEAS - MV E MAX VEL: 46.6 CM/SEC
BH CV ECHO MEAS - MV E/A: 0.51
BH CV ECHO MEAS - MV MEAN PG: 2 MMHG
BH CV ECHO MEAS - MV P1/2T: 45.3 MSEC
BH CV ECHO MEAS - MV V2 VTI: 12.8 CM
BH CV ECHO MEAS - MVA(P1/2T): 4.9 CM2
BH CV ECHO MEAS - MVA(VTI): 4 CM2
BH CV ECHO MEAS - RVDD: 2.2 CM
BH CV ECHO MEAS - SV(LVOT): 51.2 ML
BH CV ECHO MEAS - SV(MOD-SP2): 11.7 ML
BH CV ECHO MEAS - SV(MOD-SP4): 30.4 ML
BH CV ECHO MEAS - SVI(LVOT): 27.4 ML/M2
BH CV ECHO MEAS - SVI(MOD-SP2): 6.3 ML/M2
BH CV ECHO MEAS - SVI(MOD-SP4): 16.2 ML/M2
BH CV ECHO MEAS - TAPSE (>1.6): 1.72 CM
BH CV ECHO MEASUREMENTS AVERAGE E/E' RATIO: 5.09
CHOLEST SERPL-MCNC: 184 MG/DL (ref 0–200)
GLUCOSE BLDC GLUCOMTR-MCNC: 120 MG/DL (ref 70–99)
GLUCOSE BLDC GLUCOMTR-MCNC: 141 MG/DL (ref 70–99)
GLUCOSE BLDC GLUCOMTR-MCNC: 143 MG/DL (ref 70–99)
GLUCOSE BLDC GLUCOMTR-MCNC: 185 MG/DL (ref 70–99)
HBA1C MFR BLD: 7.4 % (ref 4.8–5.6)
HDLC SERPL-MCNC: 45 MG/DL (ref 40–60)
IVRT: 61 MS
LDLC SERPL CALC-MCNC: 108 MG/DL (ref 0–100)
LDLC/HDLC SERPL: 2.3 {RATIO}
LEFT ATRIUM VOLUME INDEX: 10.9 ML/M2
LV EF BIPLANE MOD: 72.4 %
TRIGL SERPL-MCNC: 177 MG/DL (ref 0–150)
VLDLC SERPL-MCNC: 31 MG/DL (ref 5–40)
WHOLE BLOOD HOLD SPECIMEN: NORMAL

## 2025-05-11 PROCEDURE — 93306 TTE W/DOPPLER COMPLETE: CPT | Performed by: INTERNAL MEDICINE

## 2025-05-11 PROCEDURE — 82948 REAGENT STRIP/BLOOD GLUCOSE: CPT

## 2025-05-11 PROCEDURE — 97161 PT EVAL LOW COMPLEX 20 MIN: CPT

## 2025-05-11 PROCEDURE — 83036 HEMOGLOBIN GLYCOSYLATED A1C: CPT | Performed by: STUDENT IN AN ORGANIZED HEALTH CARE EDUCATION/TRAINING PROGRAM

## 2025-05-11 PROCEDURE — 99232 SBSQ HOSP IP/OBS MODERATE 35: CPT | Performed by: PSYCHIATRY & NEUROLOGY

## 2025-05-11 PROCEDURE — 99232 SBSQ HOSP IP/OBS MODERATE 35: CPT | Performed by: STUDENT IN AN ORGANIZED HEALTH CARE EDUCATION/TRAINING PROGRAM

## 2025-05-11 PROCEDURE — 93306 TTE W/DOPPLER COMPLETE: CPT

## 2025-05-11 PROCEDURE — 92610 EVALUATE SWALLOWING FUNCTION: CPT

## 2025-05-11 PROCEDURE — 36415 COLL VENOUS BLD VENIPUNCTURE: CPT | Performed by: STUDENT IN AN ORGANIZED HEALTH CARE EDUCATION/TRAINING PROGRAM

## 2025-05-11 PROCEDURE — 80061 LIPID PANEL: CPT | Performed by: STUDENT IN AN ORGANIZED HEALTH CARE EDUCATION/TRAINING PROGRAM

## 2025-05-11 RX ORDER — CHOLECALCIFEROL (VITAMIN D3) 25 MCG
1000 TABLET ORAL DAILY
COMMUNITY

## 2025-05-11 RX ORDER — MAGNESIUM OXIDE 400 MG/1
400 TABLET ORAL DAILY
COMMUNITY

## 2025-05-11 RX ADMIN — ASPIRIN 81 MG: 81 TABLET, CHEWABLE ORAL at 08:28

## 2025-05-11 RX ADMIN — ATORVASTATIN CALCIUM 80 MG: 40 TABLET, FILM COATED ORAL at 20:51

## 2025-05-11 RX ADMIN — Medication 10 ML: at 20:51

## 2025-05-11 RX ADMIN — QUETIAPINE FUMARATE 25 MG: 25 TABLET ORAL at 20:51

## 2025-05-11 RX ADMIN — Medication 10 ML: at 08:28

## 2025-05-11 NOTE — THERAPY EVALUATION
Acute Care - Speech Language Pathology   Swallow Initial Evaluation  Kristy     Patient Name: Criss Edwards  : 1950  MRN: [id number]  Today's Date: 2025               Admit Date: 5/10/2025    Visit Dx:     ICD-10-CM ICD-9-CM   1. Acute thalamic infarction  I63.81 434.91   2. Pharyngoesophageal dysphagia  R13.14 787.24     Patient Active Problem List   Diagnosis    Primary osteoarthritis of left knee    Status post total hip replacement, right    Essential hypertension    Hypertrophic cardiomyopathy    Meniere's disease    Mitral valve prolapse    Mixed stress and urge urinary incontinence    Major depressive disorder, recurrent episode, moderate degree    Generalized osteoarthritis    Osteopenia of multiple sites    Seasonal allergic rhinitis due to pollen    Vitamin D deficiency    Gastroesophageal reflux disease without esophagitis    Lumbar radiculopathy    Allergic dermatitis    Generalized anxiety disorder    Primary osteoarthritis, left shoulder    Other fatigue    Type 2 diabetes mellitus without complication, without long-term current use of insulin    Nephrolithiasis    Stroke     Past Medical History:   Diagnosis Date    Dizziness     History of back surgery     unkown what surgeries    History of total right hip replacement     HL (hearing loss)     Hypertension      Past Surgical History:   Procedure Laterality Date    CHOLECYSTECTOMY      COLONOSCOPY      ENDOSCOPY N/A 2024    Procedure: ESOPHAGOGASTRODUODENOSCOPY;  Surgeon: Mallory Lua MD;  Location: [facility] ENDOSCOPY;  Service: Gastroenterology;  Laterality: N/A;  GASTRIC POLYP ESOPH DILATATION 18MM ESOPH DIVERTICULUIM    HAND SURGERY Left 2016    HIP ARTHROPLASTY Right     KIDNEY STONE SURGERY               Inpatient Speech Pathology Dysphagia Evaluation        PAIN SCALE: None indicated.    PRECAUTIONS/CONTRAINDICATIONS: Standard    SUSPECTED ABUSE/NEGLECT/EXPLOITATION: None  indicated.    SOCIAL/PSYCHOLOGICAL NEEDS/BARRIERS: None indicated.    PAST SOCIAL HISTORY: 74-year-old female lives at home alone    PRIOR FUNCTION: On regular diet    PATIENT GOALS/EXPECTATIONS: Continue eating orally    HISTORY: 74-year-old female with the above diagnosis referred for speech therapy evaluation due to stroke.  No previous speech pathology services are reported.  Patient reporting recent difficulty swallowing over the past couple of weeks.  Patient states complaint of solid foods catching with her indicating mid chest.  Patient reports recent testing due to esophageal dysphagia.    CURRENT DIET LEVEL: N.p.o.    OBJECTIVE:    TEST ADMINISTERED: Clinical dysphagia evaluation    COGNITION/SAFETY AWARENESS: Patient followed basic directions and responded to questions.    BEHAVIORAL OBSERVATIONS: Alert and cooperative    ORAL MOTOR EXAM: Facial asymmetry noted which may be baseline.  Strength/range of motion appeared functional.    VOICE QUALITY: Adequate    REFLEX EXAM: Deferred    POSTURE: Sitting upright in bed    FEEDING/SWALLOWING FUNCTION: Assessed with nectar liquid, thin liquids, puréed solids, crunchy solid.    CLINICAL OBSERVATIONS: Nectar liquid  by cup with delay, vocal quality remaining clear to cervical auscultation.  Thin liquid by cup and by straw with minimal delay, vocal quality remaining clear to cervical auscultation.  Purée solid with swallow completed with laryngeal elevation noted to palpation.  Crunchy solid with adequate chewing followed by swallow completed clearing the oral cavity.    DYSPHAGIA CRITERIA: Risk of aspiration, reported esophageal dysphagia.    FUNCTIONAL ASSESSMENT INSTRUMENT: Patient currently scored a level 6 of 7 on Functional Communication Measures for swallowing indicating a 1-19% limitation in function.    ASSESSMENT/ PLAN OF CARE:  Pt presents with limitations, noted below, that impede her ability to swallow safely and maintain nutrition. The skills of a  therapist will be required to safely and effectively implement the following treatment plan to restore maximal level of function.    PROBLEMS:  1.   Swallow delay, risk of aspiration                       LTG 1: 30 days: Patient will tolerate least restrictive diet utilizing appropriate positioning and strategies with minimal assistance.                       STG 1a: 14 days: Patient will tolerate diet of mechanical ground solids and thin liquids with minimal assistance for strategies.                       STG 1b: 14 days: Patient/family education.                       TREATMENT: Dysphagia therapy to address swallow function through exercises and education of strategies.     FREQUENCY/DURATION: Once daily 5 times per week    REHAB POTENTIAL:  Pt has fair to good rehab potential.  The following limitations may influence improvement/ length of tx: Medical status.    RECOMMENDATIONS:   1.   DIET: Mechanical ground solids, thin liquid.    2.  POSITION: Positioning fully upright for all p.o. intake and 30 minutes following.    3.  COMPENSATORY STRATEGIES: Alternate small bites and small sips of solids and liquids at a slow rate.  Frequent dry swallow.  Larger medications whole in applesauce or partially melted ice cream.    Pt/responsible party agrees with plan of care and has been informed of all alternatives, risks and benefits.                            Anticipated Discharge Disposition (SLP): skilled nursing facility (05/11/25 0918)                                                               EDUCATION  The patient has been educated in the following areas:   Modified Diet Instruction.                Time Calculation:    Time Calculation- SLP       Row Name 05/11/25 0918             Time Calculation- SLP    SLP Start Time 0800  -TB      SLP Stop Time 0900  -TB      SLP Time Calculation (min) 60 min  -TB      SLP Received On 05/11/25  -TB         Untimed Charges    SLP Eval/Re-eval  ST Eval Oral Pharyng Swallow -  58016  -TB      27194-NX Eval Oral Pharyng Swallow Minutes 60  -TB         Total Minutes    Untimed Charges Total Minutes 60  -TB       Total Minutes 60  -TB                User Key  (r) = Recorded By, (t) = Taken By, (c) = Cosigned By      Initials Name Provider Type    Celestina Ortez SLP Speech and Language Pathologist                    Therapy Charges for Today       Code Description Service Date Service Provider Modifiers Qty    [id number]  ST EVAL ORAL PHARYNG SWALLOW 4 5/11/2025 Celestina Guy SLP GN 1                 CHERRI Moon  5/11/2025

## 2025-05-11 NOTE — PLAN OF CARE
Goal Outcome Evaluation:  Plan of Care Reviewed With: patient           Outcome Evaluation: Pt presents with decreased ambulatory and transfer function and safety due to diminished dynamic balance and strength. Pt will benefit from skilled physical therapy to address deficits and improve safety.    Anticipated Discharge Disposition (PT): inpatient rehabilitation facility

## 2025-05-11 NOTE — PLAN OF CARE
Goal Outcome Evaluation:  Plan of Care Reviewed With: patient      ASSESSMENT/ PLAN OF CARE:  Pt presents with limitations, noted below, that impede her ability to swallow safely and maintain nutrition. The skills of a therapist will be required to safely and effectively implement the following treatment plan to restore maximal level of function.    PROBLEMS:  1.   Swallow delay, risk of aspiration                       LTG 1: 30 days: Patient will tolerate least restrictive diet utilizing appropriate positioning and strategies with minimal assistance.                       STG 1a: 14 days: Patient will tolerate diet of mechanical ground solids and thin liquids with minimal assistance for strategies.                       STG 1b: 14 days: Patient/family education.                       TREATMENT: Dysphagia therapy to address swallow function through exercises and education of strategies.     FREQUENCY/DURATION: Once daily 5 times per week    REHAB POTENTIAL:  Pt has fair to good rehab potential.  The following limitations may influence improvement/ length of tx: Medical status.    RECOMMENDATIONS:   1.   DIET: Mechanical ground solids, thin liquid.    2.  POSITION: Positioning fully upright for all p.o. intake and 30 minutes following.    3.  COMPENSATORY STRATEGIES: Alternate small bites and small sips of solids and liquids at a slow rate.  Frequent dry swallow.  Larger medications whole in applesauce or partially melted ice cream.          Anticipated Discharge Disposition (SLP): skilled nursing facility

## 2025-05-11 NOTE — PLAN OF CARE
Goal Outcome Evaluation:   Patient moved from 210-1 to 204-2.  Patient ambulated X1 to BR several times. Sat up in chair for lunch and dinner. No visible signs of distress or pain. NIH 0. VSS..however . HR has had steady increase to 100's NRS - ST. Will continue to monitor destiney parekh

## 2025-05-11 NOTE — PLAN OF CARE
Goal Outcome Evaluation:      Pt rested most of night. Denied any discomforts when awaken. NIH remains a 0.

## 2025-05-11 NOTE — PROGRESS NOTES
[facility]   Hospitalist Progress Note  Date: 2025  Patient Name: Criss Edwards  : 1950  MRN: [id number]  Date of admission: 5/10/2025  Room/Bed: 204/2      Subjective   Subjective     Chief Complaint: Altered mental status    Summary:Criss Edwards is a 74 y.o. female with hypertension, hypertrophic cardiomyopathy, depression/anxiety, type 2 diabetes who presents to the emergency room due to altered mental status.  Also presented with generalized weakness and dizziness.  Found to have an acute right thalamic stroke on MRI.  Teleneurology consulted.  MRA head and neck pending.  Echo pending.  Patient will need referral to cardiology for loop/Holter monitor.  Patient worked with physical therapy.  Likely needs rehab.     Interval Followup:   Patient was complaining of generalized weakness.  We discussed her MRI findings.  No other complaints.    All systems reviewed and negative except for what is outlined above.      Objective   Objective     Vitals:   Temp:  [97.5 °F (36.4 °C)-97.7 °F (36.5 °C)] 97.7 °F (36.5 °C)  Heart Rate:  [] 111  Resp:  [16-18] 18  BP: (121-162)/(71-94) 149/85    Physical Exam   General: NAD  Cardiovascular: RRR  Pulmonary:no conversational dyspnea  Neuro: Moving all 4 extremities spontaneously  Psych: Mood and affect appropriate    Result Review    Result Review:  I have personally reviewed these results:  [x]  Laboratory      Lab 05/10/25  1528 05/10/25  1151 25  1007   WBC  --  7.00 8.95   HEMOGLOBIN  --  14.5 15.2   HEMATOCRIT  --  44.3 47.4*   PLATELETS  --  207 254   NEUTROS ABS  --  4.39 5.92   IMMATURE GRANS (ABS)  --  0.03 0.04   LYMPHS ABS  --  1.92 2.17   MONOS ABS  --  0.42 0.61   EOS ABS  --  0.19 0.17   MCV  --  95.5 96.1   LACTATE 1.8 2.1*  --    PROTIME  --  14.5  --    D DIMER QUANT  --   --  0.46         Lab 05/10/25  1151 25  1007   SODIUM 138 140   POTASSIUM 4.2 4.6   CHLORIDE 100 100   CO2 28.3 27.1   ANION GAP 9.7 12.9   BUN 13 16    CREATININE 0.72 0.87   EGFR 86.8 69.1   GLUCOSE 158* 145*   CALCIUM 10.8* 11.0*   MAGNESIUM 1.7  --    HEMOGLOBIN A1C  --  7.40*         Lab 05/10/25  1151 05/09/25  1007   TOTAL PROTEIN 7.4 7.9   ALBUMIN 4.4 4.6   GLOBULIN 3.0 3.3   ALT (SGPT) 38* 47*   AST (SGOT) 45* 72*   BILIRUBIN 0.3 0.6   ALK PHOS 94 85   LIPASE 38  --          Lab 05/10/25  1327 05/10/25  1151 05/09/25  1007   PROBNP  --  <36.0 <36.0   HSTROP T 8 8  --    PROTIME  --  14.5  --    INR  --  1.09  --          Lab 05/11/25  0545 05/09/25  1007   CHOLESTEROL 184 193   LDL CHOL 108* 117*   HDL CHOL 45 48   TRIGLYCERIDES 177* 157*             Brief Urine Lab Results  (Last result in the past 365 days)        Color   Clarity   Blood   Leuk Est   Nitrite   Protein   CREAT   Urine HCG        05/10/25 1055 Dark Yellow   Cloudy   Negative   Trace   Negative   Trace                 [x]  Microbiology   Microbiology Results (last 10 days)       Procedure Component Value - Date/Time    COVID-19, FLU A/B, RSV PCR 1 HR TAT - Swab, Nasopharynx [[id number]]  (Normal) Collected: 05/10/25 1154    Lab Status: Final result Specimen: Swab from Nasopharynx Updated: 05/10/25 1247     COVID19 Not Detected     Influenza A PCR Not Detected     Influenza B PCR Not Detected     RSV, PCR Not Detected    Narrative:      Fact sheet for providers: https://www.fda.gov/media/348780/download    Fact sheet for patients: https://www.fda.gov/media/950355/download    Test performed by PCR.          [x]  Radiology  XR Hip With or Without Pelvis 2 - 3 View Right  Result Date: 5/10/2025  Impression: Intact total right hip arthroplasty. Moderate degenerative changes in the left hip. Electronically Signed: Chucho Mcdonnell MD  5/10/2025 6:39 PM EDT  Workstation ID: [id number]    MRI Brain With & Without Contrast  Result Date: 5/10/2025  Impression: 1.5 cm x 6 mm acute infarct anteriorly in the right thalamus. Electronically Signed: Chucho Mcdonnell MD  5/10/2025 3:21 PM EDT  Workstation ID:  [id number]    CT Head Without Contrast  Result Date: 5/10/2025  Impression: 1. No evidence of acute hemorrhage or midline shift. 2. New low-attenuation anteriorly in the right thalamus possibly an area of old infarction. Suggest a follow-up MRI examination with and without intravenous gadolinium to exclude an acute infarct or mass. Electronically Signed: Chucho Mcdonnell MD  5/10/2025 12:08 PM EDT  Workstation ID: [id number]    XR Chest 1 View  Result Date: 5/10/2025  Impression: Patchy airspace opacity in the lung fields right greater than left possibly atelectasis or pneumonia. Electronically Signed: Chucho Mcdonnell MD  5/10/2025 12:00 PM EDT  Workstation ID: [id number]    XR Abdomen KUB  Result Date: 5/10/2025  Impression: No evidence of bowel obstruction. Horseshoe kidney with punctate renal calcifications is not well visualized on this examination. Electronically Signed: Chucho Mcdonnell MD  5/10/2025 11:42 AM EDT  Workstation ID: [id number]    []  EKG/Telemetry   []  Cardiology/Vascular   []  Pathology  []  Old records  []  Other:    Assessment & Plan        Assessment and Plan:    #Acute right thalamic stroke  Aspirin and statin  Hemoglobin A1c ordered  Teleneurology consulted  MRA head and neck ordered  Echo ordered    #Depression/anxiety  Continue Seroquel    Restart home medications when appropriate     Discussed with RN.    VTE Prophylaxis:  Mechanical VTE prophylaxis orders are present.        CODE STATUS:   Code Status (Patient has no pulse and is not breathing): CPR (Attempt to Resuscitate)  Medical Interventions (Patient has pulse or is breathing): Full Support      Electronically signed by Joaquim Norris MD, 5/11/2025, 12:25 EDT.

## 2025-05-11 NOTE — THERAPY EVALUATION
Acute Care - Physical Therapy Initial Evaluation   [place]     Patient Name: Criss Edwards  : 1950  MRN: [id number]  Today's Date: 2025      Visit Dx:     ICD-10-CM ICD-9-CM   1. Acute thalamic infarction  I63.81 434.91   2. Pharyngoesophageal dysphagia  R13.14 787.24   3. Weakness  R53.1 780.79     Patient Active Problem List   Diagnosis    Primary osteoarthritis of left knee    Status post total hip replacement, right    Essential hypertension    Hypertrophic cardiomyopathy    Meniere's disease    Mitral valve prolapse    Mixed stress and urge urinary incontinence    Major depressive disorder, recurrent episode, moderate degree    Generalized osteoarthritis    Osteopenia of multiple sites    Seasonal allergic rhinitis due to pollen    Vitamin D deficiency    Gastroesophageal reflux disease without esophagitis    Lumbar radiculopathy    Allergic dermatitis    Generalized anxiety disorder    Primary osteoarthritis, left shoulder    Other fatigue    Type 2 diabetes mellitus without complication, without long-term current use of insulin    Nephrolithiasis    Stroke     Past Medical History:   Diagnosis Date    Dizziness     History of back surgery     unkown what surgeries    History of total right hip replacement     HL (hearing loss)     Hypertension      Past Surgical History:   Procedure Laterality Date    CHOLECYSTECTOMY      COLONOSCOPY      ENDOSCOPY N/A 2024    Procedure: ESOPHAGOGASTRODUODENOSCOPY;  Surgeon: Mallory Lua MD;  Location: [facility] ENDOSCOPY;  Service: Gastroenterology;  Laterality: N/A;  GASTRIC POLYP ESOPH DILATATION 18MM ESOPH DIVERTICULUIM    HAND SURGERY Left 2016    HIP ARTHROPLASTY Right     KIDNEY STONE SURGERY       PT Assessment (Last 12 Hours)       PT Evaluation and Treatment       Row Name 25 0933          Physical Therapy Time and Intention    Subjective Information complains of;weakness  -JH     Document Type evaluation  -     Mode  of Treatment individual therapy;physical therapy  -     Patient Effort good  -     Symptoms Noted During/After Treatment none  -[facility] Name 05/11/25 0933          General Information    Patient Profile Reviewed [facility]  -     Patient Observations alert;cooperative;agree to therapy  -     Prior Level of Function independent:;all household mobility;transfer  -     Barriers to Rehab none identified  -[facility] Name 05/11/25 0933          Living Environment    Current Living Arrangements home  -     Primary Care Provided by self  -[facility] Name 05/11/25 0933          Pain    Pain Location hip  -     Pain Side/Orientation right  -[facility] Name 05/11/25 0933          Range of Motion (ROM)    Range of Motion bilateral lower extremities;ROM is [facility] Name 05/11/25 09          Strength (Manual Muscle Testing)    Strength (Manual Muscle Testing) bilateral lower extremities;strength is [facility] Name 05/11/25 0933          Bed Mobility    Bed Mobility bed mobility (all) activities  -     All Activities, [place] (Bed Mobility) standby assist  -[facility] 05/11/25 0933          Transfers    Transfers sit-stand transfer;stand-sit transfer  -JH       Row Name 05/11/25 09          Sit-Stand Transfer    Sit-Stand [place] (Transfers) contact guard;verbal cues  -     Assistive Device (Sit-Stand Transfers) [facility]          Stand-Sit Transfer    Stand-Sit [place] (Transfers) contact guard;verbal cues  [facility]     Assistive Device (Stand-Sit Transfers) walker, front-wheeled  [facility] Name 05/11/25 0933          Gait/Stairs (Locomotion)    Gait/Stairs Locomotion gait/ambulation assistive device  -     [place] Level (Gait) contact guard;verbal cues  -     Assistive Device (Gait) walker, front-wheeled  [facility]     Patient was able to Ambulate yes  -     Distance in Feet (Gait) 50  pt required rest breaks  during ambulation  -     Pattern (Gait) step-to  -     Deviations/Abnormal Patterns (Gait) gait speed decreased  -JH       Row Name 05/11/25 0933          Safety Issues/Impairments Affecting Functional Mobility    Impairments Affecting Function (Mobility) balance;endurance/activity tolerance;strength;pain  -JH       Row Name 05/11/25 0933          Balance    Balance Assessment standing dynamic balance  -     Dynamic Standing Balance contact guard;verbal cues  -     Position/Device Used, Standing Balance walker, front-wheeled  -[facility] 05/11/25 0933          Plan of Care Review    Plan of Care Reviewed With patient  -     Outcome Evaluation Pt presents with decreased ambulatory and transfer function and safety due to diminished dynamic balance and strength. Pt will benefit from skilled physical therapy to address deficits and improve safety.  -[facility] 05/11/25 0933          Positioning and Restraints    Pre-Treatment Position in bed  -     Post Treatment Position bed  -     In Bed call light within reach;encouraged to call for assist;exit alarm on  PT positioned a pillow under pt's R hip to relieve some pain she states is from her KENZIE from years ago.  -[facility] 05/11/25 0933          Therapy Assessment/Plan (PT)    Patient/Family Therapy Goals Statement (PT) Improve balance and strength  -JH       Row Name 05/11/25 0933          Therapy Plan Review/Discharge Plan (PT)    Therapy Plan Review (PT) evaluation/treatment results reviewed;participants voiced agreement with care plan;patient  -JH       Row Name 05/11/25 0933          Physical Therapy Goals    Bed Mobility Goal Selection (PT) bed mobility, PT goal 1  -     Transfer Goal Selection (PT) transfer, PT goal 1  -     Gait Training Goal Selection (PT) gait training, PT goal 1  -JH       Row Name 05/11/25 0933          Bed Mobility Goal 1 (PT)    Activity/Assistive Device (Bed Mobility Goal 1, PT) bed mobility  activities, all  -     [place]tis Level/Cues Needed (Bed Mobility Goal 1, PT) independent  -     Time Frame (Bed Mobility Goal 1, PT) 10 days  -       Row Name 05/11/25 0933          Transfer Goal 1 (PT)    Activity/Assistive Device (Transfer Goal 1, PT) transfers, all  -     Time Frame (Transfer Goal 1, PT) 10 days  -       Row Name 05/11/25 0933          Gait Training Goal 1 (PT)    Activity/Assistive Device (Gait Training Goal 1, PT) gait (walking locomotion);assistive device use;walker, rolling  -     [place] Level (Gait Training Goal 1, PT) independent  -     Distance (Gait Training Goal 1, PT) 300  -     Time Frame (Gait Training Goal 1, PT) 10 days  -               User Key  (r) = Recorded By, (t) = Taken By, (c) = Cosigned By      Initials Name Provider Type    Juvenal Palacios PT Physical Therapist                    Physical Therapy Education       Title: PT OT SLP Therapies (Done)       Topic: Physical Therapy (Done)       Point: Mobility training (Done)       Learning Progress Summary            Patient Acceptance, E, VU by  at 5/11/2025 0940                      Point: Home exercise program (Done)       Learning Progress Summary            Patient Acceptance, E, VU by  at 5/11/2025 0940                      Point: Body mechanics (Done)       Learning Progress Summary            Patient Acceptance, E, VU by  at 5/11/2025 0940                      Point: Precautions (Done)       Learning Progress Summary            Patient Acceptance, E, VU by  at 5/11/2025 0940                                      User Key       Initials Effective Dates Name Provider Type [facility] 05/08/23 -  Juvenal Barahona PT Physical Therapist PT                  PT Recommendation and Plan  Anticipated Discharge Disposition (PT): inpatient rehabilitation facility  Plan of Care Reviewed With: patient  Outcome Evaluation: Pt presents with decreased ambulatory and transfer function and safety due  to diminished dynamic balance and strength. Pt will benefit from skilled physical therapy to address deficits and improve safety.   Outcome Measures       Row Name 05/11/25 0900             How much help from another person do you currently need...    Turning from your back to your side while in flat bed without using bedrails? 4  -JH      Moving from lying on back to sitting on the side of a flat bed without bedrails? 3  -JH      Moving to and from a bed to a chair (including a wheelchair)? 3  -JH      Standing up from a chair using your arms (e.g., wheelchair, bedside chair)? 3  -JH      Climbing 3-5 steps with a railing? 2  -JH      To walk in hospital room? 3  -JH      AM-PAC 6 Clicks Score (PT) 18  -         Functional Assessment    Outcome Measure Options AM-PAC 6 Clicks Basic Mobility (PT)  -                User Key  (r) = Recorded By, (t) = Taken By, (c) = Cosigned By      Initials Name Provider Type    Juvenal Palacios PT Physical Therapist                     Time Calculation:    PT Charges       Row Name 05/11/25 0933             Time Calculation    PT Received On 05/11/25  -      PT Goal Re-Cert Due Date 05/20/25  -         Untimed Charges    PT Eval/Re-eval Minutes 38  -         Total Minutes    Untimed Charges Total Minutes 38  -JH       Total Minutes 38  -JH                User Key  (r) = Recorded By, (t) = Taken By, (c) = Cosigned By      Initials Name Provider Type    Juvenal Palacios PT Physical Therapist                  Therapy Charges for Today       Code Description Service Date Service Provider Modifiers Qty    38045624552 HC PT EVAL LOW COMPLEXITY 3 5/11/2025 Juvenal Barahona PT GP 1            PT G-Codes  Outcome Measure Options: AM-PAC 6 Clicks Basic Mobility (PT)  AM-PAC 6 Clicks Score (PT): 18    Juvenal Barahona PT  5/11/2025

## 2025-05-11 NOTE — PROGRESS NOTES
[facility]  [facility] Tele[facility] Consult [facility]    Routine Consult Follow-Up    Patient Name:   Criss Edwards  YOB: 1950  Identification Number:   MRN - [id number]  Date of Service:   05/11/2025 10:53:19    Diagnosis        I63.89 - Cerebrovascular accident (CVA) due to other mechanism ([facility])    Impression  Patient is a 74-year-old female with past medical history of hypertension. Found to have right thalamic stroke. MRA head and neck pending, echo pending. She has contrast dye allergy. Patient to continue aspirin, atorvastatin. Long-term may need loop recorder/Holter monitor. Will continue to follow    Our recommendations are outlined below    Diagnostic Studies :  Holter/Loop Recorder as outpatient with cardiology follow up    Laboratory Studies :  Lipid panel  I ordered  Hemoglobin A1c    Antithrombotic Medication :  Aspirin 81 mg PO dailyStatins for LDL goal less than 70    Nursing Recommendations :  Continue with Telemetry    Consultations :  Physical therapy/Occupational therapy    DVT Prophylaxis :  Choice of Primary Team    Disposition :  Neurology will follow    Subjective  Patient states she face shaky all over. States she went to her primary care yesterday because her kidneys were hurting and she was to be tested for UTI.    Hospital Course  Patient is a 74-year-old female with past medical history of hypertension. She presented with altered mental status. Drove herself to to primary care visit yesterday was acting strange poor memory. Yesterday morning was lethargic and not feeling well. Records indicate possible sundowning as well as night terrors.    Imaging  MRI brain with and without contrast: Findings:  There is a 1.5 cm x 6 mm area of restricted diffusion anteriorly in the right thalamus consistent with an area of acute infarction. There is mild generalized atrophy with prominence of the ventricular system. No evidence of acute intracranial hemorrhage,  mass or  midline shift. No extra-axial fluid collections are identified. There are chronic appearing changes in the white matter. There are no areas of abnormal contrast enhancement. There is mild proptosis similar to the prior examination..    IMPRESSION:  Impression:  1.5 cm x 6 mm acute infarct anteriorly in the right thalamus.    Labs  Glucose 120,  Hba1c 7.4      Examination  BP(162/94), Pulse(78), Temp(97.5), Resp(18),  1A: Level of Consciousness - Alert; keenly responsive + 0  1B: Ask Month and Age - Both Questions Right + 0  1C: Blink Eyes & Squeeze Hands - Performs Both Tasks + 0  2: Test Horizontal Extraocular Movements - Normal + 0  3: Test Visual Fields - No Visual Loss + 0  4: Test Facial Palsy (Use Grimace if Obtunded) - Minor paralysis (flat nasolabial fold, smile asymmetry) + 1  5A: Test Left Arm Motor Drift - No Drift for 10 Seconds + 0  5B: Test Right Arm Motor Drift - No Drift for 10 Seconds + 0  6A: Test Left Leg Motor Drift - No Drift for 5 Seconds + 0  6B: Test Right Leg Motor Drift - No Drift for 5 Seconds + 0  7: Test Limb Ataxia (FNF/Heel-Shin) - No Ataxia + 0  8: Test Sensation - Normal; No sensory loss + 0  9: Test Language/Aphasia - Normal; No aphasia + 0  10: Test Dysarthria - Normal + 0  11: Test Extinction/Inattention - No abnormality + 0    NIHSS Score: 1          This consult was conducted in real time using interactive audio and video technology. Patient was informed of the technology being used for this visit and agreed to proceed. Patient located in hospital and provider located at home/office setting.    Telehealth Neurology consultation was provided. I spent minutes providing telehealth care. This includes time spent for face to face visit via telemedicine, review of medical records, imaging studies and discussion of findings with providers, the patient and/or family.      Dr Huong Fortune      [facility]  For Inpatient follow-up with [facility] physician please call [facility]  at [phone]. As we are not an outpatient service for any post hospital discharge needs please contact the hospital for assistance.  If you have any questions for the [facility] physicians or need to reconsult for clinical or diagnostic changes please contact us via [facility] at [phone]

## 2025-05-12 ENCOUNTER — APPOINTMENT (OUTPATIENT)
Dept: MRI IMAGING | Facility: HOSPITAL | Age: 75
DRG: 065 | End: 2025-05-12
Payer: MEDICARE

## 2025-05-12 ENCOUNTER — TELEPHONE (OUTPATIENT)
Dept: NEUROLOGY | Facility: CLINIC | Age: 75
End: 2025-05-12

## 2025-05-12 LAB
ANION GAP SERPL CALCULATED.3IONS-SCNC: 10 MMOL/L (ref 5–15)
BUN SERPL-MCNC: 10 MG/DL (ref 8–23)
BUN/CREAT SERPL: 15.2 (ref 7–25)
CALCIUM SPEC-SCNC: 9.8 MG/DL (ref 8.6–10.5)
CHLORIDE SERPL-SCNC: 104 MMOL/L (ref 98–107)
CO2 SERPL-SCNC: 23 MMOL/L (ref 22–29)
CREAT SERPL-MCNC: 0.66 MG/DL (ref 0.57–1)
DEPRECATED RDW RBC AUTO: 47.8 FL (ref 37–54)
EGFRCR SERPLBLD CKD-EPI 2021: 91 ML/MIN/1.73
ERYTHROCYTE [DISTWIDTH] IN BLOOD BY AUTOMATED COUNT: 13.6 % (ref 12.3–15.4)
GLUCOSE BLDC GLUCOMTR-MCNC: 129 MG/DL (ref 70–99)
GLUCOSE BLDC GLUCOMTR-MCNC: 144 MG/DL (ref 70–99)
GLUCOSE BLDC GLUCOMTR-MCNC: 160 MG/DL (ref 70–99)
GLUCOSE SERPL-MCNC: 144 MG/DL (ref 65–99)
HCT VFR BLD AUTO: 43.5 % (ref 34–46.6)
HGB BLD-MCNC: 14.1 G/DL (ref 12–15.9)
MCH RBC QN AUTO: 30.9 PG (ref 26.6–33)
MCHC RBC AUTO-ENTMCNC: 32.4 G/DL (ref 31.5–35.7)
MCV RBC AUTO: 95.4 FL (ref 79–97)
PLATELET # BLD AUTO: 187 10*3/MM3 (ref 140–450)
PMV BLD AUTO: 9.9 FL (ref 6–12)
POTASSIUM SERPL-SCNC: 4.1 MMOL/L (ref 3.5–5.2)
RBC # BLD AUTO: 4.56 10*6/MM3 (ref 3.77–5.28)
SODIUM SERPL-SCNC: 137 MMOL/L (ref 136–145)
WBC NRBC COR # BLD AUTO: 7.28 10*3/MM3 (ref 3.4–10.8)

## 2025-05-12 PROCEDURE — 63710000001 INSULIN LISPRO (HUMAN) PER 5 UNITS: Performed by: STUDENT IN AN ORGANIZED HEALTH CARE EDUCATION/TRAINING PROGRAM

## 2025-05-12 PROCEDURE — 70547 MR ANGIOGRAPHY NECK W/O DYE: CPT

## 2025-05-12 PROCEDURE — 80048 BASIC METABOLIC PNL TOTAL CA: CPT | Performed by: STUDENT IN AN ORGANIZED HEALTH CARE EDUCATION/TRAINING PROGRAM

## 2025-05-12 PROCEDURE — 99232 SBSQ HOSP IP/OBS MODERATE 35: CPT | Performed by: STUDENT IN AN ORGANIZED HEALTH CARE EDUCATION/TRAINING PROGRAM

## 2025-05-12 PROCEDURE — 92526 ORAL FUNCTION THERAPY: CPT

## 2025-05-12 PROCEDURE — 82948 REAGENT STRIP/BLOOD GLUCOSE: CPT

## 2025-05-12 PROCEDURE — 97110 THERAPEUTIC EXERCISES: CPT

## 2025-05-12 PROCEDURE — 82948 REAGENT STRIP/BLOOD GLUCOSE: CPT | Performed by: STUDENT IN AN ORGANIZED HEALTH CARE EDUCATION/TRAINING PROGRAM

## 2025-05-12 PROCEDURE — 97166 OT EVAL MOD COMPLEX 45 MIN: CPT

## 2025-05-12 PROCEDURE — 85027 COMPLETE CBC AUTOMATED: CPT | Performed by: STUDENT IN AN ORGANIZED HEALTH CARE EDUCATION/TRAINING PROGRAM

## 2025-05-12 PROCEDURE — 97116 GAIT TRAINING THERAPY: CPT

## 2025-05-12 PROCEDURE — 70544 MR ANGIOGRAPHY HEAD W/O DYE: CPT

## 2025-05-12 RX ORDER — FAMOTIDINE 20 MG/1
20 TABLET, FILM COATED ORAL 2 TIMES DAILY
Status: DISCONTINUED | OUTPATIENT
Start: 2025-05-12 | End: 2025-05-13 | Stop reason: HOSPADM

## 2025-05-12 RX ORDER — IBUPROFEN 600 MG/1
1 TABLET ORAL
Status: DISCONTINUED | OUTPATIENT
Start: 2025-05-12 | End: 2025-05-13 | Stop reason: HOSPADM

## 2025-05-12 RX ORDER — INSULIN LISPRO 100 [IU]/ML
2-7 INJECTION, SOLUTION INTRAVENOUS; SUBCUTANEOUS
Status: DISCONTINUED | OUTPATIENT
Start: 2025-05-12 | End: 2025-05-13 | Stop reason: HOSPADM

## 2025-05-12 RX ORDER — NICOTINE POLACRILEX 4 MG
15 LOZENGE BUCCAL
Status: DISCONTINUED | OUTPATIENT
Start: 2025-05-12 | End: 2025-05-13 | Stop reason: HOSPADM

## 2025-05-12 RX ORDER — DEXTROSE MONOHYDRATE 25 G/50ML
25 INJECTION, SOLUTION INTRAVENOUS
Status: DISCONTINUED | OUTPATIENT
Start: 2025-05-12 | End: 2025-05-13 | Stop reason: HOSPADM

## 2025-05-12 RX ADMIN — QUETIAPINE FUMARATE 25 MG: 25 TABLET ORAL at 20:58

## 2025-05-12 RX ADMIN — Medication 10 ML: at 20:58

## 2025-05-12 RX ADMIN — ATORVASTATIN CALCIUM 80 MG: 40 TABLET, FILM COATED ORAL at 20:58

## 2025-05-12 RX ADMIN — INSULIN LISPRO 2 UNITS: 100 INJECTION, SOLUTION INTRAVENOUS; SUBCUTANEOUS at 12:41

## 2025-05-12 RX ADMIN — FAMOTIDINE 20 MG: 20 TABLET, FILM COATED ORAL at 20:58

## 2025-05-12 RX ADMIN — FAMOTIDINE 20 MG: 20 TABLET, FILM COATED ORAL at 11:34

## 2025-05-12 RX ADMIN — Medication 10 ML: at 09:39

## 2025-05-12 RX ADMIN — ASPIRIN 81 MG: 81 TABLET, CHEWABLE ORAL at 09:38

## 2025-05-12 NOTE — PLAN OF CARE
Goal Outcome Evaluation:  Plan of Care Reviewed With: patient        Progress: no change  Outcome Evaluation: No acute changes this shift. VSS.

## 2025-05-12 NOTE — THERAPY TREATMENT NOTE
Acute Care - Speech Language Pathology   Swallow Treatment Note  Kristy     Patient Name: Criss Edwards  : 1950  MRN: [id number]  Today's Date: 2025               Admit Date: 5/10/2025    Visit Dx:     ICD-10-CM ICD-9-CM   1. Acute thalamic infarction  I63.81 434.91   2. Pharyngoesophageal dysphagia  R13.14 787.24   3. Weakness  R53.1 780.79     Patient Active Problem List   Diagnosis    Primary osteoarthritis of left knee    Status post total hip replacement, right    Essential hypertension    Hypertrophic cardiomyopathy    Meniere's disease    Mitral valve prolapse    Mixed stress and urge urinary incontinence    Major depressive disorder, recurrent episode, moderate degree    Generalized osteoarthritis    Osteopenia of multiple sites    Seasonal allergic rhinitis due to pollen    Vitamin D deficiency    Gastroesophageal reflux disease without esophagitis    Lumbar radiculopathy    Allergic dermatitis    Generalized anxiety disorder    Primary osteoarthritis, left shoulder    Other fatigue    Type 2 diabetes mellitus without complication, without long-term current use of insulin    Nephrolithiasis    Stroke     Past Medical History:   Diagnosis Date    Dizziness     History of back surgery     unkown what surgeries    History of total right hip replacement     HL (hearing loss)     Hypertension      Past Surgical History:   Procedure Laterality Date    CHOLECYSTECTOMY      COLONOSCOPY      ENDOSCOPY N/A 2024    Procedure: ESOPHAGOGASTRODUODENOSCOPY;  Surgeon: Mallory Lua MD;  Location: [facility] ENDOSCOPY;  Service: Gastroenterology;  Laterality: N/A;  GASTRIC POLYP ESOPH DILATATION 18MM ESOPH DIVERTICULUIM    HAND SURGERY Left 2016    HIP ARTHROPLASTY Right     KIDNEY STONE SURGERY           SPEECH PATHOLOGY DYSPHAGIA TREATMENT    Subjective/Behavioral Observations: Alert and cooperative, sitting up in chair, feeding self.        Day/time of Treatment:  5/12/2025      Current Diet: Regular, thin      Current Strategies: Alternate small bites and small sips of solids and liquids at a slow rate.  Frequent dry swallow.  Larger medications whole in applesauce or partially melted ice cream.       Treatment received: Dysphagia therapy to address swallow function through exercises and education of strategies.      Results of treatment: Patient educated for strategies for improving swallow/p.o. intake.  Patient stated having difficulty with last night's meal likely due to breads.  Patient demonstrating understanding, verbalizing understanding of strategies.  No difficulty noted with a.m. meal with soft cereal.  Patient demonstrated strategies with minimal cueing.      Progress toward goals: Good      Barriers to Achieving goals: N/A      Plan of care:/changes in plan: Continue per current plan.                                                                                                      EDUCATION  The patient has been educated in the following areas:   Modified Diet Instruction.                Time Calculation:    Time Calculation- SLP       Row Name 05/12/25 1018             Time Calculation- SLP    SLP Stop Time 0845  -TB      SLP Received On 05/12/25  -TB         Untimed Charges    SLP Eval/Re-eval  ST Eval Oral Pharyng Swallow - 12508  -TB      [id number] Treatment Swallow Minutes 40  -TB         Total Minutes    Untimed Charges Total Minutes 40  -TB       Total Minutes 40  -TB                User Key  (r) = Recorded By, (t) = Taken By, (c) = Cosigned By      Initials Name Provider Type    TB Celestina Guy SLP Speech and Language Pathologist                    Therapy Charges for Today       Code Description Service Date Service Provider Modifiers Qty    22435076803 HC ST EVAL ORAL PHARYNG SWALLOW 4 5/11/2025 Celestina Guy SLP GN 1    [id number] HC ST TREATMENT SWALLOW 3 5/12/2025 Celestina Guy SLP GN 1                 CHERRI Moon  5/12/2025

## 2025-05-12 NOTE — PLAN OF CARE
Goal Outcome Evaluation:  Plan of Care Reviewed With: patient        Progress: no change (Evaluation complete)  Outcome Evaluation: Patient presents with limitations of cognition, strength, activity tolerance, and balance which impede her ability to perform ADLs/transfers as prior.  The skills of a therapist will be required to safely and effectively implement treatment plan to restore maximum level function.    Anticipated Discharge Disposition (OT): sub acute care setting

## 2025-05-12 NOTE — PLAN OF CARE
Goal Outcome Evaluation:  Plan of Care Reviewed With: patient        Progress: no change  Outcome Evaluation: No significant changes this shift. NIH - 0. Patient slept throughout the night. Call light within reach at all times.

## 2025-05-12 NOTE — PROGRESS NOTES
[facility]   Hospitalist Progress Note  Date: 2025  Patient Name: Criss Edwards  : 1950  MRN: [id number]  Date of admission: 5/10/2025  Room/Bed: [facility] [place]      Subjective   Subjective     Chief Complaint: Altered mental status    Summary:Criss Edwards is a 74 y.o. female with hypertension, hypertrophic cardiomyopathy, depression/anxiety, type 2 diabetes who presents to the emergency room due to altered mental status.  Also presented with generalized weakness and dizziness.  Found to have an acute right thalamic stroke on MRI.  Teleneurology consulted.  MRA head and neck within normal limits.  Echo showed grade 1 diastolic dysfunction.  Patient will need referral to cardiology for loop/Holter monitor.  Patient worked with physical therapy.  She needs rehab.     Interval Followup:   Discussed MRI and echo findings with patient.  Patient is amenable to rehab.  She wants to go to [place].     All systems reviewed and negative except for what is outlined above.      Objective   Objective     Vitals:   Temp:  [97.5 °F (36.4 °C)-99 °F (37.2 °C)] 99 °F (37.2 °C)  Heart Rate:  [] 99  Resp:  [16] 16  BP: (124-147)/(75-96) 138/84    Physical Exam   General: NAD  Cardiovascular: Normal S1, S2  Pulmonary: No accessory muscle use  Neuro: Moving all 4 extremities spontaneously  Psych: Mood and affect appropriate    Result Review    Result Review:  I have personally reviewed these results:  [x]  Laboratory      Lab 25  0507 05/10/25  1528 05/10/25  1151 25  1007   WBC 7.28  --  7.00 8.95   HEMOGLOBIN 14.1  --  14.5 15.2   HEMATOCRIT 43.5  --  44.3 47.4*   PLATELETS 187  --  207 254   NEUTROS ABS  --   --  4.39 5.92   IMMATURE GRANS (ABS)  --   --  0.03 0.04   LYMPHS ABS  --   --  1.92 2.17   MONOS ABS  --   --  0.42 0.61   EOS ABS  --   --  0.19 0.17   MCV 95.4  --  95.5 96.1   LACTATE  --  1.8 2.1*  --    PROTIME  --   --  14.5  --    D DIMER QUANT  --   --   --  0.46         Lab  05/12/25  0507 05/11/25  0545 05/10/25  1151 05/09/25  1007   SODIUM 137  --  138 140   POTASSIUM 4.1  --  4.2 4.6   CHLORIDE 104  --  100 100   CO2 23.0  --  28.3 27.1   ANION GAP 10.0  --  9.7 12.9   BUN 10  --  13 16   CREATININE 0.66  --  0.72 0.87   EGFR 91.0  --  86.8 69.1   GLUCOSE 144*  --  158* 145*   CALCIUM 9.8  --  10.8* 11.0*   MAGNESIUM  --   --  1.7  --    HEMOGLOBIN A1C  --  7.40*  --  7.40*         Lab 05/10/25  1151 05/09/25  1007   TOTAL PROTEIN 7.4 7.9   ALBUMIN 4.4 4.6   GLOBULIN 3.0 3.3   ALT (SGPT) 38* 47*   AST (SGOT) 45* 72*   BILIRUBIN 0.3 0.6   ALK PHOS 94 85   LIPASE 38  --          Lab 05/10/25  1327 05/10/25  1151 05/09/25  1007   PROBNP  --  <36.0 <36.0   HSTROP T 8 8  --    PROTIME  --  14.5  --    INR  --  1.09  --          Lab 05/11/25  0545 05/09/25  1007   CHOLESTEROL 184 193   LDL CHOL 108* 117*   HDL CHOL 45 48   TRIGLYCERIDES 177* 157*             Brief Urine Lab Results  (Last result in the past 365 days)        Color   Clarity   Blood   Leuk Est   Nitrite   Protein   CREAT   Urine HCG        05/10/25 1055 Dark Yellow   Cloudy   Negative   Trace   Negative   Trace                 [x]  Microbiology   Microbiology Results (last 10 days)       Procedure Component Value - Date/Time    COVID-19, FLU A/B, RSV PCR 1 HR TAT - Swab, Nasopharynx [[id number]]  (Normal) Collected: 05/10/25 1154    Lab Status: Final result Specimen: Swab from Nasopharynx Updated: 05/10/25 1247     COVID19 Not Detected     Influenza A PCR Not Detected     Influenza B PCR Not Detected     RSV, PCR Not Detected    Narrative:      Fact sheet for providers: https://www.fda.gov/media/641230/download    Fact sheet for patients: https://www.fda.gov/media/566849/download    Test performed by PCR.          [x]  Radiology  MRI Angiogram Neck Without Contrast  Result Date: 5/12/2025  Impression: Unremarkable MRA of the head and neck (please see above discussion). Electronically Signed: Jax Locke MD  5/12/2025 8:34 AM  EDT  Workstation ID: [id number]    MRI Angiogram Head Without Contrast  Result Date: 5/12/2025  Impression: Unremarkable MRA of the head and neck (please see above discussion). Electronically Signed: Jax Locke MD  5/12/2025 8:34 AM EDT  Workstation ID: [id number]    XR Hip With or Without Pelvis 2 - 3 View Right  Result Date: 5/10/2025  Impression: Intact total right hip arthroplasty. Moderate degenerative changes in the left hip. Electronically Signed: Chucho Mcdonnell MD  5/10/2025 6:39 PM EDT  Workstation ID: [id number]    MRI Brain With & Without Contrast  Result Date: 5/10/2025  Impression: 1.5 cm x 6 mm acute infarct anteriorly in the right thalamus. Electronically Signed: Chucho Mcdonnell MD  5/10/2025 3:21 PM EDT  Workstation ID: [id number]    CT Head Without Contrast  Result Date: 5/10/2025  Impression: 1. No evidence of acute hemorrhage or midline shift. 2. New low-attenuation anteriorly in the right thalamus possibly an area of old infarction. Suggest a follow-up MRI examination with and without intravenous gadolinium to exclude an acute infarct or mass. Electronically Signed: Chucho Mcdonnell MD  5/10/2025 12:08 PM EDT  Workstation ID: [id number]    XR Chest 1 View  Result Date: 5/10/2025  Impression: Patchy airspace opacity in the lung fields right greater than left possibly atelectasis or pneumonia. Electronically Signed: Chucho Mcdonnell MD  5/10/2025 12:00 PM EDT  Workstation ID: [id number]    XR Abdomen KUB  Result Date: 5/10/2025  Impression: No evidence of bowel obstruction. Horseshoe kidney with punctate renal calcifications is not well visualized on this examination. Electronically Signed: Cuhcho Mcdonnell MD  5/10/2025 11:42 AM EDT  Workstation ID: [id number]    []  EKG/Telemetry   []  Cardiology/Vascular   []  Pathology  []  Old records  []  Other:    Assessment & Plan        Assessment and Plan:    #Acute right thalamic stroke  Aspirin and statin  Teleneurology consulted, signed off  MRA head and neck appreciated  Echo  with grade 1 diastolic dysfunction    #Depression/anxiety  Continue Seroquel    #Diabetes  Sliding scale insulin  Glucose checks    #Hypertension  Restart home meds when appropriate     Discussed with RN.    VTE Prophylaxis:  Mechanical VTE prophylaxis orders are present.        CODE STATUS:   Code Status (Patient has no pulse and is not breathing): CPR (Attempt to Resuscitate)  Medical Interventions (Patient has pulse or is breathing): Full Support      Electronically signed by Joaquim Norris MD, 5/12/2025, 12:24 EDT.

## 2025-05-12 NOTE — TELEPHONE ENCOUNTER
Left detailed message on VM that she can come back up to the office and get another kit or can just wait until she has to urinate then come up to office and give sample then.  OK FOR HUB TO RELAY

## 2025-05-12 NOTE — CONSULTS
Consult received per Stroke Protocol. Patient with a noted DM diagnosis, with a current HbA1c of 7.4%, and an estimated average glucose of 166 mg/dl. Patient's home regimen consists of Metformin 500 mg daily.

## 2025-05-12 NOTE — THERAPY EVALUATION
Patient Name: Criss Edwards  : 1950    MRN: [id number]                              Today's Date: 2025       Admit Date: 5/10/2025    Visit Dx:     ICD-10-CM ICD-9-CM   1. Acute thalamic infarction  I63.81 434.91   2. Pharyngoesophageal dysphagia  R13.14 787.24   3. Weakness  R53.1 780.79     Patient Active Problem List   Diagnosis    Primary osteoarthritis of left knee    Status post total hip replacement, right    Essential hypertension    Hypertrophic cardiomyopathy    Meniere's disease    Mitral valve prolapse    Mixed stress and urge urinary incontinence    Major depressive disorder, recurrent episode, moderate degree    Generalized osteoarthritis    Osteopenia of multiple sites    Seasonal allergic rhinitis due to pollen    Vitamin D deficiency    Gastroesophageal reflux disease without esophagitis    Lumbar radiculopathy    Allergic dermatitis    Generalized anxiety disorder    Primary osteoarthritis, left shoulder    Other fatigue    Type 2 diabetes mellitus without complication, without long-term current use of insulin    Nephrolithiasis    Stroke     Past Medical History:   Diagnosis Date    Dizziness     History of back surgery     unkown what surgeries    History of total right hip replacement     HL (hearing loss)     Hypertension      Past Surgical History:   Procedure Laterality Date    CHOLECYSTECTOMY      COLONOSCOPY      ENDOSCOPY N/A 2024    Procedure: ESOPHAGOGASTRODUODENOSCOPY;  Surgeon: Mallory Lua MD;  [facility];  Service: Gastroenterology;  Laterality: N/A;  GASTRIC POLYP ESOPH DILATATION 18MM ESOPH DIVERTICULUIM    HAND SURGERY Left 2016    HIP ARTHROPLASTY Right     KIDNEY STONE SURGERY        General Information       Row Name 25 1452          OT Time and Intention    Document Type evaluation  -SC     Mode of Treatment individual therapy;occupational therapy  -SC     Patient Effort good  -SC       Row Name 25 1455     "      General Information    Patient Profile Reviewed yes  -SC     Prior Level of Function independent:  Pt lives alone and PLOF is independence in ADLs/IADLs. She reports using a cane for mobilitly. Pt reports shehas a garden tub w/ chair in tub where she sits to shower but did verbalize much difficulty getting in/out of garden tub  -SC     Existing Precautions/Restrictions fall  -SC     Barriers to Rehab none identified  -SC       Row Name 05/12/25 1452          Occupational Profile    Reason for Services/Referral (Occupational Profile) Patient is a 74 year-old female admitted to [facility] on 5/10/2025 with altered mental status.  OT consulted due to a decline in function and mobility.  No previous OT services for current condition.  -SC     Patient Goals (Occupational Profile) Patient wishes to return home at prior level of function  -SC       Row Name 05/12/25 1452          Living Environment    Current Living Arrangements home  -SC     People in Home alone  -SC       Row Name 05/12/25 1452          Home Main Entrance    Number of Stairs, Main Entrance five  -SC     Stair Railings, Main Entrance railings safe and in good condition  -SC       Row Name 05/12/25 1452          Stairs Within Home, Primary    Number of Stairs, Within Home, Primary none  -SC     Stair Railings, Within Home, Primary none  -SC       Row Name 05/12/25 1452          Cognition    Orientation Status (Cognition) oriented to;person;place  -SC       Row Name 05/12/25 1452          Safety Issues/Impairments Affecting Functional Mobility    Safety Issues Affecting Function (Mobility) problem-solving;insight into deficits/self-awareness;judgment  Pt voiced safety concerns in her home prior to hospital and reported, \"I was having alot of trouble.\"  -SC     Impairments Affecting Function (Mobility) balance;endurance/activity tolerance;strength;pain;cognition  -SC               User Key  (r) = Recorded By, (t) = Taken By, (c) = Cosigned By      Initials " Name Provider Type    SC Barbara Rodriguez OT Occupational Therapist                     Mobility/ADL's       Row Name 05/12/25 1459          Bed Mobility    Bed Mobility bed mobility (all) activities  -SC     Comment, (Bed Mobility) Pt up in chair upon clinician arrival.  -SC       Row Name 05/12/25 1459          Transfers    Transfers sit-stand transfer;stand-sit transfer;bed-chair transfer  -[facility] 05/12/25 1459          Bed-Chair Transfer    Bed-Chair [place] (Transfers) minimum assist (75% patient effort)  -SC     Assistive Device (Bed-Chair Transfers) walker, front-wheeled  -[facility] Name 05/12/25 1459          Sit-Stand Transfer    Sit-Stand [place] (Transfers) contact guard  -SC     Assistive Device (Sit-Stand Transfers) walker, front-wheeled  -[facility] Name 05/12/25 1459          Stand-Sit Transfer    Stand-Sit [place] (Transfers) contact guard  -SC     Assistive Device (Stand-Sit Transfers) walker, front-wheeled  -SC       Row Name 05/12/25 1459          Functional Mobility    Functional Mobility- Ind. Level minimum assist (75% patient effort)  -SC     Functional Mobility- Device walker, front-wheeled  -SC     Functional Mobility- Comment Note a forward posture and unsteady balance  -[facility] Name 05/12/25 1459          Activities of Daily Living    BADL Assessment/Intervention bathing;upper body dressing;lower body dressing;grooming  -[facility] Name 05/12/25 1459          Bathing Assessment/Intervention    [place] Level (Bathing) moderate assist (50% patient effort)  -SC       Row Name 05/12/25 1459          Upper Body Dressing Assessment/Training    [place] Level (Upper Body Dressing) doff;don;upper body dressing skills;set up  -SC       Row Name 05/12/25 1459          Lower Body Dressing Assessment/Training    [place] Level (Lower Body Dressing) moderate assist (50% patient effort)  -[facility] Name 05/12/25 1459          Grooming  Assessment/Training    [place] Level (Grooming) standby assist  -SC               User Key  (r) = Recorded By, (t) = Taken By, (c) = Cosigned By      Initials Name Provider Type    SC Barbara Rodriguez OT Occupational Therapist                   Obj/Interventions       Row Name 05/12/25 1502          Sensory Assessment (Somatosensory)    Sensory Assessment (Somatosensory) sensation intact  -[facility] 05/12/25 1502          Vision Assessment/Intervention    Visual Impairment/Limitations corrective lenses full-time  -SC       Row Name 05/12/25 1502          Range of Motion Comprehensive    General Range of Motion bilateral upper extremity ROM WFL  -SC       Row Name 05/12/25 1502          Strength Comprehensive (MMT)    Comment, General Manual Muscle Testing (MMT) Assessment UB gross strength 4-/5 ; bilateral  3+/5 (pt reports weakness is not new and confirms much difficulty at home getting bottles opened)  -SC       Row Name 05/12/25 1502          Motor Skills    Motor Skills coordination;functional endurance  -SC     Coordination WFL  -SC     Functional Endurance fair-  -SC       Row Name 05/12/25 1502          Balance    Balance Assessment standing dynamic balance;standing static balance  -SC     Static Standing Balance contact guard  -SC     Dynamic Standing Balance minimal assist  -SC               User Key  (r) = Recorded By, (t) = Taken By, (c) = Cosigned By      Initials Name Provider Type    SC Barbara Rodriguez OT Occupational Therapist                   Goals/Plan       Row Name 05/12/25 1513          Bed Mobility Goal 1 (OT)    Activity/Assistive Device (Bed Mobility Goal 1, OT) bed mobility activities, all  -SC     [place] Level/Cues Needed (Bed Mobility Goal 1, OT) modified independence  -SC     Time Frame (Bed Mobility Goal 1, OT) long term goal (LTG);10 days  -SC       Row Name 05/12/25 1513          Transfer Goal 1 (OT)    Activity/Assistive Device (Transfer Goal 1, OT) transfers, all   -SC     [place] Level/Cues Needed (Transfer Goal 1, OT) modified independence  -SC     Time Frame (Transfer Goal 1, OT) long term goal (LTG);10 days  -SC       Row Name 05/12/25 1513          Bathing Goal 1 (OT)    Activity/Device (Bathing Goal 1, OT) bathing skills, all  -SC     [place] Level/Cues Needed (Bathing Goal 1, OT) modified independence  -SC     Time Frame (Bathing Goal 1, OT) long term goal (LTG);10 days  -SC       Row Name 05/12/25 1513          Dressing Goal 1 (OT)    Activity/Device (Dressing Goal 1, OT) dressing skills, all  -SC     [place]/Cues Needed (Dressing Goal 1, OT) modified independence  -SC     Time Frame (Dressing Goal 1, OT) long term goal (LTG);10 days  -SC       Row Name 05/12/25 1513          Toileting Goal 1 (OT)    Activity/Device (Toileting Goal 1, OT) toileting skills, all  -SC     [place] Level/Cues Needed (Toileting Goal 1, OT) modified independence  -SC     Time Frame (Toileting Goal 1, OT) long term goal (LTG);10 days  -SC       Row Name 05/12/25 1513          Grooming Goal 1 (OT)    Activity/Device (Grooming Goal 1, OT) grooming skills, all  -SC     [place] (Grooming Goal 1, OT) modified independence  -SC     Time Frame (Grooming Goal 1, OT) long term goal (LTG);10 days  -SC       Row Name 05/12/25 1513          Strength Goal 1 (OT)    Strength Goal 1 (OT) Patient will improve upper body strength 4/5 to support improved ADL function and mobility.  -SC     Time Frame (Strength Goal 1, OT) long term goal (LTG);10 days  -SC       Row Name 05/12/25 1513          Therapy Assessment/Plan (OT)    Planned Therapy Interventions (OT) activity tolerance training;IADL retraining;strengthening exercise;transfer/mobility retraining;patient/caregiver education/training;BADL retraining;functional balance retraining;occupation/activity based interventions;ROM/therapeutic exercise  -SC               User Key  (r) = Recorded By, (t) = Taken By, (c) = Cosigned By       Initials Name Provider Type    SC Barbara Rodriguez OT Occupational Therapist                   Clinical Impression       Row Name 05/12/25 1502          Pain Scale: FACES Pre/Post-Treatment    Pain: FACES Scale, Pretreatment 0-->no hurt  -SC     Posttreatment Pain Rating 0-->no hurt  -SC       Row Name 05/12/25 1507          Plan of Care Review    Plan of Care Reviewed With patient  -SC     Progress no change  Evaluation complete  -SC     Outcome Evaluation Patient presents with limitations of cognition, strength, activity tolerance, and balance which impede her ability to perform ADLs/transfers as prior.  The skills of a therapist will be required to safely and effectively implement treatment plan to restore maximum level function.  -SC       Row Name 05/12/25 1508          Therapy Assessment/Plan (OT)    Rehab Potential (OT) fair  -SC     Criteria for Skilled Therapeutic Interventions Met (OT) yes;meets criteria;skilled treatment is necessary  -SC     Therapy Frequency (OT) 5 times/wk  -SC       Row Name 05/12/25 1508          Therapy Plan Review/Discharge Plan (OT)    Anticipated Discharge Disposition (OT) sub acute care setting  -SC       Row Name 05/12/25 1501          Positioning and Restraints    Pre-Treatment Position sitting in chair/recliner  -SC     Post Treatment Position chair  -SC     In Bed call light within reach;encouraged to call for assist;exit alarm on  -SC               User Key  (r) = Recorded By, (t) = Taken By, (c) = Cosigned By      Initials Name Provider Type    Barbara Basilio OT Occupational Therapist                   Outcome Measures       Row Name 05/12/25 7005          How much help from another is currently needed...    Putting on and taking off regular lower body clothing? 2  -SC     Bathing (including washing, rinsing, and drying) 2  -SC     Toileting (which includes using toilet bed pan or urinal) 2  -SC     Putting on and taking off regular upper body clothing 3  -SC     Taking  care of personal grooming (such as brushing teeth) 4  -SC     Eating meals 4  -SC     AM-PAC 6 Clicks Score (OT) 17  -SC       Row Name 05/12/25 1100 05/12/25 0748       How much help from another person do you currently need...    Turning from your back to your side while in flat bed without using bedrails? 4  -RH 4  -AR    Moving from lying on back to sitting on the side of a flat bed without bedrails? 3  -RH 3  -AR    Moving to and from a bed to a chair (including a wheelchair)? 3  -RH 3  -AR    Standing up from a chair using your arms (e.g., wheelchair, bedside chair)? 3  -RH 3  -AR    Climbing 3-5 steps with a railing? 2  -RH 2  -AR    To walk in hospital room? 3  -RH 3  -AR    AM-PAC 6 Clicks Score (PT) 18  -RH 18  -AR    Highest Level of Mobility Goal 6 --> Walk 10 steps or more  -RH 6 --> Walk 10 steps or more  -AR      Row Name 05/12/25 1514          Functional Assessment    Outcome Measure Options AM-[facility] 6 Clicks Daily Activity (OT);Optimal Instrument  -SC       Row Name 05/12/25 1514          Optimal Instrument    Optimal Instrument Optimal - 3  -SC     Bending/Stooping 4  -SC     Standing 2  -SC     Reaching 1  -SC     From the list, choose the 3 activities you would most like to be able to do without any difficulty Standing;Reaching;Bending/stooping  -SC     Total Score Optimal - 3 7  -SC               User Key  (r) = Recorded By, (t) = Taken By, (c) = Cosigned By      Initials Name Provider Type    RH Jose Cruz Velasco, DAYNA Physical Therapist Assistant    Mine Fleming, RN Registered Nurse    Barbara Basilio OT Occupational Therapist                    Occupational Therapy Education        No education to display              User Key       Initials Effective Dates Name Provider Type Discipline    SC 02/05/24 -  Barbara Rodriguez OT Occupational Therapist OT                  OT Recommendation and Plan  Planned Therapy Interventions (OT): activity tolerance training, IADL retraining, strengthening exercise,  transfer/mobility retraining, patient/caregiver education/training, BADL retraining, functional balance retraining, occupation/activity based interventions, ROM/therapeutic exercise  Therapy Frequency (OT): 5 times/wk  Plan of Care Review  Plan of Care Reviewed With: patient  Progress: no change (Evaluation complete)  Outcome Evaluation: Patient presents with limitations of cognition, strength, activity tolerance, and balance which impede her ability to perform ADLs/transfers as prior.  The skills of a therapist will be required to safely and effectively implement treatment plan to restore maximum level function.     Time Calculation:   Evaluation Complexity (OT)  Review Occupational Profile/Medical/Therapy History Complexity: expanded/moderate complexity  Assessment, Occupational Performance/Identification of Deficit Complexity: 3-5 performance deficits  Clinical Decision Making Complexity (OT): detailed assessment/moderate complexity  Overall Complexity of Evaluation (OT): moderate complexity     Time Calculation- OT       Row Name 05/12/25 1518 05/12/25 1128          Time Calculation- OT    OT Received On 05/12/25  -SC --     OT Goal Re-Cert Due Date 05/21/25  -SC --        Timed Charges    15507 - Gait Training Minutes  -- 6  -RH        Untimed Charges    OT Eval/Re-eval Minutes 35  -SC --        Total Minutes    Timed Charges Total Minutes -- 6  -RH     Untimed Charges Total Minutes 35  -SC --      Total Minutes 35  -SC 6  -RH               User Key  (r) = Recorded By, (t) = Taken By, (c) = Cosigned By      Initials Name Provider Type    RH Jose Cruz Velasco, PTA Physical Therapist Assistant    Barbara Basiilo OT Occupational Therapist                  Therapy Charges for Today       Code Description Service Date Service Provider Modifiers Qty    [id number]  OT EVAL MOD COMPLEXITY 3 5/12/2025 Barbara Rodriguez OT GO 1                 Barbara Rodriguez OT  5/12/2025

## 2025-05-12 NOTE — TELEPHONE ENCOUNTER
[facility]  [facility] TeleNeurology Consult Services    Routine Consult Follow-Up    Patient Name:   Criss Edwards  YOB: 1950  Identification Number:   MRN - [id number]  Date of Service:   05/12/2025 08:30:08    Diagnosis        I63.30 - Cerebrovascular accident (CVA) due to thrombosis of cerebral artery (HCCC)    Impression  Criss is a very pleasant woman who unfortunately had a small thalamic infarct, which left her with no significant residual problems except for a bit of confusion, which has improved dramatically. She feels ready to go back home. Her workup today is unremarkable in summarizing the HPI. I would let her be discharged to home with an aspirin a day, a cholesterol agent, and strict blood pressure control as an outpatient. I explained to her that her major risk factors are her blood pressure, diabetes, and age. Testing included MRI of the brain, MRA of the head and neck, echocardiogram, telemetry, and routine laboratories. She feels comfortable going home today    Our recommendations are outlined below    Diagnostic Studies :  Holter/Loop Recorder as outpatient with cardiology follow up    Laboratory Studies :  Lipid panel  I ordered  Hemoglobin A1c    Antithrombotic Medication :  Aspirin 81 mg PO dailyStatins for LDL goal less than 70    Nursing Recommendations :  IV Fluids, avoid dextrose containing fluids, Maintain euglycemiaNeuro checks q4 hrs x 24 hrs and then per shiftHead of bed 30 degreesContinue with Telemetry    Consultations :  Recommend Speech therapy if failed dysphagia screenPhysical therapy/Occupational therapy    DVT Prophylaxis :  Choice of Primary Team    Disposition :  Neurology will follow    Subjective  Criss is a very pleasant woman who presents for further evaluation of her difficulty with regards to numbness in the face. Basically she notes that yesterday she got very confused and didn't quite know what was going on. She developed them numbness over  the anterior part of the head that have a tingling sensation. She didn't have any difficulties with numbness and tingling into the arm were into the leg. She denied any weakness. He is able to walk without difficulties. She was evaluated the emergency room where she had a head CT which was unremarkable. She then had an MRI of the brain on May 10, 2025 wish to show small nuclear infarct on the right consistent with her symptoms.    Hospital Course  over the intervening hospital days he feels that things have dramatically improved. She still feels a little bit off with regards to her current quote confusion as you feel stronger and every other way. She feels comfortable going home this evening once her daughter picks her up after four stroke risk factors are diabetes which is on metformin, hypertension, high cholesterol. She does not have a history of heart disease and she's not a smoker. He never had a stroke before the business is aware of.    Imaging  MRI brain  1.5 cm x 6 mm acute infarct anteriorly in the right thalamus.    MRA of the head and neck was unremarkable and showed no evidence of stenosis or significant pathology    transthoracic echo was unremarkable    Labs  glucose is been running from approximately 120 up to 185 over the past 24 hours.       Examination  BP(150/70), Pulse(72), Temp(36.5),  1A: Level of Consciousness - Alert; keenly responsive + 0  1B: Ask Month and Age - Both Questions Right + 0  1C: Blink Eyes & Squeeze Hands - Performs Both Tasks + 0  2: Test Horizontal Extraocular Movements - Normal + 0  3: Test Visual Fields - No Visual Loss + 0  4: Test Facial Palsy (Use Grimace if Obtunded) - Normal symmetry + 0  5A: Test Left Arm Motor Drift - No Drift for 10 Seconds + 0  5B: Test Right Arm Motor Drift - No Drift for 10 Seconds + 0  6A: Test Left Leg Motor Drift - No Drift for 5 Seconds + 0  6B: Test Right Leg Motor Drift - No Drift for 5 Seconds + 0  7: Test Limb Ataxia (FNF/Heel-Shin) - No  Ataxia + 0  8: Test Sensation - Normal; No sensory loss + 0  9: Test Language/Aphasia - Normal; No aphasia + 0  10: Test Dysarthria - Normal + 0  11: Test Extinction/Inattention - No abnormality + 0    NIHSS Score: 0           This consult was conducted in real time using interactive audio and video technology. Patient was informed of the technology being used for this visit and agreed to proceed. Patient located in hospital and provider located at home/office setting.    Telehealth Neurology consultation was provided. I spent minutes providing telehealth care. This includes time spent for face to face visit via telemedicine, review of medical records, imaging studies and discussion of findings with providers, the patient and/or family.      Dr Mychal Poon      [facility]  For Inpatient follow-up with [facility] physician please call [facility] at [phone]. As we are not an outpatient service for any post hospital discharge needs please contact the hospital for assistance.  If you have any questions for the [facility] physicians or need to reconsult for clinical or diagnostic changes please contact us via [facility] at [phone]

## 2025-05-12 NOTE — CONSULTS
Consult placed per Newly Diagnosed. Met with patient at bedside. Discussed current A1c of 7.4% with an estimated average glucose of  166 mg/dl. Patient states her PCP discussed her diagnosis with her earlier this year, then received a letter in the mail stating that she does not have diabetes, then was told by her PCP once again that she does have diabetes. I explained that factually, yes, she does have diabetes. Explained A1c and how for her age, her goal is less than 8% and she is within that goal.    Patient states that she doesn't know why she has diabetes. I explained that as we age, our pancreas can become less efficient and that genetics can also play a role. Patient states multiple family members have been diagnosed with diabetes, and many of them required insulin.     Patient states she has reservations about Metformin. I explained that if taken as prescribed, it can help lower A1c while preventing the need for insulin injections in the future. Explained that she will need to take it with food to help prevent GI side effects. Patient is understanding.     Patient also states that she enjoys regular soda daily and does not want to switch to diet because she believes it messes with her kidneys. Explained how regular soda effects blood sugar directly and how it could potentially lower her A1c if she is to cut it out of her diet.    Patient does not wish to check blood sugars at home and wishes to continue seeing her PCP for her diabetes needs. Provided patient with Diabetes and Nutrition, Diabetes Action Plan, Metformin Tablets, and Diabetes and Foot Care handouts. Patient has no further needs or questions at this time.

## 2025-05-12 NOTE — THERAPY TREATMENT NOTE
Acute Care - Physical Therapy Treatment Note   Kristy     Patient Name: Criss Edwards  : 1950  MRN: [id number]  Today's Date: 2025      Visit Dx:     ICD-10-CM ICD-9-CM   1. Acute thalamic infarction  I63.81 434.91   2. Pharyngoesophageal dysphagia  R13.14 787.24   3. Weakness  R53.1 780.79     Patient Active Problem List   Diagnosis    Primary osteoarthritis of left knee    Status post total hip replacement, right    Essential hypertension    Hypertrophic cardiomyopathy    Meniere's disease    Mitral valve prolapse    Mixed stress and urge urinary incontinence    Major depressive disorder, recurrent episode, moderate degree    Generalized osteoarthritis    Osteopenia of multiple sites    Seasonal allergic rhinitis due to pollen    Vitamin D deficiency    Gastroesophageal reflux disease without esophagitis    Lumbar radiculopathy    Allergic dermatitis    Generalized anxiety disorder    Primary osteoarthritis, left shoulder    Other fatigue    Type 2 diabetes mellitus without complication, without long-term current use of insulin    Nephrolithiasis    Stroke     Past Medical History:   Diagnosis Date    Dizziness     History of back surgery     unkown what surgeries    History of total right hip replacement     HL (hearing loss)     Hypertension      Past Surgical History:   Procedure Laterality Date    CHOLECYSTECTOMY      COLONOSCOPY      ENDOSCOPY N/A 2024    Procedure: ESOPHAGOGASTRODUODENOSCOPY;  Surgeon: Mallory Lua MD;  Location: [facility] ENDOSCOPY;  Service: Gastroenterology;  Laterality: N/A;  GASTRIC POLYP ESOPH DILATATION 18MM ESOPH DIVERTICULUIM    HAND SURGERY Left 2016    HIP ARTHROPLASTY Right     KIDNEY STONE SURGERY       PT Assessment (Last 12 Hours)       PT Evaluation and Treatment       Row Name 25 1129          Physical Therapy Time and Intention    Subjective Information complains of;weakness;numbness  -RH     Document Type therapy note (daily  note)  -RH     Mode of Treatment physical therapy;individual therapy  -RH     Patient Effort adequate  -[facility] Name 05/12/25 1129          Pain    Additional Documentation Pain Scale: FACES Pre/Post-Treatment (Group)  -[facility] Name 05/12/25 1129          Pain Scale: FACES Pre/Post-Treatment    Pain: FACES Scale, Pretreatment 0-->no hurt  -RH     Posttreatment Pain Rating 0-->no hurt  -RH       [facility] 05/12/25 1129          Strength (Manual Muscle Testing)    Strength (Manual Muscle Testing) --  Pt B ankle DF strength at 4/5.  -[facility] Name 05/12/25 1129          Transfers    Transfers sit-stand transfer;stand-sit transfer  -RH       Row Name 05/12/25 1129          Sit-Stand Transfer    Sit-Stand [place] (Transfers) contact guard  -     Assistive Device (Sit-Stand Transfers) walker, front-wheeled  -RH       Row Name 05/12/25 1129          Stand-Sit Transfer    Stand-Sit [place] (Transfers) contact guard  -     Assistive Device (Stand-Sit Transfers) walker, front-wheeled  -RH       Row Name 05/12/25 1129          Gait/Stairs (Locomotion)    Gait/Stairs Locomotion gait/ambulation independence;gait/ambulation assistive device;distance ambulated;gait pattern;gait deviations  -RH     [place] Level (Gait) contact guard  -     Assistive Device (Gait) walker, front-wheeled  -     Patient was able to Ambulate yes  -RH     Distance in Feet (Gait) 200  -RH     Pattern (Gait) 3-point;step-through  -RH     Deviations/Abnormal Patterns (Gait) stride length decreased;gait speed decreased;base of support, narrow  -RH     Bilateral Gait Deviations forward flexed posture;heel strike decreased  -RH     Gait Assessment/Intervention Pt amb with RW and CGA with minimal c/o SOA and fatigue at end of tx.  -[facility] Name 05/12/25 1129          Balance    Dynamic Standing Balance contact guard  -     Position/Device Used, Standing Balance walker, front-wheeled  -RH       Row Name 05/12/25 1129           Motor Skills    Therapeutic Exercise ankle;knee;hip  -       Row Name 05/12/25 1129          Hip (Therapeutic Exercise)    Hip (Therapeutic Exercise) strengthening exercise;AROM (active range of motion)  -     Hip AROM (Therapeutic Exercise) bilateral;aBduction;aDduction;sitting;20 repititions  -     Hip Strengthening (Therapeutic Exercise) bilateral;marching while seated;sitting;supine;10 repetitions;2 sets  -       Row Name 05/12/25 1129          Knee (Therapeutic Exercise)    Knee (Therapeutic Exercise) strengthening exercise  -     Knee Strengthening (Therapeutic Exercise) bilateral;LAQ (long arc quad);sitting;10 repetitions;2 sets  -       Row Name 05/12/25 1129          Ankle (Therapeutic Exercise)    Ankle (Therapeutic Exercise) AROM (active range of motion)  -     Ankle AROM (Therapeutic Exercise) bilateral;dorsiflexion;plantarflexion;supine;10 repetitions;2 sets  -       Row Name 05/12/25 1129          Vital Signs    O2 Delivery Intra Treatment room air  -       Row Name 05/12/25 1129          Positioning and Restraints    Post Treatment Position chair  -     In Chair reclined;sitting;encouraged to call for assist;call light within reach;exit alarm on;with other staff  -       Row Name 05/12/25 1129          Progress Summary (PT)    Progress Toward Functional Goals (PT) progress toward functional goals is fair  -RH               User Key  (r) = Recorded By, (t) = Taken By, (c) = Cosigned By      Initials Name Provider Type     Jose Cruz Velasco PTA Physical Therapist Assistant                    Physical Therapy Education       Title: PT OT SLP Therapies (Done)       Topic: Physical Therapy (Done)       Point: Mobility training (Done)       Learning Progress Summary            Patient Acceptance, E, VU by  at 5/11/2025 0940                      Point: Home exercise program (Done)       Learning Progress Summary            Patient Acceptance, E, VU by  at 5/11/2025 0940                       Point: Body mechanics (Done)       Learning Progress Summary            Patient Acceptance, E, VU by  at 5/11/2025 0940                      Point: Precautions (Done)       Learning Progress Summary            Patient Acceptance, E, VU by  at 5/11/2025 0940                                      User Key       Initials Effective Dates Name Provider Type [facility] 05/08/23 -  Juvenal Barahona, PT Physical Therapist PT                  PT Recommendation and Plan     Progress Summary (PT)  Progress Toward Functional Goals (PT): progress toward functional goals is fair   Outcome Measures       Row Name 05/12/25 1100 05/11/25 0900          How much help from another person do you currently need...    Turning from your back to your side while in flat bed without using bedrails? 4  -RH 4  -JH     Moving from lying on back to sitting on the side of a flat bed without bedrails? 3  -RH 3  -JH     Moving to and from a bed to a chair (including a wheelchair)? 3  -RH 3  -JH     Standing up from a chair using your arms (e.g., wheelchair, bedside chair)? 3  -RH 3  -JH     Climbing 3-5 steps with a railing? 2  -RH 2  -JH     To walk in hospital room? 3  -RH 3  -JH     AM-PAC 6 Clicks Score (PT) 18  -RH 18  -        Functional Assessment    Outcome Measure Options -- AM-PAC 6 Clicks Basic Mobility (PT)  -               User Key  (r) = Recorded By, (t) = Taken By, (c) = Cosigned By      Initials Name Provider Type     Jose Cruz Velasco, PTA Physical Therapist Assistant     Juvenal Barahona, PT Physical Therapist                     Time Calculation:    PT Charges       Row Name 05/12/25 1128             Time Calculation    PT Received On 05/12/25  -RH         Timed Charges    87512 - PT Therapeutic Exercise Minutes 12  -RH      08597 - Gait Training Minutes  6  -RH      60933 - PT Therapeutic Activity Minutes 6  -RH         Total Minutes    Timed Charges Total Minutes 24  -RH       Total Minutes 24  -RH                 User Key  (r) = Recorded By, (t) = Taken By, (c) = Cosigned By      Initials Name Provider Type     Jose Cruz Velasco PTA Physical Therapist Assistant                  Therapy Charges for Today       Code Description Service Date Service Provider Modifiers Qty    58760785583 HC PT THER PROC EA 15 MIN 5/12/2025 Jose Cruz Velasco PTA GP 1    59030839290 HC GAIT TRAINING EA 15 MIN 5/12/2025 Jose Cruz Velasco PTA GP 1            PT G-Codes  Outcome Measure Options: AM-PAC 6 Clicks Basic Mobility (PT)  AM-PAC 6 Clicks Score (PT): 18    Jose Cruz Velasco PTA  5/12/2025

## 2025-05-13 VITALS
SYSTOLIC BLOOD PRESSURE: 135 MMHG | WEIGHT: 185.85 LBS | RESPIRATION RATE: 18 BRPM | HEART RATE: 100 BPM | DIASTOLIC BLOOD PRESSURE: 89 MMHG | HEIGHT: 63 IN | OXYGEN SATURATION: 98 % | BODY MASS INDEX: 32.93 KG/M2 | TEMPERATURE: 97.9 F

## 2025-05-13 LAB
ANION GAP SERPL CALCULATED.3IONS-SCNC: 12.7 MMOL/L (ref 5–15)
BUN SERPL-MCNC: 9 MG/DL (ref 8–23)
BUN/CREAT SERPL: 13.4 (ref 7–25)
CALCIUM SPEC-SCNC: 9.8 MG/DL (ref 8.6–10.5)
CHLORIDE SERPL-SCNC: 107 MMOL/L (ref 98–107)
CO2 SERPL-SCNC: 19.3 MMOL/L (ref 22–29)
CREAT SERPL-MCNC: 0.67 MG/DL (ref 0.57–1)
DEPRECATED RDW RBC AUTO: 49.1 FL (ref 37–54)
EGFRCR SERPLBLD CKD-EPI 2021: 90.7 ML/MIN/1.73
ERYTHROCYTE [DISTWIDTH] IN BLOOD BY AUTOMATED COUNT: 13.6 % (ref 12.3–15.4)
GLUCOSE BLDC GLUCOMTR-MCNC: 124 MG/DL (ref 70–99)
GLUCOSE BLDC GLUCOMTR-MCNC: 145 MG/DL (ref 70–99)
GLUCOSE BLDC GLUCOMTR-MCNC: 147 MG/DL (ref 70–99)
GLUCOSE BLDC GLUCOMTR-MCNC: 157 MG/DL (ref 70–99)
GLUCOSE SERPL-MCNC: 129 MG/DL (ref 65–99)
HCT VFR BLD AUTO: 46.1 % (ref 34–46.6)
HGB BLD-MCNC: 14.8 G/DL (ref 12–15.9)
MCH RBC QN AUTO: 31.2 PG (ref 26.6–33)
MCHC RBC AUTO-ENTMCNC: 32.1 G/DL (ref 31.5–35.7)
MCV RBC AUTO: 97.1 FL (ref 79–97)
PLATELET # BLD AUTO: 195 10*3/MM3 (ref 140–450)
PMV BLD AUTO: 10.1 FL (ref 6–12)
POTASSIUM SERPL-SCNC: 4.2 MMOL/L (ref 3.5–5.2)
RBC # BLD AUTO: 4.75 10*6/MM3 (ref 3.77–5.28)
SODIUM SERPL-SCNC: 139 MMOL/L (ref 136–145)
WBC NRBC COR # BLD AUTO: 6.93 10*3/MM3 (ref 3.4–10.8)

## 2025-05-13 PROCEDURE — 97116 GAIT TRAINING THERAPY: CPT

## 2025-05-13 PROCEDURE — 82948 REAGENT STRIP/BLOOD GLUCOSE: CPT

## 2025-05-13 PROCEDURE — 82948 REAGENT STRIP/BLOOD GLUCOSE: CPT | Performed by: STUDENT IN AN ORGANIZED HEALTH CARE EDUCATION/TRAINING PROGRAM

## 2025-05-13 PROCEDURE — 85027 COMPLETE CBC AUTOMATED: CPT | Performed by: STUDENT IN AN ORGANIZED HEALTH CARE EDUCATION/TRAINING PROGRAM

## 2025-05-13 PROCEDURE — 80048 BASIC METABOLIC PNL TOTAL CA: CPT | Performed by: STUDENT IN AN ORGANIZED HEALTH CARE EDUCATION/TRAINING PROGRAM

## 2025-05-13 PROCEDURE — 63710000001 INSULIN LISPRO (HUMAN) PER 5 UNITS: Performed by: STUDENT IN AN ORGANIZED HEALTH CARE EDUCATION/TRAINING PROGRAM

## 2025-05-13 PROCEDURE — 99239 HOSP IP/OBS DSCHRG MGMT >30: CPT | Performed by: INTERNAL MEDICINE

## 2025-05-13 PROCEDURE — 92526 ORAL FUNCTION THERAPY: CPT

## 2025-05-13 RX ORDER — INSULIN LISPRO 100 [IU]/ML
2-7 INJECTION, SOLUTION INTRAVENOUS; SUBCUTANEOUS
Start: 2025-05-13

## 2025-05-13 RX ORDER — CLOPIDOGREL BISULFATE 75 MG/1
75 TABLET ORAL DAILY
Start: 2025-05-13

## 2025-05-13 RX ORDER — ATORVASTATIN CALCIUM 80 MG/1
80 TABLET, FILM COATED ORAL NIGHTLY
Start: 2025-05-13

## 2025-05-13 RX ORDER — ASPIRIN 81 MG/1
81 TABLET, CHEWABLE ORAL DAILY
Start: 2025-05-14

## 2025-05-13 RX ADMIN — ASPIRIN 81 MG: 81 TABLET, CHEWABLE ORAL at 08:57

## 2025-05-13 RX ADMIN — FAMOTIDINE 20 MG: 20 TABLET, FILM COATED ORAL at 08:57

## 2025-05-13 RX ADMIN — INSULIN LISPRO 2 UNITS: 100 INJECTION, SOLUTION INTRAVENOUS; SUBCUTANEOUS at 12:25

## 2025-05-13 RX ADMIN — TIZANIDINE 4 MG: 4 TABLET ORAL at 09:00

## 2025-05-13 RX ADMIN — Medication 10 ML: at 08:58

## 2025-05-13 NOTE — THERAPY TREATMENT NOTE
Acute Care - Physical Therapy Treatment Note   Kristy     Patient Name: Criss Edwards  : 1950  MRN: [id number]  Today's Date: 2025      Visit Dx:     ICD-10-CM ICD-9-CM   1. Acute thalamic infarction  I63.81 434.91   2. Pharyngoesophageal dysphagia  R13.14 787.24   3. Weakness  R53.1 780.79   4. Difficulty walking  R26.2 719.7     Patient Active Problem List   Diagnosis    Primary osteoarthritis of left knee    Status post total hip replacement, right    Essential hypertension    Hypertrophic cardiomyopathy    Meniere's disease    Mitral valve prolapse    Mixed stress and urge urinary incontinence    Major depressive disorder, recurrent episode, moderate degree    Generalized osteoarthritis    Osteopenia of multiple sites    Seasonal allergic rhinitis due to pollen    Vitamin D deficiency    Gastroesophageal reflux disease without esophagitis    Lumbar radiculopathy    Allergic dermatitis    Generalized anxiety disorder    Primary osteoarthritis, left shoulder    Other fatigue    Type 2 diabetes mellitus without complication, without long-term current use of insulin    Nephrolithiasis    Stroke     Past Medical History:   Diagnosis Date    Dizziness     History of back surgery     unkown what surgeries    History of total right hip replacement     HL (hearing loss)     Hypertension      Past Surgical History:   Procedure Laterality Date    CHOLECYSTECTOMY      COLONOSCOPY      ENDOSCOPY N/A 2024    Procedure: ESOPHAGOGASTRODUODENOSCOPY;  Surgeon: Mallory Lua MD;  Location: [facility] ENDOSCOPY;  Service: Gastroenterology;  Laterality: N/A;  GASTRIC POLYP ESOPH DILATATION 18MM ESOPH DIVERTICULUIM    HAND SURGERY Left 2016    HIP ARTHROPLASTY Right     KIDNEY STONE SURGERY       PT Assessment (Last 12 Hours)       PT Evaluation and Treatment       Row Name 25 1136          Physical Therapy Time and Intention    Subjective Information no complaints (P)   -RA     Document  Type therapy note (daily note) (P)   -RA     Mode of Treatment individual therapy;physical therapy (P)   -RA     Patient Effort good (P)   -RA     Symptoms Noted During/After Treatment none (P)   -RA       Row Name 05/13/25 1136          General Information    Patient Profile Reviewed yes (P)   -RA     Patient Observations alert;cooperative;agree to therapy (P)   -RA       Row Name 05/13/25 1136          Bed Mobility    Bed Mobility supine-sit (P)   -RA     Supine-Sit [place] (Bed Mobility) standby assist (P)   -RA     Comment, (Bed Mobility) Pt UIC at end of session (P)   -RA       Row Name 05/13/25 1136          Transfers    Transfers sit-stand transfer;stand-sit transfer (P)   -RA       Row Name 05/13/25 1136          Sit-Stand Transfer    Sit-Stand [place] (Transfers) contact guard (P)   -RA     Assistive Device (Sit-Stand Transfers) walker, front-wheeled (P)   -RA       Row Name 05/13/25 1136          Stand-Sit Transfer    Stand-Sit [place] (Transfers) contact guard (P)   -RA     Assistive Device (Stand-Sit Transfers) walker, front-wheeled (P)   -RA       Row Name 05/13/25 1136          Gait/Stairs (Locomotion)    Gait/Stairs Locomotion gait/ambulation assistive device (P)   -RA     [place] Level (Gait) contact guard (P)   -RA     Assistive Device (Gait) walker, front-wheeled (P)   -RA     Patient was able to Ambulate yes (P)   -RA     Distance in Feet (Gait) 150 (P)   -RA     Deviations/Abnormal Patterns (Gait) festinating/shuffling;gait speed decreased (P)   -RA     Bilateral Gait Deviations forward flexed posture (P)   -RA       Row Name 05/13/25 1136          Safety Issues/Impairments Affecting Functional Mobility    Impairments Affecting Function (Mobility) balance;endurance/activity tolerance (P)   -RA       Row Name 05/13/25 1136          Balance    Balance Assessment standing dynamic balance (P)   -RA     Dynamic Standing Balance contact guard (P)   -RA     Position/Device Used,  Standing Balance walker, front-wheeled (P)   -RA       Row Name 05/13/25 1136          Progress Summary (PT)    Progress Toward Functional Goals (PT) progress toward functional goals is good (P)   -RA     Daily Progress Summary (PT) Pt tolerated transfers and ambulation during treatment session well. She is making good progress towards functional goals. Continue POC. (P)   -RA               User Key  (r) = Recorded By, (t) = Taken By, (c) = Cosigned By      Initials Name Provider Type    Carmelita West, PT Student PT Student                    Physical Therapy Education       Title: PT OT SLP Therapies (Done)       Topic: Physical Therapy (Done)       Point: Mobility training (Done)       Learning Progress Summary            Patient Acceptance, E, VU by SC at 5/12/2025 1515    Acceptance, E, VU by  at 5/11/2025 0940                      Point: Home exercise program (Done)       Learning Progress Summary            Patient Acceptance, E, VU by SC at 5/12/2025 1515    Acceptance, E, VU by  at 5/11/2025 0940                      Point: Body mechanics (Done)       Learning Progress Summary            Patient Acceptance, E, VU by SC at 5/12/2025 1515    Acceptance, E, VU by  at 5/11/2025 0940                      Point: Precautions (Done)       Learning Progress Summary            Patient Acceptance, E, VU by SC at 5/12/2025 1515    Acceptance, E, VU by  at 5/11/2025 0940                                      User Key       Initials Effective Dates Name Provider Type [facility] 05/08/23 -  Juvenal Barahona, PT Physical Therapist PT    [facility] 02/05/24 -  Barbara Rodriguez OT Occupational Therapist OT                  PT Recommendation and Plan     Progress Summary (PT)  Progress Toward Functional Goals (PT): (P) progress toward functional goals is good  Daily Progress Summary (PT): (P) Pt tolerated transfers and ambulation during treatment session well. She is making good progress towards functional goals. Continue  POC.   Outcome Measures       Row Name 05/13/25 1100 05/12/25 1100 05/11/25 0900       How much help from another person do you currently need...    Turning from your back to your side while in flat bed without using bedrails? 4 (P)   - 4  -RH 4  -JH    Moving from lying on back to sitting on the side of a flat bed without bedrails? 4 (P)   - 3  -RH 3  -JH    Moving to and from a bed to a chair (including a wheelchair)? 4 (P)   -RA 3  -RH 3  -JH    Standing up from a chair using your arms (e.g., wheelchair, bedside chair)? 3 (P)   -RA 3  -RH 3  -JH    Climbing 3-5 steps with a railing? 3 (P)   -RA 2  - 2  -JH    To walk in hospital room? 3 (P)   - 3  -RH 3  -JH    AM-PAC 6 Clicks Score (PT) 21 (P)   - 18  -RH 18  -JH       Functional Assessment    Outcome Measure Options -- -- AM-PAC 6 Clicks Basic Mobility (PT)  -              User Key  (r) = Recorded By, (t) = Taken By, (c) = Cosigned By      Initials Name Provider Type     Jose Cruz Velasco, PTA Physical Therapist Assistant    Juvenal Palacios, PT Physical Therapist    Carmelita West, PT Student PT Student                     Time Calculation:    PT Charges       Row Name 05/13/25 1140             Time Calculation    PT Received On 05/13/25 (P)   -RA         Timed Charges    98442 - Gait Training Minutes  15 (P)   -RA         Total Minutes    Timed Charges Total Minutes 15 (P)   -RA       Total Minutes 15 (P)   -RA                User Key  (r) = Recorded By, (t) = Taken By, (c) = Cosigned By      Initials Name Provider Type    Carmelita West, PT Student PT Student                      PT G-Codes  Outcome Measure Options: AM-PAC 6 Clicks Daily Activity (OT), Optimal Instrument  AM-PAC 6 Clicks Score (PT): (P) 21  AM-PAC 6 Clicks Score (OT): 17    Carmelita Henson, PT Student  5/13/2025

## 2025-05-13 NOTE — PLAN OF CARE
Goal Outcome Evaluation:  Plan of Care Reviewed With: patient        Progress: no change  Outcome Evaluation: No acute changes this shift. VSS. Patient slept through the night. Call light within reach at all times.

## 2025-05-13 NOTE — THERAPY TREATMENT NOTE
Acute Care - Speech Language Pathology   Swallow Treatment Note  Kristy     Patient Name: Criss Edwards  : 1950  MRN: [id number]  Today's Date: 2025               Admit Date: 5/10/2025    Visit Dx:     ICD-10-CM ICD-9-CM   1. Acute thalamic infarction  I63.81 434.91   2. Pharyngoesophageal dysphagia  R13.14 787.24   3. Weakness  R53.1 780.79   4. Difficulty walking  R26.2 719.7   5. Acute CVA (cerebrovascular accident)  I63.9 434.91     Patient Active Problem List   Diagnosis    Primary osteoarthritis of left knee    Status post total hip replacement, right    Essential hypertension    Hypertrophic cardiomyopathy    Meniere's disease    Mitral valve prolapse    Mixed stress and urge urinary incontinence    Major depressive disorder, recurrent episode, moderate degree    Generalized osteoarthritis    Osteopenia of multiple sites    Seasonal allergic rhinitis due to pollen    Vitamin D deficiency    Gastroesophageal reflux disease without esophagitis    Lumbar radiculopathy    Allergic dermatitis    Generalized anxiety disorder    Primary osteoarthritis, left shoulder    Other fatigue    Type 2 diabetes mellitus without complication, without long-term current use of insulin    Nephrolithiasis    Acute CVA (cerebrovascular accident)     Past Medical History:   Diagnosis Date    Dizziness     History of back surgery     unkown what surgeries    History of total right hip replacement     HL (hearing loss)     Hypertension      Past Surgical History:   Procedure Laterality Date    CHOLECYSTECTOMY      COLONOSCOPY      ENDOSCOPY N/A 2024    Procedure: ESOPHAGOGASTRODUODENOSCOPY;  Surgeon: Mallory Lua MD;  Location: [facility] ENDOSCOPY;  Service: Gastroenterology;  Laterality: N/A;  GASTRIC POLYP ESOPH DILATATION 18MM ESOPH DIVERTICULUIM    HAND SURGERY Left 2016    HIP ARTHROPLASTY Right     KIDNEY STONE SURGERY           SPEECH PATHOLOGY DYSPHAGIA TREATMENT      Subjective/Behavioral Observations: Alert and cooperative, sitting up in chair, feeding self.          Day/time of Treatment: 5/13/2025        Current Diet: Regular, thin        Current Strategies: Alternate small bites and small sips of solids and liquids at a slow rate.  Frequent dry swallow.  Larger medications whole in applesauce or partially melted ice cream.         Treatment received: Dysphagia therapy to address swallow function through exercises and education of strategies.        Results of treatment: Patient educated for strategies for improving swallow/p.o. intake.  P.o. intake with 50% of noon meal.  No complaint of globus sensation, no complaint of difficulty.       Progress toward goals: Good        Barriers to Achieving goals: N/A        Plan of care:/changes in plan: Continue per current plan.  Continue with mechanical ground diet and thin liquid.                                                                                            EDUCATION  The patient has been educated in the following areas:   Modified Diet Instruction.                Time Calculation:    Time Calculation- SLP       Row Name 05/13/25 1322             Time Calculation- SLP    SLP Stop Time 1322  -TB      SLP Received On 05/13/25  -TB         Untimed Charges    94688-TD Treatment Swallow Minutes 45  -TB         Total Minutes    Untimed Charges Total Minutes 45  -TB       Total Minutes 45  -TB                User Key  (r) = Recorded By, (t) = Taken By, (c) = Cosigned By      Initials Name Provider Type    TB Celestina Guy SLP Speech and Language Pathologist                    Therapy Charges for Today       Code Description Service Date Service Provider Modifiers Qty    19775630196 HC ST TREATMENT SWALLOW 3 5/12/2025 Celestina Guy SLP GN 1    95751467450 HC ST TREATMENT SWALLOW 3 5/13/2025 Celestina Guy SLP GN 1                 CHERRI Moon  5/13/2025

## 2025-05-13 NOTE — DISCHARGE SUMMARY
[facility]         HOSPITALIST  DISCHARGE SUMMARY    Patient Name: Criss Edwards  : 1950  MRN: [id number]    Date of Admission: 5/10/2025  Date of Discharge:  2025    Primary Care Physician: Clarisse Flores MD    Consults       Date and Time Order Name Status Description    2025  7:43 AM Inpatient Neurology Consult Stroke      5/10/2025  3:56 PM Inpatient Hospitalist Consult      5/10/2025 12:34 PM IP General Consult (Use specialty-specific consult if known)              Active and Resolved Hospital Problems:  Active Hospital Problems    Diagnosis POA    **Acute CVA (cerebrovascular accident) [I63.9] Yes    Type 2 diabetes mellitus without complication, without long-term current use of insulin [E11.9] Yes    Essential hypertension [I10] Yes      Resolved Hospital Problems   No resolved problems to display.       Hospital Course     Hospital Course:  Criss Edwards is a 74 y.o. female who presented to the hospital with altered mental status.  Patient has history of hypertension hypertrophic cardiomyopathy, depression anxiety and diabetes.  Patient also had generalized weakness and dizziness.  Patient was found to have an acute right thalamic stroke on MRI.  Teleneurology was consulted.Patient had an MRI of the head and neck which was unremarkable.  Patient had an echocardiogram which showed preserved ejection fraction 66 to 70% patient had hypertrophy and diastolic dysfunction.  Patient has been started on aspirin Plavix and statin.  Outpatient referral has been sent to cardiology for evaluation of Holter monitor.  Patient will need rehab placement.  Patient was agreeable to rehab placement and will [facility] today in stable condition.      DISCHARGE Follow Up Recommendations for labs and diagnostics:   Referral sent to outpatient neurology  for continued follow-up  Referral has been sent to cardiology for evaluation for possible Holter  monitor  Patient should follow-up with primary care once discharged from the rehab facility        Day of Discharge     Vital Signs:  Temp:  [97.3 °F (36.3 °C)-97.7 °F (36.5 °C)] 97.7 °F (36.5 °C)  Heart Rate:  [] 77  Resp:  [16-18] 18  BP: ()/(57-91) 90/57  Physical Exam:   General: No acute distress resting in bedside chair sleeping  Cardiovascular: Regular rate and rhythm, no murmur  Pulmonary: Clear, no wheezing  Neurological: Patient is moving all extremities, speech is clear no significant focal deficits noted  Abdomen: Soft nondistended nondistended  Psych: normal mood and affect noted         Discharge Details        Discharge Medications        New Medications        Instructions Start Date   aspirin 81 MG chewable tablet   81 mg, Oral, Daily   Start Date: May 14, 2025     atorvastatin 80 MG tablet  Commonly known as: LIPITOR   80 mg, Oral, Nightly      clopidogrel 75 MG tablet  Commonly known as: Plavix   75 mg, Oral, Daily      Insulin Lispro 100 UNIT/ML injection  Commonly known as: humaLOG   2-7 Units, Subcutaneous, 4 Times Daily Before Meals & Nightly             Changes to Medications        Instructions Start Date   simethicone 80 MG chewable tablet  Commonly known as: MYLICON  What changed: See the new instructions.   CHEW 1 TABLET BY MOUTH EVERY 6 HOURS AS NEEDED FOR FLATULENCE             Continue These Medications        Instructions Start Date   cholecalciferol 25 MCG (1000 UT) tablet  Commonly known as: VITAMIN D3   1,000 Units, Daily      cyclobenzaprine 5 MG tablet  Commonly known as: FLEXERIL   5 mg, Oral, Nightly PRN      famotidine 20 MG tablet  Commonly known as: PEPCID   20 mg, Oral, 2 Times Daily      magnesium oxide 400 MG tablet  Commonly known as: MAG-OX   400 mg, Daily      meloxicam 15 MG tablet  Commonly known as: MOBIC   15 mg, Oral, Daily PRN, Take with food.      metFORMIN 500 MG tablet  Commonly known as: GLUCOPHAGE   500 mg, Oral, Daily With Breakfast       nebivolol 5 MG tablet  Commonly known as: BYSTOLIC   5 mg, Oral, Daily      ondansetron ODT 4 MG disintegrating tablet  Commonly known as: ZOFRAN-ODT   4 mg, Translingual, Every 8 Hours PRN             Stop These Medications      HYDROcodone-acetaminophen 5-325 MG per tablet  Commonly known as: NORCO     valsartan 40 MG tablet  Commonly known as: DIOVAN              Allergies   Allergen Reactions    Codeine Itching    Iodinated Contrast Media Other (See Comments)     Other reaction(s): GI Intolerance      Other reaction(s): GI Intolerance    Iodine Hives     DENIES    Contrast Dye (Echo Or Unknown Ct/Mr) GI Intolerance    Darvon [Propoxyphene] GI Intolerance and Headache       Discharge Disposition:  Rehab Facility or Unit (DC - External)    Diet:  Hospital:  Diet Order   Procedures    Diet: Cardiac, Diabetic; Healthy Heart (2-3 Na+); Consistent Carbohydrate; Texture: Mechanical Ground (NDD 2); Fluid Consistency: Thin (IDDSI 0)       Discharge Activity: as tolerated       CODE STATUS:  Code Status and Medical Interventions: CPR (Attempt to Resuscitate); Full Support   Ordered at: 05/10/25 1608     Code Status (Patient has no pulse and is not breathing):    CPR (Attempt to Resuscitate)     Medical Interventions (Patient has pulse or is breathing):    Full Support         Future Appointments   Date Time Provider Department Center   5/16/2025  1:30 PM [facility] GAVINO  [facility]   6/2/2025  2:15 PM Shaun Benson MD [facility] ENT ETWN [facility]   6/10/2025 11:00 AM Thong Tiwari MD [facility] U ETRING [facility]   8/18/2025 10:00 AM Lola Hutton APRN [facility] GS H BT [facility]   8/28/2025  9:15 AM Clarisse Flores MD [facility] PC BARDS [facility]       Additional Instructions for the Follow-ups that You Need to Schedule       Ambulatory Referral to Neurology   As directed      Discharge Follow-up with PCP   As directed       Currently Documented PCP:    Clarisse Flores MD    PCP Phone Number:    [phone]      Follow Up Details: when discharged from rehab                Pertinent  and/or Most Recent Results     PROCEDURES:   None    LAB RESULTS:      Lab 05/13/25  0526 05/12/25  0507 05/10/25  1528 05/10/25  1151 05/09/25  1007   WBC 6.93 7.28  --  7.00 8.95   HEMOGLOBIN 14.8 14.1  --  14.5 15.2   HEMATOCRIT 46.1 43.5  --  44.3 47.4*   PLATELETS 195 187  --  207 254   NEUTROS ABS  --   --   --  4.39 5.92   IMMATURE GRANS (ABS)  --   --   --  0.03 0.04   LYMPHS ABS  --   --   --  1.92 2.17   MONOS ABS  --   --   --  0.42 0.61   EOS ABS  --   --   --  0.19 0.17   MCV 97.1* 95.4  --  95.5 96.1   LACTATE  --   --  1.8 2.1*  --    PROTIME  --   --   --  14.5  --    D DIMER QUANT  --   --   --   --  0.46         Lab 05/13/25  0526 05/12/25  0507 05/11/25  0545 05/10/25  1151 05/09/25  1007   SODIUM 139 137  --  138 140   POTASSIUM 4.2 4.1  --  4.2 4.6   CHLORIDE 107 104  --  100 100   CO2 19.3* 23.0  --  28.3 27.1   ANION GAP 12.7 10.0  --  9.7 12.9   BUN 9 10  --  13 16   CREATININE 0.67 0.66  --  0.72 0.87   EGFR 90.7 91.0  --  86.8 69.1   GLUCOSE 129* 144*  --  158* 145*   CALCIUM 9.8 9.8  --  10.8* 11.0*   MAGNESIUM  --   --   --  1.7  --    HEMOGLOBIN A1C  --   --  7.40*  --  7.40*         Lab 05/10/25  1151 05/09/25  1007   TOTAL PROTEIN 7.4 7.9   ALBUMIN 4.4 4.6   GLOBULIN 3.0 3.3   ALT (SGPT) 38* 47*   AST (SGOT) 45* 72*   BILIRUBIN 0.3 0.6   ALK PHOS 94 85   LIPASE 38  --          Lab 05/10/25  1327 05/10/25  1151 05/09/25  1007   PROBNP  --  <36.0 <36.0   HSTROP T 8 8  --    PROTIME  --  14.5  --    INR  --  1.09  --          Lab 05/11/25  0545 05/09/25  1007   CHOLESTEROL 184 193   LDL CHOL 108* 117*   HDL CHOL 45 48   TRIGLYCERIDES 177* 157*             Brief Urine Lab Results  (Last result in the past 365 days)        Color   Clarity   Blood   Leuk Est   Nitrite   Protein   CREAT   Urine HCG        05/10/25 1055 Dark Yellow   Cloudy   Negative   Trace   Negative   Trace                 Microbiology Results (last 10  days)       Procedure Component Value - Date/Time    COVID-19, FLU A/B, RSV PCR 1 HR TAT - Swab, Nasopharynx [[id number]]  (Normal) Collected: 05/10/25 1154    Lab Status: Final result Specimen: Swab from Nasopharynx Updated: 05/10/25 1247     COVID19 Not Detected     Influenza A PCR Not Detected     Influenza B PCR Not Detected     RSV, PCR Not Detected    Narrative:      Fact sheet for providers: https://www.fda.gov/media/201478/download    Fact sheet for patients: https://www.fda.gov/media/996840/download    Test performed by PCR.            MRI Angiogram Neck Without Contrast  Result Date: 5/12/2025  Impression: Unremarkable MRA of the head and neck (please see above discussion). Electronically Signed: Jax Locke MD  5/12/2025 8:34 AM EDT  Workstation ID: [id number]    MRI Angiogram Head Without Contrast  Result Date: 5/12/2025  Impression: Unremarkable MRA of the head and neck (please see above discussion). Electronically Signed: Jax Locke MD  5/12/2025 8:34 AM EDT  Workstation ID: [id number]    XR Hip With or Without Pelvis 2 - 3 View Right  Result Date: 5/10/2025  Impression: Intact total right hip arthroplasty. Moderate degenerative changes in the left hip. Electronically Signed: Chucho Mcdonnell MD  5/10/2025 6:39 PM EDT  Workstation ID: [id number]    MRI Brain With & Without Contrast  Result Date: 5/10/2025  Impression: 1.5 cm x 6 mm acute infarct anteriorly in the right thalamus. Electronically Signed: Chucho Mcdonnell MD  5/10/2025 3:21 PM EDT  Workstation ID: [id number]    CT Head Without Contrast  Result Date: 5/10/2025  Impression: 1. No evidence of acute hemorrhage or midline shift. 2. New low-attenuation anteriorly in the right thalamus possibly an area of old infarction. Suggest a follow-up MRI examination with and without intravenous gadolinium to exclude an acute infarct or mass. Electronically Signed: Chucho Mcdonnell MD  5/10/2025 12:08 PM EDT  Workstation ID: [id number]    XR Chest 1 View  Result Date:  5/10/2025  Impression: Patchy airspace opacity in the lung fields right greater than left possibly atelectasis or pneumonia. Electronically Signed: Chucho Mcdonnell MD  5/10/2025 12:00 PM EDT  Workstation ID: [id number]    XR Abdomen KUB  Result Date: 5/10/2025  Impression: No evidence of bowel obstruction. Horseshoe kidney with punctate renal calcifications is not well visualized on this examination. Electronically Signed: Chucho Mcdonnell MD  5/10/2025 11:42 AM EDT  Workstation ID: [id number]               Results for orders placed during the hospital encounter of 05/10/25    Adult Transthoracic Echo Complete W/ Cont if Necessary Per Protocol    Interpretation Summary    Left ventricular ejection fraction appears to be 66 - 70%.    Left ventricular wall thickness is consistent with borderline concentric hypertrophy.    Left ventricular diastolic function is consistent with (grade I) impaired relaxation and age.    No significant valvular disease.      Labs Pending at Discharge:        Time spent on Discharge including face to face service:  more than 35 minutes    Electronically signed by Guillermo Negrete DO, 05/13/25, 11:54 AM EDT.

## 2025-05-16 ENCOUNTER — TELEPHONE (OUTPATIENT)
Dept: FAMILY MEDICINE CLINIC | Age: 75
End: 2025-05-16
Payer: MEDICARE

## 2025-05-16 ENCOUNTER — READMISSION MANAGEMENT (OUTPATIENT)
Dept: CALL CENTER | Facility: HOSPITAL | Age: 75
End: 2025-05-16
Payer: MEDICARE

## 2025-05-16 NOTE — OUTREACH NOTE
Prep Survey      Flowsheet Row Responses   [facility] facility patient discharged from? Non-BH   Is LACE score < 7 ? Non-BH Discharge   Eligibility [facility]   Date of Discharge 05/16/25   Discharge Disposition Home or Self Care   Discharge diagnosis Stroke   Does the patient have one of the following disease processes/diagnoses(primary or secondary)? Stroke   Comments regarding appointments Pt scheduled 5/29/25 @ 845   Prep survey completed? Yes            Anne SCHMID - Registered Nurse

## 2025-05-16 NOTE — TELEPHONE ENCOUNTER
Pt is being release from [facility] today 05/16/25. She was seen for a stroke. I could not find any Hospital f/u appointments  within the 14 day window.  Please advise.

## 2025-05-17 ENCOUNTER — TRANSITIONAL CARE MANAGEMENT TELEPHONE ENCOUNTER (OUTPATIENT)
Dept: CALL CENTER | Facility: HOSPITAL | Age: 75
End: 2025-05-17
Payer: MEDICARE

## 2025-05-17 DIAGNOSIS — I10 ESSENTIAL HYPERTENSION: ICD-10-CM

## 2025-05-17 NOTE — OUTREACH NOTE
Call Center TCM Note      Flowsheet Row Responses   [facility] facility patient discharged from? Non-  [[facility]]   Does the patient have one of the following disease processes/diagnoses(primary or secondary)? Other   TCM attempt successful? No   Unsuccessful attempts Attempt 2            Clarita Roberson Registered Nurse    5/17/2025, 11:48 EDT

## 2025-05-17 NOTE — OUTREACH NOTE
Call Center TCM Note      Flowsheet Row Responses   [facility] facility patient discharged from? Non-  [[facility]]   Does the patient have one of the following disease processes/diagnoses(primary or secondary)? Other   TCM attempt successful? No   Unsuccessful attempts Attempt 1            Clarita Roberson Registered Nurse    5/17/2025, 10:37 EDT

## 2025-05-19 ENCOUNTER — TRANSITIONAL CARE MANAGEMENT TELEPHONE ENCOUNTER (OUTPATIENT)
Dept: CALL CENTER | Facility: HOSPITAL | Age: 75
End: 2025-05-19
Payer: MEDICARE

## 2025-05-19 RX ORDER — NEBIVOLOL 5 MG/1
5 TABLET ORAL DAILY
Qty: 90 TABLET | Refills: 1 | Status: SHIPPED | OUTPATIENT
Start: 2025-05-19

## 2025-05-19 NOTE — OUTREACH NOTE
Call Center TCM Note      Flowsheet Row Responses   [facility] patient discharged from? Non-BH  [[facility]]   Does the patient have one of the following disease processes/diagnoses(primary or secondary)? Other   TCM attempt successful? No  [verbal release for Enoc Edwards]   Unsuccessful attempts Attempt 3            MICAH Roberson Registered Nurse    5/19/2025, 09:48 EDT

## 2025-05-20 ENCOUNTER — TELEPHONE (OUTPATIENT)
Dept: FAMILY MEDICINE CLINIC | Age: 75
End: 2025-05-20
Payer: MEDICARE

## 2025-05-20 NOTE — TELEPHONE ENCOUNTER
Lucrecia chinchilla/Antoinette called to make sure u would [facility] for nursing, PT/OT/ST and social work.  Pt is coming hayden e from [facility].    [phone]

## 2025-05-23 ENCOUNTER — TELEPHONE (OUTPATIENT)
Dept: FAMILY MEDICINE CLINIC | Age: 75
End: 2025-05-23
Payer: MEDICARE

## 2025-05-23 NOTE — TELEPHONE ENCOUNTER
Caller: REDD FREY    Relationship: [facility]    Best call back number: [phone] (IT IS OKAY TO LEAVE VOICE MAIL)    What orders are you requesting (i.e. lab or imaging): HOME HEALTH NURSING ORDERS FOR TWO WEEK ONE AND THEN ONE WEEK 8.    Where will you receive your lab/imaging services: [facility]

## 2025-05-27 NOTE — PROGRESS NOTES
Chief Complaint  Hospital FU for right thalamic stroke     Patient or patient representative verbalized consent for the use of Ambient Listening during the visit with  Cruzito Huff MD PhD for chart documentation. 5/30/2025  13:45 EDT    Subjective      History of Present Illness:  Criss Edwards is a delightful 74 y.o. right handed female who presents to [facility] referred for hospital FU for right thalamic stroke with history of:  Past Medical History:   Diagnosis Date    Dizziness     History of back surgery 2013    unkown what surgeries    History of total right hip replacement     HL (hearing loss)     Hypertension    Alyssa caregiver/friend accompanied.      From [facility] Discharge Summary 5/10-13/2025:  Patient presented to the ER with altered mental status, generalized weakness and dizziness.  Patient was found to have an acute right thalamic stroke on MRI.  Teleneurology was consulted.  MRA of the head and neck was unremarkable.  TTE showed preserved ejection fraction 66 to 70% patient had hypertrophy and diastolic dysfunction.  Started aspirin Plavix and statin.  Outpatient referral has been sent to cardiology for evaluation of Holter monitor.  Discharged to [facility] rehab today in stable condition.       History of Present Illness  The patient is a 74-year-old female referred to neurology for a hospital follow-up after a stroke.    She was admitted to the emergency room in mid-May due to confusion and dizziness, which led to the diagnosis of a stroke. During her hospital stay, she was placed on dual antiplatelet therapy (Plavix plus aspirin) for 21 days until 06/03/2025, after which she was instructed to continue with daily aspirin. She was also prescribed atorvastatin 80 mg for cholesterol management and blood pressure medication. On 05/13/2025, she was discharged to [facility] and has been residing at home for the past week, living independently  with daily check-ins from her caregiver. She reports no new or different stroke-like symptoms since her discharge. She adheres to her medication regimen as prescribed, with her daughter assisting in organization.    She has been diagnosed with diabetes and is currently on medication for this condition. Her blood pressure is well-managed with medication. She has a known history of cardiac arrhythmias and atrial fibrillation, under the care of Dr. Nelson, cardiologist. A recent echocardiogram revealed a leaky valve. She has a history of a previous stroke, which occurred following a fall at home where she hit her head.    She maintains an active lifestyle, engaging in activities such as gardening and caring for her dog. She has expressed a desire to resume driving. She has been experiencing headaches, which her physical therapist attributes to her recent stroke. Her primary care physician has ruled out dementia, but there is a conflicting diagnosis from another provider. She is currently on medication for leg spasms and muscle cramps.    SOCIAL HISTORY:    Hobbies: Gardening, caring for her dog    Diet: Regular diet    FAMILY HISTORY  - Mother: Heart attack, stroke, diabetes  - Father: Heart attack, stroke, diabetes  - Sister: Heart attack, stroke, diabetes    MEDICATIONS  CURRENT MEDS:  Aspirin Oral Daily  Start Date: 06/03/2025  Atorvastatin 80 mg Oral Daily  Start Date: 05/2025  Flexeril Oral Daily  PREVIOUS MEDS:  Plavix Oral Daily  Start Date: 05/2025  End Date: 06/03/2025  Reason for Discontinuation: Completed prescribed duration        All available pertinent Labs and Imaging personally reviewed, including:    Imaging:    MRI Brain With & Without Contrast 5/10/2025 for stroke:  Impression:   1.5 cm x 6 mm acute infarct anteriorly in the right thalamus      MRI Angiogram Head Without Contrast 5/12/2025 6:45 AM EDT  Impression  Unremarkable MRA of the head and neck (please see above discussion).      Adult  "Transthoracic Echo Complete W/ Cont if Necessary Per Protocol  Interpretation Summary    Left ventricular ejection fraction appears to be 66 - 70%.    Left ventricular wall thickness is consistent with borderline concentric hypertrophy.    Left ventricular diastolic function is consistent with (grade I) impaired relaxation and age.    No significant valvular disease.          Labs:  Lab Results   Component Value Date    WBC 6.93 05/13/2025    HGB 14.8 05/13/2025    HCT 46.1 05/13/2025    MCV 97.1 (H) 05/13/2025     05/13/2025     Lab Results   Component Value Date    GLUCOSE 129 (H) 05/13/2025    BUN 9 05/13/2025    CREATININE 0.67 05/13/2025     05/13/2025    K 4.2 05/13/2025     05/13/2025    CALCIUM 9.8 05/13/2025    PROTEINTOT 7.4 05/10/2025    ALBUMIN 4.4 05/10/2025    ALT 38 (H) 05/10/2025    AST 45 (H) 05/10/2025    ALKPHOS 94 05/10/2025    BILITOT 0.3 05/10/2025    GLOB 3.0 05/10/2025    AGRATIO 1.5 05/10/2025    BCR 13.4 05/13/2025    ANIONGAP 12.7 05/13/2025    EGFR 90.7 05/13/2025     Lab Results   Component Value Date    HGBA1C 7.40 (H) 05/11/2025     Lab Results   Component Value Date    TSH 3.710 11/08/2024     Lab Results   Component Value Date    ATGVRFSG90 285 10/17/2023     Lab Results   Component Value Date    FOLATE 6.25 10/17/2023     Lab Results   Component Value Date    CHOL 184 05/11/2025    CHLPL 179 01/26/2021    TRIG 177 (H) 05/11/2025    HDL 45 05/11/2025     (H) 05/11/2025         Objective   Vital Signs:   /78   Pulse 85   Ht 160 cm (63\")   Wt 82.9 kg (182 lb 11.2 oz)   BMI 32.36 kg/m²     GENERAL:  GEN: owgt, well appearing, no apparent distress, appropriately dressed and groomed.  HEENT: NCAT, nml symm facies, no LNA, no goiter  LUNGS: CTA mindi, no wheezes/crackles/rhonchi noted  Card: RRR, no m noted, no carotid bruit, mindi rad and dp pulses 2+ with cap refill < 2 sec  EXT: no cce, no rash noted    NEUROLOGICAL:  MS: A+O x 3, language spontaneous, " fluent with logical content, no word finding, some perseveration of thought, reported own and regarded as excellent historian, wants to return driving, mood euthymic;   Friend present and adds to and corroborates history.  CN: PERRLA, full excursions in all cardinal directions with smooth pursuits throughout without saccadic breaks or nystagmus noted; horizontal and vertical saccades symmetric and on target. Cover test reveals no phoria. Visual fields full to confrontation. V1-III Light touch symm and intact; symm jaw clench masseter and temporalis bulk and tone, medial and lateral pterygoids intact. Symm forehead crease and grimace. Hearing grossly symm and intact to finger rub. Uvula and soft palate midline rise on gag. Sternocleidomastoids and traps symm and 5/5. Tongue demonstrates no furrowing and extends midline and into left and right.  MOTOR: no atrophy/drift, normal tone, strength 5/5, no adventitial movements or tremor noted  SENSORY: ?vib loss in toes (uncooperative on exam). Romberg - NP  COORD: MARY ELLEN/FTN/HTS with good rhythm, speed, and amplitude. No ataxia noted.  GAIT: Native wide based slow gait with mild decreased stride, nml symm mindi armswing, nml start/stop/turn. Toe and heel walk without difficulty. Tandem walk without difficulty.  DTR's: absent Ajs, 2+ throughout; no clonus, Babinski - mute         ASSESSMENT & PLAN:    74 y.o. female who presents to [facility] NEUROLOGY & NEUROSURGERY referred for hospital FU for right thalamic stroke.    From [facility] Discharge Summary 5/10-13/2025:  Patient presented to the ER with altered mental status, generalized weakness and dizziness.  Patient was found to have an acute right thalamic stroke on MRI.  Teleneurology was consulted.  MRA of the head and neck was unremarkable.  TTE showed preserved ejection fraction 66 to 70% patient had hypertrophy and diastolic dysfunction.  Started aspirin Plavix and statin.  Outpatient referral has been sent to  cardiology for evaluation of Holter monitor.  Discharged to [facility] rehab today in stable condition.            Diagnoses and all orders for this visit:    1. H/O: CVA (cerebrovascular accident) (Primary)         Assessment & Plan  1. Post-stroke follow-up.  She was hospitalized in mid-May for confusion and dizziness, leading to a stroke diagnosis. She was placed on dual antiplatelet therapy (Plavix and aspirin) for 21 days until 06/03/2025. After this date, she will continue with daily baby aspirin indefinitely. She is also on atorvastatin 80 mg for cholesterol management and blood pressure medication, which will be monitored by her primary care physician. She was discharged to [facility] on 05/13/2025 and has been home for about a week. She has not experienced any new or different stroke-like symptoms since being home. She takes her medications as prescribed, organized in a pill box by her daughter. She has been advised to maintain an active lifestyle and engage in regular exercise. She has been informed about the importance of adhering to her medication regimen and the potential risks associated with discontinuation. She has been educated about the signs and symptoms of a stroke and the importance of seeking immediate medical attention if these occur. She has been advised to avoid high-carbohydrate foods and limit her intake to no more than 30 g per day. She has been advised to engage in 45 minutes of dedicated exercise daily. She has been advised to continue taking Flexeril for muscle cramps. She has been advised to use Tylenol for headache management and to avoid Motrin, Advil, and Excedrin due to their potential to increase her risk of bleeding.  Patient concerned about no driving (diagnosed by [facility] as having dementia), and will have her FU in 4 months for dementia eval and ability to resume driving.    2. Diabetes.  She was recently diagnosed with diabetes, which is an independent risk factor for  stroke. She has been advised to work on managing her diabetes and will be given goals for that.    3. Blood pressure management.  Her blood pressure is generally well controlled on medication, but it will need to be monitored by her primary care physician over time as blood pressure tends to change with age.    4. Cardiac arrhythmias.  She has a leaky valve as per her recent echo done by her cardiologist, Dr. Nelson. This condition will be monitored by her cardiologist.    5. History of stroke.  She had a previous stroke and fell at home and hit her head.    6. Muscle cramps.  She is currently on medication for leg spasms and muscle cramps.    Follow-up  The patient will follow up in 4 months.        Discussed below Stroke prevention measures with patient:  Lifestyle modifications recommended for secondary stroke prevention:  Take medications as prescribed to help prevent recurrent stroke and normal BP  Diet - heart healthy/diabetic diet  Exercise - increase over time to daily 45 minutes of moderate exercise (increased heart rate, mild short of breath and unable to have a conversation, and break a sweat)  Smoking - cessation  Alcohol intake - abstain    Discussed with patient common stroke signs and symptoms (see below) and importance of rapid ER medical evaluation for accurate diagnosis and appropriate time sensitive management including consideration for thrombolysis and endovascular therapy.    BE FAST common stroke signs and symptoms:  Balance/walking difficulty  Eye changes - blurred or double vision  Face droop  Asymmetric weakness or sensory loss (right or left side)  Speech difficulty - garbled, unable to speak or understand  Time - symptom onset and last known normal state of health          Follow Up  Return in about 4 months (around 9/30/2025) for for memory loss, 40 min extended.    37 minutes were spent caring for Criss Edwards on this date of service. This time spent by me includes preparing for the  visit, reviewing tests, obtaining/reviewing separately obtained history, performing medically appropriate exam/evaluation, counseling/educating the patient/family/caregiver, ordering medications/tests/procedures, referring/communicating with other health care professionals, documenting information in the medical record, independently interpreting results and communicating that with the patient/family/caregiver and/or care coordination.     Patient was given instructions and counseling regarding her condition or for health maintenance advice. Please see specific information pulled into the AVS if appropriate.

## 2025-05-29 ENCOUNTER — OFFICE VISIT (OUTPATIENT)
Dept: FAMILY MEDICINE CLINIC | Age: 75
End: 2025-05-29
Payer: MEDICARE

## 2025-05-29 ENCOUNTER — TELEPHONE (OUTPATIENT)
Dept: FAMILY MEDICINE CLINIC | Age: 75
End: 2025-05-29

## 2025-05-29 VITALS
TEMPERATURE: 98.2 F | OXYGEN SATURATION: 97 % | BODY MASS INDEX: 31.89 KG/M2 | HEIGHT: 63 IN | DIASTOLIC BLOOD PRESSURE: 72 MMHG | SYSTOLIC BLOOD PRESSURE: 114 MMHG | HEART RATE: 79 BPM | WEIGHT: 180 LBS

## 2025-05-29 DIAGNOSIS — R25.2 LEG CRAMP: ICD-10-CM

## 2025-05-29 DIAGNOSIS — R10.84 GENERALIZED ABDOMINAL PAIN: ICD-10-CM

## 2025-05-29 DIAGNOSIS — E11.9 TYPE 2 DIABETES MELLITUS WITHOUT COMPLICATION, WITHOUT LONG-TERM CURRENT USE OF INSULIN: ICD-10-CM

## 2025-05-29 DIAGNOSIS — I63.9 ACUTE CVA (CEREBROVASCULAR ACCIDENT): Primary | ICD-10-CM

## 2025-05-29 RX ORDER — AVOBENZONE, HOMOSALATE, OCTISALATE, OCTOCRYLENE 30; 40; 45; 26 MG/ML; MG/ML; MG/ML; MG/ML
CREAM TOPICAL
Qty: 100 EACH | Refills: 2 | Status: SHIPPED | OUTPATIENT
Start: 2025-05-29

## 2025-05-29 RX ORDER — CLOPIDOGREL BISULFATE 75 MG/1
75 TABLET ORAL DAILY
Qty: 90 TABLET | Refills: 1 | Status: SHIPPED | OUTPATIENT
Start: 2025-05-29

## 2025-05-29 RX ORDER — ATORVASTATIN CALCIUM 80 MG/1
80 TABLET, FILM COATED ORAL NIGHTLY
Qty: 90 TABLET | Refills: 1 | Status: SHIPPED | OUTPATIENT
Start: 2025-05-29

## 2025-05-29 RX ORDER — MAGNESIUM OXIDE 400 MG/1
400 TABLET ORAL DAILY
Qty: 30 TABLET | Refills: 3 | Status: SHIPPED | OUTPATIENT
Start: 2025-05-29

## 2025-05-29 RX ORDER — SIMETHICONE 80 MG
80 TABLET,CHEWABLE ORAL EVERY 6 HOURS PRN
Qty: 90 TABLET | Refills: 2 | Status: SHIPPED | OUTPATIENT
Start: 2025-05-29

## 2025-05-29 RX ORDER — BLOOD-GLUCOSE METER
1 KIT MISCELLANEOUS DAILY
Qty: 1 EACH | Refills: 0 | Status: SHIPPED | OUTPATIENT
Start: 2025-05-29

## 2025-05-29 RX ORDER — ASPIRIN 81 MG/1
81 TABLET, CHEWABLE ORAL DAILY
Qty: 90 TABLET | Refills: 1 | Status: SHIPPED | OUTPATIENT
Start: 2025-05-29

## 2025-05-29 RX ORDER — CYCLOBENZAPRINE HCL 5 MG
5 TABLET ORAL NIGHTLY PRN
Qty: 30 TABLET | Refills: 0 | Status: SHIPPED | OUTPATIENT
Start: 2025-05-29

## 2025-05-29 NOTE — TELEPHONE ENCOUNTER
Pt dropped of [facility] paperwork. The 20 dollar fee was collected. Documents were scanned in and routed to [employer].

## 2025-05-29 NOTE — PROGRESS NOTES
Transitional Care Follow Up Visit  Subjective     Criss Edwards is a 74 y.o. female who presents for a transitional care management visit.    Within 48 business hours after discharge our office contacted her via telephone to coordinate her care and needs.      I reviewed and discussed the details of that call along with the discharge summary, hospital problems, inpatient lab results, inpatient diagnostic studies, and consultation reports with Criss.     Current outpatient and discharge medications have been reconciled for the patient.  Reviewed by: Clarisse Flores MD          5/16/2025     2:36 PM   Date of TCM Phone Call   [facility]   Date of Discharge 5/16/2025   Discharge Disposition Home or Self Care     Risk for Readmission (LACE) No data recorded    History of Present Illness   Course During Hospital Stay:  Criss is here today for transition of care appointment following admission to [facility] from 5/10/25 to 5/13/25 for diagnosis of acute stroke.  She is accompanied today by her friend Alyssa Stanley.     She presented to the ED initially with complaint of AMS and generalized weakness and dizziness.  She was found to have acute right thalamic stroke on MRI.  Neurology was consulted.  MRA head and neck were unremarkable.  Echo showed only diastolic dysfunction and some hypertrophy.  She was started on aspirin and Plavix as well as a statin.  She was then discharged to [facility] rehab until 5/16/2025.    Today, she feels very fatigued.  She is still having a lot of cramps in the legs.  Needs refills today on magnesium and simethicone.  Also requesting refill on meloxicam.  She is going to see Dr. Cruzito Huff Neuro tomorrow.  She She is on ASA and Plavix both still as well as her new atorvastatin.  She is having some intermittent confusion.  She would like to be released to have her car keys and her gun back.  She has home health coming out to the house.  They are doing ST, PT  and OT.       The following portions of the patient's history were reviewed and updated as appropriate: allergies, current medications, past family history, past medical history, past social history, past surgical history, and problem list.    Review of Systems   Constitutional:  Positive for fatigue. Negative for chills and fever.   Respiratory:  Negative for cough and shortness of breath.    Cardiovascular:  Negative for chest pain and palpitations.   Gastrointestinal:  Negative for abdominal pain, constipation, diarrhea, nausea and vomiting.   Skin:  Negative for rash.   Neurological:  Negative for weakness.        +confusion         Current Outpatient Medications:   •  aspirin 81 MG chewable tablet, Chew 1 tablet Daily., Disp: 90 tablet, Rfl: 1  •  atorvastatin (LIPITOR) 80 MG tablet, Take 1 tablet by mouth Every Night., Disp: 90 tablet, Rfl: 1  •  Cholecalciferol 25 MCG (1000 UT) tablet, Take 1 tablet by mouth Daily., Disp: , Rfl:   •  clopidogrel (Plavix) 75 MG tablet, Take 1 tablet by mouth Daily., Disp: 90 tablet, Rfl: 1  •  cyclobenzaprine (FLEXERIL) 5 MG tablet, Take 1 tablet by mouth At Night As Needed for Muscle Spasms., Disp: 30 tablet, Rfl: 0  •  famotidine (PEPCID) 20 MG tablet, Take 1 tablet by mouth 2 (Two) Times a Day., Disp: 60 tablet, Rfl: 1  •  magnesium oxide (MAG-OX) 400 MG tablet, Take 1 tablet by mouth Daily., Disp: 30 tablet, Rfl: 3  •  metFORMIN (GLUCOPHAGE) 500 MG tablet, Take 1 tablet by mouth Daily With Breakfast for 180 days., Disp: 30 tablet, Rfl: 5  •  nebivolol (BYSTOLIC) 5 MG tablet, TAKE 1 TABLET BY MOUTH DAILY, Disp: 90 tablet, Rfl: 1  •  ondansetron ODT (ZOFRAN-ODT) 4 MG disintegrating tablet, Place 1 tablet on the tongue Every 8 (Eight) Hours As Needed for Nausea or Vomiting., Disp: 20 tablet, Rfl: 0  •  simethicone (MYLICON) 80 MG chewable tablet, Chew 1 tablet Every 6 (Six) Hours As Needed for Flatulence., Disp: 90 tablet, Rfl: 2  •  glucose blood test strip, Use as  "instructed to check blood sugar daily, dx E11.9, Disp: 100 each, Rfl: 3  •  glucose monitor monitoring kit, Use 1 each Daily. Use daily as directed  Dx E11.9, Disp: 1 each, Rfl: 0  •  Lancets misc, Use as directed checking once daily, dx E11.9, Disp: 100 each, Rfl: 2  Medications Discontinued During This Encounter   Medication Reason   • Insulin Lispro (humaLOG) 100 UNIT/ML injection Historical Med - Therapy completed   • meloxicam (MOBIC) 15 MG tablet Historical Med - Therapy completed   • cyclobenzaprine (FLEXERIL) 5 MG tablet Reorder   • simethicone (MYLICON) 80 MG chewable tablet Reorder   • magnesium oxide (MAG-OX) 400 MG tablet Reorder   • aspirin 81 MG chewable tablet Reorder   • atorvastatin (LIPITOR) 80 MG tablet Reorder   • clopidogrel (Plavix) 75 MG tablet Reorder         Objective   Vitals:    05/29/25 0836   BP: 114/72   BP Location: Left arm   Patient Position: Sitting   Cuff Size: Adult   Pulse: 79   Temp: 98.2 °F (36.8 °C)   TempSrc: Oral   SpO2: 97%   Weight: 81.6 kg (180 lb)   Height: 160 cm (62.99\")     Body mass index is 31.89 kg/m².       Physical Exam  Vitals reviewed.   Constitutional:       General: She is not in acute distress.     Appearance: Normal appearance. She is well-developed.   HENT:      Head: Normocephalic and atraumatic.      Right Ear: External ear normal.      Left Ear: External ear normal.   Eyes:      Extraocular Movements: Extraocular movements intact.      Conjunctiva/sclera: Conjunctivae normal.      Pupils: Pupils are equal, round, and reactive to light.   Cardiovascular:      Rate and Rhythm: Normal rate and regular rhythm.      Heart sounds: No murmur heard.  Pulmonary:      Effort: Pulmonary effort is normal.      Breath sounds: Normal breath sounds. No wheezing, rhonchi or rales.   Abdominal:      General: Bowel sounds are normal. There is no distension.      Palpations: Abdomen is soft.      Tenderness: There is no abdominal tenderness.   Musculoskeletal:         " General: Normal range of motion.   Neurological:      Mental Status: She is alert.   Psychiatric:         Mood and Affect: Affect normal.       Lab Results   Component Value Date    GLUCOSE 129 (H) 05/13/2025    BUN 9 05/13/2025    CREATININE 0.67 05/13/2025    EGFR 90.7 05/13/2025    BCR 13.4 05/13/2025    K 4.2 05/13/2025    CO2 19.3 (L) 05/13/2025    CALCIUM 9.8 05/13/2025    ALBUMIN 4.4 05/10/2025    BILITOT 0.3 05/10/2025    AST 45 (H) 05/10/2025    ALT 38 (H) 05/10/2025       Lab Results   Component Value Date    CHOL 184 05/11/2025    CHLPL 179 01/26/2021    TRIG 177 (H) 05/11/2025    HDL 45 05/11/2025     (H) 05/11/2025       Lab Results   Component Value Date    WBC 6.93 05/13/2025    HGB 14.8 05/13/2025    HCT 46.1 05/13/2025    MCV 97.1 (H) 05/13/2025     05/13/2025         Assessment & Plan   Assessment & Plan  Acute CVA (cerebrovascular accident)  Doing better.  Having some residual confusion.  She is not driving and family has taken her gun; she would like both back today.  Discussed that she will need to be cleared by Neurology.  She continues on ASA/Plavix as well as high intensity statin.  Refills sent today on the meds as below.  [facility] approved for Afia.  Parking placard given.  Orders:  •  atorvastatin (LIPITOR) 80 MG tablet; Take 1 tablet by mouth Every Night.  •  clopidogrel (Plavix) 75 MG tablet; Take 1 tablet by mouth Daily.  •  aspirin 81 MG chewable tablet; Chew 1 tablet Daily.    Generalized abdominal pain  Refills sent today.  Orders:  •  simethicone (MYLICON) 80 MG chewable tablet; Chew 1 tablet Every 6 (Six) Hours As Needed for Flatulence.    Leg cramp  We are going to d/c her meloxicam due to now being on Plavix.  We will start her back on cyclobenzaprine to help with back pain and joint aches.  Orders:  •  magnesium oxide (MAG-OX) 400 MG tablet; Take 1 tablet by mouth Daily.  •  cyclobenzaprine (FLEXERIL) 5 MG tablet; Take 1 tablet by mouth At Night As Needed for  Muscle Spasms.    Type 2 diabetes mellitus without complication, without long-term current use of insulin    We had started metformin 500 mg daily earlier this month, prior to the stroke, once A1c reached 7.4.  We have been monitoring.  No insulin needed at this time.  Glucometer and supplies ordered.  Orders:  •  Lancets misc; Use as directed checking once daily, dx E11.9  •  glucose blood test strip; Use as instructed to check blood sugar daily, dx E11.9  •  glucose monitor monitoring kit; Use 1 each Daily. Use daily as directed  Dx E11.9

## 2025-05-29 NOTE — ASSESSMENT & PLAN NOTE
We had started metformin 500 mg daily earlier this month, prior to the stroke, once A1c reached 7.4.  We have been monitoring.  No insulin needed at this time.  Glucometer and supplies ordered.  Orders:    Lancets misc; Use as directed checking once daily, dx E11.9    glucose blood test strip; Use as instructed to check blood sugar daily, dx E11.9    glucose monitor monitoring kit; Use 1 each Daily. Use daily as directed  Dx E11.9

## 2025-05-29 NOTE — ASSESSMENT & PLAN NOTE
Doing better.  Having some residual confusion.  She is not driving and family has taken her gun; she would like both back today.  Discussed that she will need to be cleared by Neurology.  She continues on ASA/Plavix as well as high intensity statin.  Refills sent today on the meds as below.  [facility] approved for Afia.  Parking placard given.  Orders:    atorvastatin (LIPITOR) 80 MG tablet; Take 1 tablet by mouth Every Night.    clopidogrel (Plavix) 75 MG tablet; Take 1 tablet by mouth Daily.    aspirin 81 MG chewable tablet; Chew 1 tablet Daily.

## 2025-05-30 ENCOUNTER — OFFICE VISIT (OUTPATIENT)
Dept: NEUROLOGY | Facility: CLINIC | Age: 75
End: 2025-05-30
Payer: MEDICARE

## 2025-05-30 ENCOUNTER — TELEPHONE (OUTPATIENT)
Dept: FAMILY MEDICINE CLINIC | Age: 75
End: 2025-05-30
Payer: MEDICARE

## 2025-05-30 VITALS
HEIGHT: 63 IN | DIASTOLIC BLOOD PRESSURE: 78 MMHG | HEART RATE: 85 BPM | BODY MASS INDEX: 32.37 KG/M2 | WEIGHT: 182.7 LBS | SYSTOLIC BLOOD PRESSURE: 135 MMHG

## 2025-05-30 DIAGNOSIS — Z86.73 H/O: CVA (CEREBROVASCULAR ACCIDENT): Primary | ICD-10-CM

## 2025-05-30 NOTE — TELEPHONE ENCOUNTER
[facility] called to inform the office. Care delay until next week if any questions we can call her back [phone].

## 2025-05-30 NOTE — TELEPHONE ENCOUNTER
Noted.   Pt to ED for mid chest pain with tingling to fingers and face. Pt has a history of anxiety.

## 2025-06-02 ENCOUNTER — OFFICE VISIT (OUTPATIENT)
Dept: OTOLARYNGOLOGY | Facility: CLINIC | Age: 75
End: 2025-06-02
Payer: MEDICARE

## 2025-06-02 ENCOUNTER — HOSPITAL ENCOUNTER (OUTPATIENT)
Dept: ULTRASOUND IMAGING | Facility: HOSPITAL | Age: 75
Discharge: HOME | End: 2025-06-02
Admitting: UROLOGY
Payer: MEDICARE

## 2025-06-02 VITALS
SYSTOLIC BLOOD PRESSURE: 126 MMHG | DIASTOLIC BLOOD PRESSURE: 78 MMHG | HEART RATE: 60 BPM | TEMPERATURE: 96 F | OXYGEN SATURATION: 96 %

## 2025-06-02 DIAGNOSIS — N20.0 NEPHROLITHIASIS: ICD-10-CM

## 2025-06-02 DIAGNOSIS — H93.13 TINNITUS OF BOTH EARS: ICD-10-CM

## 2025-06-02 DIAGNOSIS — H90.3 SENSORINEURAL HEARING LOSS (SNHL) OF BOTH EARS: ICD-10-CM

## 2025-06-02 DIAGNOSIS — H61.23 BILATERAL IMPACTED CERUMEN: Primary | ICD-10-CM

## 2025-06-02 PROCEDURE — 76775 US EXAM ABDO BACK WALL LIM: CPT

## 2025-06-02 PROCEDURE — 1159F MED LIST DOCD IN RCRD: CPT | Performed by: OTOLARYNGOLOGY

## 2025-06-02 PROCEDURE — 69210 REMOVE IMPACTED EAR WAX UNI: CPT | Performed by: OTOLARYNGOLOGY

## 2025-06-02 PROCEDURE — 3078F DIAST BP <80 MM HG: CPT | Performed by: OTOLARYNGOLOGY

## 2025-06-02 PROCEDURE — 1160F RVW MEDS BY RX/DR IN RCRD: CPT | Performed by: OTOLARYNGOLOGY

## 2025-06-02 PROCEDURE — 3074F SYST BP LT 130 MM HG: CPT | Performed by: OTOLARYNGOLOGY

## 2025-06-02 NOTE — PROGRESS NOTES
Patient Name: Criss Edwards   Visit Date: 06/02/2025   Patient ID: [id number]  Provider: Shaun Benson MD    Sex: female  Location: [facility]   YOB: 1950  Location Address: [address] [place] [address], [address],?[place]?[address]    Primary Care Provider Clarisse Flores MD  Location Phone: [phone]    Referring Provider: No ref. provider found        Chief Complaint  Ear cleaning    History of Present Illness  Criss Edwards is a 74 y.o. female who returns today for follow-up of sensorineural hearing loss and bilateral cerumen impactions.  She was last seen by Dr. Terrazas on 4/24/2024 please see that note for further details.  She tells me that unfortunately she was diagnosed with a stroke back in May.  She is now on Plavix.  She does report getting her hearing aids from [facility] in the past 1 to 2 years.  She does not feel like they are very helpful at times.  She reports significant issues with cerumen for which she has required frequent debridements.  She denies any otalgia or otorrhea.  She does report some ear itching.    Past Medical History:   Diagnosis Date    Dizziness     History of back surgery 2013    unkown what surgeries    History of total right hip replacement     HL (hearing loss)     Hypertension     Stroke        Past Surgical History:   Procedure Laterality Date    CHOLECYSTECTOMY      COLONOSCOPY      ENDOSCOPY N/A 1/25/2024    Procedure: ESOPHAGOGASTRODUODENOSCOPY;  Surgeon: Mallory Lua MD;  Location: [facility];  Service: Gastroenterology;  Laterality: N/A;  GASTRIC POLYP ESOPH DILATATION 18MM ESOPH DIVERTICULUIM    HAND SURGERY Left 05/07/2016    HIP ARTHROPLASTY Right     KIDNEY STONE SURGERY           Current Outpatient Medications:     aspirin 81 MG chewable tablet, Chew 1 tablet Daily., Disp: 90 tablet, Rfl: 1    atorvastatin (LIPITOR) 80 MG tablet, Take 1 tablet by mouth Every Night., Disp: 90 tablet, Rfl: 1     Cholecalciferol 25 MCG (1000 UT) tablet, Take 1 tablet by mouth Daily., Disp: , Rfl:     clopidogrel (Plavix) 75 MG tablet, Take 1 tablet by mouth Daily., Disp: 90 tablet, Rfl: 1    famotidine (PEPCID) 20 MG tablet, Take 1 tablet by mouth 2 (Two) Times a Day., Disp: 60 tablet, Rfl: 1    magnesium oxide (MAG-OX) 400 MG tablet, Take 1 tablet by mouth Daily., Disp: 30 tablet, Rfl: 3    metFORMIN (GLUCOPHAGE) 500 MG tablet, Take 1 tablet by mouth Daily With Breakfast for 180 days., Disp: 30 tablet, Rfl: 5    nebivolol (BYSTOLIC) 5 MG tablet, TAKE 1 TABLET BY MOUTH DAILY, Disp: 90 tablet, Rfl: 1    cyclobenzaprine (FLEXERIL) 5 MG tablet, Take 1 tablet by mouth At Night As Needed for Muscle Spasms., Disp: 30 tablet, Rfl: 0    glucose blood test strip, Use as instructed to check blood sugar daily, dx E11.9, Disp: 100 each, Rfl: 3    glucose monitor monitoring kit, Use 1 each Daily. Use daily as directed  Dx E11.9, Disp: 1 each, Rfl: 0    Lancets misc, Use as directed checking once daily, dx E11.9, Disp: 100 each, Rfl: 2    ondansetron ODT (ZOFRAN-ODT) 4 MG disintegrating tablet, Place 1 tablet on the tongue Every 8 (Eight) Hours As Needed for Nausea or Vomiting., Disp: 20 tablet, Rfl: 0    simethicone (MYLICON) 80 MG chewable tablet, Chew 1 tablet Every 6 (Six) Hours As Needed for Flatulence., Disp: 90 tablet, Rfl: 2     Allergies   Allergen Reactions    Codeine Itching    Iodinated Contrast Media Other (See Comments)     Other reaction(s): GI Intolerance      Other reaction(s): GI Intolerance    Iodine Hives     DENIES    Contrast Dye (Echo Or Unknown Ct/Mr) GI Intolerance    Darvon [Propoxyphene] GI Intolerance and Headache       Social History     Tobacco Use    Smoking status: Never     Passive exposure: Never    Smokeless tobacco: Never   Vaping Use    Vaping status: Never Used   Substance Use Topics    Alcohol use: No    Drug use: No        Objective     Vital Signs:   /78   Pulse 60   Temp 96 °F (35.6 °C)    SpO2 96%       Physical Exam    General: Well developed, well nourished patient of stated age in no acute distress. Voice is strong and clear.   Head: Normocephalic and atraumatic.  Face: No lesions.  Bilateral parotid and submandibular glands are unremarkable.  Stensen's and Warthin's ducts are productive of clear saliva bilaterally.  House-Brackmann I/VI     bilaterally.   muscles and temporomandibular joint nontender to palpation.  No TMJ crepitus.  Eyes: PERRLA, sclerae anicteric, no conjunctival injection. Extraocular movements are intact and full. No nystagmus.   Ears: Auricles are normal in appearance. Bilateral external auditory canals are impacted with cerumen and dry flaky skin.  This was removed using the operating microscope and alligator forceps. Bilateral tympanic membranes are clear and without effusion. Hearing normal to conversational voice.   Nose: External nose is normal in appearance. Bilateral nares are patent with normal appearing mucosa. Septum deviated to the left. Turbinates are unremarkable. No lesions.   Oral Cavity: Lips are normal in appearance. Oral mucosa is unremarkable. Gingiva is unremarkable.  Partial dentition for age. Tongue is unremarkable with good movement. Hard palate is unremarkable.   Oropharynx: Soft palate is unremarkable with full movement. Uvula is unremarkable. Bilateral tonsils are unremarkable. Posterior oropharynx is unremarkable.    Larynx and hypopharynx: Deferred secondary to gag reflex.  Neck: Supple.  No mass.  Nontender to palpation.  Trachea midline. Thyroid normal size and without nodules to palpation.   Lymphatic: No lymphadenopathy upon palpation.  Respiratory: Clear to auscultation bilaterally, nonlabored respirations    Cardiovascular: RRR, no murmurs, rubs, or gallops,   Psychiatric: Appropriate affect, cooperative   Neurologic: Oriented x 3, strength symmetric in all extremities, Cranial Nerves II-XII are grossly intact to  confrontation   Skin: Warm and dry. No rashes.    Procedures           Result Review :               Assessment and Plan    Diagnoses and all orders for this visit:    1. Bilateral impacted cerumen (Primary)    2. Sensorineural hearing loss (SNHL) of both ears    3. Tinnitus of both ears    Impressions and findings were discussed at great length.  Currently, she is seen today for follow-up of bilateral cerumen impactions in the setting of hearing aids.  Examination today reveals bilateral cerumen impactions which were debrided along with dry external auditory canal skin.  We discussed trying olive oil in her ears once or twice weekly to help keep the cerumen soft and the skin moist.  She feels as though the cerumen really impedes the function of her hearing aids and would like to follow-up frequently.  Therefore, we will have her follow-up in 3 months or sooner if needed.  She will bring her most recent audiogram from [facility] the next time she visits.        Follow Up   Return in about 3 months (around 9/2/2025).  Patient was given instructions and counseling regarding her condition or for health maintenance advice. Please see specific information pulled into the AVS if appropriate.

## 2025-06-03 ENCOUNTER — TELEPHONE (OUTPATIENT)
Dept: FAMILY MEDICINE CLINIC | Age: 75
End: 2025-06-03
Payer: MEDICARE

## 2025-06-04 ENCOUNTER — TELEPHONE (OUTPATIENT)
Dept: FAMILY MEDICINE CLINIC | Age: 75
End: 2025-06-04
Payer: MEDICARE

## 2025-06-04 NOTE — TELEPHONE ENCOUNTER
Caller: [facility]    Relationship: Home Health    Best call back number: [phone]    What was the call regarding: DIANA, A NURSE WITH [facility], IS CALLING TO LET PROVIDER KNOW THAT SHE DID SEE PATIENT TODAY, 06/04/2025, AND SHE DID TEST HER SUGAR AND IT  BY FINGER STICK. PATIENT IS CURRENTLY NOT TAKING ANY INSULIN.

## 2025-06-07 PROBLEM — R10.2 SUPRAPUBIC PAIN: Status: ACTIVE | Noted: 2025-06-07

## 2025-06-07 PROBLEM — N20.1 URETERAL STONE: Status: ACTIVE | Noted: 2025-06-07

## 2025-06-07 NOTE — PROGRESS NOTES
Chief Complaint: Urologic complaint    Subjective         History of Present Illness  Criss Edwards is a 74 y.o. female        Horseshoe kidney  Nephrolithiasis  Left ureteral stone  Gross hematuria        6/25 renal ultrasound - horseshoe kidney, no hydronephrosis       5/25 UA with hospitalization  Patient with right flank pain.    With suprapubic pressure but improved    Voiding ok    Subjective fevers    No GH/ dysuria      Incontinence    On Estrace cream      PVR    2/25   000        2/25 UA - negative          Previous      Patient with bilateral thoracic back pain that radiates to her groins.  Changes with movement. Very  Bothersome      4/10/25 0.7, GFR 89  4/10/25 CT uro-- horseshoe kidney with a few punctate nonobstructive calculi.  No hydronephrosis.  3 mm stone left UVJ or posterior bladder lumen.  Small hiatal hernia.      hx of renal stones dating back to 1990s and has had surgery for these numerous times.    2/25 NP-Estrace cream     hysterectomy in 1984.    No family history of  malignancy        Urine CX:      11/26/24:25,000 colony-forming units per milliliter normal urogenital talia     11/08/24:E. coli greater than 100,000 colony form units per milliliter pansensitive.     10/28/24:E. coli greater than 100,000 colony-forming units per milliliter pansensitive                    Assessment and Plan    Diagnoses and all orders for this visit:    1. Ureteral stone (Primary)    2. Suprapubic pain        Ureteral stone  Suprapubic pain      Renal ultrasound normal, no hydronephrosis patient suprapubic pressure is improved, at this time we are going to assume she has passed her stone.            Recurrent UTI        Continue Estrace cream twice weekly.    Cont drinking 4-6 water daily.        F/u with PCP

## 2025-06-10 ENCOUNTER — TELEPHONE (OUTPATIENT)
Dept: FAMILY MEDICINE CLINIC | Age: 75
End: 2025-06-10
Payer: MEDICARE

## 2025-06-10 ENCOUNTER — OFFICE VISIT (OUTPATIENT)
Dept: UROLOGY | Age: 75
End: 2025-06-10
Payer: MEDICARE

## 2025-06-10 VITALS — WEIGHT: 182 LBS | BODY MASS INDEX: 32.25 KG/M2 | HEIGHT: 63 IN

## 2025-06-10 DIAGNOSIS — E11.9 TYPE 2 DIABETES MELLITUS WITHOUT COMPLICATION, WITHOUT LONG-TERM CURRENT USE OF INSULIN: Primary | ICD-10-CM

## 2025-06-10 DIAGNOSIS — R10.2 SUPRAPUBIC PAIN: ICD-10-CM

## 2025-06-10 DIAGNOSIS — N20.1 URETERAL STONE: Primary | ICD-10-CM

## 2025-06-10 RX ORDER — ESTRADIOL 0.1 MG/G
2 CREAM VAGINAL DAILY
Qty: 42.5 G | Refills: 12 | Status: SHIPPED | OUTPATIENT
Start: 2025-06-10

## 2025-06-10 NOTE — TELEPHONE ENCOUNTER
Russ, nurse w/ [facility], called stating patient is having a hard time checking her blood sugar, they are requesting rx for a dexcom for patient however pt is not on insulin or checking 3+ times daily, pt is also complaining of back pain and the lidocaine patches are not helping at all, Russ is requesting orders to see pt twice weekly x 3 weeks to get things sorted out, please advise.      [phone]

## 2025-06-11 RX ORDER — ACYCLOVIR 400 MG/1
1 TABLET ORAL
Qty: 3 EACH | Refills: 5 | Status: SHIPPED | OUTPATIENT
Start: 2025-06-11

## 2025-06-11 RX ORDER — ACYCLOVIR 400 MG/1
1 TABLET ORAL ONCE
Qty: 1 EACH | Refills: 0 | Status: SHIPPED | OUTPATIENT
Start: 2025-06-11 | End: 2025-06-11

## 2025-06-11 NOTE — TELEPHONE ENCOUNTER
I don't know that insurance will approve it, but I would love to get her a Dexcom if we can.  OK to approve PT orders twice weekly x 3 weeks as requested.  Thanks, BZESTHER

## 2025-06-13 RX ORDER — ESCITALOPRAM OXALATE 5 MG/1
5 TABLET ORAL DAILY
Qty: 30 TABLET | Refills: 5 | Status: SHIPPED | OUTPATIENT
Start: 2025-06-13

## 2025-06-13 NOTE — TELEPHONE ENCOUNTER
I think that is all a great idea.  Because the counselor cannot prescribe meds, I would be willing to start her on something like Lexapro at the same time.  Would she like me to send in escitalopram 5 mg daily?  Thanks, KATHERINE  
Left message to return call   
Left message to return call   
Mallory with [facility] called stating the  reached out to her to let her know Patient made some comments to her about her feeling like a prisoner in her own home and her not wanting to be here any longer.      Mallory then reached out to Criss and asked her if she felt suicidal and she responded yes she did , but she did not have a plan and that her daughter took out all the dangerous stuff out of the house a few weeks ago so if she did come up with a plan she would not have anything within her reach to do anything about it.  Mallory then suggested she go to ER or to a in Patient facility to be evaluated and Patient refused.  She even refused to see a therapist at this time, but stated she would try medication.  Her daughter Afia said she was on cymbalta in the past but stopped it because it did not let her feel her feelings.  The  is going to go out again and talk with her to see if she can talk her in to talking with a  via zoom in order to get the help she needs.  Also she is scheduled to see a nurse today as well.      
Per Russ, she spoke with family and they would like to move forward with rx for Lexapro 5mg.  
Rx sent.  Thanks, KATHERINE   
Spoke to nurse ashlie Solano, she was going to forward the information to pt and her daughter.   
Detail Level: Zone

## 2025-06-19 DIAGNOSIS — I10 ESSENTIAL HYPERTENSION: ICD-10-CM

## 2025-06-19 RX ORDER — VALSARTAN 40 MG/1
40 TABLET ORAL DAILY
Qty: 90 TABLET | Refills: 1 | OUTPATIENT
Start: 2025-06-19

## 2025-07-03 ENCOUNTER — OUTSIDE FACILITY SERVICE (OUTPATIENT)
Dept: FAMILY MEDICINE CLINIC | Age: 75
End: 2025-07-03
Payer: MEDICARE

## 2025-07-11 ENCOUNTER — TELEPHONE (OUTPATIENT)
Dept: UROLOGY | Age: 75
End: 2025-07-11
Payer: MEDICARE

## 2025-07-11 DIAGNOSIS — R30.0 DYSURIA: Primary | ICD-10-CM

## 2025-07-11 NOTE — TELEPHONE ENCOUNTER
GRACE FROM [facility] CALLED.  PATIENT SAID SHE HAS BURNING WITH URINATION.  SHE IS OUT OF PAIN MEDICATION.  NURSE LOOKED AT THE BOTTLE.  IT WAS FOR HYDROCODONE-ACETAMINOPHEN 5-325 PRESCRIBED ON 04/25/25 FOR QUANTITY #12.      GRACE SAID PATIENT REQUESTS REFILL.  JACKY MANCIA.    #[phone]

## 2025-07-14 NOTE — TELEPHONE ENCOUNTER
Left message for eduarda to call back.     Okay for answering service to relay message:    Per. Dr. Tiwari narcotics cannot be refilled at this time, we will need to send a urine culture.

## 2025-07-14 NOTE — TELEPHONE ENCOUNTER
GRACE CALLED FROM [facility].  Per. Dr. Tiwari narcotics cannot be refilled at this time, we will need to send a urine culture.     GRACE WANTS TO KNOW IF WE ARE GOING TO SEND AN ORDER FOR A URINE CULTURE, OR HOW WE WANT TO DO IT.

## 2025-07-14 NOTE — TELEPHONE ENCOUNTER
I CALLED GRACE AND TOLD HER THERE IS AN ORDER IN HER CHART, AND WE CAN FAX TO HER.  SHE SAID SHE TOLD THE AGENCY THAT SHE NEEDED A CULTURE, AND THEY ARE PUTTING THE ORDER IN.

## 2025-07-18 ENCOUNTER — LAB REQUISITION (OUTPATIENT)
Dept: LAB | Facility: HOSPITAL | Age: 75
End: 2025-07-18
Payer: MEDICARE

## 2025-07-18 ENCOUNTER — RESULTS FOLLOW-UP (OUTPATIENT)
Dept: LAB | Facility: HOSPITAL | Age: 75
End: 2025-07-18
Payer: MEDICARE

## 2025-07-18 DIAGNOSIS — N39.0 URINARY TRACT INFECTION, SITE NOT SPECIFIED: ICD-10-CM

## 2025-07-18 DIAGNOSIS — R30.0 DYSURIA: Primary | ICD-10-CM

## 2025-07-18 LAB
BACTERIA UR QL AUTO: ABNORMAL /HPF
BILIRUB UR QL STRIP: NEGATIVE
CLARITY UR: ABNORMAL
COD CRY URNS QL: ABNORMAL /HPF
COLOR UR: ABNORMAL
GLUCOSE UR STRIP-MCNC: NEGATIVE MG/DL
HGB UR QL STRIP.AUTO: NEGATIVE
KETONES UR QL STRIP: NEGATIVE
LEUKOCYTE ESTERASE UR QL STRIP.AUTO: ABNORMAL
NITRITE UR QL STRIP: POSITIVE
PH UR STRIP.AUTO: 5.5 [PH] (ref 5–8)
PROT UR QL STRIP: NEGATIVE
RBC # UR STRIP: ABNORMAL /HPF
REF LAB TEST METHOD: ABNORMAL
SP GR UR STRIP: 1.02 (ref 1–1.03)
SQUAMOUS #/AREA URNS HPF: ABNORMAL /HPF
UROBILINOGEN UR QL STRIP: ABNORMAL
WBC # UR STRIP: ABNORMAL /HPF

## 2025-07-18 PROCEDURE — 87086 URINE CULTURE/COLONY COUNT: CPT | Performed by: FAMILY MEDICINE

## 2025-07-18 PROCEDURE — 87186 SC STD MICRODIL/AGAR DIL: CPT | Performed by: FAMILY MEDICINE

## 2025-07-18 PROCEDURE — 81001 URINALYSIS AUTO W/SCOPE: CPT | Performed by: FAMILY MEDICINE

## 2025-07-18 PROCEDURE — 87077 CULTURE AEROBIC IDENTIFY: CPT | Performed by: FAMILY MEDICINE

## 2025-07-18 RX ORDER — NITROFURANTOIN 25; 75 MG/1; MG/1
100 CAPSULE ORAL 2 TIMES DAILY
Qty: 10 CAPSULE | Refills: 0 | Status: SHIPPED | OUTPATIENT
Start: 2025-07-18 | End: 2025-07-23

## 2025-07-20 LAB — BACTERIA SPEC AEROBE CULT: ABNORMAL

## 2025-08-04 RX ORDER — FAMOTIDINE 20 MG/1
20 TABLET, FILM COATED ORAL 2 TIMES DAILY
Qty: 60 TABLET | Refills: 1 | Status: SHIPPED | OUTPATIENT
Start: 2025-08-04

## 2025-08-07 ENCOUNTER — LAB REQUISITION (OUTPATIENT)
Dept: LAB | Facility: HOSPITAL | Age: 75
End: 2025-08-07
Payer: MEDICARE

## 2025-08-07 DIAGNOSIS — R30.0 DYSURIA: ICD-10-CM

## 2025-08-07 LAB
BACTERIA UR QL AUTO: ABNORMAL /HPF
BILIRUB UR QL STRIP: NEGATIVE
CLARITY UR: CLEAR
COD CRY URNS QL: ABNORMAL /HPF
COLOR UR: YELLOW
GLUCOSE UR STRIP-MCNC: NEGATIVE MG/DL
HGB UR QL STRIP.AUTO: NEGATIVE
KETONES UR QL STRIP: NEGATIVE
LEUKOCYTE ESTERASE UR QL STRIP.AUTO: ABNORMAL
MUCOUS THREADS URNS QL MICRO: ABNORMAL /HPF
NITRITE UR QL STRIP: NEGATIVE
PH UR STRIP.AUTO: 5.5 [PH] (ref 5–8)
PROT UR QL STRIP: NEGATIVE
RBC # UR STRIP: ABNORMAL /HPF
REF LAB TEST METHOD: ABNORMAL
SP GR UR STRIP: 1.02 (ref 1–1.03)
SQUAMOUS #/AREA URNS HPF: ABNORMAL /HPF
UROBILINOGEN UR QL STRIP: ABNORMAL
WBC # UR STRIP: ABNORMAL /HPF

## 2025-08-07 PROCEDURE — 87186 SC STD MICRODIL/AGAR DIL: CPT | Performed by: UROLOGY

## 2025-08-07 PROCEDURE — 87086 URINE CULTURE/COLONY COUNT: CPT | Performed by: UROLOGY

## 2025-08-07 PROCEDURE — 81001 URINALYSIS AUTO W/SCOPE: CPT | Performed by: UROLOGY

## 2025-08-07 PROCEDURE — 87077 CULTURE AEROBIC IDENTIFY: CPT | Performed by: UROLOGY

## 2025-08-08 ENCOUNTER — TELEPHONE (OUTPATIENT)
Dept: FAMILY MEDICINE CLINIC | Age: 75
End: 2025-08-08
Payer: MEDICARE

## 2025-08-08 DIAGNOSIS — Z12.31 ENCOUNTER FOR SCREENING MAMMOGRAM FOR MALIGNANT NEOPLASM OF BREAST: Primary | ICD-10-CM

## 2025-08-09 LAB — BACTERIA SPEC AEROBE CULT: ABNORMAL

## 2025-08-11 ENCOUNTER — RESULTS FOLLOW-UP (OUTPATIENT)
Dept: LAB | Facility: HOSPITAL | Age: 75
End: 2025-08-11
Payer: MEDICARE

## 2025-08-11 DIAGNOSIS — R30.0 DYSURIA: Primary | ICD-10-CM

## 2025-08-11 RX ORDER — NITROFURANTOIN 25; 75 MG/1; MG/1
100 CAPSULE ORAL 2 TIMES DAILY
Qty: 14 CAPSULE | Refills: 0 | Status: SHIPPED | OUTPATIENT
Start: 2025-08-11 | End: 2025-08-18

## 2025-08-18 ENCOUNTER — OFFICE VISIT (OUTPATIENT)
Dept: GASTROENTEROLOGY | Facility: CLINIC | Age: 75
End: 2025-08-18
Payer: MEDICARE

## 2025-08-18 VITALS
HEART RATE: 71 BPM | HEIGHT: 63 IN | DIASTOLIC BLOOD PRESSURE: 80 MMHG | RESPIRATION RATE: 16 BRPM | BODY MASS INDEX: 30.12 KG/M2 | WEIGHT: 170 LBS | TEMPERATURE: 97.5 F | OXYGEN SATURATION: 95 % | SYSTOLIC BLOOD PRESSURE: 142 MMHG

## 2025-08-18 DIAGNOSIS — K21.00 GASTROESOPHAGEAL REFLUX DISEASE WITH ESOPHAGITIS WITHOUT HEMORRHAGE: ICD-10-CM

## 2025-08-18 DIAGNOSIS — K59.04 CHRONIC IDIOPATHIC CONSTIPATION: Primary | ICD-10-CM

## 2025-08-18 PROCEDURE — 3077F SYST BP >= 140 MM HG: CPT | Performed by: NURSE PRACTITIONER

## 2025-08-18 PROCEDURE — 3079F DIAST BP 80-89 MM HG: CPT | Performed by: NURSE PRACTITIONER

## 2025-08-18 PROCEDURE — 1160F RVW MEDS BY RX/DR IN RCRD: CPT | Performed by: NURSE PRACTITIONER

## 2025-08-18 PROCEDURE — 1159F MED LIST DOCD IN RCRD: CPT | Performed by: NURSE PRACTITIONER

## 2025-08-18 PROCEDURE — 99214 OFFICE O/P EST MOD 30 MIN: CPT | Performed by: NURSE PRACTITIONER

## 2025-08-18 RX ORDER — FAMOTIDINE 20 MG/1
20 TABLET, FILM COATED ORAL 2 TIMES DAILY
Qty: 60 TABLET | Refills: 11 | Status: SHIPPED | OUTPATIENT
Start: 2025-08-18

## 2025-08-19 ENCOUNTER — TELEPHONE (OUTPATIENT)
Dept: GASTROENTEROLOGY | Facility: CLINIC | Age: 75
End: 2025-08-19
Payer: MEDICARE

## 2025-08-28 ENCOUNTER — OFFICE VISIT (OUTPATIENT)
Dept: FAMILY MEDICINE CLINIC | Age: 75
End: 2025-08-28
Payer: MEDICARE

## 2025-08-28 VITALS
HEART RATE: 91 BPM | DIASTOLIC BLOOD PRESSURE: 76 MMHG | HEIGHT: 63 IN | OXYGEN SATURATION: 96 % | BODY MASS INDEX: 29.45 KG/M2 | SYSTOLIC BLOOD PRESSURE: 112 MMHG | TEMPERATURE: 97.9 F | WEIGHT: 166.2 LBS

## 2025-08-28 DIAGNOSIS — R11.0 NAUSEA: ICD-10-CM

## 2025-08-28 DIAGNOSIS — K21.00 GASTROESOPHAGEAL REFLUX DISEASE WITH ESOPHAGITIS WITHOUT HEMORRHAGE: ICD-10-CM

## 2025-08-28 DIAGNOSIS — I10 ESSENTIAL HYPERTENSION: ICD-10-CM

## 2025-08-28 DIAGNOSIS — K21.9 GASTROESOPHAGEAL REFLUX DISEASE WITHOUT ESOPHAGITIS: ICD-10-CM

## 2025-08-28 DIAGNOSIS — F33.1 MAJOR DEPRESSIVE DISORDER, RECURRENT EPISODE, MODERATE DEGREE: ICD-10-CM

## 2025-08-28 DIAGNOSIS — F41.1 GENERALIZED ANXIETY DISORDER: ICD-10-CM

## 2025-08-28 DIAGNOSIS — R25.2 LEG CRAMP: ICD-10-CM

## 2025-08-28 DIAGNOSIS — E11.9 TYPE 2 DIABETES MELLITUS WITHOUT COMPLICATION, WITHOUT LONG-TERM CURRENT USE OF INSULIN: Primary | ICD-10-CM

## 2025-08-28 DIAGNOSIS — I63.9 ACUTE CVA (CEREBROVASCULAR ACCIDENT): ICD-10-CM

## 2025-08-28 RX ORDER — CYCLOBENZAPRINE HCL 5 MG
5 TABLET ORAL NIGHTLY PRN
Qty: 30 TABLET | Refills: 2 | Status: SHIPPED | OUTPATIENT
Start: 2025-08-28

## 2025-08-28 RX ORDER — FAMOTIDINE 20 MG/1
20 TABLET, FILM COATED ORAL 2 TIMES DAILY
Qty: 60 TABLET | Refills: 11 | Status: SHIPPED | OUTPATIENT
Start: 2025-08-28

## 2025-08-28 RX ORDER — ESCITALOPRAM OXALATE 10 MG/1
10 TABLET ORAL DAILY
Qty: 90 TABLET | Refills: 1 | Status: SHIPPED | OUTPATIENT
Start: 2025-08-28

## 2025-08-28 RX ORDER — ONDANSETRON 4 MG/1
4 TABLET, ORALLY DISINTEGRATING ORAL EVERY 8 HOURS PRN
Qty: 20 TABLET | Refills: 0 | Status: SHIPPED | OUTPATIENT
Start: 2025-08-28

## (undated) DEVICE — BLCK/BITE BLOX WO/DENTL/RIM W/STRAP 54F

## (undated) DEVICE — Device

## (undated) DEVICE — CONN JET HYDRA H20 AUXILIARY DISP

## (undated) DEVICE — SOL IRRG H2O PL/BG 1000ML STRL

## (undated) DEVICE — KT VLV BIOP DEFENDO SXN AIR/WATER

## (undated) DEVICE — LINER SURG CANSTR SXN S/RIGD 1500CC

## (undated) DEVICE — DEV INFL CRE STERIFLATE 60CC DISP

## (undated) DEVICE — SOLIDIFIER LIQLOC PLS 1500CC BT

## (undated) DEVICE — BALN DIL ESOPH CRE CONTRL RADL 18/20MM 8CM